# Patient Record
Sex: FEMALE | Race: BLACK OR AFRICAN AMERICAN | NOT HISPANIC OR LATINO | Employment: UNEMPLOYED | URBAN - METROPOLITAN AREA
[De-identification: names, ages, dates, MRNs, and addresses within clinical notes are randomized per-mention and may not be internally consistent; named-entity substitution may affect disease eponyms.]

---

## 2018-03-13 ENCOUNTER — OFFICE VISIT CONVERTED (OUTPATIENT)
Dept: GASTROENTEROLOGY | Facility: CLINIC | Age: 48
End: 2018-03-13
Attending: NURSE PRACTITIONER

## 2018-04-30 ENCOUNTER — CONVERSION ENCOUNTER (OUTPATIENT)
Dept: NEUROLOGY | Facility: CLINIC | Age: 48
End: 2018-04-30

## 2018-04-30 ENCOUNTER — OFFICE VISIT CONVERTED (OUTPATIENT)
Dept: NEUROSURGERY | Facility: CLINIC | Age: 48
End: 2018-04-30
Attending: PHYSICIAN ASSISTANT

## 2019-01-02 ENCOUNTER — OFFICE VISIT CONVERTED (OUTPATIENT)
Dept: CARDIOLOGY | Facility: CLINIC | Age: 49
End: 2019-01-02
Attending: INTERNAL MEDICINE

## 2019-01-02 ENCOUNTER — HOSPITAL ENCOUNTER (OUTPATIENT)
Dept: OTHER | Facility: HOSPITAL | Age: 49
Discharge: HOME | End: 2019-01-02

## 2019-01-02 LAB
ANION GAP SERPL CALC-SCNC: 21 MMOL/L (ref 8–19)
BUN SERPL-MCNC: 16 MG/DL (ref 5–25)
BUN/CREAT SERPL: 21 {RATIO} (ref 6–20)
CALCIUM SERPL-MCNC: 9.6 MG/DL (ref 8.7–10.4)
CHLORIDE SERPL-SCNC: 102 MMOL/L (ref 99–111)
CONV CO2: 24 MMOL/L (ref 22–32)
CREAT UR-MCNC: 0.78 MG/DL (ref 0.5–0.9)
GFR SERPLBLD BASED ON 1.73 SQ M-ARVRAT: >60 ML/MIN/{1.73_M2}
GLUCOSE SERPL-MCNC: 135 MG/DL (ref 65–99)
MAGNESIUM SERPL-MCNC: 1.9 MG/DL (ref 1.6–2.3)
OSMOLALITY SERPL CALC.SUM OF ELEC: 299 MOSM/KG (ref 273–304)
POTASSIUM SERPL-SCNC: 3.8 MMOL/L (ref 3.5–5.3)
SODIUM SERPL-SCNC: 143 MMOL/L (ref 135–147)

## 2019-01-03 LAB
ALBUMIN SERPL-MCNC: 3.3 G/DL (ref 2.9–4.4)
ALBUMIN/GLOB SERPL: 0.7 {RATIO} (ref 0.7–1.7)
ALPHA2 GLOB SERPL ELPH-MCNC: 1.1 G/DL (ref 0.4–1)
BETA GLOBULIN: 1.4 G/DL (ref 0.7–1.3)
CONV ALPHA-1-GLOBULIN: 0.2 G/DL (ref 0–0.4)
CONV IMMUNOGLOBULIN G (IGG): 1735 MG/DL (ref 700–1600)
CONV IMMUNOGLOBULIN M (IGM): 80 MG/DL (ref 26–217)
CONV PE INTERPRETATION: ABNORMAL
CONV PE NOTE: ABNORMAL
CONV TOTAL PROTEIN: 7.9 G/DL (ref 6–8.5)
GAMMA GLOB SERPL ELPH-MCNC: 1.9 G/DL (ref 0.4–1.8)
GLOBULIN UR ELPH-MCNC: 4.6 G/DL (ref 2.2–3.9)
IGA SERPL-MCNC: 427 MG/DL (ref 87–352)
M-SPIKE: ABNORMAL G/DL
PROT PATTERN SERPL IFE-IMP: ABNORMAL

## 2019-01-07 LAB
ALBUMIN 24H MFR UR ELPH: 18.8 %
ALPHA1 GLOB 24H MFR UR ELPH: 3.5 %
ALPHA2 GLOB 24H MFR UR ELPH: 21 %
B-GLOBULIN MFR UR ELPH: 30.2 %
CONV GAMMA GLOBULIN (URINE): 26.5 %
CONV PE 24 HR. INTERPRETATION: NORMAL
CONV PE 24HR. NOTE: NORMAL
M PROTEIN MFR UR ELPH: NORMAL %
PROT 24H UR-MRATE: 88 MG/24 HR (ref 30–150)
PROT UR-MCNC: 10 MG/DL
TOTAL VOLUME: 875 ML

## 2019-01-25 ENCOUNTER — CONVERSION ENCOUNTER (OUTPATIENT)
Dept: CARDIOLOGY | Facility: CLINIC | Age: 49
End: 2019-01-25
Attending: INTERNAL MEDICINE

## 2019-03-28 ENCOUNTER — OFFICE VISIT CONVERTED (OUTPATIENT)
Dept: GASTROENTEROLOGY | Facility: CLINIC | Age: 49
End: 2019-03-28
Attending: NURSE PRACTITIONER

## 2019-04-09 ENCOUNTER — HOSPITAL ENCOUNTER (OUTPATIENT)
Dept: OTHER | Facility: HOSPITAL | Age: 49
Discharge: HOME | End: 2019-04-09

## 2019-04-09 LAB
ALBUMIN SERPL-MCNC: 3.8 G/DL (ref 3.5–5)
ALBUMIN/GLOB SERPL: 0.9 {RATIO} (ref 1.4–2.6)
ALP SERPL-CCNC: 103 U/L (ref 42–98)
ALT SERPL-CCNC: 17 U/L (ref 10–40)
ANION GAP SERPL CALC-SCNC: 16 MMOL/L (ref 8–19)
APPEARANCE UR: ABNORMAL
AST SERPL-CCNC: 26 U/L (ref 15–50)
BASOPHILS # BLD AUTO: 0.03 10*3/UL (ref 0–0.2)
BASOPHILS NFR BLD AUTO: 0.5 % (ref 0–3)
BILIRUB SERPL-MCNC: 0.38 MG/DL (ref 0.2–1.3)
BILIRUB UR QL: NEGATIVE
BUN SERPL-MCNC: 10 MG/DL (ref 5–25)
BUN/CREAT SERPL: 13 {RATIO} (ref 6–20)
CALCIUM SERPL-MCNC: 9.5 MG/DL (ref 8.7–10.4)
CASTS URNS QL MICRO: ABNORMAL /[LPF]
CHLORIDE SERPL-SCNC: 99 MMOL/L (ref 99–111)
CHOLEST SERPL-MCNC: 196 MG/DL (ref 107–200)
CHOLEST/HDLC SERPL: 5.3 {RATIO} (ref 3–6)
COLOR UR: ABNORMAL
CONV ABS IMM GRAN: 0.01 10*3/UL (ref 0–0.2)
CONV BACTERIA: ABNORMAL
CONV CO2: 26 MMOL/L (ref 22–32)
CONV COLLECTION SOURCE (UA): ABNORMAL
CONV CREATININE URINE, RANDOM: 242 MG/DL (ref 10–300)
CONV HYALINE CASTS IN URINE MICRO: ABNORMAL /[LPF]
CONV IMMATURE GRAN: 0.2 % (ref 0–1.8)
CONV MICROALBUM.,U,RANDOM: <12 MG/L (ref 0–20)
CONV TOTAL PROTEIN: 8.2 G/DL (ref 6.3–8.2)
CONV UROBILINOGEN IN URINE BY AUTOMATED TEST STRIP: 1 {EHRLICHU}/DL (ref 0.1–1)
CREAT UR-MCNC: 0.76 MG/DL (ref 0.5–0.9)
DEPRECATED RDW RBC AUTO: 46.5 FL (ref 36.4–46.3)
EOSINOPHIL # BLD AUTO: 0.24 10*3/UL (ref 0–0.7)
EOSINOPHIL # BLD AUTO: 4.3 % (ref 0–7)
ERYTHROCYTE [DISTWIDTH] IN BLOOD BY AUTOMATED COUNT: 14.3 % (ref 11.7–14.4)
EST. AVERAGE GLUCOSE BLD GHB EST-MCNC: 117 MG/DL
GFR SERPLBLD BASED ON 1.73 SQ M-ARVRAT: >60 ML/MIN/{1.73_M2}
GLOBULIN UR ELPH-MCNC: 4.4 G/DL (ref 2–3.5)
GLUCOSE SERPL-MCNC: 172 MG/DL (ref 65–99)
GLUCOSE UR QL: NEGATIVE MG/DL
HBA1C MFR BLD: 12.2 G/DL (ref 12–16)
HBA1C MFR BLD: 5.7 % (ref 3.5–5.7)
HCT VFR BLD AUTO: 38.7 % (ref 37–47)
HDLC SERPL-MCNC: 37 MG/DL (ref 40–60)
HGB UR QL STRIP: NEGATIVE
KETONES UR QL STRIP: NEGATIVE MG/DL
LDLC SERPL CALC-MCNC: 101 MG/DL (ref 70–100)
LEUKOCYTE ESTERASE UR QL STRIP: NEGATIVE
LYMPHOCYTES # BLD AUTO: 1.54 10*3/UL (ref 1–5)
MCH RBC QN AUTO: 27.9 PG (ref 27–31)
MCHC RBC AUTO-ENTMCNC: 31.5 G/DL (ref 33–37)
MCV RBC AUTO: 88.4 FL (ref 81–99)
MICROALBUMIN/CREAT UR: 5 MG/G{CRE} (ref 0–35)
MONOCYTES # BLD AUTO: 0.35 10*3/UL (ref 0.2–1.2)
MONOCYTES NFR BLD AUTO: 6.3 % (ref 3–10)
NEUTROPHILS # BLD AUTO: 3.35 10*3/UL (ref 2–8)
NEUTROPHILS NFR BLD AUTO: 60.8 % (ref 30–85)
NITRITE UR QL STRIP: NEGATIVE
NRBC CBCN: 0 % (ref 0–0.7)
OSMOLALITY SERPL CALC.SUM OF ELEC: 289 MOSM/KG (ref 273–304)
PH UR STRIP.AUTO: 5.5 [PH] (ref 5–8)
PLATELET # BLD AUTO: 242 10*3/UL (ref 130–400)
PMV BLD AUTO: 11.1 FL (ref 9.4–12.3)
POTASSIUM SERPL-SCNC: 3.4 MMOL/L (ref 3.5–5.3)
PROT UR QL: NEGATIVE MG/DL
RBC # BLD AUTO: 4.38 10*6/UL (ref 4.2–5.4)
RBC #/AREA URNS HPF: ABNORMAL /[HPF]
SODIUM SERPL-SCNC: 138 MMOL/L (ref 135–147)
SP GR UR: 1.02 (ref 1–1.03)
SQUAMOUS SPT QL MICRO: ABNORMAL /[HPF]
T4 FREE SERPL-MCNC: 1 NG/DL (ref 0.9–1.8)
TRIGL SERPL-MCNC: 290 MG/DL (ref 40–150)
TSH SERPL-ACNC: 2.06 M[IU]/L (ref 0.27–4.2)
VARIANT LYMPHS NFR BLD MANUAL: 27.9 % (ref 20–45)
VLDLC SERPL-MCNC: 58 MG/DL (ref 5–37)
WBC # BLD AUTO: 5.52 10*3/UL (ref 4.8–10.8)
WBC #/AREA URNS HPF: ABNORMAL /[HPF]

## 2019-04-26 ENCOUNTER — HOSPITAL ENCOUNTER (OUTPATIENT)
Dept: GASTROENTEROLOGY | Facility: HOSPITAL | Age: 49
Setting detail: HOSPITAL OUTPATIENT SURGERY
Discharge: HOME | End: 2019-04-26
Attending: INTERNAL MEDICINE

## 2019-04-26 LAB — GLUCOSE BLD-MCNC: 118 MG/DL (ref 65–99)

## 2019-05-15 ENCOUNTER — HOSPITAL ENCOUNTER (OUTPATIENT)
Dept: GASTROENTEROLOGY | Facility: HOSPITAL | Age: 49
Setting detail: HOSPITAL OUTPATIENT SURGERY
Discharge: HOME | End: 2019-05-15
Attending: INTERNAL MEDICINE

## 2019-06-17 ENCOUNTER — OFFICE VISIT CONVERTED (OUTPATIENT)
Dept: GASTROENTEROLOGY | Facility: CLINIC | Age: 49
End: 2019-06-17
Attending: NURSE PRACTITIONER

## 2019-07-03 ENCOUNTER — CONVERSION ENCOUNTER (OUTPATIENT)
Dept: SURGERY | Facility: CLINIC | Age: 49
End: 2019-07-03

## 2019-07-03 ENCOUNTER — OFFICE VISIT CONVERTED (OUTPATIENT)
Dept: SURGERY | Facility: CLINIC | Age: 49
End: 2019-07-03
Attending: SURGERY

## 2019-08-19 ENCOUNTER — HOSPITAL ENCOUNTER (OUTPATIENT)
Dept: OTHER | Facility: HOSPITAL | Age: 49
Discharge: HOME | End: 2019-08-19

## 2019-08-19 LAB
ALBUMIN SERPL-MCNC: 3.7 G/DL (ref 3.5–5)
ALBUMIN/GLOB SERPL: 0.9 {RATIO} (ref 1.4–2.6)
ALP SERPL-CCNC: 111 U/L (ref 42–98)
ALT SERPL-CCNC: 20 U/L (ref 10–40)
ANION GAP SERPL CALC-SCNC: 23 MMOL/L (ref 8–19)
AST SERPL-CCNC: 49 U/L (ref 15–50)
BASOPHILS # BLD AUTO: 0.02 10*3/UL (ref 0–0.2)
BASOPHILS NFR BLD AUTO: 0.4 % (ref 0–3)
BILIRUB SERPL-MCNC: 0.41 MG/DL (ref 0.2–1.3)
BUN SERPL-MCNC: 9 MG/DL (ref 5–25)
BUN/CREAT SERPL: 14 {RATIO} (ref 6–20)
CALCIUM SERPL-MCNC: 9.7 MG/DL (ref 8.7–10.4)
CHLORIDE SERPL-SCNC: 103 MMOL/L (ref 99–111)
CHOLEST SERPL-MCNC: 181 MG/DL (ref 107–200)
CHOLEST/HDLC SERPL: 5.7 {RATIO} (ref 3–6)
CONV ABS IMM GRAN: 0.01 10*3/UL (ref 0–0.2)
CONV CO2: 22 MMOL/L (ref 22–32)
CONV IMMATURE GRAN: 0.2 % (ref 0–1.8)
CONV TOTAL PROTEIN: 7.9 G/DL (ref 6.3–8.2)
CREAT UR-MCNC: 0.63 MG/DL (ref 0.5–0.9)
DEPRECATED RDW RBC AUTO: 47.1 FL (ref 36.4–46.3)
EOSINOPHIL # BLD AUTO: 0.18 10*3/UL (ref 0–0.7)
EOSINOPHIL # BLD AUTO: 3.4 % (ref 0–7)
ERYTHROCYTE [DISTWIDTH] IN BLOOD BY AUTOMATED COUNT: 14.7 % (ref 11.7–14.4)
EST. AVERAGE GLUCOSE BLD GHB EST-MCNC: 163 MG/DL
GFR SERPLBLD BASED ON 1.73 SQ M-ARVRAT: >60 ML/MIN/{1.73_M2}
GLOBULIN UR ELPH-MCNC: 4.2 G/DL (ref 2–3.5)
GLUCOSE SERPL-MCNC: 192 MG/DL (ref 65–99)
HBA1C MFR BLD: 7.3 % (ref 3.5–5.7)
HCT VFR BLD AUTO: 37.3 % (ref 37–47)
HDLC SERPL-MCNC: 32 MG/DL (ref 40–60)
HGB BLD-MCNC: 12.2 G/DL (ref 12–16)
LDLC SERPL CALC-MCNC: 97 MG/DL (ref 70–100)
LYMPHOCYTES # BLD AUTO: 1.33 10*3/UL (ref 1–5)
LYMPHOCYTES NFR BLD AUTO: 25.2 % (ref 20–45)
MCH RBC QN AUTO: 28.7 PG (ref 27–31)
MCHC RBC AUTO-ENTMCNC: 32.7 G/DL (ref 33–37)
MCV RBC AUTO: 87.8 FL (ref 81–99)
MONOCYTES # BLD AUTO: 0.26 10*3/UL (ref 0.2–1.2)
MONOCYTES NFR BLD AUTO: 4.9 % (ref 3–10)
NEUTROPHILS # BLD AUTO: 3.47 10*3/UL (ref 2–8)
NEUTROPHILS NFR BLD AUTO: 65.9 % (ref 30–85)
NRBC CBCN: 0 % (ref 0–0.7)
OSMOLALITY SERPL CALC.SUM OF ELEC: 302 MOSM/KG (ref 273–304)
PLATELET # BLD AUTO: 226 10*3/UL (ref 130–400)
PMV BLD AUTO: 11.1 FL (ref 9.4–12.3)
POTASSIUM SERPL-SCNC: 3.8 MMOL/L (ref 3.5–5.3)
RBC # BLD AUTO: 4.25 10*6/UL (ref 4.2–5.4)
SODIUM SERPL-SCNC: 144 MMOL/L (ref 135–147)
TRIGL SERPL-MCNC: 260 MG/DL (ref 40–150)
VLDLC SERPL-MCNC: 52 MG/DL (ref 5–37)
WBC # BLD AUTO: 5.27 10*3/UL (ref 4.8–10.8)

## 2019-08-20 LAB
ALBUMIN SERPL-MCNC: 2.9 G/DL (ref 2.9–4.4)
ALBUMIN/GLOB SERPL: 0.6 {RATIO} (ref 0.7–1.7)
ALPHA2 GLOB SERPL ELPH-MCNC: 1 G/DL (ref 0.4–1)
BETA GLOBULIN: 1.5 G/DL (ref 0.7–1.3)
CONV ALPHA-1-GLOBULIN: 0.2 G/DL (ref 0–0.4)
CONV IMMUNOGLOBULIN G (IGG): 1802 MG/DL (ref 700–1600)
CONV IMMUNOGLOBULIN M (IGM): 64 MG/DL (ref 26–217)
CONV PE INTERPRETATION: ABNORMAL
CONV PE NOTE: ABNORMAL
CONV TOTAL PROTEIN: 7.4 G/DL (ref 6–8.5)
GAMMA GLOB SERPL ELPH-MCNC: 1.7 G/DL (ref 0.4–1.8)
GLOBULIN UR ELPH-MCNC: 4.5 G/DL (ref 2.2–3.9)
IGA SERPL-MCNC: 425 MG/DL (ref 87–352)
M-SPIKE: ABNORMAL G/DL
PROT PATTERN SERPL IFE-IMP: ABNORMAL

## 2019-09-20 ENCOUNTER — HOSPITAL ENCOUNTER (OUTPATIENT)
Dept: URGENT CARE | Facility: CLINIC | Age: 49
Discharge: HOME | End: 2019-09-20

## 2019-10-05 ENCOUNTER — HOSPITAL ENCOUNTER (OUTPATIENT)
Dept: URGENT CARE | Facility: CLINIC | Age: 49
Discharge: HOME | End: 2019-10-05
Attending: PHYSICIAN ASSISTANT

## 2019-10-05 LAB — GLUCOSE BLD-MCNC: 465 MG/DL (ref 65–99)

## 2019-12-08 ENCOUNTER — HOSPITAL ENCOUNTER (OUTPATIENT)
Dept: URGENT CARE | Facility: CLINIC | Age: 49
Discharge: HOME | End: 2019-12-08

## 2021-05-15 VITALS — RESPIRATION RATE: 16 BRPM | HEIGHT: 68 IN | BODY MASS INDEX: 33.08 KG/M2 | WEIGHT: 218.25 LBS

## 2021-05-15 VITALS
BODY MASS INDEX: 40.09 KG/M2 | HEIGHT: 63 IN | SYSTOLIC BLOOD PRESSURE: 158 MMHG | WEIGHT: 226.25 LBS | DIASTOLIC BLOOD PRESSURE: 104 MMHG

## 2021-05-15 VITALS
SYSTOLIC BLOOD PRESSURE: 130 MMHG | DIASTOLIC BLOOD PRESSURE: 82 MMHG | BODY MASS INDEX: 38.71 KG/M2 | WEIGHT: 218.5 LBS | HEIGHT: 63 IN

## 2021-05-16 VITALS — HEART RATE: 95 BPM | BODY MASS INDEX: 41.22 KG/M2 | HEIGHT: 62 IN | WEIGHT: 224 LBS

## 2021-05-16 VITALS
SYSTOLIC BLOOD PRESSURE: 114 MMHG | HEART RATE: 76 BPM | HEIGHT: 63 IN | DIASTOLIC BLOOD PRESSURE: 88 MMHG | BODY MASS INDEX: 40.4 KG/M2 | WEIGHT: 228 LBS

## 2021-05-16 VITALS
DIASTOLIC BLOOD PRESSURE: 91 MMHG | SYSTOLIC BLOOD PRESSURE: 134 MMHG | HEIGHT: 62 IN | WEIGHT: 221.12 LBS | BODY MASS INDEX: 40.69 KG/M2

## 2021-06-24 PROBLEM — I10 HYPERTENSION: Status: ACTIVE | Noted: 2021-06-24

## 2021-06-24 PROBLEM — E78.5 HYPERLIPEMIA: Status: ACTIVE | Noted: 2021-06-24

## 2021-06-24 PROBLEM — E78.2 MIXED HYPERLIPIDEMIA: Status: ACTIVE | Noted: 2021-06-24

## 2021-06-24 PROBLEM — I51.7 MILD LEFT VENTRICULAR HYPERTROPHY: Status: ACTIVE | Noted: 2021-06-24

## 2021-06-24 PROBLEM — R07.89 ATYPICAL CHEST PAIN: Status: ACTIVE | Noted: 2021-06-24

## 2021-08-11 ENCOUNTER — TRANSCRIBE ORDERS (OUTPATIENT)
Dept: ADMINISTRATIVE | Facility: HOSPITAL | Age: 51
End: 2021-08-11

## 2021-08-11 DIAGNOSIS — Z12.31 VISIT FOR SCREENING MAMMOGRAM: Primary | ICD-10-CM

## 2021-08-18 ENCOUNTER — APPOINTMENT (OUTPATIENT)
Dept: MAMMOGRAPHY | Facility: HOSPITAL | Age: 51
End: 2021-08-18

## 2021-12-27 ENCOUNTER — LAB (OUTPATIENT)
Dept: LAB | Facility: HOSPITAL | Age: 51
End: 2021-12-27

## 2021-12-27 ENCOUNTER — TRANSCRIBE ORDERS (OUTPATIENT)
Dept: ADMINISTRATIVE | Facility: HOSPITAL | Age: 51
End: 2021-12-27

## 2021-12-27 DIAGNOSIS — I10 ESSENTIAL HYPERTENSION: Primary | ICD-10-CM

## 2021-12-27 DIAGNOSIS — E11.9 TYPE 2 DIABETES MELLITUS WITHOUT COMPLICATION, UNSPECIFIED WHETHER LONG TERM INSULIN USE (HCC): ICD-10-CM

## 2021-12-27 DIAGNOSIS — I10 ESSENTIAL HYPERTENSION: ICD-10-CM

## 2021-12-27 LAB
ALBUMIN SERPL-MCNC: 3.7 G/DL (ref 3.5–5.2)
ALBUMIN/GLOB SERPL: 0.9 G/DL
ALP SERPL-CCNC: 115 U/L (ref 39–117)
ALT SERPL W P-5'-P-CCNC: 18 U/L (ref 1–33)
ANION GAP SERPL CALCULATED.3IONS-SCNC: 9 MMOL/L (ref 5–15)
AST SERPL-CCNC: 32 U/L (ref 1–32)
BASOPHILS # BLD AUTO: 0.02 10*3/MM3 (ref 0–0.2)
BASOPHILS NFR BLD AUTO: 0.3 % (ref 0–1.5)
BILIRUB SERPL-MCNC: 0.5 MG/DL (ref 0–1.2)
BUN SERPL-MCNC: 13 MG/DL (ref 6–20)
BUN/CREAT SERPL: 20 (ref 7–25)
CALCIUM SPEC-SCNC: 8.9 MG/DL (ref 8.6–10.5)
CHLORIDE SERPL-SCNC: 105 MMOL/L (ref 98–107)
CHOLEST SERPL-MCNC: 177 MG/DL (ref 0–200)
CO2 SERPL-SCNC: 28 MMOL/L (ref 22–29)
CREAT SERPL-MCNC: 0.65 MG/DL (ref 0.57–1)
DEPRECATED RDW RBC AUTO: 53.8 FL (ref 37–54)
EOSINOPHIL # BLD AUTO: 0.14 10*3/MM3 (ref 0–0.4)
EOSINOPHIL NFR BLD AUTO: 2.4 % (ref 0.3–6.2)
ERYTHROCYTE [DISTWIDTH] IN BLOOD BY AUTOMATED COUNT: 15.4 % (ref 12.3–15.4)
GFR SERPL CREATININE-BSD FRML MDRD: 116 ML/MIN/1.73
GLOBULIN UR ELPH-MCNC: 3.9 GM/DL
GLUCOSE SERPL-MCNC: 147 MG/DL (ref 65–99)
HBA1C MFR BLD: 6.39 % (ref 4.8–5.6)
HCT VFR BLD AUTO: 31.7 % (ref 34–46.6)
HDLC SERPL-MCNC: 31 MG/DL (ref 40–60)
HGB BLD-MCNC: 11.2 G/DL (ref 12–15.9)
IMM GRANULOCYTES # BLD AUTO: 0.03 10*3/MM3 (ref 0–0.05)
IMM GRANULOCYTES NFR BLD AUTO: 0.5 % (ref 0–0.5)
LDLC SERPL CALC-MCNC: 105 MG/DL (ref 0–100)
LDLC/HDLC SERPL: 3.19 {RATIO}
LYMPHOCYTES # BLD AUTO: 1.33 10*3/MM3 (ref 0.7–3.1)
LYMPHOCYTES NFR BLD AUTO: 22.7 % (ref 19.6–45.3)
MCH RBC QN AUTO: 34.5 PG (ref 26.6–33)
MCHC RBC AUTO-ENTMCNC: 35.3 G/DL (ref 31.5–35.7)
MCV RBC AUTO: 97.5 FL (ref 79–97)
MONOCYTES # BLD AUTO: 0.39 10*3/MM3 (ref 0.1–0.9)
MONOCYTES NFR BLD AUTO: 6.7 % (ref 5–12)
NEUTROPHILS NFR BLD AUTO: 3.94 10*3/MM3 (ref 1.7–7)
NEUTROPHILS NFR BLD AUTO: 67.4 % (ref 42.7–76)
NRBC BLD AUTO-RTO: 0 /100 WBC (ref 0–0.2)
PLATELET # BLD AUTO: 245 10*3/MM3 (ref 140–450)
PMV BLD AUTO: 10.8 FL (ref 6–12)
POTASSIUM SERPL-SCNC: 3.9 MMOL/L (ref 3.5–5.2)
PROT SERPL-MCNC: 7.6 G/DL (ref 6–8.5)
RBC # BLD AUTO: 3.25 10*6/MM3 (ref 3.77–5.28)
SODIUM SERPL-SCNC: 142 MMOL/L (ref 136–145)
TRIGL SERPL-MCNC: 235 MG/DL (ref 0–150)
VLDLC SERPL-MCNC: 41 MG/DL (ref 5–40)
WBC NRBC COR # BLD: 5.85 10*3/MM3 (ref 3.4–10.8)

## 2021-12-27 PROCEDURE — 36415 COLL VENOUS BLD VENIPUNCTURE: CPT

## 2021-12-27 PROCEDURE — 83036 HEMOGLOBIN GLYCOSYLATED A1C: CPT

## 2021-12-27 PROCEDURE — 80053 COMPREHEN METABOLIC PANEL: CPT

## 2021-12-27 PROCEDURE — 85025 COMPLETE CBC W/AUTO DIFF WBC: CPT

## 2021-12-27 PROCEDURE — 80061 LIPID PANEL: CPT

## 2022-02-05 ENCOUNTER — APPOINTMENT (OUTPATIENT)
Dept: GENERAL RADIOLOGY | Facility: HOSPITAL | Age: 52
End: 2022-02-05

## 2022-02-05 ENCOUNTER — HOSPITAL ENCOUNTER (EMERGENCY)
Facility: HOSPITAL | Age: 52
Discharge: HOME | End: 2022-02-05
Attending: EMERGENCY MEDICINE | Admitting: EMERGENCY MEDICINE

## 2022-02-05 VITALS
OXYGEN SATURATION: 100 % | TEMPERATURE: 98 F | DIASTOLIC BLOOD PRESSURE: 89 MMHG | BODY MASS INDEX: 33.15 KG/M2 | WEIGHT: 218.7 LBS | HEART RATE: 93 BPM | SYSTOLIC BLOOD PRESSURE: 121 MMHG | HEIGHT: 68 IN | RESPIRATION RATE: 22 BRPM

## 2022-02-05 DIAGNOSIS — E87.6 HYPOKALEMIA: ICD-10-CM

## 2022-02-05 DIAGNOSIS — K21.9 CHEST PAIN DUE TO GERD: Primary | ICD-10-CM

## 2022-02-05 DIAGNOSIS — R07.9 CHEST PAIN DUE TO GERD: Primary | ICD-10-CM

## 2022-02-05 LAB
ALBUMIN SERPL-MCNC: 3.4 G/DL (ref 3.5–5.2)
ALBUMIN/GLOB SERPL: 0.9 G/DL
ALP SERPL-CCNC: 119 U/L (ref 39–117)
ALT SERPL W P-5'-P-CCNC: 16 U/L (ref 1–33)
ANION GAP SERPL CALCULATED.3IONS-SCNC: 12.1 MMOL/L (ref 5–15)
AST SERPL-CCNC: 36 U/L (ref 1–32)
BASOPHILS # BLD AUTO: 0.03 10*3/MM3 (ref 0–0.2)
BASOPHILS NFR BLD AUTO: 0.7 % (ref 0–1.5)
BILIRUB SERPL-MCNC: 0.3 MG/DL (ref 0–1.2)
BUN SERPL-MCNC: 11 MG/DL (ref 6–20)
BUN/CREAT SERPL: 19 (ref 7–25)
CALCIUM SPEC-SCNC: 8.4 MG/DL (ref 8.6–10.5)
CHLORIDE SERPL-SCNC: 105 MMOL/L (ref 98–107)
CK MB SERPL-CCNC: <1 NG/ML
CK SERPL-CCNC: 42 U/L (ref 20–180)
CO2 SERPL-SCNC: 21.9 MMOL/L (ref 22–29)
CREAT SERPL-MCNC: 0.58 MG/DL (ref 0.57–1)
DEPRECATED RDW RBC AUTO: 56.1 FL (ref 37–54)
EOSINOPHIL # BLD AUTO: 0.14 10*3/MM3 (ref 0–0.4)
EOSINOPHIL NFR BLD AUTO: 3.3 % (ref 0.3–6.2)
ERYTHROCYTE [DISTWIDTH] IN BLOOD BY AUTOMATED COUNT: 15 % (ref 12.3–15.4)
GFR SERPL CREATININE-BSD FRML MDRD: 133 ML/MIN/1.73
GLOBULIN UR ELPH-MCNC: 3.8 GM/DL
GLUCOSE SERPL-MCNC: 154 MG/DL (ref 65–99)
HCT VFR BLD AUTO: 32.3 % (ref 34–46.6)
HGB BLD-MCNC: 11.1 G/DL (ref 12–15.9)
HOLD SPECIMEN: NORMAL
IMM GRANULOCYTES # BLD AUTO: 0.01 10*3/MM3 (ref 0–0.05)
IMM GRANULOCYTES NFR BLD AUTO: 0.2 % (ref 0–0.5)
LIPASE SERPL-CCNC: 23 U/L (ref 13–60)
LYMPHOCYTES # BLD AUTO: 1.27 10*3/MM3 (ref 0.7–3.1)
LYMPHOCYTES NFR BLD AUTO: 29.9 % (ref 19.6–45.3)
MAGNESIUM SERPL-MCNC: 1.7 MG/DL (ref 1.6–2.6)
MCH RBC QN AUTO: 35.1 PG (ref 26.6–33)
MCHC RBC AUTO-ENTMCNC: 34.4 G/DL (ref 31.5–35.7)
MCV RBC AUTO: 102.2 FL (ref 79–97)
MONOCYTES # BLD AUTO: 0.22 10*3/MM3 (ref 0.1–0.9)
MONOCYTES NFR BLD AUTO: 5.2 % (ref 5–12)
NEUTROPHILS NFR BLD AUTO: 2.58 10*3/MM3 (ref 1.7–7)
NEUTROPHILS NFR BLD AUTO: 60.7 % (ref 42.7–76)
NRBC BLD AUTO-RTO: 0 /100 WBC (ref 0–0.2)
NT-PROBNP SERPL-MCNC: 13.5 PG/ML (ref 0–900)
PLATELET # BLD AUTO: 228 10*3/MM3 (ref 140–450)
PMV BLD AUTO: 10.4 FL (ref 6–12)
POTASSIUM SERPL-SCNC: 3.1 MMOL/L (ref 3.5–5.2)
PROT SERPL-MCNC: 7.2 G/DL (ref 6–8.5)
QT INTERVAL: 414 MS
RBC # BLD AUTO: 3.16 10*6/MM3 (ref 3.77–5.28)
SODIUM SERPL-SCNC: 139 MMOL/L (ref 136–145)
TROPONIN I SERPL-MCNC: 0 NG/ML (ref 0–0.6)
TROPONIN I SERPL-MCNC: 0 NG/ML (ref 0–0.6)
WBC NRBC COR # BLD: 4.25 10*3/MM3 (ref 3.4–10.8)
WHOLE BLOOD HOLD SPECIMEN: NORMAL
WHOLE BLOOD HOLD SPECIMEN: NORMAL

## 2022-02-05 PROCEDURE — 99284 EMERGENCY DEPT VISIT MOD MDM: CPT

## 2022-02-05 PROCEDURE — 82553 CREATINE MB FRACTION: CPT

## 2022-02-05 PROCEDURE — 85025 COMPLETE CBC W/AUTO DIFF WBC: CPT

## 2022-02-05 PROCEDURE — 93005 ELECTROCARDIOGRAM TRACING: CPT

## 2022-02-05 PROCEDURE — 71045 X-RAY EXAM CHEST 1 VIEW: CPT

## 2022-02-05 PROCEDURE — 84484 ASSAY OF TROPONIN QUANT: CPT

## 2022-02-05 PROCEDURE — 83735 ASSAY OF MAGNESIUM: CPT

## 2022-02-05 PROCEDURE — 25010000002 MORPHINE PER 10 MG: Performed by: EMERGENCY MEDICINE

## 2022-02-05 PROCEDURE — 96374 THER/PROPH/DIAG INJ IV PUSH: CPT

## 2022-02-05 PROCEDURE — 83690 ASSAY OF LIPASE: CPT

## 2022-02-05 PROCEDURE — 96375 TX/PRO/DX INJ NEW DRUG ADDON: CPT

## 2022-02-05 PROCEDURE — 83880 ASSAY OF NATRIURETIC PEPTIDE: CPT

## 2022-02-05 PROCEDURE — 80053 COMPREHEN METABOLIC PANEL: CPT

## 2022-02-05 PROCEDURE — 93010 ELECTROCARDIOGRAM REPORT: CPT | Performed by: SPECIALIST

## 2022-02-05 PROCEDURE — 25010000002 ONDANSETRON PER 1 MG: Performed by: EMERGENCY MEDICINE

## 2022-02-05 PROCEDURE — 82550 ASSAY OF CK (CPK): CPT

## 2022-02-05 PROCEDURE — 93005 ELECTROCARDIOGRAM TRACING: CPT | Performed by: EMERGENCY MEDICINE

## 2022-02-05 RX ORDER — SODIUM CHLORIDE 0.9 % (FLUSH) 0.9 %
10 SYRINGE (ML) INJECTION AS NEEDED
Status: DISCONTINUED | OUTPATIENT
Start: 2022-02-05 | End: 2022-02-05 | Stop reason: HOSPADM

## 2022-02-05 RX ORDER — POTASSIUM CHLORIDE 750 MG/1
20 CAPSULE, EXTENDED RELEASE ORAL ONCE
Status: COMPLETED | OUTPATIENT
Start: 2022-02-05 | End: 2022-02-05

## 2022-02-05 RX ORDER — POTASSIUM CHLORIDE 750 MG/1
10 TABLET, FILM COATED, EXTENDED RELEASE ORAL 2 TIMES DAILY
Qty: 14 TABLET | Refills: 0 | Status: SHIPPED | OUTPATIENT
Start: 2022-02-05 | End: 2022-07-19

## 2022-02-05 RX ORDER — ONDANSETRON 2 MG/ML
4 INJECTION INTRAMUSCULAR; INTRAVENOUS ONCE
Status: COMPLETED | OUTPATIENT
Start: 2022-02-05 | End: 2022-02-05

## 2022-02-05 RX ORDER — ALUMINA, MAGNESIA, AND SIMETHICONE 2400; 2400; 240 MG/30ML; MG/30ML; MG/30ML
15 SUSPENSION ORAL ONCE
Status: COMPLETED | OUTPATIENT
Start: 2022-02-05 | End: 2022-02-05

## 2022-02-05 RX ORDER — LIDOCAINE HYDROCHLORIDE 20 MG/ML
15 SOLUTION OROPHARYNGEAL ONCE
Status: COMPLETED | OUTPATIENT
Start: 2022-02-05 | End: 2022-02-05

## 2022-02-05 RX ADMIN — ONDANSETRON 4 MG: 2 INJECTION INTRAMUSCULAR; INTRAVENOUS at 10:37

## 2022-02-05 RX ADMIN — MORPHINE SULFATE 4 MG: 4 INJECTION, SOLUTION INTRAMUSCULAR; INTRAVENOUS at 10:39

## 2022-02-05 RX ADMIN — POTASSIUM CHLORIDE 20 MEQ: 10 CAPSULE, COATED, EXTENDED RELEASE ORAL at 08:50

## 2022-02-05 RX ADMIN — ALUMINUM HYDROXIDE, MAGNESIUM HYDROXIDE, AND DIMETHICONE 15 ML: 400; 400; 40 SUSPENSION ORAL at 08:51

## 2022-02-05 RX ADMIN — LIDOCAINE HYDROCHLORIDE 15 ML: 20 SOLUTION ORAL; TOPICAL at 08:53

## 2022-02-05 NOTE — ED PROVIDER NOTES
"Salvador Abarca is a 51 y.o. female that presents to the ED via EMS with complaints of CP that started last night. The pain is still present and has been constant. Nothing improves or worsens symptoms. The CP is located to the center and left-side of the chest and radiates down her arms. The pain is described as \"prickly\". Pt notes that she took Seroquel before the pain started, noting that this is not a new medication.     Pt c/o nausea. She reports lower back pain stating that she does have back issues normally. Pt denies cough and SOB. She denies any illness over the last month until today.     Pt denies hx of cardiac stents and MI. 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 Pt has hx of GERD and takes omeprazole. She adds that she is anemic and receives B12 shots.       History provided by:  Patient      Review of Systems   Constitutional: Negative for chills, diaphoresis and fever.   HENT: Negative for ear discharge and nosebleeds.    Eyes: Negative for photophobia.   Respiratory: Negative for cough and shortness of breath.    Cardiovascular: Positive for chest pain. Negative for leg swelling.   Gastrointestinal: Negative for diarrhea, nausea and vomiting.   Genitourinary: Negative for dysuria.   Musculoskeletal: Positive for back pain (lower back pain). Negative for neck pain.   Skin: Negative for rash.   Neurological: Negative for headaches.   All other systems reviewed and are negative.      Past Medical History:   Diagnosis Date   • Anemia    • Arthritis    • Diabetes (HCC)    • Essential hypertension 6/24/2021   • History of pulmonary embolism    • Lumbago     Low back pain   • Mild left ventricular hypertrophy 6/24/2021   • Mixed hyperlipidemia 6/24/2021   • Obstructive sleep apnea    • Reflux esophagitis    • Seasonal allergies        Allergies   Allergen Reactions   • Tramadol Hcl Anaphylaxis   • Sulfa Antibiotics Unknown - Low Severity "       Past Surgical History:   Procedure Laterality Date   • APPENDECTOMY     •  SECTION      , , ,    • COLONOSCOPY      2019    • ENDOSCOPY  2019   • GASTRIC SLEEVE LAPAROSCOPIC      Lap Band Surgery   • HERNIA REPAIR      , , ,    • HYSTERECTOMY     • OTHER SURGICAL HISTORY      Metal implants       Family History   Problem Relation Age of Onset   • Heart disease Mother    • Diabetes Mother         Unspecified type   • Arthritis Mother    • Heart disease Father    • Diabetes Son         Unspecified type       Social History     Socioeconomic History   • Marital status:    Tobacco Use   • Smoking status: Current Every Day Smoker     Packs/day: 0.50   • Tobacco comment: Smoked 11-20 years   Substance and Sexual Activity   • Alcohol use: Yes     Comment: Occasionally drinks, less than 1 drink per day, has been drinking for less than 1 year   • Drug use: Never         Objective   Physical Exam  Vitals and nursing note reviewed.   Constitutional:       General: She is not in acute distress.     Comments: Appears mildly uncomfortable.    HENT:      Head: Normocephalic and atraumatic.      Nose: Nose normal.   Eyes:      General: No scleral icterus.  Cardiovascular:      Rate and Rhythm: Normal rate and regular rhythm.      Pulses:           Radial pulses are 2+ on the right side and 2+ on the left side.      Heart sounds: Normal heart sounds.   Pulmonary:      Effort: Pulmonary effort is normal. No respiratory distress.      Breath sounds: Normal breath sounds.   Abdominal:      Palpations: Abdomen is soft.      Tenderness: There is no abdominal tenderness.   Musculoskeletal:         General: Normal range of motion.      Cervical back: Neck supple.      Right lower leg: No edema.      Left lower leg: No edema.      Comments: No calf tenderness.    Skin:     General: Skin is warm and dry.   Neurological:      General: No focal deficit present.      Mental  Status: She is alert and oriented to person, place, and time.      Sensory: No sensory deficit.      Motor: No weakness.          Procedures         ED Course  ED Course as of 02/05/22 1630   Sat Feb 05, 2022   0715 EKG: Sinus tachycardia with a rate of 100. Normal P waves. Q waves in leads III and aVF. No ST elevation. Normal T waves and ST. No acute ischemia.Prolonged QTc 533 ms. Unchanged from previous EKG in 05/2021. Interpreted by me.  [KJ]      ED Course User Index  [KJ] Priya Wadr                                            [facility]    For my differential diagnosis of this patient's chest pain I considered acute coronary syndrome, pulmonary embolism, musculoskeletal chest wall pain, acid reflux with esophagitis, thoracic muscle strain, or anxiety induced chest pain.      This patient is a 51-year-old female presenting with some chest pain and dizziness this morning.    She does not have any known history of MI or cardiac stent.    She does have history of reflux and she points to the lower substernal or upper epigastric area with symptoms.    EKG showed no acute ischemia here and both sets of cardiac enzymes were negative.    I gave her some morphine and GI cocktail with some good relief of her symptoms here.    She has good pulses and no widened mediastinum and I have low suspicion for any aortic dissection she is only 51 years old.    I am asking her to follow-up closely with her doctor Monday morning regarding these chest pains that she may warrant further evaluation with stress testing.      Final diagnoses:   Chest pain due to GERD   Hypokalemia       Documentation assistance provided by juan jose Ward.  Information recorded by the adeibsarah was done at my direction and has been verified and validated by me.     Priya Ward  02/05/22 0729       Walter Khan MD  02/05/22 1630

## 2022-02-05 NOTE — DISCHARGE INSTRUCTIONS
Your EKG today looked okay and both sets of heart enzymes came back perfect, and no signs of a heart attack or anything life-threatening were found.    You did have some abnormality on your chest x-ray that looked like leftover Covid pneumonia even though you have not been sick or coughing much.    Your white blood cell count was normal and no fever today and no signs of bacterial infection.    Please follow-up with your doctor and call the office for an appointment regarding these chest pains, as they may want to send you for a stress test of the heart.    Your potassium levels were a bit too low so I called in prescription for some potassium pills.

## 2022-02-07 ENCOUNTER — TRANSCRIBE ORDERS (OUTPATIENT)
Dept: ADMINISTRATIVE | Facility: HOSPITAL | Age: 52
End: 2022-02-07

## 2022-02-07 DIAGNOSIS — J18.9 MULTIFOCAL PNEUMONIA: Primary | ICD-10-CM

## 2022-02-23 ENCOUNTER — APPOINTMENT (OUTPATIENT)
Dept: CT IMAGING | Facility: HOSPITAL | Age: 52
End: 2022-02-23

## 2022-03-15 ENCOUNTER — HOSPITAL ENCOUNTER (OUTPATIENT)
Dept: CT IMAGING | Facility: HOSPITAL | Age: 52
Discharge: HOME | End: 2022-03-15
Admitting: NURSE PRACTITIONER

## 2022-03-15 DIAGNOSIS — J18.9 MULTIFOCAL PNEUMONIA: ICD-10-CM

## 2022-03-15 PROCEDURE — 71260 CT THORAX DX C+: CPT

## 2022-03-15 PROCEDURE — 0 IOPAMIDOL PER 1 ML: Performed by: NURSE PRACTITIONER

## 2022-03-15 RX ADMIN — IOPAMIDOL 100 ML: 755 INJECTION, SOLUTION INTRAVENOUS at 14:36

## 2022-06-27 ENCOUNTER — CLINICAL SUPPORT (OUTPATIENT)
Dept: GASTROENTEROLOGY | Facility: CLINIC | Age: 52
End: 2022-06-27

## 2022-06-27 ENCOUNTER — PREP FOR SURGERY (OUTPATIENT)
Dept: OTHER | Facility: HOSPITAL | Age: 52
End: 2022-06-27

## 2022-06-27 DIAGNOSIS — Z12.11 SCREENING FOR COLON CANCER: Primary | ICD-10-CM

## 2022-06-27 RX ORDER — IBUPROFEN 400 MG/1
TABLET ORAL
COMMUNITY
End: 2022-07-19

## 2022-06-27 RX ORDER — PREDNISONE 20 MG/1
TABLET ORAL
COMMUNITY
End: 2022-07-19

## 2022-06-27 RX ORDER — MAGNESIUM OXIDE 400 MG/1
TABLET ORAL
Status: ON HOLD | COMMUNITY
End: 2022-07-25

## 2022-06-27 RX ORDER — ALPRAZOLAM 1 MG/1
TABLET ORAL
COMMUNITY
End: 2022-07-19

## 2022-06-27 RX ORDER — HYDROXYZINE PAMOATE 25 MG/1
CAPSULE ORAL
COMMUNITY
End: 2022-07-19

## 2022-06-27 RX ORDER — METOPROLOL SUCCINATE 50 MG/1
75 TABLET, EXTENDED RELEASE ORAL DAILY
COMMUNITY
End: 2022-12-18 | Stop reason: SDUPTHER

## 2022-06-27 RX ORDER — POTASSIUM CHLORIDE 750 MG/1
TABLET, EXTENDED RELEASE ORAL
COMMUNITY
End: 2022-07-19

## 2022-06-27 RX ORDER — ASPIRIN 81 MG/1
TABLET ORAL
COMMUNITY

## 2022-06-27 RX ORDER — IPRATROPIUM BROMIDE AND ALBUTEROL SULFATE 2.5; .5 MG/3ML; MG/3ML
SOLUTION RESPIRATORY (INHALATION)
COMMUNITY
End: 2022-07-19

## 2022-06-27 RX ORDER — FENOFIBRATE 160 MG/1
TABLET ORAL
COMMUNITY
End: 2022-07-19

## 2022-06-27 RX ORDER — DULOXETIN HYDROCHLORIDE 60 MG/1
CAPSULE, DELAYED RELEASE ORAL
COMMUNITY

## 2022-06-27 RX ORDER — ATOMOXETINE 40 MG/1
CAPSULE ORAL
COMMUNITY
End: 2022-07-19

## 2022-06-27 RX ORDER — SODIUM, POTASSIUM,MAG SULFATES 17.5-3.13G
1 SOLUTION, RECONSTITUTED, ORAL ORAL EVERY 12 HOURS
Qty: 354 ML | Refills: 0 | Status: ON HOLD | OUTPATIENT
Start: 2022-06-27 | End: 2022-11-04

## 2022-06-27 RX ORDER — LISINOPRIL 40 MG/1
TABLET ORAL
COMMUNITY

## 2022-06-27 NOTE — PROGRESS NOTES
Carol Abarca  REASON FOR CALL Screening for colonoscopy  SENT IN [facility]   Past Medical History:   Diagnosis Date   • Anemia    • Arthritis    • Diabetes (HCC)    • Essential hypertension 2021   • History of pulmonary embolism    • Lumbago     Low back pain   • Mild left ventricular hypertrophy 2021   • Mixed hyperlipidemia 2021   • Obstructive sleep apnea    • Reflux esophagitis    • Seasonal allergies      Allergies   Allergen Reactions   • Tramadol Hcl Anaphylaxis   • Sulfa Antibiotics Unknown - Low Severity   • Tramadol Unknown - High Severity     Past Surgical History:   Procedure Laterality Date   • APPENDECTOMY     •  SECTION      , , ,    • COLONOSCOPY      2019   • ENDOSCOPY  2019   • GASTRIC SLEEVE LAPAROSCOPIC      Lap Band Surgery   • HERNIA REPAIR      , , ,    • HYSTERECTOMY     • OTHER SURGICAL HISTORY      Metal implants     Social History     Socioeconomic History   • Marital status:    Tobacco Use   • Smoking status: Current Every Day Smoker     Packs/day: 0.50   • Tobacco comment: Smoked 11-20 years   Vaping Use   • Vaping Use: Never used   Substance and Sexual Activity   • Alcohol use: Yes     Comment: Occasionally drinks, less than 1 drink per day, has been drinking for less than 1 year   • Drug use: Never   • Sexual activity: Defer     Family History   Problem Relation Age of Onset   • Heart disease Mother    • Diabetes Mother         Unspecified type   • Arthritis Mother    • Heart disease Father    • Diabetes Son         Unspecified type       Current Outpatient Medications:   •  ALPRAZolam (XANAX) 1 MG tablet, Take 1 mg by mouth 2 (Two) Times a Day As Needed., Disp: , Rfl:   •  ALPRAZolam (Xanax) 1 MG tablet, Xanax 1 mg oral tablet take 1 tablet (1 mg) by oral route 3 times per day   Active, Disp: , Rfl:   •  amLODIPine (NORVASC) 5 MG tablet, Take 5 mg by mouth Daily., Disp: , Rfl:   •  aspirin (aspirin) 81 MG  EC tablet, Aspir-81 81 mg oral tablet,delayed release (DR/EC) take 1 tablet (81 mg) by oral route once daily   Active, Disp: , Rfl:   •  aspirin 81 MG EC tablet, Take 81 mg by mouth Daily., Disp: , Rfl:   •  atomoxetine (STRATTERA) 40 MG capsule, atomoxetine 40 mg capsule, Disp: , Rfl:   •  DULoxetine (CYMBALTA) 60 MG capsule, Take 60 mg by mouth 2 (two) times a day., Disp: , Rfl:   •  DULoxetine (Cymbalta) 60 MG capsule, Cymbalta 60 mg oral capsule,delayed release(DR/EC) take 1 capsule (60 mg) by oral route once daily   Active, Disp: , Rfl:   •  fenofibrate 160 MG tablet, fenofibrate 160 mg oral tablet take 1 tablet (160 mg) by oral route once daily   Suspended, Disp: , Rfl:   •  HYDROcodone-acetaminophen (NORCO) 5-325 MG per tablet, Take 1 tablet by mouth Every 6 (Six) Hours As Needed., Disp: , Rfl:   •  hydrOXYzine pamoate (VISTARIL) 25 MG capsule, hydroxyzine pamoate 25 mg capsule, Disp: , Rfl:   •  ibuprofen (ADVIL,MOTRIN) 400 MG tablet, ibuprofen 400 mg tablet, Disp: , Rfl:   •  ipratropium-albuterol (DUO-NEB) 0.5-2.5 mg/3 ml nebulizer, ipratropium 0.5 mg-albuterol 3 mg (2.5 mg base)/3 mL nebulization soln, Disp: , Rfl:   •  lisinopril (PRINIVIL,ZESTRIL) 40 MG tablet, Take 40 mg by mouth Daily., Disp: , Rfl:   •  lisinopril (PRINIVIL,ZESTRIL) 40 MG tablet, lisinopril 40 mg oral tablet take 1 tablet (40 mg) by oral route once daily   Active, Disp: , Rfl:   •  magnesium oxide (MAG-OX) 400 MG tablet, magnesium oxide 400 mg (241.3 mg magnesium) tablet, Disp: , Rfl:   •  metFORMIN (GLUCOPHAGE) 1000 MG tablet, metformin 1,000 mg oral tablet take 1 tablet (1,000 mg) by oral route 2 times per day with morning and evening meals   Active, Disp: , Rfl:   •  metoprolol succinate XL (TOPROL-XL) 25 MG 24 hr tablet, metoprolol succinate 25 mg oral tablet extended release 24 hr take 1 tablet by oral route 3 times a day   Active, Disp: , Rfl:   •  metoprolol tartrate (LOPRESSOR) 50 MG tablet, Take 50 mg by mouth Take As  Directed. Take 1 an a half tablets daily, Disp: , Rfl:   •  Omega-3 Fatty Acids (FISH OIL PO), Take  by mouth., Disp: , Rfl:   •  potassium chloride (K-DUR,KLOR-CON) 10 MEQ CR tablet, potassium chloride ER 10 mEq tablet,extended release(part/cryst), Disp: , Rfl:   •  potassium chloride 10 MEQ CR tablet, Take 1 tablet by mouth 2 (Two) Times a Day., Disp: 14 tablet, Rfl: 0  •  pravastatin (PRAVACHOL) 40 MG tablet, Take 40 mg by mouth Daily., Disp: , Rfl:   •  predniSONE (DELTASONE) 20 MG tablet, prednisone 20 mg tablet, Disp: , Rfl:   •  QUEtiapine (SEROquel) 300 MG tablet, Take 600 mg by mouth Every Night., Disp: , Rfl:   •  sodium-potassium-magnesium sulfates (Suprep Bowel Prep Kit) 17.5-3.13-1.6 GM/177ML solution oral solution, Take 1 bottle by mouth Every 12 (Twelve) Hours., Disp: 354 mL, Rfl: 0  •  zolpidem (AMBIEN) 10 MG tablet, Take 10 mg by mouth At Night As Needed., Disp: , Rfl:

## 2022-06-29 PROBLEM — Z12.11 SCREENING FOR COLON CANCER: Status: ACTIVE | Noted: 2022-06-29

## 2022-07-14 ENCOUNTER — OFFICE VISIT (OUTPATIENT)
Dept: SURGERY | Facility: CLINIC | Age: 52
End: 2022-07-14

## 2022-07-14 ENCOUNTER — PREP FOR SURGERY (OUTPATIENT)
Dept: OTHER | Facility: HOSPITAL | Age: 52
End: 2022-07-14

## 2022-07-14 VITALS — WEIGHT: 226 LBS | HEIGHT: 62 IN | RESPIRATION RATE: 16 BRPM | BODY MASS INDEX: 41.59 KG/M2

## 2022-07-14 DIAGNOSIS — K64.9 HEMORRHOIDS, UNSPECIFIED HEMORRHOID TYPE: Primary | ICD-10-CM

## 2022-07-14 PROCEDURE — 99214 OFFICE O/P EST MOD 30 MIN: CPT | Performed by: SURGERY

## 2022-07-14 RX ORDER — SODIUM CHLORIDE 9 MG/ML
40 INJECTION, SOLUTION INTRAVENOUS AS NEEDED
Status: CANCELLED | OUTPATIENT
Start: 2022-07-14

## 2022-07-14 RX ORDER — SODIUM CHLORIDE, SODIUM LACTATE, POTASSIUM CHLORIDE, CALCIUM CHLORIDE 600; 310; 30; 20 MG/100ML; MG/100ML; MG/100ML; MG/100ML
100 INJECTION, SOLUTION INTRAVENOUS CONTINUOUS
Status: CANCELLED | OUTPATIENT
Start: 2022-07-14

## 2022-07-14 RX ORDER — GABAPENTIN 600 MG/1
600 TABLET ORAL AS NEEDED
COMMUNITY
Start: 2022-06-20

## 2022-07-14 RX ORDER — HYDROCHLOROTHIAZIDE 25 MG/1
25 TABLET ORAL DAILY
COMMUNITY
Start: 2022-04-21 | End: 2022-07-19

## 2022-07-14 RX ORDER — GLIPIZIDE 10 MG/1
10 TABLET, FILM COATED, EXTENDED RELEASE ORAL DAILY
COMMUNITY
Start: 2022-05-20

## 2022-07-14 RX ORDER — SODIUM CHLORIDE 0.9 % (FLUSH) 0.9 %
10 SYRINGE (ML) INJECTION AS NEEDED
Status: CANCELLED | OUTPATIENT
Start: 2022-07-14

## 2022-07-14 RX ORDER — SODIUM CHLORIDE 0.9 % (FLUSH) 0.9 %
10 SYRINGE (ML) INJECTION EVERY 12 HOURS SCHEDULED
Status: CANCELLED | OUTPATIENT
Start: 2022-07-14

## 2022-07-14 RX ORDER — CEFAZOLIN SODIUM IN 0.9 % NACL 3 G/100 ML
3 INTRAVENOUS SOLUTION, PIGGYBACK (ML) INTRAVENOUS ONCE
Status: CANCELLED | OUTPATIENT
Start: 2022-07-14 | End: 2022-07-14

## 2022-07-14 NOTE — PROGRESS NOTES
"Chief Complaint  Hemorrhoids (Internal and external )    Subjective        Carol Abarca presents to [facility] for followup.     History of Present Illness  Carol Abarca is to be seen for hemorrhoids.    Hemorrhoids  The patient reports that she is still having hemorrhoids. She states that they are about the size of a strawberry. She denies any constipation. The patient reports that she had a colonoscopy a few years ago by Dr. James, which was normal other than the hemorrhoids. She states that she was given steroid creams in 2019, which did not provide much relief. The patient reports that she is on 81 mg of aspirin.    Objective   Vital Signs:  Resp 16   Ht 157.5 cm (62\")   Wt 103 kg (226 lb)   BMI 41.34 kg/m²   Estimated body mass index is 41.34 kg/m² as calculated from the following:    Height as of this encounter: 157.5 cm (62\").    Weight as of this encounter: 103 kg (226 lb).          Physical Exam  Constitutional:       General: She is not in acute distress.     Appearance: Normal appearance. She is well-developed and normal weight.      Comments: No acute distress.    Pulmonary:      Effort: Pulmonary effort is normal.      Breath sounds: Normal air entry.   Abdominal:      General: There is no distension.      Tenderness: There is no abdominal tenderness.      Comments: Abdomen is soft.    Genitourinary:     Comments: Her perianal area shows some small external hemorrhoids and skin tags. Unable to identify any internal hemorrhoids.       Result Review :                Assessment and Plan   There are no diagnoses linked to this encounter.    Assessment and Plan   Diagnoses and all orders for this visit:     1. Patient with symptomatic hemorrhoids.  We will plan for hemorrhoidectomy in the near future. Risks, benefits, alternatives, and other procedure were discussed extensively. All questions were answered. Patient voiced understanding and agreed to proceed with the " above plan.       Follow Up   No follow-ups on file.  Patient was given instructions and counseling regarding her condition or for health maintenance advice. Please see specific information pulled into the AVS if appropriate.     Transcribed from ambient dictation for Remi Reeder MD by Tiana Borden.  07/14/22   13:58 EDT    Patient verbalized consent to the visit recording.

## 2022-07-14 NOTE — H&P (VIEW-ONLY)
"Chief Complaint  Hemorrhoids (Internal and external )    Subjective        Carol Abarca presents to [facility] for followup.     History of Present Illness  Carol Abarca is to be seen for hemorrhoids.    Hemorrhoids  The patient reports that she is still having hemorrhoids. She states that they are about the size of a strawberry. She denies any constipation. The patient reports that she had a colonoscopy a few years ago by Dr. James, which was normal other than the hemorrhoids. She states that she was given steroid creams in 2019, which did not provide much relief. The patient reports that she is on 81 mg of aspirin.    Objective   Vital Signs:  Resp 16   Ht 157.5 cm (62\")   Wt 103 kg (226 lb)   BMI 41.34 kg/m²   Estimated body mass index is 41.34 kg/m² as calculated from the following:    Height as of this encounter: 157.5 cm (62\").    Weight as of this encounter: 103 kg (226 lb).          Physical Exam  Constitutional:       General: She is not in acute distress.     Appearance: Normal appearance. She is well-developed and normal weight.      Comments: No acute distress.    Pulmonary:      Effort: Pulmonary effort is normal.      Breath sounds: Normal air entry.   Abdominal:      General: There is no distension.      Tenderness: There is no abdominal tenderness.      Comments: Abdomen is soft.    Genitourinary:     Comments: Her perianal area shows some small external hemorrhoids and skin tags. Unable to identify any internal hemorrhoids.       Result Review :                Assessment and Plan   There are no diagnoses linked to this encounter.    Assessment and Plan   Diagnoses and all orders for this visit:     1. Patient with symptomatic hemorrhoids.  We will plan for hemorrhoidectomy in the near future. Risks, benefits, alternatives, and other procedure were discussed extensively. All questions were answered. Patient voiced understanding and agreed to proceed with the " above plan.       Follow Up   No follow-ups on file.  Patient was given instructions and counseling regarding her condition or for health maintenance advice. Please see specific information pulled into the AVS if appropriate.     Transcribed from ambient dictation for Remi Reeder MD by Tiana Borden.  07/14/22   13:58 EDT    Patient verbalized consent to the visit recording.

## 2022-07-19 NOTE — PRE-PROCEDURE INSTRUCTIONS
Patient instructed to have no food past midnight, clears up to 2 hours prior to arrival time. Patient instructed to wear no lotions, jewelry or piercing's day of surgery. Patient to take metoprolol, cymbalta and xanax

## 2022-07-22 ENCOUNTER — TELEPHONE (OUTPATIENT)
Dept: SURGERY | Facility: CLINIC | Age: 52
End: 2022-07-22

## 2022-07-22 NOTE — TELEPHONE ENCOUNTER
SPOKE TO THE PATIENT AND LET PATIENT KNOW THAT THEY WOULD CALL HER BETWEEN 1-6PM TODAY TO LET HER KNOW WHAT TIME SHE NEEDS TO ARRIVE ON Monday.

## 2022-07-22 NOTE — TELEPHONE ENCOUNTER
Provider: DALE PENDLETON    Caller: TIM COTTON    Phone Number: [phone]    Reason for Call: PT HAS HEMORRHOIDECTOMY Monday AND HAS NOT BEEN TOLD WHEN TO BE THERE OR IF THERE'S ANYTHING SHE NEEDS TO DO BEFOREHAND. PLEASE GIVE HER A CALL BACK.

## 2022-07-25 ENCOUNTER — ANESTHESIA EVENT (OUTPATIENT)
Dept: PERIOP | Facility: HOSPITAL | Age: 52
End: 2022-07-25

## 2022-07-25 ENCOUNTER — HOSPITAL ENCOUNTER (OUTPATIENT)
Facility: HOSPITAL | Age: 52
Setting detail: HOSPITAL OUTPATIENT SURGERY
Discharge: HOME | End: 2022-07-25
Attending: SURGERY | Admitting: SURGERY

## 2022-07-25 ENCOUNTER — ANESTHESIA (OUTPATIENT)
Dept: PERIOP | Facility: HOSPITAL | Age: 52
End: 2022-07-25

## 2022-07-25 VITALS
BODY MASS INDEX: 41.79 KG/M2 | TEMPERATURE: 97 F | WEIGHT: 227.07 LBS | DIASTOLIC BLOOD PRESSURE: 63 MMHG | HEIGHT: 62 IN | SYSTOLIC BLOOD PRESSURE: 104 MMHG | HEART RATE: 98 BPM | RESPIRATION RATE: 20 BRPM | OXYGEN SATURATION: 100 %

## 2022-07-25 DIAGNOSIS — K64.9 HEMORRHOIDS, UNSPECIFIED HEMORRHOID TYPE: ICD-10-CM

## 2022-07-25 LAB
GLUCOSE BLDC GLUCOMTR-MCNC: 128 MG/DL (ref 70–99)
GLUCOSE BLDC GLUCOMTR-MCNC: 134 MG/DL (ref 70–99)

## 2022-07-25 PROCEDURE — 88304 TISSUE EXAM BY PATHOLOGIST: CPT | Performed by: SURGERY

## 2022-07-25 PROCEDURE — 25010000002 DEXAMETHASONE PER 1 MG: Performed by: NURSE ANESTHETIST, CERTIFIED REGISTERED

## 2022-07-25 PROCEDURE — 25010000002 ONDANSETRON PER 1 MG: Performed by: NURSE ANESTHETIST, CERTIFIED REGISTERED

## 2022-07-25 PROCEDURE — 82962 GLUCOSE BLOOD TEST: CPT

## 2022-07-25 PROCEDURE — 0 HYDROMORPHONE 1 MG/ML SOLUTION: Performed by: NURSE ANESTHETIST, CERTIFIED REGISTERED

## 2022-07-25 PROCEDURE — 46260 REMOVE IN/EX HEM GROUPS 2+: CPT | Performed by: SURGERY

## 2022-07-25 PROCEDURE — 0 BUPIVACAINE LIPOSOME 1.3 % SUSPENSION 20 ML VIAL: Performed by: SURGERY

## 2022-07-25 PROCEDURE — 25010000002 FENTANYL CITRATE (PF) 50 MCG/ML SOLUTION: Performed by: NURSE ANESTHETIST, CERTIFIED REGISTERED

## 2022-07-25 PROCEDURE — 25010000002 MIDAZOLAM PER 1 MG: Performed by: STUDENT IN AN ORGANIZED HEALTH CARE EDUCATION/TRAINING PROGRAM

## 2022-07-25 PROCEDURE — C9290 INJ, BUPIVACAINE LIPOSOME: HCPCS | Performed by: SURGERY

## 2022-07-25 PROCEDURE — 25010000002 HYDROMORPHONE 1 MG/ML SOLUTION: Performed by: NURSE ANESTHETIST, CERTIFIED REGISTERED

## 2022-07-25 PROCEDURE — 25010000002 CEFAZOLIN PER 500 MG: Performed by: SURGERY

## 2022-07-25 PROCEDURE — 25010000002 PROPOFOL 10 MG/ML EMULSION: Performed by: NURSE ANESTHETIST, CERTIFIED REGISTERED

## 2022-07-25 DEVICE — HEMOST ABS SURGIFOAM 8X3CM: Type: IMPLANTABLE DEVICE | Site: RECTUM | Status: FUNCTIONAL

## 2022-07-25 RX ORDER — OXYCODONE HYDROCHLORIDE 5 MG/1
5 TABLET ORAL
Status: DISCONTINUED | OUTPATIENT
Start: 2022-07-25 | End: 2022-07-25 | Stop reason: HOSPADM

## 2022-07-25 RX ORDER — ACETAMINOPHEN 500 MG
1000 TABLET ORAL ONCE
Status: COMPLETED | OUTPATIENT
Start: 2022-07-25 | End: 2022-07-25

## 2022-07-25 RX ORDER — FENTANYL CITRATE 50 UG/ML
INJECTION, SOLUTION INTRAMUSCULAR; INTRAVENOUS AS NEEDED
Status: DISCONTINUED | OUTPATIENT
Start: 2022-07-25 | End: 2022-07-25 | Stop reason: SURG

## 2022-07-25 RX ORDER — ONDANSETRON 2 MG/ML
INJECTION INTRAMUSCULAR; INTRAVENOUS AS NEEDED
Status: DISCONTINUED | OUTPATIENT
Start: 2022-07-25 | End: 2022-07-25 | Stop reason: SURG

## 2022-07-25 RX ORDER — MEPERIDINE HYDROCHLORIDE 25 MG/ML
12.5 INJECTION INTRAMUSCULAR; INTRAVENOUS; SUBCUTANEOUS
Status: DISCONTINUED | OUTPATIENT
Start: 2022-07-25 | End: 2022-07-25 | Stop reason: HOSPADM

## 2022-07-25 RX ORDER — LIDOCAINE HYDROCHLORIDE 20 MG/ML
INJECTION, SOLUTION EPIDURAL; INFILTRATION; INTRACAUDAL; PERINEURAL AS NEEDED
Status: DISCONTINUED | OUTPATIENT
Start: 2022-07-25 | End: 2022-07-25 | Stop reason: SURG

## 2022-07-25 RX ORDER — CEFAZOLIN SODIUM IN 0.9 % NACL 3 G/100 ML
3 INTRAVENOUS SOLUTION, PIGGYBACK (ML) INTRAVENOUS ONCE
Status: COMPLETED | OUTPATIENT
Start: 2022-07-25 | End: 2022-07-25

## 2022-07-25 RX ORDER — HYDROCODONE BITARTRATE AND ACETAMINOPHEN 5; 325 MG/1; MG/1
1 TABLET ORAL ONCE AS NEEDED
Status: DISCONTINUED | OUTPATIENT
Start: 2022-07-25 | End: 2022-07-25 | Stop reason: HOSPADM

## 2022-07-25 RX ORDER — SODIUM CHLORIDE 9 MG/ML
40 INJECTION, SOLUTION INTRAVENOUS AS NEEDED
Status: DISCONTINUED | OUTPATIENT
Start: 2022-07-25 | End: 2022-07-25 | Stop reason: HOSPADM

## 2022-07-25 RX ORDER — LIDOCAINE HYDROCHLORIDE 20 MG/ML
JELLY TOPICAL AS NEEDED
Status: DISCONTINUED | OUTPATIENT
Start: 2022-07-25 | End: 2022-07-25 | Stop reason: HOSPADM

## 2022-07-25 RX ORDER — SODIUM CHLORIDE, SODIUM LACTATE, POTASSIUM CHLORIDE, CALCIUM CHLORIDE 600; 310; 30; 20 MG/100ML; MG/100ML; MG/100ML; MG/100ML
9 INJECTION, SOLUTION INTRAVENOUS CONTINUOUS PRN
Status: DISCONTINUED | OUTPATIENT
Start: 2022-07-25 | End: 2022-07-25 | Stop reason: HOSPADM

## 2022-07-25 RX ORDER — SODIUM CHLORIDE 0.9 % (FLUSH) 0.9 %
10 SYRINGE (ML) INJECTION AS NEEDED
Status: DISCONTINUED | OUTPATIENT
Start: 2022-07-25 | End: 2022-07-25 | Stop reason: HOSPADM

## 2022-07-25 RX ORDER — POLYETHYLENE GLYCOL 3350 17 G/17G
17 POWDER, FOR SOLUTION ORAL 2 TIMES DAILY PRN
Qty: 14 PACKET | Refills: 0 | Status: SHIPPED | OUTPATIENT
Start: 2022-07-25 | End: 2022-08-16 | Stop reason: SDUPTHER

## 2022-07-25 RX ORDER — ONDANSETRON 2 MG/ML
4 INJECTION INTRAMUSCULAR; INTRAVENOUS ONCE AS NEEDED
Status: DISCONTINUED | OUTPATIENT
Start: 2022-07-25 | End: 2022-07-25 | Stop reason: HOSPADM

## 2022-07-25 RX ORDER — LIDOCAINE 50 MG/G
1 OINTMENT TOPICAL
Qty: 1 EACH | Refills: 1 | Status: SHIPPED | OUTPATIENT
Start: 2022-07-25 | End: 2022-08-16 | Stop reason: SDUPTHER

## 2022-07-25 RX ORDER — KETAMINE HCL IN NACL, ISO-OSM 100MG/10ML
SYRINGE (ML) INJECTION AS NEEDED
Status: DISCONTINUED | OUTPATIENT
Start: 2022-07-25 | End: 2022-07-25 | Stop reason: SURG

## 2022-07-25 RX ORDER — DEXAMETHASONE SODIUM PHOSPHATE 4 MG/ML
INJECTION, SOLUTION INTRA-ARTICULAR; INTRALESIONAL; INTRAMUSCULAR; INTRAVENOUS; SOFT TISSUE AS NEEDED
Status: DISCONTINUED | OUTPATIENT
Start: 2022-07-25 | End: 2022-07-25 | Stop reason: SURG

## 2022-07-25 RX ORDER — HYDROCODONE BITARTRATE AND ACETAMINOPHEN 5; 325 MG/1; MG/1
1-2 TABLET ORAL EVERY 4 HOURS PRN
Qty: 30 TABLET | Refills: 0 | Status: SHIPPED | OUTPATIENT
Start: 2022-07-25 | End: 2022-12-18 | Stop reason: SDUPTHER

## 2022-07-25 RX ORDER — SODIUM CHLORIDE 0.9 % (FLUSH) 0.9 %
10 SYRINGE (ML) INJECTION EVERY 12 HOURS SCHEDULED
Status: DISCONTINUED | OUTPATIENT
Start: 2022-07-25 | End: 2022-07-25 | Stop reason: HOSPADM

## 2022-07-25 RX ORDER — PROPOFOL 10 MG/ML
VIAL (ML) INTRAVENOUS AS NEEDED
Status: DISCONTINUED | OUTPATIENT
Start: 2022-07-25 | End: 2022-07-25 | Stop reason: SURG

## 2022-07-25 RX ORDER — PROMETHAZINE HYDROCHLORIDE 12.5 MG/1
25 TABLET ORAL ONCE AS NEEDED
Status: DISCONTINUED | OUTPATIENT
Start: 2022-07-25 | End: 2022-07-25 | Stop reason: HOSPADM

## 2022-07-25 RX ORDER — SODIUM CHLORIDE, SODIUM LACTATE, POTASSIUM CHLORIDE, CALCIUM CHLORIDE 600; 310; 30; 20 MG/100ML; MG/100ML; MG/100ML; MG/100ML
100 INJECTION, SOLUTION INTRAVENOUS CONTINUOUS
Status: DISCONTINUED | OUTPATIENT
Start: 2022-07-25 | End: 2022-07-25 | Stop reason: HOSPADM

## 2022-07-25 RX ORDER — PROMETHAZINE HYDROCHLORIDE 25 MG/1
25 SUPPOSITORY RECTAL ONCE AS NEEDED
Status: DISCONTINUED | OUTPATIENT
Start: 2022-07-25 | End: 2022-07-25 | Stop reason: HOSPADM

## 2022-07-25 RX ORDER — MIDAZOLAM HYDROCHLORIDE 1 MG/ML
2 INJECTION INTRAMUSCULAR; INTRAVENOUS ONCE
Status: COMPLETED | OUTPATIENT
Start: 2022-07-25 | End: 2022-07-25

## 2022-07-25 RX ORDER — DOCUSATE SODIUM 100 MG/1
100 CAPSULE, LIQUID FILLED ORAL 2 TIMES DAILY PRN
Qty: 10 CAPSULE | Refills: 1 | Status: SHIPPED | OUTPATIENT
Start: 2022-07-25 | End: 2022-08-16 | Stop reason: SDUPTHER

## 2022-07-25 RX ADMIN — PROPOFOL 200 MG: 10 INJECTION, EMULSION INTRAVENOUS at 10:49

## 2022-07-25 RX ADMIN — HYDROMORPHONE HYDROCHLORIDE 0.25 MG: 1 INJECTION, SOLUTION INTRAMUSCULAR; INTRAVENOUS; SUBCUTANEOUS at 12:47

## 2022-07-25 RX ADMIN — ONDANSETRON 4 MG: 2 INJECTION INTRAMUSCULAR; INTRAVENOUS at 12:04

## 2022-07-25 RX ADMIN — Medication 25 MG: at 11:07

## 2022-07-25 RX ADMIN — MIDAZOLAM HYDROCHLORIDE 2 MG: 1 INJECTION, SOLUTION INTRAMUSCULAR; INTRAVENOUS at 10:33

## 2022-07-25 RX ADMIN — ONDANSETRON 4 MG: 2 INJECTION INTRAMUSCULAR; INTRAVENOUS at 11:06

## 2022-07-25 RX ADMIN — HYDROMORPHONE HYDROCHLORIDE 1 MG: 1 INJECTION, SOLUTION INTRAMUSCULAR; INTRAVENOUS; SUBCUTANEOUS at 11:11

## 2022-07-25 RX ADMIN — LIDOCAINE HYDROCHLORIDE 50 MG: 20 INJECTION, SOLUTION EPIDURAL; INFILTRATION; INTRACAUDAL; PERINEURAL at 10:49

## 2022-07-25 RX ADMIN — SODIUM CHLORIDE, POTASSIUM CHLORIDE, SODIUM LACTATE AND CALCIUM CHLORIDE 9 ML/HR: 600; 310; 30; 20 INJECTION, SOLUTION INTRAVENOUS at 10:33

## 2022-07-25 RX ADMIN — ACETAMINOPHEN 1000 MG: 500 TABLET ORAL at 10:33

## 2022-07-25 RX ADMIN — FENTANYL CITRATE 100 MCG: 50 INJECTION, SOLUTION INTRAMUSCULAR; INTRAVENOUS at 10:49

## 2022-07-25 RX ADMIN — Medication 25 MG: at 10:49

## 2022-07-25 RX ADMIN — Medication 3 G: at 10:45

## 2022-07-25 RX ADMIN — DEXAMETHASONE SODIUM PHOSPHATE 4 MG: 4 INJECTION, SOLUTION INTRA-ARTICULAR; INTRALESIONAL; INTRAMUSCULAR; INTRAVENOUS; SOFT TISSUE at 11:06

## 2022-07-25 NOTE — ANESTHESIA POSTPROCEDURE EVALUATION
Patient: Carol Abarca    Procedure Summary     Date: 07/25/22 Room / Location: [facility] OR 06 / [facility] MAIN OR    Anesthesia Start: 1045 Anesthesia Stop: 1143    Procedure: HEMORRHOIDECTOMY (N/A Anus) Diagnosis:       Hemorrhoids, unspecified hemorrhoid type      (Hemorrhoids, unspecified hemorrhoid type [K64.9])    Surgeons: Remi Reeder MD Provider: Kristin Yepez DO    Anesthesia Type: general ASA Status: 3          Anesthesia Type: general    Vitals  Vitals Value Taken Time   /81 07/25/22 1247   Temp 36.4 °C (97.5 °F) 07/25/22 1145   Pulse 88 07/25/22 1249   Resp 15 07/25/22 1230   SpO2 96 % 07/25/22 1249   Vitals shown include unvalidated device data.        Post Anesthesia Care and Evaluation    Patient location during evaluation: bedside  Patient participation: complete - patient participated  Level of consciousness: awake  Pain management: adequate    Airway patency: patent  Anesthetic complications: No anesthetic complications  PONV Status: none  Cardiovascular status: acceptable and stable  Respiratory status: acceptable  Hydration status: acceptable    Comments: An Anesthesiologist personally participated in the most demanding procedures (including induction and emergence if applicable) in the anesthesia plan, monitored the course of anesthesia administration at frequent intervals and remained physically present and available for immediate diagnosis and treatment of emergencies.

## 2022-07-25 NOTE — ANESTHESIA PREPROCEDURE EVALUATION
Anesthesia Evaluation     Patient summary reviewed and Nursing notes reviewed   no history of anesthetic complications:  NPO Solid Status: > 8 hours  NPO Liquid Status: > 2 hours           Airway   Mallampati: III  TM distance: >3 FB  Possible difficult intubation and Large neck circumference  Dental    (+) lower dentures    Pulmonary - normal exam   (+) pulmonary embolism (2013, 2014), a smoker Current, sleep apnea,   Cardiovascular - normal exam  Exercise tolerance: good (4-7 METS)    ECG reviewed    (+) hypertension, dysrhythmias (prolonged QT) Tachycardia, hyperlipidemia,       Neuro/Psych- negative ROS  GI/Hepatic/Renal/Endo    (+) obesity, morbid obesity, GERD,  diabetes mellitus,     ROS Comment: S/p gastric sleeve    Musculoskeletal     (+) back pain,   Abdominal  - normal exam   Substance History - negative use     OB/GYN negative ob/gyn ROS         Other   arthritis, blood dyscrasia anemia,     ROS/Med Hx Other:                       Anesthesia Plan    ASA 3     general     (Patient understands anesthesia not responsible for dental damage.)  intravenous induction     Anesthetic plan, risks, benefits, and alternatives have been provided, discussed and informed consent has been obtained with: patient.    Plan discussed with CRNA.        CODE STATUS:

## 2022-07-25 NOTE — OP NOTE
Preoperative diagnosis: symptomatic hemorrhoids    Postoperative diagnosis: Same    Procedure: Hemorrhoidectomy    Surgeon: Lilli    Anesthesia: General    Assistant: Coty Gordon    EBL: 11    Specimens: hemorrhoids    Complications: None    Indications: 51-year-old female with worsening and symptomatic hemorrhoids.  Presents today for elective hemorrhoidectomy.    PROCEDURE    Patient was seen in the preoperative holding area.  Risks, benefits, alternatives of the operation were again discussed in detail.  All of the patient and family's questions were answered.  They voiced understanding, and agreed to proceed.    Patient was brought to the operating room.  Monitoring devices and Soto stockings were placed.  Anesthesia was administered.  Patient was prepped and draped in standard surgical fashion.  After prepping and draping a timeout was performed to verify both the correct patient and correct procedure.    We began by injecting local anesthesia using local anesthesia and Exparel around the perianal area.  We injected 60 mL and 6 separate aliquots.  Digital rectal exam was performed.  Did not feel any unusual or unexpected masses.  Speculum was placed.  She had evident small internal and large external hemorrhoids.  We performed a hemorrhoidectomy and 3 columns using the LigaSure device.  We made sure to identify the anal sphincter muscle prior to ligating any hemorrhoids.  Once all hemorrhoids were ligated and bleeding was controlled, we placed a Surgifoam plug in the perianal canal it was coated with lidocaine.  The wound was dressed with 4 x 4's and foam tape.    The patient was then aroused from anesthesia, and taken off the OR table, and taken to PACU in stable condition.  Sponge, needle, and instrument counts were correct x2.  Coty Gordon was present for the entire procedure and helped in all portions of the case.    The operative note was dictated with the help of the dragon dictation system.

## 2022-07-25 NOTE — DISCHARGE INSTRUCTIONS
DISCHARGE INSTRUCTIONS  Hemorrhoidectomy      For your surgery you had:  Local anesthesia  Monitored anesthesia Care  You may experience dizziness, drowsiness, or light-headedness for several hours following surgery/procedure.  Do not stay alone today or tonight.  Limit your activity for 24 hours.  Resume your diet slowly.  Follow whatever special dietary instructions you may have been given by your doctor.  You should not drive or operate machinery, drink alcohol, or sign legally binding documents for 24 hours or while you are taking pain medication.  NOTIFY YOUR DOCTOR IF YOU EXPERIENCE ANY OF THE FOLLOWING:  Temperature greater than 101 degrees Fahrenheit  Shaking Chills  Redness or excessive drainage from incision  Nausea, vomiting and/or pain that is not controlled by prescribed medications  Increase in bleeding or bleeding that is excessive  Unable to urinate in 6 hours after surgery  If unable to reach your doctor, please go to the closest Emergency Room  It is important to avoid constipation at this time so the physician will prescribe stool softeners. Eating a high-fiber diet and drinking plenty of liquids also helps. A small to moderate amount of bleeding, usually when having a bowel movement may occur for a week or two following the surgery. This is normal and should stop when the anus and rectum heal.  Heavy lifting should be avoided for 2 to 3 weeks.  An ice pack can help reduce swelling. Soaking in a sitz bath (a shallow bath of warm water) several times a day helps ease the discomfort. Using a donut ring (cushion with a hole in the middle) can make sitting upright more comfortable.    SPECIAL INSTRUCTIONS:               Last dose of pain medication was given at:  Tylenol (1000mg) last at 10:30am. Do not exceed 4000mg of tylenol in a 24 hour period.

## 2022-07-26 ENCOUNTER — TELEPHONE (OUTPATIENT)
Dept: SURGERY | Facility: CLINIC | Age: 52
End: 2022-07-26

## 2022-07-26 NOTE — TELEPHONE ENCOUNTER
Pt was calling to see if she could get anything different called in because the lidocaine was $40 at the pharmacy. She also said she had some questions she wanted to speak with Dr. Reeder or his staff about. [facility] [phone]

## 2022-07-26 NOTE — TELEPHONE ENCOUNTER
Spoke with the patient and let her know that she may try to get it otc, it may be cheaper for her.

## 2022-07-26 NOTE — TELEPHONE ENCOUNTER
Pt returned call. Can you call her back please? She was in the bathroom when you called the first time.

## 2022-07-27 ENCOUNTER — TELEPHONE (OUTPATIENT)
Dept: SURGERY | Facility: CLINIC | Age: 52
End: 2022-07-27

## 2022-07-27 LAB
CYTO UR: NORMAL
LAB AP CASE REPORT: NORMAL
LAB AP CLINICAL INFORMATION: NORMAL
PATH REPORT.FINAL DX SPEC: NORMAL
PATH REPORT.GROSS SPEC: NORMAL

## 2022-07-27 NOTE — TELEPHONE ENCOUNTER
Patient had hemorrhoidectomy on Monday and she called today concerned with bleeding and passing blood clots. She wants to know if passing blood clots is normal?   # [phone]

## 2022-07-27 NOTE — TELEPHONE ENCOUNTER
PER April YES THIS IS NORMAL. PER PATIENT SHE HAS BEEN TAKING MIRALX AND HAS NOT HAD A BEEN A BM YET. I TOLD HER TO TRY 1/2 BOTTLE OF MAG CITRATE TODAY AND THE OTHER 1/2 TOMORROW IF STILL NO BM TO CALL THE OFFICE AND LET US KNOW.

## 2022-08-16 ENCOUNTER — OFFICE VISIT (OUTPATIENT)
Dept: SURGERY | Facility: CLINIC | Age: 52
End: 2022-08-16

## 2022-08-16 VITALS — WEIGHT: 224.2 LBS | BODY MASS INDEX: 41.26 KG/M2 | HEIGHT: 62 IN

## 2022-08-16 DIAGNOSIS — K64.9 HEMORRHOIDS, UNSPECIFIED HEMORRHOID TYPE: ICD-10-CM

## 2022-08-16 PROCEDURE — 99024 POSTOP FOLLOW-UP VISIT: CPT | Performed by: SURGERY

## 2022-08-16 RX ORDER — HYDROCODONE BITARTRATE AND ACETAMINOPHEN 5; 325 MG/1; MG/1
1-2 TABLET ORAL EVERY 6 HOURS PRN
Qty: 20 TABLET | Refills: 0 | Status: SHIPPED | OUTPATIENT
Start: 2022-08-16 | End: 2022-11-03

## 2022-08-16 RX ORDER — LIDOCAINE 50 MG/G
1 OINTMENT TOPICAL
Qty: 1 EACH | Refills: 1 | Status: SHIPPED | OUTPATIENT
Start: 2022-08-16

## 2022-08-16 RX ORDER — BLOOD SUGAR DIAGNOSTIC
STRIP MISCELLANEOUS
COMMUNITY
Start: 2022-07-14

## 2022-08-16 RX ORDER — POLYETHYLENE GLYCOL 3350 17 G/17G
17 POWDER, FOR SOLUTION ORAL 2 TIMES DAILY PRN
Qty: 14 PACKET | Refills: 0 | Status: ON HOLD | OUTPATIENT
Start: 2022-08-16 | End: 2022-11-04

## 2022-08-16 RX ORDER — POLYETHYLENE GLYCOL 3350 17 G/17G
POWDER, FOR SOLUTION ORAL
Status: ON HOLD | COMMUNITY
Start: 2022-07-25 | End: 2022-11-04

## 2022-08-16 RX ORDER — DOCUSATE SODIUM 100 MG/1
100 CAPSULE, LIQUID FILLED ORAL 2 TIMES DAILY PRN
Qty: 10 CAPSULE | Refills: 1 | Status: ON HOLD | OUTPATIENT
Start: 2022-08-16 | End: 2022-11-04

## 2022-08-16 NOTE — PROGRESS NOTES
"Chief Complaint: Post-op Follow-up (Hemorrhoidectomy)    Subjective         History of Present Illness  Carol Abarca is a 51 y.o. female presents to [facility] GENERAL SURGERY. The patient is to be seen for follow-up status post hemorrhoidectomy.    Status post hemorrhoidectomy  The patient complains of significant pain. She reports painful bowel movements. She states she sleeps on her stomach. The patient reports a \"poking\" sensation in her rectum when passing a bowel movement. She reports pain while ambulating. The patient has been applying a folded tissue to her rectal area to catch the drainage.     Objective     Past Medical History:   Diagnosis Date   • Anemia    • Arthritis    • Diabetes (HCC)    • Essential hypertension 2021   • History of pulmonary embolism    • Lumbago     Low back pain   • Mild left ventricular hypertrophy 2021   • Mixed hyperlipidemia 2021   • Obstructive sleep apnea    • Reflux esophagitis    • Seasonal allergies        Past Surgical History:   Procedure Laterality Date   • APPENDECTOMY     •  SECTION      , , ,    • COLONOSCOPY      2019   • ENDOSCOPY     • GASTRIC SLEEVE LAPAROSCOPIC      Lap Band Surgery   • HEMORRHOIDECTOMY N/A 2022    Procedure: HEMORRHOIDECTOMY;  Surgeon: Remi Reeder MD;  Location: [facility];  Service: General;  Laterality: N/A;   • HERNIA REPAIR      , , ,    • HYSTERECTOMY     • OTHER SURGICAL HISTORY      Metal implants         Current Outpatient Medications:   •  Accu-Chek Frances Plus test strip, USE 1 STRIP EVERY 6 HOURS, Disp: , Rfl:   •  ALPRAZolam (XANAX) 1 MG tablet, Take 1 mg by mouth 2 (Two) Times a Day As Needed., Disp: , Rfl:   •  aspirin 81 MG EC tablet, Aspir-81 81 mg oral tablet,delayed release (DR/EC) take 1 tablet (81 mg) by oral route once daily   Active, Disp: , Rfl:   •  docusate sodium (Colace) 100 MG capsule, Take 1 capsule by mouth " 2 (Two) Times a Day As Needed for Constipation., Disp: 10 capsule, Rfl: 1  •  DULoxetine (CYMBALTA) 60 MG capsule, Cymbalta 60 mg oral capsule,delayed release(DR/EC) take 1 capsule (60 mg) by oral route once daily   Active, Disp: , Rfl:   •  gabapentin (NEURONTIN) 600 MG tablet, Take 600 mg by mouth As Needed., Disp: , Rfl:   •  glipizide (GLUCOTROL XL) 10 MG 24 hr tablet, Take 10 mg by mouth Daily., Disp: , Rfl:   •  HYDROcodone-acetaminophen (NORCO) 5-325 MG per tablet, Take 1 tablet by mouth Every 6 (Six) Hours As Needed., Disp: , Rfl:   •  HYDROcodone-acetaminophen (NORCO) 5-325 MG per tablet, Take 1-2 tablets by mouth Every 4 (Four) Hours As Needed (Pain)., Disp: 30 tablet, Rfl: 0  •  lidocaine (XYLOCAINE) 5 % ointment, Apply 1 application topically to the appropriate area as directed Every 2 (Two) Hours As Needed for Mild Pain ., Disp: 1 each, Rfl: 1  •  lisinopril (PRINIVIL,ZESTRIL) 40 MG tablet, lisinopril 40 mg oral tablet take 1 tablet (40 mg) by oral route once daily   Active, Disp: , Rfl:   •  metFORMIN (GLUCOPHAGE) 1000 MG tablet, metformin 1,000 mg oral tablet take 1 tablet (1,000 mg) by oral route 2 times per day with morning and evening meals   Active, Disp: , Rfl:   •  metoprolol succinate XL (TOPROL-XL) 50 MG 24 hr tablet, Take 75 mg by mouth Daily., Disp: , Rfl:   •  polyethylene glycol (MIRALAX) 17 g packet, Take 17 g by mouth 2 (Two) Times a Day As Needed (constipation)., Disp: 14 packet, Rfl: 0  •  pravastatin (PRAVACHOL) 40 MG tablet, Take 40 mg by mouth Daily., Disp: , Rfl:   •  QUEtiapine (SEROquel) 300 MG tablet, Take 600 mg by mouth Every Night., Disp: , Rfl:   •  witch hazel-glycerin (TUCKS) pad, Insert  into the rectum As Needed for Irritation., Disp: 1 each, Rfl: 12  •  zolpidem (AMBIEN) 10 MG tablet, Take 10 mg by mouth At Night As Needed., Disp: , Rfl:   •  polyethylene glycol (MIRALAX) 17 GM/SCOOP powder, TAKE 1 CAPFUL BY MOUTH TWICE DAILY AS NEEDED, Disp: , Rfl:   •   sodium-potassium-magnesium sulfates (Suprep Bowel Prep Kit) 17.5-3.13-1.6 GM/177ML solution oral solution, Take 1 bottle by mouth Every 12 (Twelve) Hours., Disp: 354 mL, Rfl: 0    Allergies   Allergen Reactions   • Tramadol Hcl Anaphylaxis   • Sulfa Antibiotics Unknown - Low Severity   • Tramadol Unknown - High Severity        Family History   Problem Relation Age of Onset   • Heart disease Mother    • Diabetes Mother         Unspecified type   • Arthritis Mother    • Heart disease Father    • Diabetes Son         Unspecified type   • Malig Hyperthermia Neg Hx        Social History     Socioeconomic History   • Marital status: Single   Tobacco Use   • Smoking status: Current Every Day Smoker     Packs/day: 0.50     Types: Cigarettes   • Smokeless tobacco: Never Used   • Tobacco comment: Smoked 11-20 years. last 7/24/22 1700   Vaping Use   • Vaping Use: Never used   Substance and Sexual Activity   • Alcohol use: Yes     Comment: Occasionally drinks, less than 1 drink per day, has been drinking for less than 1 year   • Drug use: Never   • Sexual activity: Defer        Physical Exam  Vitals and nursing note reviewed. Exam conducted with a chaperone present.   Constitutional:       General: She is not in acute distress.     Appearance: Normal appearance. She is well-developed and normal weight.   Cardiovascular:      Rate and Rhythm: Normal rate and regular rhythm.   Pulmonary:      Effort: Pulmonary effort is normal.      Breath sounds: Normal air entry. No wheezing.   Abdominal:      General: Bowel sounds are normal. There is no distension.      Palpations: Abdomen is soft.      Tenderness: There is no abdominal tenderness.      Hernia: No hernia is present.   Skin:     General: Skin is warm and dry.      Findings: No erythema.      Comments: Surgical Incision Healing Well   Neurological:      Mental Status: She is alert and oriented to person, place, and time.      Motor: Motor function is intact.   Psychiatric:          Mood and Affect: Mood normal.        Post Surgical Incision  Surgical wound: Her perianal area has good beefy red granulation tissue. No signs of infection. No purulent drainage or bleeding.    Result Review :               Assessment and Plan    There are no diagnoses linked to this encounter.   Patient status post hemorrhoidectomy.  We will call in refills on her medications for pain and constipation. I will follow up with her again in about 4 weeks for reassessment. Discussed with the patient. All questions were answered. She voiced understanding and agreed to proceed with above plan.      Follow Up   No follow-ups on file.  Patient was given instructions and counseling regarding her condition or for health maintenance advice. Please see specific information pulled into the AVS if appropriate.       Transcribed from ambient dictation for Remi Reeder MD by Daya Marcial.  08/16/22   13:56 EDT    Patient verbalized consent to the visit recording.  I have personally performed the services described in this document as transcribed by the above individual, and it is both accurate and complete.  Remi Reeder MD  8/17/2022  16:58 EDT

## 2022-08-19 ENCOUNTER — TELEPHONE (OUTPATIENT)
Dept: SURGERY | Facility: CLINIC | Age: 52
End: 2022-08-19

## 2022-08-19 NOTE — TELEPHONE ENCOUNTER
LEFT MESSAGE FOR PATIENT TO CALL THE OFFICE. CALLING TO SEE IF PATIENT PICKED UP HER LIDOCAINE OINTMENT.

## 2022-10-21 ENCOUNTER — TELEPHONE (OUTPATIENT)
Dept: GASTROENTEROLOGY | Facility: CLINIC | Age: 52
End: 2022-10-21

## 2022-10-24 ENCOUNTER — TELEPHONE (OUTPATIENT)
Dept: GASTROENTEROLOGY | Facility: CLINIC | Age: 52
End: 2022-10-24

## 2022-10-24 NOTE — TELEPHONE ENCOUNTER
Pt called and is requesting a call back due to having questions about her upcoming scope in November

## 2022-10-27 NOTE — TELEPHONE ENCOUNTER
Pt called and left a vm requesting a return call.   Pt states that she had an egd previously with her colonoscopy and had stomach polyps in 2019. Pt is requesting an egd added to scheduled colonoscopy on 11/04. Please advise.

## 2022-11-04 ENCOUNTER — ANESTHESIA EVENT (OUTPATIENT)
Dept: GASTROENTEROLOGY | Facility: HOSPITAL | Age: 52
End: 2022-11-04

## 2022-11-04 ENCOUNTER — HOSPITAL ENCOUNTER (OUTPATIENT)
Facility: HOSPITAL | Age: 52
Setting detail: HOSPITAL OUTPATIENT SURGERY
Discharge: HOME | End: 2022-11-04
Attending: INTERNAL MEDICINE | Admitting: INTERNAL MEDICINE

## 2022-11-04 ENCOUNTER — ANESTHESIA (OUTPATIENT)
Dept: GASTROENTEROLOGY | Facility: HOSPITAL | Age: 52
End: 2022-11-04

## 2022-11-04 VITALS
DIASTOLIC BLOOD PRESSURE: 93 MMHG | OXYGEN SATURATION: 97 % | RESPIRATION RATE: 15 BRPM | HEIGHT: 63 IN | WEIGHT: 212.52 LBS | SYSTOLIC BLOOD PRESSURE: 109 MMHG | BODY MASS INDEX: 37.66 KG/M2 | TEMPERATURE: 98.8 F | HEART RATE: 94 BPM

## 2022-11-04 DIAGNOSIS — Z12.11 SCREENING FOR COLON CANCER: ICD-10-CM

## 2022-11-04 LAB — GLUCOSE BLDC GLUCOMTR-MCNC: 118 MG/DL (ref 70–99)

## 2022-11-04 PROCEDURE — 82962 GLUCOSE BLOOD TEST: CPT

## 2022-11-04 PROCEDURE — 45385 COLONOSCOPY W/LESION REMOVAL: CPT | Performed by: INTERNAL MEDICINE

## 2022-11-04 PROCEDURE — 25010000002 PROPOFOL 10 MG/ML EMULSION: Performed by: NURSE ANESTHETIST, CERTIFIED REGISTERED

## 2022-11-04 PROCEDURE — 88305 TISSUE EXAM BY PATHOLOGIST: CPT | Performed by: INTERNAL MEDICINE

## 2022-11-04 RX ORDER — SODIUM CHLORIDE, SODIUM LACTATE, POTASSIUM CHLORIDE, CALCIUM CHLORIDE 600; 310; 30; 20 MG/100ML; MG/100ML; MG/100ML; MG/100ML
30 INJECTION, SOLUTION INTRAVENOUS CONTINUOUS
Status: DISCONTINUED | OUTPATIENT
Start: 2022-11-04 | End: 2022-11-04 | Stop reason: HOSPADM

## 2022-11-04 RX ORDER — LIDOCAINE HYDROCHLORIDE 20 MG/ML
INJECTION, SOLUTION EPIDURAL; INFILTRATION; INTRACAUDAL; PERINEURAL AS NEEDED
Status: DISCONTINUED | OUTPATIENT
Start: 2022-11-04 | End: 2022-11-04 | Stop reason: SURG

## 2022-11-04 RX ORDER — PROPOFOL 10 MG/ML
VIAL (ML) INTRAVENOUS AS NEEDED
Status: DISCONTINUED | OUTPATIENT
Start: 2022-11-04 | End: 2022-11-04 | Stop reason: SURG

## 2022-11-04 RX ADMIN — PROPOFOL 250 MCG/KG/MIN: 10 INJECTION, EMULSION INTRAVENOUS at 12:49

## 2022-11-04 RX ADMIN — LIDOCAINE HYDROCHLORIDE 100 MG: 20 INJECTION, SOLUTION EPIDURAL; INFILTRATION; INTRACAUDAL; PERINEURAL at 12:49

## 2022-11-04 RX ADMIN — PROPOFOL 150 MG: 10 INJECTION, EMULSION INTRAVENOUS at 12:49

## 2022-11-04 RX ADMIN — SODIUM CHLORIDE, POTASSIUM CHLORIDE, SODIUM LACTATE AND CALCIUM CHLORIDE 30 ML/HR: 600; 310; 30; 20 INJECTION, SOLUTION INTRAVENOUS at 12:37

## 2022-11-04 NOTE — ANESTHESIA PREPROCEDURE EVALUATION
Anesthesia Evaluation     Patient summary reviewed and Nursing notes reviewed   no history of anesthetic complications:  NPO Solid Status: > 8 hours  NPO Liquid Status: > 2 hours           Airway   Mallampati: II  TM distance: >3 FB  Neck ROM: full  No difficulty expected  Dental - normal exam     Pulmonary - normal exam    breath sounds clear to auscultation  (+) a smoker Current Abstained day of surgery, sleep apnea on CPAP,   Cardiovascular - normal exam  Exercise tolerance: good (4-7 METS)    Patient on routine beta blocker and Beta blocker not taken-may be given intraoperatively  Rhythm: regular  Rate: normal    (+) hypertension well controlled 2 medications or greater, hyperlipidemia,       Neuro/Psych- negative ROS  GI/Hepatic/Renal/Endo    (+) obesity,   diabetes mellitus type 2 using insulin,     Musculoskeletal     Abdominal    Substance History - negative use     OB/GYN negative ob/gyn ROS         Other   arthritis,      ROS/Med Hx Other: PAT Nursing Notes unavailable.                   Anesthesia Plan    ASA 3     general     (Patient understands anesthesia not responsible for dental damage.)  intravenous induction     Anesthetic plan, risks, benefits, and alternatives have been provided, discussed and informed consent has been obtained with: patient.  Pre-procedure education provided  Use of blood products discussed with patient .   Plan discussed with CRNA.        CODE STATUS:

## 2022-11-04 NOTE — ANESTHESIA POSTPROCEDURE EVALUATION
Patient: Carol Abarca    Procedure Summary     Date: 11/04/22 Room / Location: [facility] ENDOSCOPY 1 / [facility] ENDOSCOPY    Anesthesia Start: 1247 Anesthesia Stop: 1311    Procedure: COLONOSCOPY Diagnosis:       Screening for colon cancer      (Screening for colon cancer [Z12.11])    Surgeons: Radha James MD Provider: Reyes, Mirabelle, DO    Anesthesia Type: general ASA Status: 3          Anesthesia Type: general    Vitals  Vitals Value Taken Time   /89 11/04/22 1317   Temp 36.6 °C (97.9 °F) 11/04/22 1312   Pulse 102 11/04/22 1317   Resp 26 11/04/22 1317   SpO2 96 % 11/04/22 1317           Post Anesthesia Care and Evaluation    Patient location during evaluation: bedside  Patient participation: complete - patient participated  Level of consciousness: awake  Pain management: adequate    Airway patency: patent  Anesthetic complications: No anesthetic complications  PONV Status: none  Cardiovascular status: acceptable and stable  Respiratory status: acceptable  Hydration status: acceptable    Comments: An Anesthesiologist personally participated in the most demanding procedures (including induction and emergence if applicable) in the anesthesia plan, monitored the course of anesthesia administration at frequent intervals and remained physically present and available for immediate diagnosis and treatment of emergencies.

## 2022-11-04 NOTE — H&P
Pre Procedure History & Physical    Chief Complaint:   Screening colonoscopy    Subjective     HPI:   53 yo F here for screening colonoscopy.    Past Medical History:   Past Medical History:   Diagnosis Date   • Anemia    • Arthritis    • Diabetes (HCC)    • Essential hypertension 2021   • History of pulmonary embolism    • Lumbago     Low back pain   • Mild left ventricular hypertrophy 2021   • Mixed hyperlipidemia 2021   • Obstructive sleep apnea    • Reflux esophagitis    • Seasonal allergies        Past Surgical History:  Past Surgical History:   Procedure Laterality Date   • APPENDECTOMY     •  SECTION      , , ,    • COLONOSCOPY      2019   • ENDOSCOPY     • HEMORRHOIDECTOMY N/A 2022    Procedure: HEMORRHOIDECTOMY;  Surgeon: Remi Reeder MD;  Location: [facility] MAIN OR;  Service: General;  Laterality: N/A;   • HERNIA REPAIR      , , ,    • HYSTERECTOMY     • LAPAROSCOPIC GASTRIC BANDING     • OTHER SURGICAL HISTORY      Metal implants       Family History:  Family History   Problem Relation Age of Onset   • Heart disease Mother    • Diabetes Mother         Unspecified type   • Arthritis Mother    • Heart disease Father    • Diabetes Son         Unspecified type   • Malig Hyperthermia Neg Hx        Social History:   reports that she has been smoking cigarettes. She has been smoking an average of .5 packs per day. She has never used smokeless tobacco. She reports current alcohol use. She reports that she does not use drugs.    Medications:   Medications Prior to Admission   Medication Sig Dispense Refill Last Dose   • ALPRAZolam (XANAX) 1 MG tablet Take 1 mg by mouth 2 (Two) Times a Day As Needed.      • aspirin 81 MG EC tablet Aspir-81 81 mg oral tablet,delayed release (DR/EC) take 1 tablet (81 mg) by oral route once daily   Active      • docusate sodium (Colace) 100 MG capsule Take 1 capsule by mouth 2 (Two) Times a Day As  Spoke to Gloria Amin from testing, she stated she will contact pt to schedule echo test Needed for Constipation. 10 capsule 1    • DULoxetine (CYMBALTA) 60 MG capsule Cymbalta 60 mg oral capsule,delayed release(DR/EC) take 1 capsule (60 mg) by oral route once daily   Active      • gabapentin (NEURONTIN) 600 MG tablet Take 600 mg by mouth As Needed.      • glipizide (GLUCOTROL XL) 10 MG 24 hr tablet Take 10 mg by mouth Daily.      • HYDROcodone-acetaminophen (NORCO) 5-325 MG per tablet Take 1-2 tablets by mouth Every 4 (Four) Hours As Needed (Pain). 30 tablet 0    • lidocaine (XYLOCAINE) 5 % ointment Apply 1 application topically to the appropriate area as directed Every 2 (Two) Hours As Needed for Mild Pain . 1 each 1    • lisinopril (PRINIVIL,ZESTRIL) 40 MG tablet lisinopril 40 mg oral tablet take 1 tablet (40 mg) by oral route once daily   Active      • metFORMIN (GLUCOPHAGE) 1000 MG tablet metformin 1,000 mg oral tablet take 1 tablet (1,000 mg) by oral route 2 times per day with morning and evening meals   Active      • metoprolol succinate XL (TOPROL-XL) 50 MG 24 hr tablet Take 75 mg by mouth Daily.      • pravastatin (PRAVACHOL) 40 MG tablet Take 40 mg by mouth Daily.      • QUEtiapine (SEROquel) 300 MG tablet Take 600 mg by mouth Every Night.      • witch hazel-glycerin (TUCKS) pad Insert  into the rectum As Needed for Irritation. 1 each 12    • zolpidem (AMBIEN) 10 MG tablet Take 10 mg by mouth At Night As Needed.      • Accu-Chek Frances Plus test strip USE 1 STRIP EVERY 6 HOURS      • polyethylene glycol (MIRALAX) 17 g packet Take 17 g by mouth 2 (Two) Times a Day As Needed (constipation). 14 packet 0    • polyethylene glycol (MIRALAX) 17 GM/SCOOP powder TAKE 1 CAPFUL BY MOUTH TWICE DAILY AS NEEDED      • sodium-potassium-magnesium sulfates (Suprep Bowel Prep Kit) 17.5-3.13-1.6 GM/177ML solution oral solution Take 1 bottle by mouth Every 12 (Twelve) Hours. 354 mL 0        Allergies:  Tramadol hcl, Sulfa antibiotics, and Tramadol    ROS:    Pertinent items are noted in HPI     Objective  "    Height 160 cm (63\"), weight 96.4 kg (212 lb 8.4 oz), not currently breastfeeding.    Physical Exam   Constitutional: Pt is oriented to person, place, and time and well-developed, well-nourished, and in no distress.   Mouth/Throat: Oropharynx is clear and moist.   Neck: Normal range of motion.   Cardiovascular: Normal rate, regular rhythm and normal heart sounds.    Pulmonary/Chest: Effort normal and breath sounds normal.   Abdominal: Soft. Nontender  Skin: Skin is warm and dry.   Psychiatric: Mood, memory, affect and judgment normal.     Assessment & Plan     Diagnosis:  Screening colonoscopy    Anticipated Surgical Procedure:  Colonoscopy    The risks, benefits, and alternatives of this procedure have been discussed with the patient or the responsible party- the patient understands and agrees to proceed.          "

## 2022-11-08 ENCOUNTER — TELEPHONE (OUTPATIENT)
Dept: GASTROENTEROLOGY | Facility: CLINIC | Age: 52
End: 2022-11-08

## 2022-11-08 NOTE — TELEPHONE ENCOUNTER
----- Message from REGAN Ribeiro sent at 11/8/2022 12:53 PM EST -----  Please place patient in colon recall for 5 years.  Mail letter to patient PCP.  Follow-up only as needed.

## 2023-05-31 ENCOUNTER — HOSPITAL ENCOUNTER (INPATIENT)
Facility: HOSPITAL | Age: 53
LOS: 14 days | Discharge: HOME | End: 2023-06-15
Attending: EMERGENCY MEDICINE | Admitting: INTERNAL MEDICINE
Payer: MEDICARE

## 2023-05-31 ENCOUNTER — APPOINTMENT (OUTPATIENT)
Dept: CT IMAGING | Facility: HOSPITAL | Age: 53
End: 2023-05-31
Payer: MEDICARE

## 2023-05-31 ENCOUNTER — APPOINTMENT (OUTPATIENT)
Dept: GENERAL RADIOLOGY | Facility: HOSPITAL | Age: 53
End: 2023-05-31
Payer: MEDICARE

## 2023-05-31 DIAGNOSIS — R26.2 DIFFICULTY IN WALKING: ICD-10-CM

## 2023-05-31 DIAGNOSIS — D61.818 PANCYTOPENIA: ICD-10-CM

## 2023-05-31 DIAGNOSIS — R26.2 DIFFICULTY WALKING: ICD-10-CM

## 2023-05-31 DIAGNOSIS — Z78.9 DECREASED ACTIVITIES OF DAILY LIVING (ADL): ICD-10-CM

## 2023-05-31 DIAGNOSIS — R55 SYNCOPE AND COLLAPSE: Primary | ICD-10-CM

## 2023-05-31 DIAGNOSIS — D64.9 SEVERE ANEMIA: ICD-10-CM

## 2023-05-31 LAB
ALBUMIN SERPL-MCNC: 3.2 G/DL (ref 3.5–5.2)
ALBUMIN/GLOB SERPL: 0.8 G/DL
ALP SERPL-CCNC: 113 U/L (ref 39–117)
ALT SERPL W P-5'-P-CCNC: 10 U/L (ref 1–33)
ANION GAP SERPL CALCULATED.3IONS-SCNC: 7.9 MMOL/L (ref 5–15)
ANISOCYTOSIS BLD QL: ABNORMAL
AST SERPL-CCNC: 25 U/L (ref 1–32)
BILIRUB SERPL-MCNC: 0.5 MG/DL (ref 0–1.2)
BUN SERPL-MCNC: 9 MG/DL (ref 6–20)
BUN/CREAT SERPL: 14.8 (ref 7–25)
CALCIUM SPEC-SCNC: 8.7 MG/DL (ref 8.6–10.5)
CHLORIDE SERPL-SCNC: 101 MMOL/L (ref 98–107)
CO2 SERPL-SCNC: 27.1 MMOL/L (ref 22–29)
CREAT SERPL-MCNC: 0.61 MG/DL (ref 0.57–1)
DEPRECATED RDW RBC AUTO: 60.9 FL (ref 37–54)
EGFRCR SERPLBLD CKD-EPI 2021: 107.7 ML/MIN/1.73
EOSINOPHIL # BLD MANUAL: 0.03 10*3/MM3 (ref 0–0.4)
EOSINOPHIL NFR BLD MANUAL: 1 % (ref 0.3–6.2)
ERYTHROCYTE [DISTWIDTH] IN BLOOD BY AUTOMATED COUNT: 15.6 % (ref 12.3–15.4)
FERRITIN SERPL-MCNC: 176.5 NG/ML (ref 13–150)
FOLATE SERPL-MCNC: <2 NG/ML (ref 4.78–24.2)
GEN 5 2HR TROPONIN T REFLEX: 8 NG/L
GLOBULIN UR ELPH-MCNC: 3.8 GM/DL
GLUCOSE SERPL-MCNC: 165 MG/DL (ref 65–99)
HCT VFR BLD AUTO: 24.7 % (ref 34–46.6)
HCYS SERPL-MCNC: 42.8 UMOL/L (ref 0–15)
HGB BLD-MCNC: 8.1 G/DL (ref 12–15.9)
HOLD SPECIMEN: NORMAL
HOLD SPECIMEN: NORMAL
HYPOCHROMIA BLD QL: ABNORMAL
IRON 24H UR-MRATE: 76 MCG/DL (ref 37–145)
IRON SATN MFR SERPL: 26 % (ref 20–50)
LIPASE SERPL-CCNC: 17 U/L (ref 13–60)
LYMPHOCYTES # BLD MANUAL: 1.15 10*3/MM3 (ref 0.7–3.1)
LYMPHOCYTES NFR BLD MANUAL: 4 % (ref 5–12)
MACROCYTES BLD QL SMEAR: ABNORMAL
MAGNESIUM SERPL-MCNC: 1.9 MG/DL (ref 1.6–2.6)
MCH RBC QN AUTO: 35.8 PG (ref 26.6–33)
MCHC RBC AUTO-ENTMCNC: 32.8 G/DL (ref 31.5–35.7)
MCV RBC AUTO: 109.3 FL (ref 79–97)
MONOCYTES # BLD: 0.12 10*3/MM3 (ref 0.1–0.9)
NEUTROPHILS # BLD AUTO: 1.8 10*3/MM3 (ref 1.7–7)
NEUTROPHILS NFR BLD MANUAL: 52 % (ref 42.7–76)
NEUTS BAND NFR BLD MANUAL: 6 % (ref 0–5)
NT-PROBNP SERPL-MCNC: 93.2 PG/ML (ref 0–900)
PLATELET # BLD AUTO: 98 10*3/MM3 (ref 140–450)
PMV BLD AUTO: 9.9 FL (ref 6–12)
POTASSIUM SERPL-SCNC: 3.9 MMOL/L (ref 3.5–5.2)
PROT SERPL-MCNC: 7 G/DL (ref 6–8.5)
QT INTERVAL: 340 MS
QT INTERVAL: 421 MS
RBC # BLD AUTO: 2.26 10*6/MM3 (ref 3.77–5.28)
SCAN SLIDE: NORMAL
SMALL PLATELETS BLD QL SMEAR: ABNORMAL
SODIUM SERPL-SCNC: 136 MMOL/L (ref 136–145)
TIBC SERPL-MCNC: 288 MCG/DL (ref 298–536)
TRANSFERRIN SERPL-MCNC: 193 MG/DL (ref 200–360)
TROPONIN T DELTA: 0 NG/L
TROPONIN T SERPL HS-MCNC: 8 NG/L
TROPONIN T SERPL HS-MCNC: 8 NG/L
VARIANT LYMPHS NFR BLD MANUAL: 37 % (ref 19.6–45.3)
VIT B12 BLD-MCNC: <150 PG/ML (ref 211–946)
WBC MORPH BLD: NORMAL
WBC NRBC COR # BLD: 3.11 10*3/MM3 (ref 3.4–10.8)
WHOLE BLOOD HOLD COAG: NORMAL
WHOLE BLOOD HOLD SPECIMEN: NORMAL

## 2023-05-31 PROCEDURE — 83735 ASSAY OF MAGNESIUM: CPT

## 2023-05-31 PROCEDURE — 83690 ASSAY OF LIPASE: CPT

## 2023-05-31 PROCEDURE — 83090 ASSAY OF HOMOCYSTEINE: CPT | Performed by: INTERNAL MEDICINE

## 2023-05-31 PROCEDURE — 85025 COMPLETE CBC W/AUTO DIFF WBC: CPT

## 2023-05-31 PROCEDURE — 94799 UNLISTED PULMONARY SVC/PX: CPT

## 2023-05-31 PROCEDURE — 70450 CT HEAD/BRAIN W/O DYE: CPT

## 2023-05-31 PROCEDURE — 93010 ELECTROCARDIOGRAM REPORT: CPT | Performed by: SPECIALIST

## 2023-05-31 PROCEDURE — 82607 VITAMIN B-12: CPT | Performed by: INTERNAL MEDICINE

## 2023-05-31 PROCEDURE — 94640 AIRWAY INHALATION TREATMENT: CPT

## 2023-05-31 PROCEDURE — 83921 ORGANIC ACID SINGLE QUANT: CPT | Performed by: INTERNAL MEDICINE

## 2023-05-31 PROCEDURE — 93005 ELECTROCARDIOGRAM TRACING: CPT | Performed by: EMERGENCY MEDICINE

## 2023-05-31 PROCEDURE — 25510000001 IOPAMIDOL PER 1 ML: Performed by: EMERGENCY MEDICINE

## 2023-05-31 PROCEDURE — 82746 ASSAY OF FOLIC ACID SERUM: CPT | Performed by: INTERNAL MEDICINE

## 2023-05-31 PROCEDURE — 71045 X-RAY EXAM CHEST 1 VIEW: CPT

## 2023-05-31 PROCEDURE — 80053 COMPREHEN METABOLIC PANEL: CPT

## 2023-05-31 PROCEDURE — G0378 HOSPITAL OBSERVATION PER HR: HCPCS

## 2023-05-31 PROCEDURE — 93005 ELECTROCARDIOGRAM TRACING: CPT

## 2023-05-31 PROCEDURE — 83880 ASSAY OF NATRIURETIC PEPTIDE: CPT

## 2023-05-31 PROCEDURE — 85007 BL SMEAR W/DIFF WBC COUNT: CPT

## 2023-05-31 PROCEDURE — 84484 ASSAY OF TROPONIN QUANT: CPT

## 2023-05-31 PROCEDURE — 83540 ASSAY OF IRON: CPT | Performed by: INTERNAL MEDICINE

## 2023-05-31 PROCEDURE — 82728 ASSAY OF FERRITIN: CPT | Performed by: INTERNAL MEDICINE

## 2023-05-31 PROCEDURE — 84466 ASSAY OF TRANSFERRIN: CPT | Performed by: INTERNAL MEDICINE

## 2023-05-31 PROCEDURE — 99285 EMERGENCY DEPT VISIT HI MDM: CPT

## 2023-05-31 PROCEDURE — 36415 COLL VENOUS BLD VENIPUNCTURE: CPT

## 2023-05-31 PROCEDURE — 72125 CT NECK SPINE W/O DYE: CPT

## 2023-05-31 PROCEDURE — 71260 CT THORAX DX C+: CPT

## 2023-05-31 RX ORDER — FOLIC ACID 1 MG/1
5 TABLET ORAL DAILY
Status: DISCONTINUED | OUTPATIENT
Start: 2023-05-31 | End: 2023-06-15 | Stop reason: HOSPADM

## 2023-05-31 RX ORDER — GABAPENTIN 300 MG/1
600 CAPSULE ORAL EVERY 12 HOURS SCHEDULED
Status: DISCONTINUED | OUTPATIENT
Start: 2023-05-31 | End: 2023-06-01

## 2023-05-31 RX ORDER — IPRATROPIUM BROMIDE AND ALBUTEROL SULFATE 2.5; .5 MG/3ML; MG/3ML
3 SOLUTION RESPIRATORY (INHALATION)
Status: DISCONTINUED | OUTPATIENT
Start: 2023-05-31 | End: 2023-06-01

## 2023-05-31 RX ORDER — QUETIAPINE 200 MG/1
600 TABLET, FILM COATED, EXTENDED RELEASE ORAL NIGHTLY
Status: DISCONTINUED | OUTPATIENT
Start: 2023-05-31 | End: 2023-06-01

## 2023-05-31 RX ORDER — PRAVASTATIN SODIUM 40 MG
40 TABLET ORAL DAILY
Status: DISCONTINUED | OUTPATIENT
Start: 2023-05-31 | End: 2023-06-09

## 2023-05-31 RX ORDER — METOPROLOL SUCCINATE 50 MG/1
50 TABLET, EXTENDED RELEASE ORAL 2 TIMES DAILY
Status: ON HOLD | COMMUNITY
End: 2023-06-15 | Stop reason: SDUPTHER

## 2023-05-31 RX ORDER — HYDROCODONE BITARTRATE AND ACETAMINOPHEN 10; 325 MG/1; MG/1
1 TABLET ORAL EVERY 6 HOURS PRN
Status: DISCONTINUED | OUTPATIENT
Start: 2023-05-31 | End: 2023-06-01

## 2023-05-31 RX ORDER — MONTELUKAST SODIUM 10 MG/1
10 TABLET ORAL NIGHTLY
Status: DISCONTINUED | OUTPATIENT
Start: 2023-05-31 | End: 2023-06-15 | Stop reason: HOSPADM

## 2023-05-31 RX ORDER — ALPRAZOLAM 1 MG/1
1 TABLET ORAL 2 TIMES DAILY PRN
Status: DISCONTINUED | OUTPATIENT
Start: 2023-05-31 | End: 2023-06-01

## 2023-05-31 RX ORDER — ZOLPIDEM TARTRATE 5 MG/1
10 TABLET ORAL NIGHTLY
Status: DISCONTINUED | OUTPATIENT
Start: 2023-05-31 | End: 2023-06-01

## 2023-05-31 RX ORDER — ASPIRIN 81 MG/1
324 TABLET, CHEWABLE ORAL ONCE
Status: DISCONTINUED | OUTPATIENT
Start: 2023-05-31 | End: 2023-05-31

## 2023-05-31 RX ORDER — SODIUM CHLORIDE 0.9 % (FLUSH) 0.9 %
10 SYRINGE (ML) INJECTION AS NEEDED
Status: DISCONTINUED | OUTPATIENT
Start: 2023-05-31 | End: 2023-05-31

## 2023-05-31 RX ORDER — CETIRIZINE HYDROCHLORIDE 10 MG/1
10 TABLET ORAL DAILY
Status: DISCONTINUED | OUTPATIENT
Start: 2023-05-31 | End: 2023-06-15 | Stop reason: HOSPADM

## 2023-05-31 RX ORDER — QUETIAPINE 200 MG/1
600 TABLET, FILM COATED, EXTENDED RELEASE ORAL EVERY EVENING
Status: DISCONTINUED | OUTPATIENT
Start: 2023-05-31 | End: 2023-05-31

## 2023-05-31 RX ORDER — DULOXETIN HYDROCHLORIDE 30 MG/1
60 CAPSULE, DELAYED RELEASE ORAL 2 TIMES DAILY
Status: DISCONTINUED | OUTPATIENT
Start: 2023-05-31 | End: 2023-06-15 | Stop reason: HOSPADM

## 2023-05-31 RX ORDER — METOPROLOL SUCCINATE 25 MG/1
25 TABLET, EXTENDED RELEASE ORAL 2 TIMES DAILY
Status: DISCONTINUED | OUTPATIENT
Start: 2023-05-31 | End: 2023-06-02 | Stop reason: SDUPTHER

## 2023-05-31 RX ADMIN — IOPAMIDOL 100 ML: 755 INJECTION, SOLUTION INTRAVENOUS at 06:30

## 2023-05-31 RX ADMIN — IPRATROPIUM BROMIDE AND ALBUTEROL SULFATE 3 ML: .5; 2.5 SOLUTION RESPIRATORY (INHALATION) at 19:12

## 2023-05-31 RX ADMIN — QUETIAPINE FUMARATE 600 MG: 200 TABLET, EXTENDED RELEASE ORAL at 21:07

## 2023-05-31 RX ADMIN — PRAVASTATIN SODIUM 40 MG: 40 TABLET ORAL at 17:07

## 2023-05-31 RX ADMIN — Medication 5 MG: at 21:09

## 2023-05-31 RX ADMIN — METOPROLOL SUCCINATE 25 MG: 25 TABLET, EXTENDED RELEASE ORAL at 21:08

## 2023-05-31 RX ADMIN — FOLIC ACID 5 MG: 5 INJECTION, SOLUTION INTRAMUSCULAR; INTRAVENOUS; SUBCUTANEOUS at 21:06

## 2023-05-31 RX ADMIN — GABAPENTIN 600 MG: 300 CAPSULE ORAL at 21:06

## 2023-05-31 RX ADMIN — CETIRIZINE HYDROCHLORIDE 10 MG: 10 TABLET, FILM COATED ORAL at 16:50

## 2023-05-31 RX ADMIN — ALPRAZOLAM 1 MG: 1 TABLET ORAL at 21:07

## 2023-05-31 RX ADMIN — HYDROCODONE BITARTRATE AND ACETAMINOPHEN 1 TABLET: 10; 325 TABLET ORAL at 16:50

## 2023-05-31 RX ADMIN — MONTELUKAST 10 MG: 10 TABLET, FILM COATED ORAL at 21:08

## 2023-05-31 RX ADMIN — ZOLPIDEM TARTRATE 10 MG: 5 TABLET ORAL at 21:07

## 2023-05-31 RX ADMIN — IOPAMIDOL 100 ML: 755 INJECTION, SOLUTION INTRAVENOUS at 06:32

## 2023-05-31 NOTE — H&P
"   [facility]   HISTORY AND PHYSICAL  [facility]   HISTORY AND PHYSICAL    Patient Name: Carol Abarca  : 1970  MRN: [id number]  Primary Care Physician: Sonia Mosquera APRN  Date of admission: 2023    Subjective   Subjective     Chief Complaint:   Loss of consciousness  History of Present Illness   The patient is a 52-year-old woman, she was brought to the emergency department by EMS after she passed out in her home.  History was obtained from the patient who was sitting in the bed and was awake and alert and did not appear to be in any discomfort.  The patient stated that on 2023 she fell out of the bed and landed on the floor, she woke up as soon as she touched the floor and felt a little dazed.  At 3:00 this morning she fell down while walking out of her bedroom, she does not recall the preceding circumstances of the fall but she believes that she passed out and landed on the floor, her sons called EMS and she was brought to the emergency department.  The patient did not feel dizziness, palpitations, chest pain or shortness of breath before the fall  Patient evaluated emergency department oxygen saturation was noted to be 82% and it yesica immediately to 96% upon application of supplemental oxygen  The patient stated that she has been feeling \"\" lazy\" for the past few months, she feels tired and sluggish and is not motivated to perform her usual activities.  She did not complain of nausea, vomiting, diarrhea, constipation or bleeding per rectum  She reported no change in her appetite  Patient complains of chronic pain which involves many joints in the lower back.  She also complains of numbness and pain in the feet and the hands which is attributed to diabetic neuropathy  She states that she has not been compliant with vitamin B12 injections which she has been taking at home and are prescribed for vitamin B12 deficiency as she finds the co-pay of $23 per 3 months unaffordable.  She is also " not compliant with CPAP which was prescribed for sleep apnea    Review of Systems   Constitutional: Positive for fatigue. Negative for activity change.   HENT: Negative.    Eyes: Negative.    Respiratory: Negative.    Cardiovascular: Negative.    Gastrointestinal: Negative.    Endocrine: Negative.    Genitourinary: Negative.    Musculoskeletal: Positive for arthralgias and back pain. Negative for gait problem and joint swelling.   Neurological: Positive for numbness.   Hematological: Negative.    Psychiatric/Behavioral: Negative.         Personal History     Past Medical History:   Diagnosis Date   • Anemia    • Arthritis    • Diabetes    • Essential hypertension 2021   • History of pulmonary embolism    • Lumbago     Low back pain   • Mild left ventricular hypertrophy 2021   • Mixed hyperlipidemia 2021   • Obstructive sleep apnea    • Reflux esophagitis    • Seasonal allergies        Past Surgical History:   Procedure Laterality Date   • APPENDECTOMY     •  SECTION      , , ,    • COLONOSCOPY      2019   • COLONOSCOPY N/A 2022    Procedure: COLONOSCOPY;  Surgeon: Radha James MD;  Location: [facility] ENDOSCOPY;  Service: Gastroenterology;  Laterality: N/A;  COLON POLYP    • ENDOSCOPY     • HEMORRHOIDECTOMY N/A 2022    Procedure: HEMORRHOIDECTOMY;  Surgeon: Remi Reeder MD;  Location: [facility] MAIN OR;  Service: General;  Laterality: N/A;   • HERNIA REPAIR      , , ,    • HYSTERECTOMY     • LAPAROSCOPIC GASTRIC BANDING     • OTHER SURGICAL HISTORY      Metal implants       Family History: family history includes Arthritis in her mother; Diabetes in her mother and son; Heart disease in her father and mother. Otherwise pertinent FHx was reviewed and not pertinent to current issue.    Social History:  reports that she has been smoking cigarettes. She has been smoking an average of .5 packs per day. She has never used  smokeless tobacco. She reports current alcohol use. She reports that she does not use drugs.    Home Medications:  ALPRAZolam, DULoxetine, HYDROcodone-acetaminophen, Insulin Glargine (1 Unit Dial), QUEtiapine XR, albuterol sulfate HFA, aspirin, cetirizine, gabapentin, glipizide, lisinopril, metoprolol succinate XL, montelukast, potassium chloride, pravastatin, and zolpidem      Allergies:  Allergies   Allergen Reactions   • Tramadol Anaphylaxis   • Tramadol Hcl Anaphylaxis   • Moxifloxacin Hives   • Sulfa Antibiotics Hives       Objective    Objective     Vitals:  Temp:  [98.2 °F (36.8 °C)-98.6 °F (37 °C)] 98.6 °F (37 °C)  Heart Rate:  [] 99  Resp:  [16-18] 18  BP: ()/() 130/89  Flow (L/min):  [2-4] 2    Physical Exam  Vitals reviewed.   HENT:      Head: Normocephalic.      Nose: Nose normal.   Eyes:      Pupils: Pupils are equal, round, and reactive to light.   Cardiovascular:      Rate and Rhythm: Normal rate.      Pulses: Normal pulses.   Pulmonary:      Effort: Pulmonary effort is normal.   Abdominal:      General: Abdomen is flat.   Musculoskeletal:         General: Normal range of motion.      Cervical back: Normal range of motion.   Skin:     General: Skin is warm.   Neurological:      General: No focal deficit present.      Mental Status: She is alert.   Psychiatric:         Mood and Affect: Mood normal.         Result Review    Result Review:  I have personally reviewed the results from the time of this admission to 05/31/23 3:25 PM EDT and agree with these findings:  [x]  Laboratory  []  Microbiology  [x]  Radiology  []  EKG/Telemetry   []  Cardiology/Vascular   []  Pathology  []  Old records  []  Other:      Current Facility-Administered Medications:   •  ipratropium-albuterol (DUO-NEB) nebulizer solution 3 mL, 3 mL, Nebulization, 4x Daily - RT, Guanako Lemons MD       Assessment & Plan   Assessment / Plan   Macrocytic anemia, likely secondary to vitamin B12 and folate deficiency.  Type  2 diabetes mellitus with neuropathy  Chronic pain  Hypertension  Sleep apnea, noncompliant with treatment    Active Hospital Problems:  Active Hospital Problems    Diagnosis    • **Syncope and collapse        Plan:   Admit to monitored bed  Continue Toprol-XL 25 mg daily  Continue Norco  Neurochecks  Obtain vitamin B12 and folate level  2D echocardiogram  Initiate supplemental oxygen to maintain oxygen saturation above 92%  Consult cardiology             Electronically signed by Guanako Lemons MD, 05/31/23, 3:25 PM EDT.

## 2023-05-31 NOTE — ED NOTES
"Patient arrived via [facility] for lightheadedness and dizziness x2 days.  Per EMS, patient had a syncopal episode 40 minutes before their arrival.  Patient admits a near syncopal episode yesterday.     \"When I passed out, I landed face first on the floor.\"     Patient denies vomiting after passing out.     Patient O2 arrival to ED was 82%.  After 4L NC patient O2 was 98%.    Patient complains of chest pain that started 5 minutes ago after arrival.   "

## 2023-06-01 ENCOUNTER — APPOINTMENT (OUTPATIENT)
Dept: CARDIOLOGY | Facility: HOSPITAL | Age: 53
End: 2023-06-01
Payer: MEDICARE

## 2023-06-01 LAB
ANION GAP SERPL CALCULATED.3IONS-SCNC: 11.2 MMOL/L (ref 5–15)
ARTERIAL PATENCY WRIST A: POSITIVE
ASCENDING AORTA: 4.2 CM
BASE EXCESS BLDA CALC-SCNC: -5.1 MMOL/L (ref -2–2)
BDY SITE: ABNORMAL
BH CV ECHO MEAS - AO MAX PG: 11 MMHG
BH CV ECHO MEAS - AO MEAN PG: 5.9 MMHG
BH CV ECHO MEAS - AO ROOT DIAM: 3.4 CM
BH CV ECHO MEAS - AO V2 MAX: 168 CM/SEC
BH CV ECHO MEAS - AO V2 VTI: 32.5 CM
BH CV ECHO MEAS - AVA(I,D): 1.54 CM2
BH CV ECHO MEAS - EDV(CUBED): 100.9 ML
BH CV ECHO MEAS - EDV(MOD-SP2): 93.3 ML
BH CV ECHO MEAS - EDV(MOD-SP4): 84.5 ML
BH CV ECHO MEAS - EF(MOD-BP): 72.3 %
BH CV ECHO MEAS - EF(MOD-SP2): 77 %
BH CV ECHO MEAS - EF(MOD-SP4): 68.6 %
BH CV ECHO MEAS - ESV(CUBED): 9.3 ML
BH CV ECHO MEAS - ESV(MOD-SP2): 21.5 ML
BH CV ECHO MEAS - ESV(MOD-SP4): 26.5 ML
BH CV ECHO MEAS - FS: 54.8 %
BH CV ECHO MEAS - IVS/LVPW: 0.91 CM
BH CV ECHO MEAS - IVSD: 1.31 CM
BH CV ECHO MEAS - LA DIMENSION: 3.7 CM
BH CV ECHO MEAS - LAT PEAK E' VEL: 7.5 CM/SEC
BH CV ECHO MEAS - LV DIASTOLIC VOL/BSA (35-75): 42.9 CM2
BH CV ECHO MEAS - LV MASS(C)D: 256.4 GRAMS
BH CV ECHO MEAS - LV MAX PG: 2.8 MMHG
BH CV ECHO MEAS - LV MEAN PG: 1.45 MMHG
BH CV ECHO MEAS - LV SYSTOLIC VOL/BSA (12-30): 13.4 CM2
BH CV ECHO MEAS - LV V1 MAX: 83 CM/SEC
BH CV ECHO MEAS - LV V1 VTI: 15.4 CM
BH CV ECHO MEAS - LVIDD: 4.7 CM
BH CV ECHO MEAS - LVIDS: 2.11 CM
BH CV ECHO MEAS - LVOT AREA: 3.2 CM2
BH CV ECHO MEAS - LVOT DIAM: 2.03 CM
BH CV ECHO MEAS - LVPWD: 1.45 CM
BH CV ECHO MEAS - MED PEAK E' VEL: 4.9 CM/SEC
BH CV ECHO MEAS - MV A MAX VEL: 86.7 CM/SEC
BH CV ECHO MEAS - MV DEC SLOPE: 570.8 CM/SEC2
BH CV ECHO MEAS - MV DEC TIME: 0.17 MSEC
BH CV ECHO MEAS - MV E MAX VEL: 107 CM/SEC
BH CV ECHO MEAS - MV E/A: 1.23
BH CV ECHO MEAS - MV MEAN PG: 2.17 MMHG
BH CV ECHO MEAS - MV P1/2T: 52.5 MSEC
BH CV ECHO MEAS - MV V2 VTI: 26.1 CM
BH CV ECHO MEAS - MVA(P1/2T): 4.2 CM2
BH CV ECHO MEAS - MVA(VTI): 1.92 CM2
BH CV ECHO MEAS - RAP SYSTOLE: 3 MMHG
BH CV ECHO MEAS - RVDD: 2.47 CM
BH CV ECHO MEAS - RVSP: 40.9 MMHG
BH CV ECHO MEAS - SI(MOD-SP2): 36.4 ML/M2
BH CV ECHO MEAS - SI(MOD-SP4): 29.4 ML/M2
BH CV ECHO MEAS - SV(LVOT): 50.1 ML
BH CV ECHO MEAS - SV(MOD-SP2): 71.8 ML
BH CV ECHO MEAS - SV(MOD-SP4): 58 ML
BH CV ECHO MEAS - TR MAX PG: 37.9 MMHG
BH CV ECHO MEAS - TR MAX VEL: 307.7 CM/SEC
BH CV ECHO MEASUREMENTS AVERAGE E/E' RATIO: 17.26
BUN SERPL-MCNC: 13 MG/DL (ref 6–20)
BUN/CREAT SERPL: 14.6 (ref 7–25)
CA-I BLDA-SCNC: 1.09 MMOL/L (ref 1.13–1.32)
CALCIUM SPEC-SCNC: 8.1 MG/DL (ref 8.6–10.5)
CHLORIDE BLDA-SCNC: 102 MMOL/L (ref 98–106)
CHLORIDE SERPL-SCNC: 99 MMOL/L (ref 98–107)
CO2 SERPL-SCNC: 23.8 MMOL/L (ref 22–29)
COHGB MFR BLD: 0.9 % (ref 0–1.5)
CREAT SERPL-MCNC: 0.89 MG/DL (ref 0.57–1)
EGFRCR SERPLBLD CKD-EPI 2021: 78.1 ML/MIN/1.73
FHHB: 5.3 % (ref 0–5)
GAS FLOW AIRWAY: 7 LPM
GLUCOSE BLDA-MCNC: 356 MG/DL (ref 65–99)
GLUCOSE BLDC GLUCOMTR-MCNC: 128 MG/DL (ref 70–99)
GLUCOSE BLDC GLUCOMTR-MCNC: 371 MG/DL (ref 70–99)
GLUCOSE SERPL-MCNC: 351 MG/DL (ref 65–99)
HCO3 BLDA-SCNC: 22.3 MMOL/L (ref 22–26)
HGB BLDA-MCNC: 10 G/DL (ref 11.7–14.6)
HOLD SPECIMEN: NORMAL
INHALED O2 CONCENTRATION: ABNORMAL %
LACTATE BLDA-SCNC: 3.63 MMOL/L (ref 0.5–2)
LEFT ATRIUM VOLUME INDEX: 41.3 ML/M2
MAXIMAL PREDICTED HEART RATE: 168 BPM
METHGB BLD QL: 0.1 % (ref 0–1.5)
MODALITY: ABNORMAL
NOTE: ABNORMAL
OXYHGB MFR BLDV: 93.7 % (ref 94–99)
PCO2 BLDA: 52.8 MM HG (ref 35–45)
PH BLDA: 7.24 PH UNITS (ref 7.35–7.45)
PO2 BLD: ABNORMAL MM[HG]
PO2 BLDA: 96.6 MM HG (ref 80–100)
POTASSIUM BLDA-SCNC: 3.98 MMOL/L (ref 3.5–5)
POTASSIUM SERPL-SCNC: 4 MMOL/L (ref 3.5–5.2)
SAO2 % BLDCOA: 94.6 % (ref 95–99)
SODIUM BLDA-SCNC: 134.1 MMOL/L (ref 136–146)
SODIUM SERPL-SCNC: 134 MMOL/L (ref 136–145)
STRESS TARGET HR: 143 BPM
WHOLE BLOOD HOLD SPECIMEN: NORMAL

## 2023-06-01 PROCEDURE — 80048 BASIC METABOLIC PNL TOTAL CA: CPT | Performed by: INTERNAL MEDICINE

## 2023-06-01 PROCEDURE — 94799 UNLISTED PULMONARY SVC/PX: CPT

## 2023-06-01 PROCEDURE — 82805 BLOOD GASES W/O2 SATURATION: CPT | Performed by: INTERNAL MEDICINE

## 2023-06-01 PROCEDURE — 82375 ASSAY CARBOXYHB QUANT: CPT | Performed by: INTERNAL MEDICINE

## 2023-06-01 PROCEDURE — 25010000002 CYANOCOBALAMIN PER 1000 MCG: Performed by: INTERNAL MEDICINE

## 2023-06-01 PROCEDURE — 99221 1ST HOSP IP/OBS SF/LOW 40: CPT | Performed by: SPECIALIST

## 2023-06-01 PROCEDURE — 25010000002 SULFUR HEXAFLUORIDE MICROSPH 60.7-25 MG RECONSTITUTED SUSPENSION: Performed by: INTERNAL MEDICINE

## 2023-06-01 PROCEDURE — 94664 DEMO&/EVAL PT USE INHALER: CPT

## 2023-06-01 PROCEDURE — 93306 TTE W/DOPPLER COMPLETE: CPT

## 2023-06-01 PROCEDURE — 93306 TTE W/DOPPLER COMPLETE: CPT | Performed by: INTERNAL MEDICINE

## 2023-06-01 PROCEDURE — 25010000002 NALOXONE PER 1 MG: Performed by: INTERNAL MEDICINE

## 2023-06-01 PROCEDURE — G0378 HOSPITAL OBSERVATION PER HR: HCPCS

## 2023-06-01 PROCEDURE — 83050 HGB METHEMOGLOBIN QUAN: CPT | Performed by: INTERNAL MEDICINE

## 2023-06-01 PROCEDURE — 36600 WITHDRAWAL OF ARTERIAL BLOOD: CPT | Performed by: INTERNAL MEDICINE

## 2023-06-01 PROCEDURE — 25010000002 NALOXONE PER 1 MG

## 2023-06-01 PROCEDURE — 25010000002 HYDROMORPHONE 1 MG/ML SOLUTION: Performed by: INTERNAL MEDICINE

## 2023-06-01 PROCEDURE — 82948 REAGENT STRIP/BLOOD GLUCOSE: CPT

## 2023-06-01 RX ORDER — GABAPENTIN 100 MG/1
200 CAPSULE ORAL EVERY 12 HOURS SCHEDULED
Status: DISCONTINUED | OUTPATIENT
Start: 2023-06-01 | End: 2023-06-15 | Stop reason: HOSPADM

## 2023-06-01 RX ORDER — NALOXONE HCL 0.4 MG/ML
0.4 VIAL (ML) INJECTION ONCE
Status: COMPLETED | OUTPATIENT
Start: 2023-06-01 | End: 2023-06-01

## 2023-06-01 RX ORDER — QUETIAPINE FUMARATE 50 MG/1
150 TABLET, EXTENDED RELEASE ORAL NIGHTLY
Status: DISCONTINUED | OUTPATIENT
Start: 2023-06-01 | End: 2023-06-02

## 2023-06-01 RX ORDER — NALOXONE HYDROCHLORIDE 0.4 MG/ML
INJECTION, SOLUTION INTRAMUSCULAR; INTRAVENOUS; SUBCUTANEOUS
Status: COMPLETED
Start: 2023-06-01 | End: 2023-06-01

## 2023-06-01 RX ORDER — SODIUM CHLORIDE 9 MG/ML
75 INJECTION, SOLUTION INTRAVENOUS CONTINUOUS
Status: DISCONTINUED | OUTPATIENT
Start: 2023-06-01 | End: 2023-06-02

## 2023-06-01 RX ORDER — CYANOCOBALAMIN 1000 UG/ML
1000 INJECTION, SOLUTION INTRAMUSCULAR; SUBCUTANEOUS DAILY
Status: DISPENSED | OUTPATIENT
Start: 2023-06-01 | End: 2023-06-09

## 2023-06-01 RX ADMIN — NALOXONE HYDROCHLORIDE 0.4 MG: 0.4 INJECTION, SOLUTION INTRAMUSCULAR; INTRAVENOUS; SUBCUTANEOUS at 06:57

## 2023-06-01 RX ADMIN — HYDROCODONE BITARTRATE AND ACETAMINOPHEN 1 TABLET: 10; 325 TABLET ORAL at 14:43

## 2023-06-01 RX ADMIN — PRAVASTATIN SODIUM 40 MG: 40 TABLET ORAL at 09:09

## 2023-06-01 RX ADMIN — CYANOCOBALAMIN 1000 MCG: 1000 INJECTION, SOLUTION INTRAMUSCULAR at 18:22

## 2023-06-01 RX ADMIN — SULFUR HEXAFLUORIDE 2 ML: KIT at 10:25

## 2023-06-01 RX ADMIN — DULOXETINE HYDROCHLORIDE 60 MG: 30 CAPSULE, DELAYED RELEASE ORAL at 20:50

## 2023-06-01 RX ADMIN — SODIUM CHLORIDE 75 ML/HR: 9 INJECTION, SOLUTION INTRAVENOUS at 05:52

## 2023-06-01 RX ADMIN — HYDROMORPHONE HYDROCHLORIDE 0.25 MG: 1 INJECTION, SOLUTION INTRAMUSCULAR; INTRAVENOUS; SUBCUTANEOUS at 18:22

## 2023-06-01 RX ADMIN — METOPROLOL SUCCINATE 25 MG: 25 TABLET, EXTENDED RELEASE ORAL at 09:10

## 2023-06-01 RX ADMIN — IPRATROPIUM BROMIDE AND ALBUTEROL SULFATE 3 ML: .5; 2.5 SOLUTION RESPIRATORY (INHALATION) at 07:24

## 2023-06-01 RX ADMIN — SODIUM CHLORIDE 75 ML/HR: 9 INJECTION, SOLUTION INTRAVENOUS at 18:22

## 2023-06-01 RX ADMIN — CETIRIZINE HYDROCHLORIDE 10 MG: 10 TABLET, FILM COATED ORAL at 09:09

## 2023-06-01 RX ADMIN — GABAPENTIN 600 MG: 300 CAPSULE ORAL at 09:09

## 2023-06-01 RX ADMIN — NALOXONE HYDROCHLORIDE 0.4 MG: 0.4 INJECTION, SOLUTION INTRAMUSCULAR; INTRAVENOUS; SUBCUTANEOUS at 05:40

## 2023-06-01 RX ADMIN — GABAPENTIN 200 MG: 100 CAPSULE ORAL at 20:50

## 2023-06-01 RX ADMIN — QUETIAPINE FUMARATE 150 MG: 50 TABLET, EXTENDED RELEASE ORAL at 20:50

## 2023-06-01 RX ADMIN — METOPROLOL SUCCINATE 25 MG: 25 TABLET, EXTENDED RELEASE ORAL at 20:50

## 2023-06-01 RX ADMIN — MONTELUKAST 10 MG: 10 TABLET, FILM COATED ORAL at 20:50

## 2023-06-01 RX ADMIN — FOLIC ACID 5 MG: 5 INJECTION, SOLUTION INTRAMUSCULAR; INTRAVENOUS; SUBCUTANEOUS at 09:10

## 2023-06-01 NOTE — NURSING NOTE
PCA called out for this nurse during VS; patient was found to be non responsive to sternal rubs and BP was found to be 85/43 bilateral extremities are flaccid as well; Pupils found to be pinpoint; RRT called; patient received Narcan via IV and began to arouse and PERRLA noted in pupils; patient orientated to self, and location; MD notified with orders placed accordingly; Family notified and voicemail left; will continue to monitor

## 2023-06-01 NOTE — PROGRESS NOTES
[facility]   Progress Note    Patient Name: Carol Abarca  : 1970  MRN: [id number]  Primary Care Physician: Sonia Mosquera APRN  Date of admission: 2023    Subjective   Subjective     Chief Complaint:   Follow-up on syncope    History of Present Illness  Complains of generalized pain  RRT was called this morning  Patient could not be awakened from sleep easily.  When she was finally awakened she was noted to be confused and drowsy, pupils were pinpoint.  Blood pressure was low, ABGs showed hypercapnia and hypoxia  Supplemental oxygen was initiated and IV fluids were given  Opioids and benzodiazepines were discontinued  The patient is now fully awake and alert and does not fully remember the events of early morning  Vitamin B12 and folate levels are less than the reportable range meaning that they are almost nonexistent.  Patient acknowledges that she may not have taken B12 injections since the onset of the pandemic        Review of Systems   Constitutional: Positive for activity change.   Cardiovascular: Negative.    Gastrointestinal: Negative.    Musculoskeletal: Positive for arthralgias, back pain and myalgias.       Objective   Objective     Vitals:  Temp:  [98 °F (36.7 °C)-98.6 °F (37 °C)] 98.6 °F (37 °C)  Heart Rate:  [] 90  Resp:  [14-18] 16  BP: ()/() 113/82  Flow (L/min):  [3-8] 3    Physical Exam  Cardiovascular:      Rate and Rhythm: Normal rate.      Pulses: Normal pulses.   Skin:     General: Skin is warm.   Neurological:      General: No focal deficit present.         Result Review    Result Review:  I have personally reviewed the results from the time of this admission to 23 4:46 PM EDT and agree with these findings:  []  Laboratory  []  Microbiology  []  Radiology  []  EKG/Telemetry   []  Cardiology/Vascular   []  Pathology  []  Old records  []  Other:    Assessment & Plan   Assessment / Plan   Excessive sedation secondary to opioids and  benzodiazepines  Macrocytic anemia secondary to severe vitamin B12 and folate deficiency  Obstructive sleep apnea  Hypoxia and hypercapnia secondary to obstructive sleep apnea and decreased respiratory drive  Syncope  Hypertension  Diabetes  Neuropathy secondary to diabetes and vitamin B12 deficiency    Active Hospital Problems:  Active Hospital Problems    Diagnosis    • **Syncope and collapse        Plan:   Start folic acid 5 mg IV daily  Start folic acid 5 mg p.o. daily  Start vitamin B12 1000 mcg IM daily for 7 days and then every week for 8 weeks  Stop Norco and Xanax  Decrease Seroquel from 600 mg to 200 mg  Decrease Neurontin  Initiate continuous pulse oximetry  Start Dilaudid 0.25 mg IV every 4 hours  Start CPAP pressure setting of 12 cm  Continue supplemental oxygen   Check antiparietal cell antibody                  Electronically signed by Guanako Lemons MD, 06/01/23, 4:46 PM EDT.

## 2023-06-01 NOTE — CONSULTS
[facility]   Cardiology Consult Note    Patient Name: Carol Abarca  : 1970  MRN: [id number]  Primary Care Physician:  Sonia Mosquera APRN  Referring Physician: No Known Provider  Date of admission: 2023    Subjective   Subjective     Reason for Consult/ Chief Complaint: Syncope    HPI:  Carol Abarca is a 52 y.o. female with history of syncopal episode while she was sitting at home.  Now alert no distress.  Syncope was not preceded by any palpitations chest pain or dizziness.  Has history of obstructive sleep apnea.    Review of Systems:      Constitutional no fever,  no weight loss   Skin no rash   Otolaryngeal no difficulty swallowing   Cardiovascular See HPI   Pulmonary no cough, no sputum production   Gastrointestinal no constipation, no diarrhea   Genitourinary no dysuria, no hematuria   Hematologic no easy bruisability, no abnormal bleeding   Musculoskeletal no muscle pain   Neurologic no dizziness, no falls     Personal History       Past Medical/Surgical History:   Past Medical History:   Diagnosis Date   • Anemia    • Arthritis    • Diabetes    • Essential hypertension 2021   • History of pulmonary embolism    • Lumbago     Low back pain   • Mild left ventricular hypertrophy 2021   • Mixed hyperlipidemia 2021   • Obstructive sleep apnea    • Reflux esophagitis    • Seasonal allergies      Past Surgical History:   Procedure Laterality Date   • APPENDECTOMY     •  SECTION      , , ,    • COLONOSCOPY      2019   • COLONOSCOPY N/A 2022    Procedure: COLONOSCOPY;  Surgeon: Radha James MD;  Location: [facility] ENDOSCOPY;  Service: Gastroenterology;  Laterality: N/A;  COLON POLYP    • ENDOSCOPY     • HEMORRHOIDECTOMY N/A 2022    Procedure: HEMORRHOIDECTOMY;  Surgeon: Remi Reeder MD;  Location: [facility] MAIN OR;  Service: General;  Laterality: N/A;   • HERNIA REPAIR      , 2006, 2007, 2008   • HYSTERECTOMY  2004    • LAPAROSCOPIC GASTRIC BANDING     • OTHER SURGICAL HISTORY      Metal implants         Family History: No Family History of CAD.    Social History:  reports that she has been smoking cigarettes. She has been smoking an average of .5 packs per day. She has never used smokeless tobacco. She reports current alcohol use. She reports that she does not use drugs.    Medications:  Medications Prior to Admission   Medication Sig Dispense Refill Last Dose   • albuterol sulfate  (90 Base) MCG/ACT inhaler Inhale 2 puffs Every 4 (Four) Hours As Needed for Wheezing. 8 g 0 5/30/2023   • ALPRAZolam (XANAX) 1 MG tablet Take 1 tablet by mouth 2 (Two) Times a Day As Needed.   5/30/2023   • aspirin 81 MG EC tablet Take 1 tablet by mouth Daily.   5/30/2023   • cetirizine (zyrTEC) 10 MG tablet Daily.   5/30/2023   • DULoxetine (CYMBALTA) 60 MG capsule Take 1 capsule by mouth 2 (Two) Times a Day.   5/30/2023   • gabapentin (NEURONTIN) 600 MG tablet Take 1 tablet by mouth 2 (Two) Times a Day As Needed.   5/30/2023   • glipizide (GLUCOTROL XL) 10 MG 24 hr tablet Take 1 tablet by mouth Daily.   5/30/2023   • HYDROcodone-acetaminophen (NORCO)  MG per tablet Take 1 tablet by mouth Every 6 (Six) Hours As Needed for Moderate Pain.   5/30/2023   • lisinopril (PRINIVIL,ZESTRIL) 40 MG tablet Take 1 tablet by mouth Daily.   5/30/2023   • metoprolol succinate XL (TOPROL-XL) 25 MG 24 hr tablet Take 1 tablet by mouth 2 (Two) Times a Day.   5/30/2023   • metoprolol succinate XL (TOPROL-XL) 50 MG 24 hr tablet Take 1 tablet by mouth 2 (Two) Times a Day.   5/30/2023   • montelukast (SINGULAIR) 10 MG tablet Take 1 tablet by mouth Every Night.   5/30/2023   • pravastatin (PRAVACHOL) 40 MG tablet Take 1 tablet by mouth Daily.   5/30/2023   • QUEtiapine XR (SEROquel XR) 300 MG 24 hr tablet Take 2 tablets by mouth Every Evening.   5/30/2023   • Toujeo SoloStar 300 UNIT/ML solution pen-injector injection Inject 300 Units under the skin into the  appropriate area as directed Every Night.   5/30/2023   • zolpidem (AMBIEN) 10 MG tablet Take 1 tablet by mouth At Night As Needed.   5/30/2023   • potassium chloride (K-DUR,KLOR-CON) 10 MEQ CR tablet Take 1 tablet by mouth 2 (Two) Times a Day.   More than a month     Current medications:  cetirizine, 10 mg, Oral, Daily  DULoxetine, 60 mg, Oral, BID  folic acid, 5 mg, Oral, Daily  folic acid (FOLVITE) IVPB, 5 mg, Intravenous, Daily  gabapentin, 600 mg, Oral, Q12H  ipratropium-albuterol, 3 mL, Nebulization, 4x Daily - RT  metoprolol succinate XL, 25 mg, Oral, BID  montelukast, 10 mg, Oral, Nightly  pravastatin, 40 mg, Oral, Daily  QUEtiapine XR, 600 mg, Oral, Nightly  zolpidem, 10 mg, Oral, Nightly      Current IV drips:  sodium chloride, 75 mL/hr, Last Rate: 75 mL/hr (06/01/23 0552)        Allergies:  Allergies   Allergen Reactions   • Tramadol Anaphylaxis   • Tramadol Hcl Anaphylaxis   • Moxifloxacin Hives   • Sulfa Antibiotics Hives       Objective    Objective     Vitals:   Temp:  [98 °F (36.7 °C)-98.6 °F (37 °C)] 98.6 °F (37 °C)  Heart Rate:  [] 87  Resp:  [14-18] 14  BP: ()/() 128/82  Flow (L/min):  [2-8] 3      Physical Exam:    Constitutional: Awake, alert, No acute distress   Eyes: PERRLA, sclerae anicteric, no conjunctival injection  HENT: NCAT, mucous membranes moist  Neck: Supple, no thyromegaly, no lymphadenopathy, trachea midline  Respiratory: Clear to auscultation bilaterally, nonlabored respirations   Cardiovascular: RRR, no murmurs, rubs, or gallops, palpable pedal pulses bilaterally  Gastrointestinal: Positive bowel sounds, soft, nontender, nondistended  Musculoskeletal: No bilateral ankle edema, no clubbing or cyanosis to extremities  Psychiatric: Appropriate affect, cooperative  Neurologic: Oriented x 3, strength symmetric in all extremities, Cranial Nerves grossly intact to confrontation, speech clear  Skin: No rashes.    Result Review    Result Review:  I have personally  reviewed the results from the time of this admission to 6/1/2023 08:58 EDT and agree with these findings:  [x]  Laboratory  [x]  EKG/Telemetry   [x]  Cardiology/Vascular   []  Pathology  [x]  Old records  [x]  Medications  Basic Metabolic Panel    Sodium Sodium   Date Value Ref Range Status   06/01/2023 134 (L) 136 - 145 mmol/L Final   05/31/2023 136 136 - 145 mmol/L Final     Sodium, Arterial   Date Value Ref Range Status   06/01/2023 134.1 (L) 136 - 146 mmol/L Final      Potassium Potassium   Date Value Ref Range Status   06/01/2023 4.0 3.5 - 5.2 mmol/L Final   05/31/2023 3.9 3.5 - 5.2 mmol/L Final      Chloride Chloride   Date Value Ref Range Status   06/01/2023 99 98 - 107 mmol/L Final   05/31/2023 101 98 - 107 mmol/L Final      Bicarbonate No results found for: PLASMABICARB   BUN BUN   Date Value Ref Range Status   06/01/2023 13 6 - 20 mg/dL Final   05/31/2023 9 6 - 20 mg/dL Final      Creatinine Creatinine   Date Value Ref Range Status   06/01/2023 0.89 0.57 - 1.00 mg/dL Final   05/31/2023 0.61 0.57 - 1.00 mg/dL Final      Calcium Calcium   Date Value Ref Range Status   06/01/2023 8.1 (L) 8.6 - 10.5 mg/dL Final   05/31/2023 8.7 8.6 - 10.5 mg/dL Final      Glucose            Lab Results   Component Value Date    PROBNP 93.2 05/31/2023          EKG shows sinus rhythm with no acute changes.  Telemetry reviewed shows sinus rhythm with no arrhythmias    Assessment / Plan     Impression:   1.  Syncope unspecified.  2.  Obstructive sleep apnea    Plan:   1.  Monitoring showed no significant arrhythmias.  2.  Echocardiogram.  3.  48-hour Holter monitoring as an outpatient.    Electronically signed by Aiden Cedillo MD, 06/01/23, 8:58 AM EDT.

## 2023-06-01 NOTE — PLAN OF CARE
Goal Outcome Evaluation:                      Patient is alert and oriented x4.  Patient was confused and lethargic at the beginning of the shift, but an RRT had been called before shift change and Narcan had been administered.  Patient is now fully alert and oriented.  Vital signs stable.  Patient complained of pain x1 and was medicated per MAR.  Continuing plan of care.

## 2023-06-01 NOTE — SIGNIFICANT NOTE
RRT called for AMS, patient was unresponsive upon assessment, pupils bilaterally were found to be pinpiont and nonreactive.   0.4 of Narcan given per RRT protocol, after trap squeeze patient was able to give name and location. Pupils much improved to 4 mm and reactive. Following commands appropriately. MD notified of events, IVF given as ordered.  Cintia Reddy RN

## 2023-06-01 NOTE — PLAN OF CARE
Goal Outcome Evaluation:  Plan of Care Reviewed With: patient        Progress: no change  Outcome Evaluation: patient alert and orientated overnight; VSS; no s/s of distress; oxygen remained in place; patient educated on medications overnight; will continue to monitor;

## 2023-06-02 LAB
ABO GROUP BLD: NORMAL
ABO GROUP BLD: NORMAL
ALBUMIN SERPL-MCNC: 3.2 G/DL (ref 3.5–5.2)
ALBUMIN/GLOB SERPL: 0.9 G/DL
ALP SERPL-CCNC: 97 U/L (ref 39–117)
ALT SERPL W P-5'-P-CCNC: 8 U/L (ref 1–33)
ANION GAP SERPL CALCULATED.3IONS-SCNC: 8.8 MMOL/L (ref 5–15)
ANISOCYTOSIS BLD QL: NORMAL
ARTERIAL PATENCY WRIST A: POSITIVE
AST SERPL-CCNC: 18 U/L (ref 1–32)
BASE EXCESS BLDA CALC-SCNC: 0.5 MMOL/L (ref -2–2)
BASOPHILS # BLD AUTO: 0.01 10*3/MM3 (ref 0–0.2)
BASOPHILS NFR BLD AUTO: 0.2 % (ref 0–1.5)
BDY SITE: ABNORMAL
BILIRUB SERPL-MCNC: 0.8 MG/DL (ref 0–1.2)
BLD GP AB SCN SERPL QL: NEGATIVE
BUN SERPL-MCNC: 13 MG/DL (ref 6–20)
BUN/CREAT SERPL: 23.2 (ref 7–25)
CA-I BLDA-SCNC: 1.13 MMOL/L (ref 1.13–1.32)
CALCIUM SPEC-SCNC: 8.3 MG/DL (ref 8.6–10.5)
CHLORIDE BLDA-SCNC: 104 MMOL/L (ref 98–106)
CHLORIDE SERPL-SCNC: 102 MMOL/L (ref 98–107)
CO2 SERPL-SCNC: 24.2 MMOL/L (ref 22–29)
COHGB MFR BLD: 0.6 % (ref 0–1.5)
CREAT SERPL-MCNC: 0.56 MG/DL (ref 0.57–1)
DACRYOCYTES BLD QL SMEAR: NORMAL
DEPRECATED RDW RBC AUTO: 60.6 FL (ref 37–54)
EGFRCR SERPLBLD CKD-EPI 2021: 110 ML/MIN/1.73
EOSINOPHIL # BLD AUTO: 0.02 10*3/MM3 (ref 0–0.4)
EOSINOPHIL NFR BLD AUTO: 0.3 % (ref 0.3–6.2)
ERYTHROCYTE [DISTWIDTH] IN BLOOD BY AUTOMATED COUNT: 15.3 % (ref 12.3–15.4)
FHHB: 4.5 % (ref 0–5)
FOLATE SERPL-MCNC: 2.5 NG/ML
GAS FLOW AIRWAY: 3 LPM
GLOBULIN UR ELPH-MCNC: 3.7 GM/DL
GLUCOSE BLDA-MCNC: 124 MG/DL (ref 65–99)
GLUCOSE SERPL-MCNC: 173 MG/DL (ref 65–99)
HBA1C MFR BLD: 5.4 % (ref 4.8–5.6)
HCO3 BLDA-SCNC: 26 MMOL/L (ref 22–26)
HCT VFR BLD AUTO: 21.5 % (ref 34–46.6)
HGB BLD-MCNC: 6.9 G/DL (ref 12–15.9)
HGB BLDA-MCNC: 8.8 G/DL (ref 11.7–14.6)
HYPOCHROMIA BLD QL: NORMAL
IMM GRANULOCYTES # BLD AUTO: 0.02 10*3/MM3 (ref 0–0.05)
IMM GRANULOCYTES NFR BLD AUTO: 0.3 % (ref 0–0.5)
INHALED O2 CONCENTRATION: 32 %
LACTATE BLDA-SCNC: 0.86 MMOL/L (ref 0.5–2)
LYMPHOCYTES # BLD AUTO: 0.96 10*3/MM3 (ref 0.7–3.1)
LYMPHOCYTES NFR BLD AUTO: 16.4 % (ref 19.6–45.3)
MACROCYTES BLD QL SMEAR: NORMAL
MCH RBC QN AUTO: 35.2 PG (ref 26.6–33)
MCHC RBC AUTO-ENTMCNC: 32.1 G/DL (ref 31.5–35.7)
MCV RBC AUTO: 109.7 FL (ref 79–97)
METHGB BLD QL: 0.2 % (ref 0–1.5)
MODALITY: ABNORMAL
MONOCYTES # BLD AUTO: 0.24 10*3/MM3 (ref 0.1–0.9)
MONOCYTES NFR BLD AUTO: 4.1 % (ref 5–12)
NEUTROPHILS NFR BLD AUTO: 4.6 10*3/MM3 (ref 1.7–7)
NEUTROPHILS NFR BLD AUTO: 78.7 % (ref 42.7–76)
NOTE: ABNORMAL
NRBC BLD AUTO-RTO: 0 /100 WBC (ref 0–0.2)
OVALOCYTES BLD QL SMEAR: NORMAL
OXYHGB MFR BLDV: 94.7 % (ref 94–99)
PCO2 BLDA: 46 MM HG (ref 35–45)
PH BLDA: 7.37 PH UNITS (ref 7.35–7.45)
PLAT MORPH BLD: NORMAL
PLATELET # BLD AUTO: 87 10*3/MM3 (ref 140–450)
PMV BLD AUTO: 12.7 FL (ref 6–12)
PO2 BLD: 297 MM[HG] (ref 0–500)
PO2 BLDA: 95 MM HG (ref 80–100)
POTASSIUM BLDA-SCNC: 4.29 MMOL/L (ref 3.5–5)
POTASSIUM SERPL-SCNC: 4.1 MMOL/L (ref 3.5–5.2)
PROT SERPL-MCNC: 6.9 G/DL (ref 6–8.5)
RBC # BLD AUTO: 1.96 10*6/MM3 (ref 3.77–5.28)
RH BLD: POSITIVE
RH BLD: POSITIVE
SAO2 % BLDCOA: 95.5 % (ref 95–99)
SODIUM BLDA-SCNC: 135.2 MMOL/L (ref 136–146)
SODIUM SERPL-SCNC: 135 MMOL/L (ref 136–145)
T&S EXPIRATION DATE: NORMAL
VIT B12 SERPL-MCNC: 132 PG/ML (ref 232–1245)
WBC MORPH BLD: NORMAL
WBC NRBC COR # BLD: 5.85 10*3/MM3 (ref 3.4–10.8)

## 2023-06-02 PROCEDURE — 94799 UNLISTED PULMONARY SVC/PX: CPT

## 2023-06-02 PROCEDURE — 36430 TRANSFUSION BLD/BLD COMPNT: CPT

## 2023-06-02 PROCEDURE — 86901 BLOOD TYPING SEROLOGIC RH(D): CPT | Performed by: INTERNAL MEDICINE

## 2023-06-02 PROCEDURE — 86900 BLOOD TYPING SEROLOGIC ABO: CPT

## 2023-06-02 PROCEDURE — P9016 RBC LEUKOCYTES REDUCED: HCPCS

## 2023-06-02 PROCEDURE — 94761 N-INVAS EAR/PLS OXIMETRY MLT: CPT

## 2023-06-02 PROCEDURE — 83516 IMMUNOASSAY NONANTIBODY: CPT | Performed by: INTERNAL MEDICINE

## 2023-06-02 PROCEDURE — 83050 HGB METHEMOGLOBIN QUAN: CPT | Performed by: INTERNAL MEDICINE

## 2023-06-02 PROCEDURE — 83036 HEMOGLOBIN GLYCOSYLATED A1C: CPT | Performed by: INTERNAL MEDICINE

## 2023-06-02 PROCEDURE — 86923 COMPATIBILITY TEST ELECTRIC: CPT

## 2023-06-02 PROCEDURE — 82375 ASSAY CARBOXYHB QUANT: CPT | Performed by: INTERNAL MEDICINE

## 2023-06-02 PROCEDURE — 25010000002 CYANOCOBALAMIN PER 1000 MCG: Performed by: INTERNAL MEDICINE

## 2023-06-02 PROCEDURE — 25010000002 HYDROMORPHONE 1 MG/ML SOLUTION: Performed by: INTERNAL MEDICINE

## 2023-06-02 PROCEDURE — 86850 RBC ANTIBODY SCREEN: CPT | Performed by: INTERNAL MEDICINE

## 2023-06-02 PROCEDURE — 99231 SBSQ HOSP IP/OBS SF/LOW 25: CPT | Performed by: SPECIALIST

## 2023-06-02 PROCEDURE — 25010000002 HYDRALAZINE PER 20 MG: Performed by: INTERNAL MEDICINE

## 2023-06-02 PROCEDURE — 82805 BLOOD GASES W/O2 SATURATION: CPT | Performed by: INTERNAL MEDICINE

## 2023-06-02 PROCEDURE — 94660 CPAP INITIATION&MGMT: CPT

## 2023-06-02 PROCEDURE — 80053 COMPREHEN METABOLIC PANEL: CPT | Performed by: INTERNAL MEDICINE

## 2023-06-02 PROCEDURE — 86901 BLOOD TYPING SEROLOGIC RH(D): CPT

## 2023-06-02 PROCEDURE — 85025 COMPLETE CBC W/AUTO DIFF WBC: CPT | Performed by: INTERNAL MEDICINE

## 2023-06-02 PROCEDURE — 97161 PT EVAL LOW COMPLEX 20 MIN: CPT

## 2023-06-02 PROCEDURE — 86900 BLOOD TYPING SEROLOGIC ABO: CPT | Performed by: INTERNAL MEDICINE

## 2023-06-02 PROCEDURE — 36600 WITHDRAWAL OF ARTERIAL BLOOD: CPT | Performed by: INTERNAL MEDICINE

## 2023-06-02 PROCEDURE — 85007 BL SMEAR W/DIFF WBC COUNT: CPT | Performed by: INTERNAL MEDICINE

## 2023-06-02 RX ORDER — HYDRALAZINE HYDROCHLORIDE 20 MG/ML
20 INJECTION INTRAMUSCULAR; INTRAVENOUS EVERY 4 HOURS PRN
Status: DISCONTINUED | OUTPATIENT
Start: 2023-06-02 | End: 2023-06-10

## 2023-06-02 RX ORDER — ZOLPIDEM TARTRATE 5 MG/1
5 TABLET ORAL NIGHTLY PRN
Status: DISPENSED | OUTPATIENT
Start: 2023-06-02 | End: 2023-06-09

## 2023-06-02 RX ORDER — QUETIAPINE 200 MG/1
600 TABLET, FILM COATED, EXTENDED RELEASE ORAL EVERY EVENING
Status: DISCONTINUED | OUTPATIENT
Start: 2023-06-02 | End: 2023-06-09

## 2023-06-02 RX ORDER — METOPROLOL SUCCINATE 50 MG/1
50 TABLET, EXTENDED RELEASE ORAL 2 TIMES DAILY
Status: DISCONTINUED | OUTPATIENT
Start: 2023-06-02 | End: 2023-06-15 | Stop reason: HOSPADM

## 2023-06-02 RX ORDER — LISINOPRIL 20 MG/1
40 TABLET ORAL DAILY
Status: DISCONTINUED | OUTPATIENT
Start: 2023-06-02 | End: 2023-06-15 | Stop reason: HOSPADM

## 2023-06-02 RX ORDER — HYDRALAZINE HYDROCHLORIDE 20 MG/ML
40 INJECTION INTRAMUSCULAR; INTRAVENOUS ONCE
Status: COMPLETED | OUTPATIENT
Start: 2023-06-02 | End: 2023-06-02

## 2023-06-02 RX ADMIN — PRAVASTATIN SODIUM 40 MG: 40 TABLET ORAL at 08:23

## 2023-06-02 RX ADMIN — MONTELUKAST 10 MG: 10 TABLET, FILM COATED ORAL at 21:40

## 2023-06-02 RX ADMIN — CETIRIZINE HYDROCHLORIDE 10 MG: 10 TABLET, FILM COATED ORAL at 08:23

## 2023-06-02 RX ADMIN — HYDROMORPHONE HYDROCHLORIDE 0.25 MG: 1 INJECTION, SOLUTION INTRAMUSCULAR; INTRAVENOUS; SUBCUTANEOUS at 08:24

## 2023-06-02 RX ADMIN — HYDROMORPHONE HYDROCHLORIDE 0.25 MG: 1 INJECTION, SOLUTION INTRAMUSCULAR; INTRAVENOUS; SUBCUTANEOUS at 14:22

## 2023-06-02 RX ADMIN — GABAPENTIN 200 MG: 100 CAPSULE ORAL at 21:40

## 2023-06-02 RX ADMIN — LISINOPRIL 40 MG: 20 TABLET ORAL at 08:22

## 2023-06-02 RX ADMIN — METOPROLOL SUCCINATE 50 MG: 50 TABLET, EXTENDED RELEASE ORAL at 21:40

## 2023-06-02 RX ADMIN — ZOLPIDEM TARTRATE 5 MG: 5 TABLET ORAL at 21:41

## 2023-06-02 RX ADMIN — HYDROMORPHONE HYDROCHLORIDE 0.25 MG: 1 INJECTION, SOLUTION INTRAMUSCULAR; INTRAVENOUS; SUBCUTANEOUS at 21:40

## 2023-06-02 RX ADMIN — CYANOCOBALAMIN 1000 MCG: 1000 INJECTION, SOLUTION INTRAMUSCULAR at 08:23

## 2023-06-02 RX ADMIN — Medication 5 MG: at 08:22

## 2023-06-02 RX ADMIN — GABAPENTIN 200 MG: 100 CAPSULE ORAL at 08:22

## 2023-06-02 RX ADMIN — HYDROMORPHONE HYDROCHLORIDE 0.25 MG: 1 INJECTION, SOLUTION INTRAMUSCULAR; INTRAVENOUS; SUBCUTANEOUS at 00:42

## 2023-06-02 RX ADMIN — QUETIAPINE FUMARATE 600 MG: 50 TABLET, EXTENDED RELEASE ORAL at 21:40

## 2023-06-02 RX ADMIN — HYDRALAZINE HYDROCHLORIDE 40 MG: 20 INJECTION, SOLUTION INTRAMUSCULAR; INTRAVENOUS at 21:39

## 2023-06-02 RX ADMIN — DULOXETINE HYDROCHLORIDE 60 MG: 30 CAPSULE, DELAYED RELEASE ORAL at 21:41

## 2023-06-02 RX ADMIN — FOLIC ACID 5 MG: 5 INJECTION, SOLUTION INTRAMUSCULAR; INTRAVENOUS; SUBCUTANEOUS at 08:23

## 2023-06-02 RX ADMIN — METOPROLOL SUCCINATE 25 MG: 25 TABLET, EXTENDED RELEASE ORAL at 08:23

## 2023-06-02 NOTE — PLAN OF CARE
Goal Outcome Evaluation:  Plan of Care Reviewed With: patient           Outcome Evaluation: Patient is stand by assist with all transfers and ambulation. She does not present with an deficits that require inpatient PT services at this time. Recommend home following discharge.      PT Evaluation Complexity  History, PT Evaluation Complexity: no personal factors and/or comorbidities  Examination of Body Systems (PT Eval Complexity): 1-2 elements  Clinical Presentation (PT Evaluation Complexity): stable  Clinical Decision Making (PT Evaluation Complexity): low complexity  Overall Complexity (PT Evaluation Complexity): low complexity

## 2023-06-02 NOTE — PLAN OF CARE
Goal Outcome Evaluation:  Plan of Care Reviewed With: patient        Progress: no change  Outcome Evaluation: Patient has our V60 BIPAP at bedside for PRN/nightly use due to Sleep Apnea. Patient states that she does wear a unit at home as well. Provider order reads [id number]. Charting has said [id number]. I ensured machine matched provider order at [id number]. Patient stated that she is used to wearing mask and has no issues with it. She is wearing a full face mask. Currently, patient is on a 3lpm NC when not on CPAP. She does not wear Oxygen at home.

## 2023-06-02 NOTE — PLAN OF CARE
Problem: Adult Inpatient Plan of Care  Goal: Plan of Care Review  Outcome: Ongoing, Progressing  Goal: Patient-Specific Goal (Individualized)  Outcome: Ongoing, Progressing  Goal: Absence of Hospital-Acquired Illness or Injury  Outcome: Ongoing, Progressing  Intervention: Identify and Manage Fall Risk  Recent Flowsheet Documentation  Taken 6/2/2023 0300 by Layla Rhodes RN  Safety Promotion/Fall Prevention: safety round/check completed  Taken 6/2/2023 0200 by Layla Rhodes RN  Safety Promotion/Fall Prevention: safety round/check completed  Taken 6/2/2023 0100 by Layla Rhodes RN  Safety Promotion/Fall Prevention: safety round/check completed  Taken 6/2/2023 0000 by Layla Rhodes RN  Safety Promotion/Fall Prevention: safety round/check completed  Taken 6/1/2023 2300 by Layla Rhodes RN  Safety Promotion/Fall Prevention: safety round/check completed  Taken 6/1/2023 2200 by Layla Rhodes RN  Safety Promotion/Fall Prevention: safety round/check completed  Taken 6/1/2023 2100 by Layla Rhodes RN  Safety Promotion/Fall Prevention: safety round/check completed  Taken 6/1/2023 2000 by Layla Rhoeds RN  Safety Promotion/Fall Prevention: safety round/check completed  Intervention: Prevent Skin Injury  Recent Flowsheet Documentation  Taken 6/1/2023 2000 by Layla Rhodes RN  Body Position: position changed independently  Skin Protection: adhesive use limited  Intervention: Prevent and Manage VTE (Venous Thromboembolism) Risk  Recent Flowsheet Documentation  Taken 6/1/2023 2000 by Layla Rhodes RN  Activity Management: activity encouraged  Range of Motion: active ROM (range of motion) encouraged  Intervention: Prevent Infection  Recent Flowsheet Documentation  Taken 6/1/2023 2000 by Layla Rhodes RN  Infection Prevention:   visitors restricted/screened   single patient room provided   hand hygiene promoted  Goal: Optimal Comfort and Wellbeing  Outcome: Ongoing, Progressing  Intervention: Monitor Pain and Promote  Comfort  Recent Flowsheet Documentation  Taken 6/1/2023 2000 by Layla Rhodes, RN  Pain Management Interventions:   see MAR   position adjusted   pillow support provided  Intervention: Provide Person-Centered Care  Recent Flowsheet Documentation  Taken 6/1/2023 2000 by Layla Rhodes RN  Trust Relationship/Rapport:   care explained   choices provided   emotional support provided   empathic listening provided   questions answered   questions encouraged   reassurance provided   thoughts/feelings acknowledged  Goal: Readiness for Transition of Care  Outcome: Ongoing, Progressing     Problem: Syncope  Goal: Absence of Syncopal Symptoms  Outcome: Ongoing, Progressing  Intervention: Manage Effect of Syncopal Symptoms  Recent Flowsheet Documentation  Taken 6/1/2023 2000 by Layla Rhodes, RN  Syncope Management: position changed slowly  Supportive Measures:   positive reinforcement provided   active listening utilized   Goal Outcome Evaluation:      No changes at this time

## 2023-06-02 NOTE — PLAN OF CARE
Goal Outcome Evaluation:                      Patient is alert and oriented x4.  Blood pressure elevated this afternoon; MD aware.  Patient complained of pain x2, and was medicated per MAR.  Patient received one unit of blood this shift and tolerated well.  Continuing plan of care.

## 2023-06-02 NOTE — PLAN OF CARE
Goal Outcome Evaluation:         Patient wore CPAP 12, 30% from 0100 until morning for sleep. Patient has continuous pulse ox at bedside.

## 2023-06-02 NOTE — THERAPY EVALUATION
Acute Care - Physical Therapy Initial Evaluation  [facility]     Patient Name: Carol Abarca  : 1970  MRN: [id number]  Today's Date: 2023      Visit Dx: Admit date: 2023     Referring Physician: Guanako Lemons MD     Surgery Date:* No surgery found *            ICD-10-CM ICD-9-CM   1. Syncope and collapse  R55 780.2   2. Pancytopenia  D61.818 284.19   3. Difficulty walking  R26.2 719.7     Patient Active Problem List   Diagnosis   • Mixed hyperlipidemia   • Essential hypertension   • Mild left ventricular hypertrophy   • Atypical chest pain   • Obstructive sleep apnea   • History of pulmonary embolism   • Screening for colon cancer   • Hemorrhoids   • Syncope and collapse     Past Medical History:   Diagnosis Date   • Anemia    • Arthritis    • Diabetes    • Essential hypertension 2021   • History of pulmonary embolism    • Lumbago     Low back pain   • Mild left ventricular hypertrophy 2021   • Mixed hyperlipidemia 2021   • Obstructive sleep apnea    • Reflux esophagitis    • Seasonal allergies      Past Surgical History:   Procedure Laterality Date   • APPENDECTOMY     •  SECTION      , , ,    • COLONOSCOPY      2019   • COLONOSCOPY N/A 2022    Procedure: COLONOSCOPY;  Surgeon: Radha James MD;  Location: [facility] ENDOSCOPY;  Service: Gastroenterology;  Laterality: N/A;  COLON POLYP    • ENDOSCOPY     • HEMORRHOIDECTOMY N/A 2022    Procedure: HEMORRHOIDECTOMY;  Surgeon: Remi Reeder MD;  Location: [facility] MAIN OR;  Service: General;  Laterality: N/A;   • HERNIA REPAIR      , , ,    • HYSTERECTOMY     • LAPAROSCOPIC GASTRIC BANDING     • OTHER SURGICAL HISTORY      Metal implants     PT Assessment (last 12 hours)     PT Evaluation and Treatment     Row Name 23 1400          Physical Therapy Time and Intention    Subjective Information no complaints  -DP     Document Type evaluation  -DP      Mode of Treatment individual therapy;physical therapy  -DP     Patient Effort good  -DP     Row Name 06/02/23 1400          General Information    Patient Profile Reviewed yes  -DP     Patient Observations alert;cooperative;agree to therapy  -DP     Prior Level of Function independent:;all household mobility;ADL's;gait;transfer;dressing;bathing;home management;driving  -DP     Equipment Currently Used at Home none  -DP     Existing Precautions/Restrictions fall  -DP     Barriers to Rehab none identified  -DP     Row Name 06/02/23 1400          Living Environment    Current Living Arrangements home  -DP     Home Accessibility stairs to enter home  -DP     People in Home child(cory), adult  -DP     Primary Care Provided by self  -DP     Row Name 06/02/23 1400          Home Main Entrance    Number of Stairs, Main Entrance one  -DP     Row Name 06/02/23 1400          Home Use of Assistive/Adaptive Equipment    Equipment Currently Used at Home none  -DP     Row Name 06/02/23 1400          Range of Motion (ROM)    Range of Motion bilateral lower extremities;ROM is WFL  -DP     Row Name 06/02/23 1400          Strength (Manual Muscle Testing)    Strength (Manual Muscle Testing) bilateral lower extremities  BLE: 4-/5  -DP     Row Name 06/02/23 1400          Bed Mobility    Bed Mobility supine-sit;sit-supine  -DP     Supine-Sit [place] (Bed Mobility) standby assist  -DP     Sit-Supine [place] (Bed Mobility) standby assist  -DP     Assistive Device (Bed Mobility) head of bed elevated  -DP     Row Name 06/02/23 1400          Transfers    Transfers sit-stand transfer;stand-sit transfer  -DP     Row Name 06/02/23 1400          Sit-Stand Transfer    Sit-Stand [place] (Transfers) standby assist  -DP     Assistive Device (Sit-Stand Transfers) other (see comments)  none  -DP     Row Name 06/02/23 1400          Stand-Sit Transfer    Stand-Sit [place] (Transfers) standby assist  -DP     Assistive Device (Stand-Sit  Transfers) other (see comments)  none  -DP     Row Name 06/02/23 1400          Gait/Stairs (Locomotion)    Gait/Stairs Locomotion gait/ambulation independence  -DP     [place] Level (Gait) standby assist  -DP     Assistive Device (Gait) other (see comments)  none  -DP     Distance in Feet (Gait) 50 feet  -DP     Deviations/Abnormal Patterns (Gait) gait speed decreased  -DP     Comment, (Gait/Stairs) ambulated on room air  -DP     Row Name 06/02/23 1400          Balance    Balance Assessment standing dynamic balance  -DP     Dynamic Standing Balance standby assist  -DP     Position/Device Used, Standing Balance unsupported  -DP     Row Name 06/02/23 1400          Plan of Care Review    Plan of Care Reviewed With patient  -DP     Outcome Evaluation Patient is stand by assist with all transfers and ambulation. She does not present with an deficits that require inpatient PT services at this time. Recommend home following discharge.  -DP     Row Name 06/02/23 1400          Vital Signs    Post SpO2 (%) 75  -DP     O2 Delivery Post Treatment supplemental O2  3L. elevated to 91 <5 seconds  -DP     Row Name 06/02/23 1400          Therapy Assessment/Plan (PT)    Criteria for Skilled Interventions Met (PT) no;no problems identified which require skilled intervention  -DP     Therapy Frequency (PT) evaluation only  -DP     Row Name 06/02/23 1400          PT Evaluation Complexity    History, PT Evaluation Complexity no personal factors and/or comorbidities  -DP     Examination of Body Systems (PT Eval Complexity) 1-2 elements  -DP     Clinical Presentation (PT Evaluation Complexity) stable  -DP     Clinical Decision Making (PT Evaluation Complexity) low complexity  -DP     Overall Complexity (PT Evaluation Complexity) low complexity  -DP           User Key  (r) = Recorded By, (t) = Taken By, (c) = Cosigned By    Initials Name Provider Type    Arnulfo Forde, PT Physical Therapist                  PT Recommendation and  Plan  Anticipated Discharge Disposition (PT): home  Therapy Frequency (PT): evaluation only  Plan of Care Reviewed With: patient  Outcome Evaluation: Patient is stand by assist with all transfers and ambulation. She does not present with an deficits that require inpatient PT services at this time. Recommend home following discharge.   Outcome Measures     Row Name 06/02/23 1400             How much help from another person do you currently need...    Turning from your back to your side while in flat bed without using bedrails? 4  -DP      Moving from lying on back to sitting on the side of a flat bed without bedrails? 4  -DP      Moving to and from a bed to a chair (including a wheelchair)? 4  -DP      Standing up from a chair using your arms (e.g., wheelchair, bedside chair)? 4  -DP      Climbing 3-5 steps with a railing? 4  -DP      To walk in hospital room? 4  -DP      AM-PAC 6 Clicks Score (PT) 24  -DP         Functional Assessment    Outcome Measure Options AM-PAC 6 Clicks Basic Mobility (PT)  -DP            User Key  (r) = Recorded By, (t) = Taken By, (c) = Cosigned By    Initials Name Provider Type    Arnulfo Forde, PT Physical Therapist                 Time Calculation:    PT Charges     Row Name 06/02/23 1419             Time Calculation    PT Received On 06/02/23  -DP         Untimed Charges    PT Eval/Re-eval Minutes 50  -DP         Total Minutes    Untimed Charges Total Minutes 50  -DP       Total Minutes 50  -DP            User Key  (r) = Recorded By, (t) = Taken By, (c) = Cosigned By    Initials Name Provider Type    Arnulfo Forde, PT Physical Therapist              Therapy Charges for Today     Code Description Service Date Service Provider Modifiers Qty    [id number]  PT EVAL LOW COMPLEXITY 4 6/2/2023 Arnulfo Geiger, PT GP 1          PT G-Codes  Outcome Measure Options: AM-PAC 6 Clicks Basic Mobility (PT)  AM-PAC 6 Clicks Score (PT): 24    Arnulfo Geiger PT  6/2/2023

## 2023-06-02 NOTE — PROGRESS NOTES
"  [facility]   Cardiology Progress Note      Patient Name: Carol Abarca  : 1970  MRN: [id number]  Primary Care Physician:  Sonia Mosquera APRN  Referring Physician: No Known Provider  Date of admission: 2023    Subjective   Subjective     Chief Complaint: Syncopal episode    HPI:  Carol Abarca is a 52 y.o. female admitted with an unspecified syncopal episode.  Now alert oriented no further episode    REVIEW OF SYSTEMS    Constitutional:    No fever, no weight loss  Skin:     No rash  Otolaryngeal:    No difficulty swallowing  Cardiovascular:  No chest pain or shortness of breath  Pulmonary:    No cough, no sputum production    Objective    Objective     Vitals:   Vitals:    23 0101 23 0357 23 0714 23 0724   BP:  142/92     BP Location:  Left arm     Patient Position:  Lying     Pulse:  86 86    Resp:  16 16 16   Temp:  98.2 °F (36.8 °C)     TempSrc:  Oral  Oral   SpO2:  96% 96%    Weight: 97.8 kg (215 lb 9.8 oz)      Height: 157.5 cm (62\")               Physical Exam:   Constitutional: Awake, alert, No acute distress    Eyes: PERRLA, sclerae anicteric, no conjunctival injection   HENT: NCAT, mucous membranes moist   Neck: Supple, no thyromegaly, no lymphadenopathy, trachea midline   Respiratory: Clear to auscultation bilaterally, nonlabored respirations    Cardiovascular: RRR, no murmurs, rubs, or gallops, palpable pedal pulses bilaterally   Gastrointestinal: Positive bowel sounds, soft, nontender, nondistended   Musculoskeletal: No bilateral ankle edema, no clubbing or cyanosis to extremities   Psychiatric: Appropriate affect, cooperative   Neurologic: Oriented x 3, strength symmetric in all extremities, Cranial Nerves grossly intact to confrontation, speech clear   Skin: No rashes.      Current medications:  cetirizine, 10 mg, Oral, Daily  cyanocobalamin, 1,000 mcg, Intramuscular, Daily  DULoxetine, 60 mg, Oral, BID  folic acid, 5 mg, Oral, Daily  folic acid (FOLVITE) " IVPB, 5 mg, Intravenous, Daily  gabapentin, 200 mg, Oral, Q12H  lisinopril, 40 mg, Oral, Daily  metoprolol succinate XL, 25 mg, Oral, BID  montelukast, 10 mg, Oral, Nightly  pravastatin, 40 mg, Oral, Daily  QUEtiapine XR, 150 mg, Oral, Nightly      Current IV drips:       Result Review    Result Review:  I have personally reviewed the results from the time of this admission to 6/2/2023 08:27 EDT and agree with these findings:  []  Laboratory  []  EKG/Telemetry   []  Cardiology/Vascular   []  Radiology         CBC        5/31/2023    05:09 6/2/2023    04:22   CBC   WBC 3.11   5.85     RBC 2.26   1.96     Hemoglobin 8.1   6.9     Hematocrit 24.7   21.5     .3   109.7     MCH 35.8   35.2     MCHC 32.8   32.1     RDW 15.6   15.3     Platelets 98   87       CMP        5/31/2023    05:09 6/1/2023    05:45 6/1/2023    05:46 6/2/2023    04:22   CMP   Glucose 165   351   356   173     BUN 9   13    13     Creatinine 0.61   0.89    0.56     EGFR 107.7   78.1    110.0     Sodium 136   134   134.1   135     Potassium 3.9   4.0    4.1     Chloride 101   99    102     Calcium 8.7   8.1    8.3     Total Protein 7.0     6.9     Albumin 3.2     3.2     Globulin 3.8     3.7     Total Bilirubin 0.5     0.8     Alkaline Phosphatase 113     97     AST (SGOT) 25     18     ALT (SGPT) 10     8     Albumin/Globulin Ratio 0.8     0.9     BUN/Creatinine Ratio 14.8   14.6    23.2     Anion Gap 7.9   11.2    8.8       Results for orders placed during the hospital encounter of 05/31/23    Adult Transthoracic Echo Complete W/ Cont if Necessary Per Protocol    Interpretation Summary  •  Left ventricular ejection fraction appears to be 61 - 65%.  •  The left ventricular cavity is borderline dilated.  •  Left ventricular wall thickness is consistent with mild concentric hypertrophy.  •  Left ventricular diastolic function is consistent with (grade Ia w/high LAP) impaired relaxation.  •  Left atrial volume is mildly increased.  •  Estimated  right ventricular systolic pressure from tricuspid regurgitation is mildly elevated (35-45 mmHg).        Lab Results   Component Value Date    PROBNP 93.2 05/31/2023         Telemetry reviewed shows sinus rhythm    Assessment / Plan     ASSESSMENT:    Syncope and collapse  Essential hypertension  Anemia      PLAN:  1.  Telemetry shows no significant bradycardia arrhythmias.  2.  Echocardiogram within normal limits.  PRBC transfusion for anemia and work-up..  3.  Continue current home medications.  4.  We will sign off.    Electronically signed by Aiden Cedillo MD, 06/02/23, 8:27 AM EDT.

## 2023-06-02 NOTE — PROGRESS NOTES
[facility]   Progress Note    Patient Name: Carol Abarca  : 1970  MRN: [id number]  Primary Care Physician: Sonia Mosquera APRN  Date of admission: 2023    Subjective   Subjective     Chief Complaint:   Follow-up on multiple conditions    History of Present Illness  Complains of generalized pain  Continues to feel weak  Hemoglobin decreased to 6.9 g  Receiving IV folic acid, oral folate and intramuscular vitamin B12  Blood pressure is increased  Did not sleep well last night and attributes that to the absence of taking her usual dose of Seroquel 600 mg at night  Tolerated CPAP      Review of Systems   Constitutional: Positive for activity change and fatigue. Negative for fever.   Musculoskeletal: Positive for arthralgias.   Hematological: Negative.        Objective   Objective     Vitals:  Temp:  [97.7 °F (36.5 °C)-99 °F (37.2 °C)] 98.6 °F (37 °C)  Heart Rate:  [79-94] 84  Resp:  [16-19] 19  BP: (139-190)/() 190/102  Flow (L/min):  [3] 3    Physical Exam  Cardiovascular:      Rate and Rhythm: Normal rate.      Pulses: Normal pulses.   Pulmonary:      Effort: Pulmonary effort is normal.   Abdominal:      Palpations: Abdomen is soft.   Skin:     Capillary Refill: Capillary refill takes 2 to 3 seconds.   Neurological:      General: No focal deficit present.      Mental Status: She is alert.         Result Review    Result Review:  I have personally reviewed the results from the time of this admission to 23 6:14 PM EDT and agree with these findings:  [x]  Laboratory  []  Microbiology  []  Radiology  []  EKG/Telemetry   []  Cardiology/Vascular   []  Pathology  []  Old records  []  Other:    Assessment & Plan   Assessment / Plan   Uncontrolled blood pressure  Macrocytic anemia secondary to vitamin B12 and folate deficiency  Type 2 diabetes mellitus, extremely well controlled with hemoglobin A1c of 5.6  Obstructive sleep apnea  Hypoxia  Hypertension with hypertensive heart disease    Active  Hospital Problems:  Active Hospital Problems    Diagnosis    • **Syncope and collapse        Plan:  Transfuse 1 unit of packed red blood cells  Continue folate and B12 supplementation  Give hydralazine 40 mg IV push now  Start hydralazine 20 mg IV every 4 hours as needed for systolic blood pressure greater than 160 mmHg  Resume usual dose of Toprol  Resume home dose of Seroquel  mg at bedtime  Hold hypoglycemic agents  Continue supplemental oxygen  Counseled to quit smoking  Increase activity  Possible discharge to home on 6/4/23                Electronically signed by Guanako Lemons MD, 06/02/23, 6:14 PM EDT.

## 2023-06-03 LAB
BH BB BLOOD EXPIRATION DATE: NORMAL
BH BB BLOOD TYPE BARCODE: NORMAL
BH BB DISPENSE STATUS: NORMAL
BH BB PRODUCT CODE: NORMAL
BH BB UNIT NUMBER: NORMAL
CROSSMATCH INTERPRETATION: NORMAL
UNIT  ABO: NORMAL
UNIT  RH: NORMAL

## 2023-06-03 PROCEDURE — 94799 UNLISTED PULMONARY SVC/PX: CPT

## 2023-06-03 PROCEDURE — 25010000002 CYANOCOBALAMIN PER 1000 MCG: Performed by: INTERNAL MEDICINE

## 2023-06-03 PROCEDURE — 94660 CPAP INITIATION&MGMT: CPT

## 2023-06-03 PROCEDURE — 25010000002 HYDROMORPHONE 1 MG/ML SOLUTION: Performed by: INTERNAL MEDICINE

## 2023-06-03 PROCEDURE — 94761 N-INVAS EAR/PLS OXIMETRY MLT: CPT

## 2023-06-03 RX ADMIN — GABAPENTIN 200 MG: 100 CAPSULE ORAL at 09:22

## 2023-06-03 RX ADMIN — METOPROLOL SUCCINATE 50 MG: 50 TABLET, EXTENDED RELEASE ORAL at 09:22

## 2023-06-03 RX ADMIN — GABAPENTIN 200 MG: 100 CAPSULE ORAL at 21:00

## 2023-06-03 RX ADMIN — CYANOCOBALAMIN 1000 MCG: 1000 INJECTION, SOLUTION INTRAMUSCULAR at 09:22

## 2023-06-03 RX ADMIN — HYDROMORPHONE HYDROCHLORIDE 0.25 MG: 1 INJECTION, SOLUTION INTRAMUSCULAR; INTRAVENOUS; SUBCUTANEOUS at 16:01

## 2023-06-03 RX ADMIN — PRAVASTATIN SODIUM 40 MG: 40 TABLET ORAL at 09:22

## 2023-06-03 RX ADMIN — HYDROMORPHONE HYDROCHLORIDE 0.25 MG: 1 INJECTION, SOLUTION INTRAMUSCULAR; INTRAVENOUS; SUBCUTANEOUS at 06:44

## 2023-06-03 RX ADMIN — Medication 5 MG: at 09:22

## 2023-06-03 RX ADMIN — FOLIC ACID 5 MG: 5 INJECTION, SOLUTION INTRAMUSCULAR; INTRAVENOUS; SUBCUTANEOUS at 11:38

## 2023-06-03 RX ADMIN — LISINOPRIL 40 MG: 20 TABLET ORAL at 09:22

## 2023-06-03 RX ADMIN — CETIRIZINE HYDROCHLORIDE 10 MG: 10 TABLET, FILM COATED ORAL at 09:22

## 2023-06-03 RX ADMIN — QUETIAPINE FUMARATE 600 MG: 50 TABLET, EXTENDED RELEASE ORAL at 21:02

## 2023-06-03 RX ADMIN — DULOXETINE HYDROCHLORIDE 60 MG: 30 CAPSULE, DELAYED RELEASE ORAL at 09:22

## 2023-06-03 RX ADMIN — HYDROMORPHONE HYDROCHLORIDE 0.25 MG: 1 INJECTION, SOLUTION INTRAMUSCULAR; INTRAVENOUS; SUBCUTANEOUS at 11:03

## 2023-06-03 RX ADMIN — METOPROLOL SUCCINATE 50 MG: 50 TABLET, EXTENDED RELEASE ORAL at 21:01

## 2023-06-03 NOTE — PLAN OF CARE
Goal Outcome Evaluation:  Plan of Care Reviewed With: patient        Progress: no change  Outcome Evaluation: Patient is off Cpap this morning on 4L nasal cannula.

## 2023-06-03 NOTE — PROGRESS NOTES
[facility]   Progress Note    Patient Name: Carol Abarca  : 1970  MRN: [id number]  Primary Care Physician: Sonia Mosqurea APRN  Date of admission: 2023    Subjective   Subjective     Chief Complaint:   Follow-up on syncope    History of Present Illness  No further episodes of syncope since admission to the hospital  Feels slightly better today  Blood pressure is better controlled  Slept well last night after dose of Seroquel was increased to 600 mg      Review of Systems   Constitutional:  Positive for activity change.   Respiratory: Negative.     Psychiatric/Behavioral: Negative.       Objective   Objective     Vitals:  Temp:  [97.8 °F (36.6 °C)-98.6 °F (37 °C)] 98.1 °F (36.7 °C)  Heart Rate:  [79-96] 82  Resp:  [18-19] 18  BP: (100-190)/() 121/78  Flow (L/min):  [3-3.5] 3.5    Physical Exam  Constitutional:       Appearance: Normal appearance.   Cardiovascular:      Pulses: Normal pulses.   Pulmonary:      Effort: Pulmonary effort is normal.   Musculoskeletal:         General: Normal range of motion.   Neurological:      General: No focal deficit present.      Mental Status: She is alert.       Result Review    Result Review:  I have personally reviewed the results from the time of this admission to 23 2:00 PM EDT and agree with these findings:  []  Laboratory  []  Microbiology  []  Radiology  []  EKG/Telemetry   []  Cardiology/Vascular   []  Pathology  []  Old records  []  Other:    Assessment & Plan   Assessment / Plan       Active Hospital Problems:  Active Hospital Problems    Diagnosis     **Syncope and collapse        Plan:   Continue current management  Walk oximetry in a.m. to determine need for supplemental oxygen at home  Possible discharge to home in the morning                    Electronically signed by Guanako Lemons MD, 23, 2:00 PM EDT.

## 2023-06-03 NOTE — PLAN OF CARE
Problem: Adult Inpatient Plan of Care  Goal: Plan of Care Review  Outcome: Ongoing, Progressing  Goal: Patient-Specific Goal (Individualized)  Outcome: Ongoing, Progressing  Goal: Absence of Hospital-Acquired Illness or Injury  Outcome: Ongoing, Progressing  Intervention: Identify and Manage Fall Risk  Recent Flowsheet Documentation  Taken 6/3/2023 0500 by Layla Rhodes RN  Safety Promotion/Fall Prevention: safety round/check completed  Taken 6/3/2023 0410 by Layla Rhodes RN  Safety Promotion/Fall Prevention: safety round/check completed  Taken 6/3/2023 0400 by Layla Rhodes RN  Safety Promotion/Fall Prevention: safety round/check completed  Taken 6/3/2023 0300 by Layla Rhodes RN  Safety Promotion/Fall Prevention: safety round/check completed  Taken 6/3/2023 0200 by Layla Rhodes RN  Safety Promotion/Fall Prevention: safety round/check completed  Taken 6/3/2023 0100 by Layla Rhodes RN  Safety Promotion/Fall Prevention: safety round/check completed  Taken 6/3/2023 0000 by Layla Rhodes RN  Safety Promotion/Fall Prevention: safety round/check completed  Taken 6/2/2023 2300 by Layla Rhodes RN  Safety Promotion/Fall Prevention: safety round/check completed  Taken 6/2/2023 2200 by Layla Rhodes RN  Safety Promotion/Fall Prevention: safety round/check completed  Taken 6/2/2023 2100 by Layla Rhodes RN  Safety Promotion/Fall Prevention: safety round/check completed  Taken 6/2/2023 2000 by Layla Rhodes RN  Safety Promotion/Fall Prevention: safety round/check completed  Intervention: Prevent Skin Injury  Recent Flowsheet Documentation  Taken 6/2/2023 2000 by Layla Rhodes RN  Body Position: position changed independently  Skin Protection: adhesive use limited  Intervention: Prevent and Manage VTE (Venous Thromboembolism) Risk  Recent Flowsheet Documentation  Taken 6/2/2023 2000 by Layla Rhodes RN  Activity Management: activity encouraged  Range of Motion: active ROM (range of motion) encouraged  Intervention: Prevent  Infection  Recent Flowsheet Documentation  Taken 6/2/2023 2000 by Layla Rhodes, RN  Infection Prevention: rest/sleep promoted  Goal: Optimal Comfort and Wellbeing  Outcome: Ongoing, Progressing  Intervention: Monitor Pain and Promote Comfort  Recent Flowsheet Documentation  Taken 6/2/2023 2000 by Layla Rhodes RN  Pain Management Interventions:   see MAR   quiet environment facilitated   position adjusted   pillow support provided  Intervention: Provide Person-Centered Care  Recent Flowsheet Documentation  Taken 6/2/2023 2000 by Layla Rhodes RN  Trust Relationship/Rapport:   care explained   choices provided   emotional support provided   empathic listening provided   questions answered   questions encouraged   reassurance provided   thoughts/feelings acknowledged  Goal: Readiness for Transition of Care  Outcome: Ongoing, Progressing     Problem: Syncope  Goal: Absence of Syncopal Symptoms  Outcome: Ongoing, Progressing  Intervention: Manage Effect of Syncopal Symptoms  Recent Flowsheet Documentation  Taken 6/2/2023 2000 by Layla Rhodes RN  Syncope Management: position changed slowly  Supportive Measures: active listening utilized     Problem: Noninvasive Ventilation Acute  Goal: Effective Unassisted Ventilation and Oxygenation  Outcome: Ongoing, Progressing  Intervention: Monitor and Manage Noninvasive Ventilation  Recent Flowsheet Documentation  Taken 6/2/2023 2000 by Layla Rhodes RN  Airway/Ventilation Management: airway patency maintained   Goal Outcome Evaluation:   No changes at this time

## 2023-06-04 LAB
ALBUMIN SERPL-MCNC: 3 G/DL (ref 3.5–5.2)
ALBUMIN/GLOB SERPL: 0.8 G/DL
ALP SERPL-CCNC: 105 U/L (ref 39–117)
ALT SERPL W P-5'-P-CCNC: 10 U/L (ref 1–33)
ANION GAP SERPL CALCULATED.3IONS-SCNC: 5.7 MMOL/L (ref 5–15)
AST SERPL-CCNC: 18 U/L (ref 1–32)
BASOPHILS # BLD AUTO: 0.01 10*3/MM3 (ref 0–0.2)
BASOPHILS NFR BLD AUTO: 0.2 % (ref 0–1.5)
BILIRUB SERPL-MCNC: 0.6 MG/DL (ref 0–1.2)
BUN SERPL-MCNC: 9 MG/DL (ref 6–20)
BUN/CREAT SERPL: 19.1 (ref 7–25)
CALCIUM SPEC-SCNC: 8.6 MG/DL (ref 8.6–10.5)
CHLORIDE SERPL-SCNC: 101 MMOL/L (ref 98–107)
CO2 SERPL-SCNC: 28.3 MMOL/L (ref 22–29)
CREAT SERPL-MCNC: 0.47 MG/DL (ref 0.57–1)
DEPRECATED RDW RBC AUTO: 71.2 FL (ref 37–54)
EGFRCR SERPLBLD CKD-EPI 2021: 114.7 ML/MIN/1.73
EOSINOPHIL # BLD AUTO: 0.04 10*3/MM3 (ref 0–0.4)
EOSINOPHIL NFR BLD AUTO: 0.9 % (ref 0.3–6.2)
ERYTHROCYTE [DISTWIDTH] IN BLOOD BY AUTOMATED COUNT: 18.4 % (ref 12.3–15.4)
FOLATE SERPL-MCNC: >20 NG/ML (ref 4.78–24.2)
GLOBULIN UR ELPH-MCNC: 3.9 GM/DL
GLUCOSE SERPL-MCNC: 97 MG/DL (ref 65–99)
HCT VFR BLD AUTO: 21.6 % (ref 34–46.6)
HGB BLD-MCNC: 7 G/DL (ref 12–15.9)
IMM GRANULOCYTES # BLD AUTO: 0.04 10*3/MM3 (ref 0–0.05)
IMM GRANULOCYTES NFR BLD AUTO: 0.9 % (ref 0–0.5)
LYMPHOCYTES # BLD AUTO: 0.97 10*3/MM3 (ref 0.7–3.1)
LYMPHOCYTES NFR BLD AUTO: 22.8 % (ref 19.6–45.3)
MCH RBC QN AUTO: 34.7 PG (ref 26.6–33)
MCHC RBC AUTO-ENTMCNC: 32.4 G/DL (ref 31.5–35.7)
MCV RBC AUTO: 106.9 FL (ref 79–97)
METHYLMALONATE SERPL-SCNC: 119 NMOL/L (ref 0–378)
MONOCYTES # BLD AUTO: 0.39 10*3/MM3 (ref 0.1–0.9)
MONOCYTES NFR BLD AUTO: 9.2 % (ref 5–12)
NEUTROPHILS NFR BLD AUTO: 2.81 10*3/MM3 (ref 1.7–7)
NEUTROPHILS NFR BLD AUTO: 66 % (ref 42.7–76)
NRBC BLD AUTO-RTO: 0.7 /100 WBC (ref 0–0.2)
PLATELET # BLD AUTO: 75 10*3/MM3 (ref 140–450)
PMV BLD AUTO: 9.9 FL (ref 6–12)
POTASSIUM SERPL-SCNC: 4.2 MMOL/L (ref 3.5–5.2)
PROT SERPL-MCNC: 6.9 G/DL (ref 6–8.5)
RBC # BLD AUTO: 2.02 10*6/MM3 (ref 3.77–5.28)
SODIUM SERPL-SCNC: 135 MMOL/L (ref 136–145)
WBC NRBC COR # BLD: 4.26 10*3/MM3 (ref 3.4–10.8)

## 2023-06-04 PROCEDURE — 86900 BLOOD TYPING SEROLOGIC ABO: CPT

## 2023-06-04 PROCEDURE — 36430 TRANSFUSION BLD/BLD COMPNT: CPT

## 2023-06-04 PROCEDURE — 80053 COMPREHEN METABOLIC PANEL: CPT | Performed by: INTERNAL MEDICINE

## 2023-06-04 PROCEDURE — 94799 UNLISTED PULMONARY SVC/PX: CPT

## 2023-06-04 PROCEDURE — P9016 RBC LEUKOCYTES REDUCED: HCPCS

## 2023-06-04 PROCEDURE — 25010000002 HYDROMORPHONE 1 MG/ML SOLUTION: Performed by: INTERNAL MEDICINE

## 2023-06-04 PROCEDURE — 94660 CPAP INITIATION&MGMT: CPT

## 2023-06-04 PROCEDURE — 94761 N-INVAS EAR/PLS OXIMETRY MLT: CPT

## 2023-06-04 PROCEDURE — 82728 ASSAY OF FERRITIN: CPT | Performed by: INTERNAL MEDICINE

## 2023-06-04 PROCEDURE — 85025 COMPLETE CBC W/AUTO DIFF WBC: CPT | Performed by: INTERNAL MEDICINE

## 2023-06-04 PROCEDURE — 25010000002 CYANOCOBALAMIN PER 1000 MCG: Performed by: INTERNAL MEDICINE

## 2023-06-04 PROCEDURE — 25010000002 HYDRALAZINE PER 20 MG: Performed by: INTERNAL MEDICINE

## 2023-06-04 PROCEDURE — 82746 ASSAY OF FOLIC ACID SERUM: CPT | Performed by: INTERNAL MEDICINE

## 2023-06-04 PROCEDURE — 86923 COMPATIBILITY TEST ELECTRIC: CPT

## 2023-06-04 RX ADMIN — METOPROLOL SUCCINATE 50 MG: 50 TABLET, EXTENDED RELEASE ORAL at 09:12

## 2023-06-04 RX ADMIN — ZOLPIDEM TARTRATE 5 MG: 5 TABLET ORAL at 21:34

## 2023-06-04 RX ADMIN — HYDROMORPHONE HYDROCHLORIDE 0.25 MG: 1 INJECTION, SOLUTION INTRAMUSCULAR; INTRAVENOUS; SUBCUTANEOUS at 02:16

## 2023-06-04 RX ADMIN — Medication 5 MG: at 09:12

## 2023-06-04 RX ADMIN — GABAPENTIN 200 MG: 100 CAPSULE ORAL at 20:22

## 2023-06-04 RX ADMIN — DULOXETINE HYDROCHLORIDE 60 MG: 30 CAPSULE, DELAYED RELEASE ORAL at 20:22

## 2023-06-04 RX ADMIN — CYANOCOBALAMIN 1000 MCG: 1000 INJECTION, SOLUTION INTRAMUSCULAR at 09:12

## 2023-06-04 RX ADMIN — HYDRALAZINE HYDROCHLORIDE 20 MG: 20 INJECTION, SOLUTION INTRAMUSCULAR; INTRAVENOUS at 16:24

## 2023-06-04 RX ADMIN — DULOXETINE HYDROCHLORIDE 60 MG: 30 CAPSULE, DELAYED RELEASE ORAL at 09:12

## 2023-06-04 RX ADMIN — CETIRIZINE HYDROCHLORIDE 10 MG: 10 TABLET, FILM COATED ORAL at 09:13

## 2023-06-04 RX ADMIN — GABAPENTIN 200 MG: 100 CAPSULE ORAL at 09:12

## 2023-06-04 RX ADMIN — FOLIC ACID 5 MG: 5 INJECTION, SOLUTION INTRAMUSCULAR; INTRAVENOUS; SUBCUTANEOUS at 09:13

## 2023-06-04 RX ADMIN — PRAVASTATIN SODIUM 40 MG: 40 TABLET ORAL at 09:13

## 2023-06-04 RX ADMIN — METOPROLOL SUCCINATE 50 MG: 50 TABLET, EXTENDED RELEASE ORAL at 20:22

## 2023-06-04 RX ADMIN — HYDRALAZINE HYDROCHLORIDE 20 MG: 20 INJECTION, SOLUTION INTRAMUSCULAR; INTRAVENOUS at 21:34

## 2023-06-04 RX ADMIN — HYDROMORPHONE HYDROCHLORIDE 0.25 MG: 1 INJECTION, SOLUTION INTRAMUSCULAR; INTRAVENOUS; SUBCUTANEOUS at 09:11

## 2023-06-04 RX ADMIN — MONTELUKAST 10 MG: 10 TABLET, FILM COATED ORAL at 20:22

## 2023-06-04 RX ADMIN — HYDROMORPHONE HYDROCHLORIDE 0.25 MG: 1 INJECTION, SOLUTION INTRAMUSCULAR; INTRAVENOUS; SUBCUTANEOUS at 22:23

## 2023-06-04 RX ADMIN — LISINOPRIL 40 MG: 20 TABLET ORAL at 09:12

## 2023-06-04 RX ADMIN — HYDROMORPHONE HYDROCHLORIDE 0.25 MG: 1 INJECTION, SOLUTION INTRAMUSCULAR; INTRAVENOUS; SUBCUTANEOUS at 14:36

## 2023-06-04 NOTE — PLAN OF CARE
Goal Outcome Evaluation:  Plan of Care Reviewed With: patient        Progress: no change  Outcome Evaluation: Patient wore cpap a few hours last night. Currently on 3.5L nasal cannula.

## 2023-06-04 NOTE — PROGRESS NOTES
[facility]   Progress Note    Patient Name: Carol Abarca  : 1970  MRN: [id number]  Primary Care Physician: Sonia Mosquera APRN  Date of admission: 2023    Subjective   Subjective     Chief Complaint:   Follow-up on multiple conditions    History of Present Illness  Complains of pain in the back and other joints.  Still feels fatigued      Review of Systems   Constitutional:  Positive for activity change. Negative for appetite change.   Musculoskeletal:  Positive for arthralgias and back pain.   Neurological:  Positive for weakness.     Objective   Objective     Vitals:  Temp:  [98.2 °F (36.8 °C)-98.8 °F (37.1 °C)] 98.8 °F (37.1 °C)  Heart Rate:  [80-95] 94  Resp:  [20-22] 22  BP: (121-156)/(57-98) 132/80  Flow (L/min):  [3.5] 3.5    Physical Exam  Constitutional:       Appearance: Normal appearance.   Cardiovascular:      Rate and Rhythm: Normal rate.   Pulmonary:      Effort: Pulmonary effort is normal.   Neurological:      General: No focal deficit present.      Mental Status: She is alert and oriented to person, place, and time.       Result Review    Result Review:  I have personally reviewed the results from the time of this admission to 23 2:33 PM EDT and agree with these findings:  [x]  Laboratory  []  Microbiology  []  Radiology  []  EKG/Telemetry   []  Cardiology/Vascular   []  Pathology  []  Old records  []  Other:    Assessment & Plan   Assessment / Plan   Macrocytic anemia secondary to B12 and folate deficiency, severe    Active Hospital Problems:  Active Hospital Problems    Diagnosis     **Syncope and collapse        Plan:  Transfuse 1 unit of packed red blood cells  Continue injection B12  Continue folic acid intravenously and orally  Continue supplemental oxygen  Increase activity                      Electronically signed by Guanako Lemons MD, 23, 2:33 PM EDT.

## 2023-06-04 NOTE — PLAN OF CARE
Goal Outcome Evaluation:      Patient A&O X4. Received PRN hydralazine for /94. Medicated for pain X2 this shift.

## 2023-06-05 PROBLEM — D64.9 SEVERE ANEMIA: Status: ACTIVE | Noted: 2023-01-01

## 2023-06-05 PROBLEM — D64.9 SEVERE ANEMIA: Status: ACTIVE | Noted: 2023-06-05

## 2023-06-05 LAB
BASOPHILS # BLD AUTO: 0.01 10*3/MM3 (ref 0–0.2)
BASOPHILS NFR BLD AUTO: 0.1 % (ref 0–1.5)
DEPRECATED RDW RBC AUTO: 79.7 FL (ref 37–54)
EOSINOPHIL # BLD AUTO: 0.01 10*3/MM3 (ref 0–0.4)
EOSINOPHIL NFR BLD AUTO: 0.1 % (ref 0.3–6.2)
ERYTHROCYTE [DISTWIDTH] IN BLOOD BY AUTOMATED COUNT: 21.6 % (ref 12.3–15.4)
FERRITIN SERPL-MCNC: 200 NG/ML (ref 13–150)
HCT VFR BLD AUTO: 27.1 % (ref 34–46.6)
HGB BLD-MCNC: 8.8 G/DL (ref 12–15.9)
IMM GRANULOCYTES # BLD AUTO: 0.1 10*3/MM3 (ref 0–0.05)
IMM GRANULOCYTES NFR BLD AUTO: 1.2 % (ref 0–0.5)
LYMPHOCYTES # BLD AUTO: 0.86 10*3/MM3 (ref 0.7–3.1)
LYMPHOCYTES NFR BLD AUTO: 10.2 % (ref 19.6–45.3)
MCH RBC QN AUTO: 33.7 PG (ref 26.6–33)
MCHC RBC AUTO-ENTMCNC: 32.5 G/DL (ref 31.5–35.7)
MCV RBC AUTO: 103.8 FL (ref 79–97)
MONOCYTES # BLD AUTO: 0.94 10*3/MM3 (ref 0.1–0.9)
MONOCYTES NFR BLD AUTO: 11.1 % (ref 5–12)
NEUTROPHILS NFR BLD AUTO: 6.54 10*3/MM3 (ref 1.7–7)
NEUTROPHILS NFR BLD AUTO: 77.3 % (ref 42.7–76)
NRBC BLD AUTO-RTO: 1.8 /100 WBC (ref 0–0.2)
PCA AB SER-ACNC: 23.1 UNITS (ref 0–20)
PLATELET # BLD AUTO: 162 10*3/MM3 (ref 140–450)
PMV BLD AUTO: 10.9 FL (ref 6–12)
RBC # BLD AUTO: 2.61 10*6/MM3 (ref 3.77–5.28)
WBC NRBC COR # BLD: 8.46 10*3/MM3 (ref 3.4–10.8)

## 2023-06-05 PROCEDURE — 25010000002 HYDRALAZINE PER 20 MG: Performed by: INTERNAL MEDICINE

## 2023-06-05 PROCEDURE — 94799 UNLISTED PULMONARY SVC/PX: CPT

## 2023-06-05 PROCEDURE — 94761 N-INVAS EAR/PLS OXIMETRY MLT: CPT

## 2023-06-05 PROCEDURE — 25010000002 CYANOCOBALAMIN PER 1000 MCG: Performed by: INTERNAL MEDICINE

## 2023-06-05 PROCEDURE — 85025 COMPLETE CBC W/AUTO DIFF WBC: CPT | Performed by: INTERNAL MEDICINE

## 2023-06-05 PROCEDURE — 25010000002 HYDROMORPHONE 1 MG/ML SOLUTION: Performed by: INTERNAL MEDICINE

## 2023-06-05 RX ADMIN — CETIRIZINE HYDROCHLORIDE 10 MG: 10 TABLET, FILM COATED ORAL at 11:46

## 2023-06-05 RX ADMIN — HYDROMORPHONE HYDROCHLORIDE 0.25 MG: 1 INJECTION, SOLUTION INTRAMUSCULAR; INTRAVENOUS; SUBCUTANEOUS at 13:54

## 2023-06-05 RX ADMIN — GABAPENTIN 200 MG: 100 CAPSULE ORAL at 11:46

## 2023-06-05 RX ADMIN — HYDROMORPHONE HYDROCHLORIDE 0.25 MG: 1 INJECTION, SOLUTION INTRAMUSCULAR; INTRAVENOUS; SUBCUTANEOUS at 04:13

## 2023-06-05 RX ADMIN — GABAPENTIN 200 MG: 100 CAPSULE ORAL at 20:15

## 2023-06-05 RX ADMIN — QUETIAPINE FUMARATE 600 MG: 50 TABLET, EXTENDED RELEASE ORAL at 17:42

## 2023-06-05 RX ADMIN — DULOXETINE HYDROCHLORIDE 60 MG: 30 CAPSULE, DELAYED RELEASE ORAL at 20:20

## 2023-06-05 RX ADMIN — PRAVASTATIN SODIUM 40 MG: 40 TABLET ORAL at 11:46

## 2023-06-05 RX ADMIN — CYANOCOBALAMIN 1000 MCG: 1000 INJECTION, SOLUTION INTRAMUSCULAR at 11:47

## 2023-06-05 RX ADMIN — FOLIC ACID 5 MG: 5 INJECTION, SOLUTION INTRAMUSCULAR; INTRAVENOUS; SUBCUTANEOUS at 13:54

## 2023-06-05 RX ADMIN — Medication 5 MG: at 11:46

## 2023-06-05 RX ADMIN — METOPROLOL SUCCINATE 50 MG: 50 TABLET, EXTENDED RELEASE ORAL at 20:15

## 2023-06-05 RX ADMIN — METOPROLOL SUCCINATE 50 MG: 50 TABLET, EXTENDED RELEASE ORAL at 11:46

## 2023-06-05 RX ADMIN — DULOXETINE HYDROCHLORIDE 60 MG: 30 CAPSULE, DELAYED RELEASE ORAL at 11:46

## 2023-06-05 RX ADMIN — MONTELUKAST 10 MG: 10 TABLET, FILM COATED ORAL at 20:15

## 2023-06-05 RX ADMIN — HYDRALAZINE HYDROCHLORIDE 20 MG: 20 INJECTION, SOLUTION INTRAMUSCULAR; INTRAVENOUS at 17:54

## 2023-06-05 RX ADMIN — LISINOPRIL 40 MG: 20 TABLET ORAL at 11:46

## 2023-06-05 RX ADMIN — HYDROMORPHONE HYDROCHLORIDE 0.25 MG: 1 INJECTION, SOLUTION INTRAMUSCULAR; INTRAVENOUS; SUBCUTANEOUS at 20:14

## 2023-06-05 NOTE — PLAN OF CARE
Goal Outcome Evaluation:      Patient refused CPAP during the night. RT lowered patient NC to 2.5. patient tolerated well and kept O2 sats above 88 while sleeping. Continuing plan of care.

## 2023-06-05 NOTE — PLAN OF CARE
Goal Outcome Evaluation:   Patient has wore oxygen off and on throughout shift. When patient is wearing O2 she is one 2.5 liters nasal canula. O2 sats have stayed above 90% this shift on room air. Patient blood pressure was 172/102 at 1748, patient medicated per MAR. Continue plan of care.

## 2023-06-05 NOTE — PLAN OF CARE
Problem: Noninvasive Ventilation Acute  Goal: Effective Unassisted Ventilation and Oxygenation  Intervention: Monitor and Manage Noninvasive Ventilation  Flowsheets (Taken 6/5/2023 0830)  Airway/Ventilation Management: (pt refused machine last night)   calming measures promoted   pulmonary hygiene promoted   Goal Outcome Evaluation:  Plan of Care Reviewed With: patient        Progress: improving  Outcome Evaluation: pt is no on room air and is tolerating well at 98% saturations.

## 2023-06-05 NOTE — PLAN OF CARE
Goal Outcome Evaluation:   Patient did not want to wear CPAP tonight. Was able to wean from 3.5L to 2.5L nasal cannula.

## 2023-06-05 NOTE — PROGRESS NOTES
[facility]     Progress Note    Patient Name: Carol Abarca  : 1970  MRN: [id number]  Primary Care Physician:  Sonia Mosquera APRN  Date of admission: 2023      Subjective   Brief summary.  Chart reviewed patient admitted with syncope and weakness and severe anemia      HPI:  Patient feels tired and fatigued, not sure about blood in the stools or black-colored stools.  Patient very poor historian.  Patient's last hemoglobin in office was in the normal range approximately 2 months ago.  Denies any chest pain complains of fatigue and weakness and shortness of breath, chronic abdominal discomfort.    Review of Systems     Fatigue and weakness difficulty walking.  No chest pain.  Chest tightness chronic   Denies shortness of breath at rest.  But shortness of breath with exertion.  No blood in the stools or black-colored stools.  Chronic abdominal pain and reflux symptoms.  Chronic nausea.  Back pain and joint pain      Objective     Vitals:   Temp:  [97.5 °F (36.4 °C)-98.4 °F (36.9 °C)] 97.9 °F (36.6 °C)  Heart Rate:  [] 81  Resp:  [16-19] 19  BP: ()/() 111/71  Flow (L/min):  [2.5-205] 205    Physical Exam :     Middle-age female obese not in acute distress tired looking.  Pale mucosa.  Neck supple.  Heart regular, grade 2 systolic murmur.  Lungs diminished breath sounds.  Abdomen soft and obese.  Mild diffuse tenderness chronic.  No masses felt.  Extremities trace of edema.  Neuro awake alert and oriented generalized weakness        Result Review:  I have personally reviewed the results from the time of this admission to 2023 16:59 EDT and agree with these findings:  [x]  Laboratory  []  Microbiology  []  Radiology  []  EKG/Telemetry   []  Cardiology/Vascular   []  Pathology  []  Old records  []  Other:    Reviewed labs      Assessment / Plan       Active Hospital Problems:  Active Hospital Problems    Diagnosis     **Severe anemia     Syncope and collapse        Plan:   Patient  with severe anemia, diagnosed with B12 deficiency, on B12 supplements.  Patient's anemia is severe enough warrants GI work-up.  Normal hemoglobin couple of months ago.  Doubt patient has a drop of significant hemoglobin just because of B12 deficiency as patient has known history of B12 deficiency for years.  At this point we will consult GI, Dr. Patrick notified.  EGD in a.m..  Monitor orthostats.  Check echo.  Nicotine patch for tobacco abuse.  Increase activity with assistance, PT OT consult.         DVT prophylaxis:  No DVT prophylaxis order currently exists.    CODE STATUS:        .Total  time spent in patient care, approximately ..37...minutes.  I personally saw and examined the patient. I have reviewed all diagnostic interpretations including labs and x-rays, also I have reviewed treatment plans as written. I was present for care of my patient, time includes patient management by me, time spent at the patients bedside/exam,  time to review lab and imaging results, discussing patient care with nursing staff, consultants and documentation in the medical record.  Electronically signed by Dung Hall MD, 06/05/23, 4:56 PM EDT.

## 2023-06-06 ENCOUNTER — PREP FOR SURGERY (OUTPATIENT)
Dept: GASTROENTEROLOGY | Facility: CLINIC | Age: 53
End: 2023-06-06
Payer: MEDICARE

## 2023-06-06 ENCOUNTER — APPOINTMENT (OUTPATIENT)
Dept: CT IMAGING | Facility: HOSPITAL | Age: 53
End: 2023-06-06
Payer: MEDICARE

## 2023-06-06 DIAGNOSIS — D64.9 SEVERE ANEMIA: Primary | ICD-10-CM

## 2023-06-06 LAB
ALBUMIN SERPL-MCNC: 3.4 G/DL (ref 3.5–5.2)
ALBUMIN/GLOB SERPL: 0.8 G/DL
ALP SERPL-CCNC: 144 U/L (ref 39–117)
ALT SERPL W P-5'-P-CCNC: 18 U/L (ref 1–33)
ANION GAP SERPL CALCULATED.3IONS-SCNC: 12.7 MMOL/L (ref 5–15)
ARTERIAL PATENCY WRIST A: POSITIVE
AST SERPL-CCNC: 28 U/L (ref 1–32)
BASE EXCESS BLDA CALC-SCNC: -1.3 MMOL/L (ref -2–2)
BASOPHILS # BLD AUTO: 0.01 10*3/MM3 (ref 0–0.2)
BASOPHILS NFR BLD AUTO: 0.2 % (ref 0–1.5)
BDY SITE: ABNORMAL
BH BB BLOOD EXPIRATION DATE: NORMAL
BH BB BLOOD TYPE BARCODE: 5100
BH BB DISPENSE STATUS: NORMAL
BH BB PRODUCT CODE: NORMAL
BH BB UNIT NUMBER: NORMAL
BILIRUB SERPL-MCNC: 0.8 MG/DL (ref 0–1.2)
BUN SERPL-MCNC: 27 MG/DL (ref 6–20)
BUN/CREAT SERPL: 33.3 (ref 7–25)
CA-I BLDA-SCNC: 1.14 MMOL/L (ref 1.13–1.32)
CALCIUM SPEC-SCNC: 8.7 MG/DL (ref 8.6–10.5)
CHLORIDE BLDA-SCNC: 100 MMOL/L (ref 98–106)
CHLORIDE SERPL-SCNC: 98 MMOL/L (ref 98–107)
CO2 SERPL-SCNC: 24.3 MMOL/L (ref 22–29)
COHGB MFR BLD: 0.4 % (ref 0–1.5)
CREAT SERPL-MCNC: 0.81 MG/DL (ref 0.57–1)
CROSSMATCH INTERPRETATION: NORMAL
D-LACTATE SERPL-SCNC: 0.9 MMOL/L (ref 0.5–2)
DEPRECATED RDW RBC AUTO: 76.3 FL (ref 37–54)
EGFRCR SERPLBLD CKD-EPI 2021: 87.5 ML/MIN/1.73
EOSINOPHIL # BLD AUTO: 0 10*3/MM3 (ref 0–0.4)
EOSINOPHIL NFR BLD AUTO: 0 % (ref 0.3–6.2)
ERYTHROCYTE [DISTWIDTH] IN BLOOD BY AUTOMATED COUNT: 20.5 % (ref 12.3–15.4)
FHHB: 24.1 % (ref 0–5)
GEN 5 2HR TROPONIN T REFLEX: 51 NG/L
GLOBULIN UR ELPH-MCNC: 4.2 GM/DL
GLUCOSE BLDA-MCNC: 143 MG/DL (ref 65–99)
GLUCOSE BLDC GLUCOMTR-MCNC: 160 MG/DL (ref 70–99)
GLUCOSE SERPL-MCNC: 141 MG/DL (ref 65–99)
HCO3 BLDA-SCNC: 24.2 MMOL/L (ref 22–26)
HCT VFR BLD AUTO: 27.9 % (ref 34–46.6)
HGB BLD-MCNC: 9 G/DL (ref 12–15.9)
HGB BLDA-MCNC: 11.5 G/DL (ref 11.7–14.6)
IMM GRANULOCYTES # BLD AUTO: 0.06 10*3/MM3 (ref 0–0.05)
IMM GRANULOCYTES NFR BLD AUTO: 1 % (ref 0–0.5)
INHALED O2 CONCENTRATION: 21 %
LACTATE BLDA-SCNC: 0.89 MMOL/L (ref 0.5–2)
LIPASE SERPL-CCNC: 10 U/L (ref 13–60)
LYMPHOCYTES # BLD AUTO: 0.66 10*3/MM3 (ref 0.7–3.1)
LYMPHOCYTES NFR BLD AUTO: 11 % (ref 19.6–45.3)
MCH RBC QN AUTO: 33 PG (ref 26.6–33)
MCHC RBC AUTO-ENTMCNC: 32.3 G/DL (ref 31.5–35.7)
MCV RBC AUTO: 102.2 FL (ref 79–97)
METHGB BLD QL: 0.1 % (ref 0–1.5)
MODALITY: ABNORMAL
MONOCYTES # BLD AUTO: 0.7 10*3/MM3 (ref 0.1–0.9)
MONOCYTES NFR BLD AUTO: 11.6 % (ref 5–12)
NEUTROPHILS NFR BLD AUTO: 4.58 10*3/MM3 (ref 1.7–7)
NEUTROPHILS NFR BLD AUTO: 76.2 % (ref 42.7–76)
NOTE: ABNORMAL
NRBC BLD AUTO-RTO: 1.2 /100 WBC (ref 0–0.2)
OXYHGB MFR BLDV: 75.4 % (ref 94–99)
PCO2 BLDA: 43.7 MM HG (ref 35–45)
PH BLDA: 7.36 PH UNITS (ref 7.35–7.45)
PLATELET # BLD AUTO: 195 10*3/MM3 (ref 140–450)
PMV BLD AUTO: 11.6 FL (ref 6–12)
PO2 BLD: 230 MM[HG] (ref 0–500)
PO2 BLDA: 48.3 MM HG (ref 80–100)
POTASSIUM BLDA-SCNC: 4.36 MMOL/L (ref 3.5–5)
POTASSIUM SERPL-SCNC: 4.5 MMOL/L (ref 3.5–5.2)
PROCALCITONIN SERPL-MCNC: 0.17 NG/ML (ref 0–0.25)
PROT SERPL-MCNC: 7.6 G/DL (ref 6–8.5)
RBC # BLD AUTO: 2.73 10*6/MM3 (ref 3.77–5.28)
SAO2 % BLDCOA: 75.8 % (ref 95–99)
SODIUM BLDA-SCNC: 134 MMOL/L (ref 136–146)
SODIUM SERPL-SCNC: 135 MMOL/L (ref 136–145)
SPECIMEN STATUS: NORMAL
TROPONIN T DELTA: -9 NG/L
TROPONIN T SERPL HS-MCNC: 60 NG/L
TROPONIN T SERPL HS-MCNC: 77 NG/L
UNIT  ABO: NORMAL
UNIT  RH: NORMAL
WBC NRBC COR # BLD: 6.01 10*3/MM3 (ref 3.4–10.8)
WHOLE BLOOD HOLD SPECIMEN: NORMAL

## 2023-06-06 PROCEDURE — 82375 ASSAY CARBOXYHB QUANT: CPT | Performed by: INTERNAL MEDICINE

## 2023-06-06 PROCEDURE — 94799 UNLISTED PULMONARY SVC/PX: CPT

## 2023-06-06 PROCEDURE — 84145 PROCALCITONIN (PCT): CPT | Performed by: INTERNAL MEDICINE

## 2023-06-06 PROCEDURE — 83690 ASSAY OF LIPASE: CPT | Performed by: INTERNAL MEDICINE

## 2023-06-06 PROCEDURE — 25010000002 HYDROMORPHONE 1 MG/ML SOLUTION: Performed by: INTERNAL MEDICINE

## 2023-06-06 PROCEDURE — 82948 REAGENT STRIP/BLOOD GLUCOSE: CPT

## 2023-06-06 PROCEDURE — 25010000002 HYDRALAZINE PER 20 MG: Performed by: INTERNAL MEDICINE

## 2023-06-06 PROCEDURE — 85025 COMPLETE CBC W/AUTO DIFF WBC: CPT | Performed by: INTERNAL MEDICINE

## 2023-06-06 PROCEDURE — 84484 ASSAY OF TROPONIN QUANT: CPT | Performed by: INTERNAL MEDICINE

## 2023-06-06 PROCEDURE — 93010 ELECTROCARDIOGRAM REPORT: CPT | Performed by: INTERNAL MEDICINE

## 2023-06-06 PROCEDURE — 70450 CT HEAD/BRAIN W/O DYE: CPT

## 2023-06-06 PROCEDURE — 36600 WITHDRAWAL OF ARTERIAL BLOOD: CPT | Performed by: INTERNAL MEDICINE

## 2023-06-06 PROCEDURE — 97165 OT EVAL LOW COMPLEX 30 MIN: CPT

## 2023-06-06 PROCEDURE — 93005 ELECTROCARDIOGRAM TRACING: CPT | Performed by: INTERNAL MEDICINE

## 2023-06-06 PROCEDURE — 25010000002 ONDANSETRON PER 1 MG: Performed by: INTERNAL MEDICINE

## 2023-06-06 PROCEDURE — 82805 BLOOD GASES W/O2 SATURATION: CPT | Performed by: INTERNAL MEDICINE

## 2023-06-06 PROCEDURE — 83605 ASSAY OF LACTIC ACID: CPT | Performed by: INTERNAL MEDICINE

## 2023-06-06 PROCEDURE — 80053 COMPREHEN METABOLIC PANEL: CPT | Performed by: INTERNAL MEDICINE

## 2023-06-06 PROCEDURE — 83050 HGB METHEMOGLOBIN QUAN: CPT | Performed by: INTERNAL MEDICINE

## 2023-06-06 RX ORDER — DEXTROSE, SODIUM CHLORIDE, AND POTASSIUM CHLORIDE 5; .9; .15 G/100ML; G/100ML; G/100ML
50 INJECTION INTRAVENOUS CONTINUOUS
Status: DISCONTINUED | OUTPATIENT
Start: 2023-06-06 | End: 2023-06-09

## 2023-06-06 RX ORDER — ONDANSETRON 2 MG/ML
4 INJECTION INTRAMUSCULAR; INTRAVENOUS EVERY 6 HOURS PRN
Status: DISCONTINUED | OUTPATIENT
Start: 2023-06-06 | End: 2023-06-15 | Stop reason: HOSPADM

## 2023-06-06 RX ORDER — FAMOTIDINE 10 MG/ML
20 INJECTION, SOLUTION INTRAVENOUS EVERY 12 HOURS SCHEDULED
Status: DISCONTINUED | OUTPATIENT
Start: 2023-06-06 | End: 2023-06-09

## 2023-06-06 RX ORDER — AMLODIPINE BESYLATE 5 MG/1
5 TABLET ORAL
Status: DISCONTINUED | OUTPATIENT
Start: 2023-06-06 | End: 2023-06-15 | Stop reason: HOSPADM

## 2023-06-06 RX ORDER — IPRATROPIUM BROMIDE AND ALBUTEROL SULFATE 2.5; .5 MG/3ML; MG/3ML
3 SOLUTION RESPIRATORY (INHALATION)
Status: DISCONTINUED | OUTPATIENT
Start: 2023-06-06 | End: 2023-06-10

## 2023-06-06 RX ORDER — CLONIDINE 0.1 MG/24H
1 PATCH, EXTENDED RELEASE TRANSDERMAL WEEKLY
Status: DISCONTINUED | OUTPATIENT
Start: 2023-06-06 | End: 2023-06-09

## 2023-06-06 RX ADMIN — QUETIAPINE FUMARATE 600 MG: 50 TABLET, EXTENDED RELEASE ORAL at 18:15

## 2023-06-06 RX ADMIN — DULOXETINE HYDROCHLORIDE 60 MG: 30 CAPSULE, DELAYED RELEASE ORAL at 20:18

## 2023-06-06 RX ADMIN — IPRATROPIUM BROMIDE AND ALBUTEROL SULFATE 3 ML: .5; 3 SOLUTION RESPIRATORY (INHALATION) at 16:22

## 2023-06-06 RX ADMIN — HYDRALAZINE HYDROCHLORIDE 20 MG: 20 INJECTION, SOLUTION INTRAMUSCULAR; INTRAVENOUS at 08:03

## 2023-06-06 RX ADMIN — METOPROLOL SUCCINATE 50 MG: 50 TABLET, EXTENDED RELEASE ORAL at 08:02

## 2023-06-06 RX ADMIN — METOPROLOL SUCCINATE 50 MG: 50 TABLET, EXTENDED RELEASE ORAL at 20:18

## 2023-06-06 RX ADMIN — LISINOPRIL 40 MG: 20 TABLET ORAL at 08:03

## 2023-06-06 RX ADMIN — HYDROMORPHONE HYDROCHLORIDE 0.25 MG: 1 INJECTION, SOLUTION INTRAMUSCULAR; INTRAVENOUS; SUBCUTANEOUS at 23:58

## 2023-06-06 RX ADMIN — IPRATROPIUM BROMIDE AND ALBUTEROL SULFATE 3 ML: .5; 3 SOLUTION RESPIRATORY (INHALATION) at 20:10

## 2023-06-06 RX ADMIN — HYDRALAZINE HYDROCHLORIDE 20 MG: 20 INJECTION, SOLUTION INTRAMUSCULAR; INTRAVENOUS at 16:35

## 2023-06-06 RX ADMIN — NITROGLYCERIN 1 INCH: 20 OINTMENT TOPICAL at 12:30

## 2023-06-06 RX ADMIN — ONDANSETRON 4 MG: 2 INJECTION INTRAMUSCULAR; INTRAVENOUS at 15:39

## 2023-06-06 RX ADMIN — CLONIDINE 1 PATCH: 0.1 PATCH TRANSDERMAL at 11:55

## 2023-06-06 RX ADMIN — HYDRALAZINE HYDROCHLORIDE 20 MG: 20 INJECTION, SOLUTION INTRAMUSCULAR; INTRAVENOUS at 12:55

## 2023-06-06 RX ADMIN — POTASSIUM CHLORIDE, DEXTROSE MONOHYDRATE AND SODIUM CHLORIDE 75 ML/HR: 150; 5; 900 INJECTION, SOLUTION INTRAVENOUS at 12:52

## 2023-06-06 RX ADMIN — AMLODIPINE BESYLATE 5 MG: 5 TABLET ORAL at 15:38

## 2023-06-06 RX ADMIN — MONTELUKAST 10 MG: 10 TABLET, FILM COATED ORAL at 20:18

## 2023-06-06 RX ADMIN — HYDROMORPHONE HYDROCHLORIDE 0.25 MG: 1 INJECTION, SOLUTION INTRAMUSCULAR; INTRAVENOUS; SUBCUTANEOUS at 14:36

## 2023-06-06 RX ADMIN — GABAPENTIN 200 MG: 100 CAPSULE ORAL at 20:18

## 2023-06-06 RX ADMIN — NITROGLYCERIN 1 INCH: 20 OINTMENT TOPICAL at 18:15

## 2023-06-06 RX ADMIN — FAMOTIDINE 20 MG: 10 INJECTION INTRAVENOUS at 20:35

## 2023-06-06 NOTE — CODE DOCUMENTATION
RRT called for patient found down in hallway. Reported that it was unwitnessed. On my arrival patient had been placed back in bed. Informed that patient had been lifted using something similar to an air mattress. Per nurse patient was hypertensive before the event as well. See vitals on flowsheet for RRT vitals. Patient taken to CT for head. Stat troponin being done at this time per Dr. Hall orders.

## 2023-06-06 NOTE — CONSULTS
"  [facility]   Consult Note      Patient Name: Carol Abarca  : 1970  MRN: [id number]  Primary Care Physician:  Sonia Mosquera APRN  Date of admission: 2023    Subjective   Subjective     This 53 years old woman was seen upon the request of  Dean Hall MD for evaluation.  Blackout spell    Chief Complaint:   Blackout spell    HPI:  She dated the onset of her illness sometime on May 31, 2023 while on the way to the restroom, she felt that tired and \"dizzy like\".  The next thing she remember was waking up on the floor.  She did not bite her lips or her tongue.  Neither did she wet herself.  She could not tell whether there were any shaking or jerking movements.  She has not had any of these spells in the past.    She mentioned that her son started to pass out and started having seizures.  Nobody else passes out or have seizures in her family.    Review of Systems  There is no headache nausea or vomiting.  No chest pains or abdominal pains.      Past Medical History:   Diagnosis Date    Anemia     Arthritis     Diabetes     Essential hypertension 2021    History of pulmonary embolism     Lumbago     Low back pain    Mild left ventricular hypertrophy 2021    Mixed hyperlipidemia 2021    Obstructive sleep apnea     Reflux esophagitis     Seasonal allergies        Past Surgical History:   Procedure Laterality Date    APPENDECTOMY  2010     SECTION      , , ,     COLONOSCOPY       2018    COLONOSCOPY N/A 2022    Procedure: COLONOSCOPY;  Surgeon: Radha James MD;  Location: [facility] ENDOSCOPY;  Service: Gastroenterology;  Laterality: N/A;  COLON POLYP     ENDOSCOPY  2019    HEMORRHOIDECTOMY N/A 2022    Procedure: HEMORRHOIDECTOMY;  Surgeon: Remi Reeder MD;  Location: [facility] MAIN OR;  Service: General;  Laterality: N/A;    HERNIA REPAIR      2005, 2006, 2007, 2008    HYSTERECTOMY  2004    LAPAROSCOPIC GASTRIC BANDING      OTHER SURGICAL " HISTORY      Metal implants       Family History: family history includes Arthritis in her mother; Diabetes in her mother and son; Heart disease in her father and mother. Otherwise pertinent FHx was reviewed and not pertinent to current issue.    Social History:  reports that she has been smoking cigarettes. She has been smoking an average of .5 packs per day. She has never used smokeless tobacco. She reports current alcohol use. She reports that she does not use drugs.    Psychosocial History: No known psychiatric disturbance.    Home Medications:  ALPRAZolam, DULoxetine, HYDROcodone-acetaminophen, Insulin Glargine (1 Unit Dial), QUEtiapine XR, albuterol sulfate HFA, aspirin, cetirizine, gabapentin, glipizide, lisinopril, metoprolol succinate XL, montelukast, potassium chloride, pravastatin, and zolpidem      Allergies:  Allergies   Allergen Reactions    Tramadol Anaphylaxis    Tramadol Hcl Anaphylaxis    Moxifloxacin Hives    Sulfa Antibiotics Hives       Vitals:   Temp:  [97.8 °F (36.6 °C)-98.7 °F (37.1 °C)] 98.2 °F (36.8 °C)  Heart Rate:  [77-89] 81  Resp:  [14-24] 20  BP: (130-251)/() 201/118  Flow (L/min):  [2-4] 2  Physical Exam   She was awake and alert was not informed distress was fully developed and fairly nourished.  Abdomen is flabby and protuberant.    Her heart was regular heart rate was 80/min.  There were no murmurs.  The lungs were clear.    The carotid pulses were 1+ and equal.  There were no bruits on either side.    Neurological Examination:  Her responses were coherent and relevant.  She was aware of what was going on around her.  She had an insight to her condition.  She was able to understand and follow verbal commands.    I did not look into the fundus on either side because of the COVID-19 pandemic social distancing.    The pupils were 3 to 4 mm. They were round and equal reactive to light directly and consensually.  There was no extraocular muscle weakness.    No facial asymmetry.   No facial weakness.  Was able to hear normal conversational speech.    The strength of the sternomastoid and trapezius muscles was normal and symmetrical.  The uvula and the tongue were in the midline.    She is weak in both upper and lower extremities.  Strength were 4+5 on both sides.    Felt pinprick equal in both sides of the forehead, face, lower jaw, both upper and lower extremities, and both sides of her trunk.    The deep tendon reflexes were absent on both sides.    Did finger-to-nose test fairly well equal on both sides.      Result Review    Result Review:  I have personally reviewed the results from the time of this admission to 6/6/2023 19:57 EDT and agree with these findings:  []  Laboratory  []  Microbiology  [x]  Radiology  []  EKG/Telemetry   []  Cardiology/Vascular   []  Pathology  []  Old records  []  Other:  Most notable findings include:   June 6, 2023  I reviewed the digital images of the CAT scan of her brain that was done on June 6, 2023.  Study showed no acute changes.  It is essentially unchanged as of the study done on May 31, 2023.  The ethmoid and maxillary sinuses are essentially unchanged.    We have the report of the CAT scan of her chest that was done on May 31, 2023.  There were evidence of pulmonary arterial hypertension, mild to moderate cardiomegaly small amount of pericardial effusion.  Changes consistent with median sternotomy procedure.      Impression:    Syncope  Seizure disorder  Postural Hypotension        Plan:   We will obtain an EEG and tilt table test.  We will stop.  MRI of the brain.  Depending on the results of this, will advise accordingly.  Thank you very much for letting me see this patient.  I will follow the patient with you.              Please note that portions of this note were completed with a voice recognition program.  Part of this note is an electric or electronic transcription/translation of spoken language to printed text using the dragon dictating  system.    Electronically signed by Jerome Rajan Jr., MD, 06/06/23, 7:57 PM EDT.

## 2023-06-06 NOTE — NURSING NOTE
Received phone call from Lab calling for critical troponin of 60. Notified MD JOE gave order for nitro ointment 1 inch q6 hours.

## 2023-06-06 NOTE — PLAN OF CARE
Goal Outcome Evaluation:  Plan of Care Reviewed With: patient        Progress: improving  Outcome Evaluation: PT resting. No complaints at this time.

## 2023-06-06 NOTE — PLAN OF CARE
Goal Outcome Evaluation:   Patient is not wanting to wear the unit and has been doing well on the 3L NC when it is on

## 2023-06-06 NOTE — NURSING NOTE
While in patient's room noticed that patient removed heart monitor, while reapplying monitor on patient, patient would remove electrodes after nurse placed. Patient non compliant with wearing continuous pulse ox and oxygen. PCA preformed orthostatic vitals on patient, patient's blood pressure 213/124. Patient medicated per MAR. MD on floor and notified of blood pressure. MD stated to only give metoprolol and lisinopril and hydralazine for blood pressure and to hold other medications due to patient going for egd this am.

## 2023-06-06 NOTE — PLAN OF CARE
Goal Outcome Evaluation:  Plan of Care Reviewed With: patient        Progress: no change  Outcome Evaluation: Pateint did not wear her CPAP last night only her nasal cannula.  Patient doing well just on oxygen.  Will try to wear CPAP tonight.

## 2023-06-06 NOTE — PLAN OF CARE
Goal Outcome Evaluation:  Plan of Care Reviewed With: patient        Progress: no change (first session for evaluation)  Outcome Evaluation: Skilled OT is not indicated at this time as patient has had no signficant decline in functional status. Will discharge OT services with patient in agreement.

## 2023-06-06 NOTE — NURSING NOTE
Patient continues to pull oxygen off when falling asleep and dropping her oxygen down to 55% once replaced oxygen shoots up to the 80s and then slowly rises to 90s. Patient was medicated once for pain during shift and reported being in pain a second time but patient fell asleep after verbalizing pain 8/10. No other complaints verbalized.

## 2023-06-06 NOTE — NURSING NOTE
Patient found at 1005 on the floor in the hallway unresponsive. RRT called. Placed non rebreather mask oxygen. Vitals obtain. Used hoverjack to lift patient out of floor and onto bed. RRT nurse arrived to floor. Called MD, gave order for stat troponin and CT of head. Patient transferred for CT of head by primary nurse and RRT nurse. Patient brought back to floor and lab collected troponin. RRT nurse notified Dr. Hall. MD gave orders to transfer to PCU and ordered clonidine patch. Patient will be in room 213 when room is cleaned.

## 2023-06-06 NOTE — PROGRESS NOTES
[facility]     Progress Note    Patient Name: Carol Abarca  : 1970  MRN: [id number]  Primary Care Physician:  Sonia Mosquera APRN  Date of admission: 2023      Subjective   Brief summary.  Admitted with syncope.        HPI:  Patient diagnosed with severe anemia, B12 extremely low.  On supplements now.  Waiting for endoscopy today.  No new complaints extremely weak.  Fatigue.  No dizziness blood pressure running high    Review of Systems     No chest pain or shortness of breath, complains of fatigue, no blood in the stools.      Objective     Vitals:   Temp:  [97.5 °F (36.4 °C)-99.1 °F (37.3 °C)] 97.8 °F (36.6 °C)  Heart Rate:  [78-89] 80  Resp:  [18-19] 18  BP: ()/() 213/124  Flow (L/min):  [2-205] 2    Physical Exam :     Middle-aged female obese not in acute distress dry oral mucosa.  Pallor and mucosa pale  Heart regular.  Lungs diminished breath sounds bilaterally clear.  Abdomen obese and soft nontender.  Extremities no edema      Result Review:  I have personally reviewed the results from the time of this admission to 2023 08:36 EDT and agree with these findings:  [x]  Laboratory  []  Microbiology  []  Radiology  []  EKG/Telemetry   []  Cardiology/Vascular   []  Pathology  []  Old records  []  Other:           Assessment / Plan       Active Hospital Problems:  Active Hospital Problems    Diagnosis     **Severe anemia     Syncope and collapse     Essential hypertension        Plan:   For EGD today  Blood pressure fluctuating  IV hydralazine in place  Patient gets extremely anxious anxious blood pressure shoots up.  Monitor labs.         DVT prophylaxis:  No DVT prophylaxis order currently exists.    CODE STATUS:              Electronically signed by Dung Hall MD, 23, 8:35 AM EDT.    Addendum..    Patient became lightheaded and dizzy almost passed out in the hallway while walking.  EGD canceled patient blood pressure high troponin high transferred to PCU.  Seen by  cardiologist, believes troponin nonsignificant related to anemia.  Patient also has issues with sleep apnea and hypoxia and COPD.  We will check baseline ABG  Aggressive nebs.  Blood pressure control.  Monitor labs.  Neuro work-up consult Dr. Rajan.  Further management will based on clinical course, lab results and consultant input.        Electronically signed by Dung Hall MD, 06/06/23, 3:23 PM EDT.

## 2023-06-06 NOTE — THERAPY EVALUATION
Patient Name: Carol Abarca  : 1970    MRN: [id number]                              Today's Date: 2023       Admit Date: 2023    Visit Dx:     ICD-10-CM ICD-9-CM   1. Syncope and collapse  R55 780.2   2. Pancytopenia  D61.818 284.19   3. Difficulty walking  R26.2 719.7   4. Decreased activities of daily living (ADL)  Z78.9 V49.89     Patient Active Problem List   Diagnosis    Mixed hyperlipidemia    Essential hypertension    Mild left ventricular hypertrophy    Atypical chest pain    Obstructive sleep apnea    History of pulmonary embolism    Screening for colon cancer    Hemorrhoids    Syncope and collapse    Severe anemia     Past Medical History:   Diagnosis Date    Anemia     Arthritis     Diabetes     Essential hypertension 2021    History of pulmonary embolism     Lumbago     Low back pain    Mild left ventricular hypertrophy 2021    Mixed hyperlipidemia 2021    Obstructive sleep apnea     Reflux esophagitis     Seasonal allergies      Past Surgical History:   Procedure Laterality Date    APPENDECTOMY  2010     SECTION      , , ,     COLONOSCOPY      2019 2018    COLONOSCOPY N/A 2022    Procedure: COLONOSCOPY;  Surgeon: Radha James MD;  Location: [facility] ENDOSCOPY;  Service: Gastroenterology;  Laterality: N/A;  COLON POLYP     ENDOSCOPY  2019    HEMORRHOIDECTOMY N/A 2022    Procedure: HEMORRHOIDECTOMY;  Surgeon: Remi Reeder MD;  Location: [facility] MAIN OR;  Service: General;  Laterality: N/A;    HERNIA REPAIR      2005, 2006, 2007, 2008    HYSTERECTOMY  2004    LAPAROSCOPIC GASTRIC BANDING      OTHER SURGICAL HISTORY      Metal implants      General Information       Row Name 23 0924          OT Time and Intention    Document Type evaluation  -AV     Mode of Treatment individual therapy;occupational therapy  -AV       Row Name 23          General Information    Patient Profile Reviewed yes  -AV     Prior  Level of Function independent:;ADL's;home management;all household mobility;transfer  stands to shower (tub without DME). stands at sink to groom standard commode. ambulates without assistive device. no home O2.  -AV     Existing Precautions/Restrictions no known precautions/restrictions  -AV     Barriers to Rehab none identified  -AV       Row Name 06/06/23 0924          Occupational Profile    Reason for Services/Referral (Occupational Profile) Patient is a 52 year old female admitted to [facility] on 5/31/23. She is currently on 3W/ 2L O2. OT consulted to evaluate for ADL needs. No previous OT services for current condition.  -AV       Row Name 06/06/23 0924          Living Environment    People in Home child(cory), adult  -AV       Row Name 06/06/23 0924          Home Main Entrance    Number of Stairs, Main Entrance one  -AV       Row Name 06/06/23 0924          Cognition    Orientation Status (Cognition) --  alert, pleasant and cooperative. able to follow commands.  -AV               User Key  (r) = Recorded By, (t) = Taken By, (c) = Cosigned By      Initials Name Provider Type    Brayan Leo OT Occupational Therapist                     Mobility/ADL's       Row Name 06/06/23 0926          Transfers    Comment, (Transfers) SBA due to hospital setting  -AV       Row Name 06/06/23 0926          Activities of Daily Living    BADL Assessment/Intervention --  Independent ADLs with setup. SBA for standing portions due to hospital setting/ consideration of lines.  -AV               User Key  (r) = Recorded By, (t) = Taken By, (c) = Cosigned By      Initials Name Provider Type    Brayan Leo OT Occupational Therapist                   Obj/Interventions       Row Name 06/06/23 0927          Sensory Assessment (Somatosensory)    Sensory Assessment (Somatosensory) UE sensation intact  -AV       Row Name 06/06/23 0927          Vision Assessment/Intervention    Visual Impairment/Limitations  WFL;corrective lenses for reading  -AV       Row Name 06/06/23 0927          Range of Motion Comprehensive    General Range of Motion bilateral upper extremity ROM WFL  -AV     Comment, General Range of Motion AROM including opposition  -AV       Row Name 06/06/23 0927          Strength Comprehensive (MMT)    Comment, General Manual Muscle Testing (MMT) Assessment 4/5 bilateral upper extremities  -AV       Row Name 06/06/23 0927          Motor Skills    Motor Skills coordination;functional endurance  -AV     Coordination WFL  right dominant  -AV     Functional Endurance fair  -AV       Row Name 06/06/23 0927          Balance    Comment, Balance SBA  -AV               User Key  (r) = Recorded By, (t) = Taken By, (c) = Cosigned By      Initials Name Provider Type    Brayan Leo, JEFFREY Occupational Therapist                   Goals/Plan    No documentation.                  Clinical Impression       Row Name 06/06/23 0928          Pain Assessment    Additional Documentation Pain Scale: FACES Pre/Post-Treatment (Group)  -AV       Row Name 06/06/23 0928          Pain Scale: FACES Pre/Post-Treatment    Pain: FACES Scale, Pretreatment 0-->no hurt  -AV     Posttreatment Pain Rating 0-->no hurt  -AV       Row Name 06/06/23 0928          Plan of Care Review    Plan of Care Reviewed With patient  -AV     Progress no change  first session for evaluation  -AV     Outcome Evaluation Skilled OT is not indicated at this time as patient has had no signficant decline in functional status. Will discharge OT services with patient in agreement.  -AV       Row Name 06/06/23 0928          Therapy Assessment/Plan (OT)    Patient/Family Therapy Goal Statement (OT) NA  -AV     Rehab Potential (OT) --  NA  -AV     Criteria for Skilled Therapeutic Interventions Met (OT) does not meet criteria for skilled intervention  -AV     Therapy Frequency (OT) evaluation only  -AV       Row Name 06/06/23 0928          Therapy Plan Review/Discharge Plan  (OT)    Anticipated Discharge Disposition (OT) home  -AV       Row Name 06/06/23 0928          Vital Signs    O2 Delivery Pre Treatment room air  nasal cannula below chin upon arrival.  -AV     O2 Delivery Intra Treatment nasal cannula  2  -AV     O2 Delivery Post Treatment nasal cannula  2  -AV               User Key  (r) = Recorded By, (t) = Taken By, (c) = Cosigned By      Initials Name Provider Type    Brayan Leo OT Occupational Therapist                   Outcome Measures       Row Name 06/06/23 0931          How much help from another is currently needed...    Putting on and taking off regular lower body clothing? 4  -AV     Bathing (including washing, rinsing, and drying) 4  -AV     Toileting (which includes using toilet bed pan or urinal) 4  -AV     Putting on and taking off regular upper body clothing 4  -AV     Taking care of personal grooming (such as brushing teeth) 4  -AV     Eating meals 4  -AV     AM-PAC 6 Clicks Score (OT) 24  -AV       Row Name 06/06/23 0931          Functional Assessment    Outcome Measure Options AM-PAC 6 Clicks Daily Activity (OT);Optimal Instrument  -AV       Row Name 06/06/23 0931          Optimal Instrument    Optimal Instrument Optimal - 3  -AV     Bending/Stooping 1  -AV     Standing 1  -AV     Reaching 1  -AV     From the list, choose the 3 activities you would most like to be able to do without any difficulty Bending/stooping;Standing;Reaching  -AV     Total Score Optimal - 3 3  -AV               User Key  (r) = Recorded By, (t) = Taken By, (c) = Cosigned By      Initials Name Provider Type    Brayan Leo OT Occupational Therapist                    Occupational Therapy Education       Title: PT OT SLP Therapies (Done)       Topic: Occupational Therapy (Done)       Point: ADL training (Done)       Description:   Instruct learner(s) on proper safety adaptation and remediation techniques during self care or transfers.   Instruct in proper use of assistive  devices.                  Learning Progress Summary             Patient Acceptance, E, VU by BRENDA at 6/6/2023 0931    Comment: no need for skilled OT services identified at this time                                         User Key       Initials Effective Dates Name Provider Type Discipline     06/16/21 -  Brayan Henderson OT Occupational Therapist OT                  OT Recommendation and Plan  Therapy Frequency (OT): evaluation only  Plan of Care Review  Plan of Care Reviewed With: patient  Progress: no change (first session for evaluation)  Outcome Evaluation: Skilled OT is not indicated at this time as patient has had no signficant decline in functional status. Will discharge OT services with patient in agreement.     Time Calculation:    Time Calculation- OT       Row Name 06/06/23 0932             Time Calculation- OT    OT Received On 06/06/23  -AV         Untimed Charges    OT Eval/Re-eval Minutes 35  -AV         Total Minutes    Untimed Charges Total Minutes 35  -AV       Total Minutes 35  -AV                User Key  (r) = Recorded By, (t) = Taken By, (c) = Cosigned By      Initials Name Provider Type    AV Brayan Henderson OT Occupational Therapist                  Therapy Charges for Today       Code Description Service Date Service Provider Modifiers Qty    89979707399 HC OT EVAL LOW COMPLEXITY 3 6/6/2023 Brayan Henderson OT GO 1                 Brayan Henderson OT  6/6/2023

## 2023-06-06 NOTE — CODE DOCUMENTATION
Discussed patient case with Dr. Hall. Patient to be transferred to PCU. Order for Clonidine patch. Transfer will take place to 213 when bed is clean. Ending RRT

## 2023-06-07 ENCOUNTER — APPOINTMENT (OUTPATIENT)
Dept: CT IMAGING | Facility: HOSPITAL | Age: 53
End: 2023-06-07
Payer: MEDICARE

## 2023-06-07 ENCOUNTER — APPOINTMENT (OUTPATIENT)
Dept: MRI IMAGING | Facility: HOSPITAL | Age: 53
End: 2023-06-07
Payer: MEDICARE

## 2023-06-07 ENCOUNTER — APPOINTMENT (OUTPATIENT)
Dept: CARDIOLOGY | Facility: HOSPITAL | Age: 53
End: 2023-06-07
Payer: MEDICARE

## 2023-06-07 ENCOUNTER — APPOINTMENT (OUTPATIENT)
Dept: NEUROLOGY | Facility: HOSPITAL | Age: 53
End: 2023-06-07
Payer: MEDICARE

## 2023-06-07 ENCOUNTER — PREP FOR SURGERY (OUTPATIENT)
Dept: GASTROENTEROLOGY | Facility: CLINIC | Age: 53
End: 2023-06-07
Payer: MEDICARE

## 2023-06-07 DIAGNOSIS — D64.9 ANEMIA: Primary | ICD-10-CM

## 2023-06-07 DIAGNOSIS — D64.9 SEVERE ANEMIA: ICD-10-CM

## 2023-06-07 PROBLEM — E53.8 B12 DEFICIENCY: Status: ACTIVE | Noted: 2023-06-07

## 2023-06-07 PROBLEM — R10.9 ABDOMINAL PAIN: Status: ACTIVE | Noted: 2023-01-01

## 2023-06-07 PROBLEM — R10.9 ABDOMINAL PAIN: Status: ACTIVE | Noted: 2023-06-07

## 2023-06-07 PROBLEM — E53.8 B12 DEFICIENCY: Status: ACTIVE | Noted: 2023-01-01

## 2023-06-07 LAB
ALBUMIN SERPL-MCNC: 3.2 G/DL (ref 3.5–5.2)
ALBUMIN/GLOB SERPL: 0.9 G/DL
ALP SERPL-CCNC: 117 U/L (ref 39–117)
ALT SERPL W P-5'-P-CCNC: 15 U/L (ref 1–33)
ANION GAP SERPL CALCULATED.3IONS-SCNC: 11.1 MMOL/L (ref 5–15)
AST SERPL-CCNC: 19 U/L (ref 1–32)
BASOPHILS # BLD AUTO: 0 10*3/MM3 (ref 0–0.2)
BASOPHILS NFR BLD AUTO: 0 % (ref 0–1.5)
BILIRUB SERPL-MCNC: 0.7 MG/DL (ref 0–1.2)
BUN SERPL-MCNC: 26 MG/DL (ref 6–20)
BUN/CREAT SERPL: 40.6 (ref 7–25)
CALCIUM SPEC-SCNC: 8.6 MG/DL (ref 8.6–10.5)
CHLORIDE SERPL-SCNC: 98 MMOL/L (ref 98–107)
CO2 SERPL-SCNC: 26.9 MMOL/L (ref 22–29)
CREAT SERPL-MCNC: 0.64 MG/DL (ref 0.57–1)
DEPRECATED RDW RBC AUTO: 75.8 FL (ref 37–54)
EGFRCR SERPLBLD CKD-EPI 2021: 106.5 ML/MIN/1.73
EOSINOPHIL # BLD AUTO: 0 10*3/MM3 (ref 0–0.4)
EOSINOPHIL NFR BLD AUTO: 0 % (ref 0.3–6.2)
ERYTHROCYTE [DISTWIDTH] IN BLOOD BY AUTOMATED COUNT: 20.4 % (ref 12.3–15.4)
GLOBULIN UR ELPH-MCNC: 3.7 GM/DL
GLUCOSE SERPL-MCNC: 105 MG/DL (ref 65–99)
HCT VFR BLD AUTO: 25.7 % (ref 34–46.6)
HGB BLD-MCNC: 8.2 G/DL (ref 12–15.9)
IMM GRANULOCYTES # BLD AUTO: 0.03 10*3/MM3 (ref 0–0.05)
IMM GRANULOCYTES NFR BLD AUTO: 0.6 % (ref 0–0.5)
LYMPHOCYTES # BLD AUTO: 0.62 10*3/MM3 (ref 0.7–3.1)
LYMPHOCYTES NFR BLD AUTO: 12.9 % (ref 19.6–45.3)
MCH RBC QN AUTO: 33.1 PG (ref 26.6–33)
MCHC RBC AUTO-ENTMCNC: 31.9 G/DL (ref 31.5–35.7)
MCV RBC AUTO: 103.6 FL (ref 79–97)
MONOCYTES # BLD AUTO: 0.51 10*3/MM3 (ref 0.1–0.9)
MONOCYTES NFR BLD AUTO: 10.6 % (ref 5–12)
NEUTROPHILS NFR BLD AUTO: 3.63 10*3/MM3 (ref 1.7–7)
NEUTROPHILS NFR BLD AUTO: 75.9 % (ref 42.7–76)
NRBC BLD AUTO-RTO: 0.8 /100 WBC (ref 0–0.2)
PLATELET # BLD AUTO: 206 10*3/MM3 (ref 140–450)
PMV BLD AUTO: 11.2 FL (ref 6–12)
POTASSIUM SERPL-SCNC: 4.5 MMOL/L (ref 3.5–5.2)
PROT SERPL-MCNC: 6.9 G/DL (ref 6–8.5)
RBC # BLD AUTO: 2.48 10*6/MM3 (ref 3.77–5.28)
RETICS # AUTO: 0.11 10*6/MM3 (ref 0.02–0.13)
RETICS/RBC NFR AUTO: 4.29 % (ref 0.7–1.9)
SODIUM SERPL-SCNC: 136 MMOL/L (ref 136–145)
WBC NRBC COR # BLD: 4.79 10*3/MM3 (ref 3.4–10.8)

## 2023-06-07 PROCEDURE — 95816 EEG AWAKE AND DROWSY: CPT

## 2023-06-07 PROCEDURE — 74177 CT ABD & PELVIS W/CONTRAST: CPT

## 2023-06-07 PROCEDURE — 94664 DEMO&/EVAL PT USE INHALER: CPT

## 2023-06-07 PROCEDURE — 93660 TILT TABLE EVALUATION: CPT

## 2023-06-07 PROCEDURE — 97161 PT EVAL LOW COMPLEX 20 MIN: CPT

## 2023-06-07 PROCEDURE — 85025 COMPLETE CBC W/AUTO DIFF WBC: CPT | Performed by: INTERNAL MEDICINE

## 2023-06-07 PROCEDURE — 93660 TILT TABLE EVALUATION: CPT | Performed by: SPECIALIST

## 2023-06-07 PROCEDURE — 70544 MR ANGIOGRAPHY HEAD W/O DYE: CPT

## 2023-06-07 PROCEDURE — 0 IOHEXOL 12 MG/ML SOLUTION: Performed by: INTERNAL MEDICINE

## 2023-06-07 PROCEDURE — 70547 MR ANGIOGRAPHY NECK W/O DYE: CPT

## 2023-06-07 PROCEDURE — 94799 UNLISTED PULMONARY SVC/PX: CPT

## 2023-06-07 PROCEDURE — 25010000002 HYDROMORPHONE 1 MG/ML SOLUTION: Performed by: INTERNAL MEDICINE

## 2023-06-07 PROCEDURE — 99222 1ST HOSP IP/OBS MODERATE 55: CPT | Performed by: INTERNAL MEDICINE

## 2023-06-07 PROCEDURE — 25010000002 LEVETIRACETAM IN NACL 0.82% 500 MG/100ML SOLUTION: Performed by: PSYCHIATRY & NEUROLOGY

## 2023-06-07 PROCEDURE — 99231 SBSQ HOSP IP/OBS SF/LOW 25: CPT | Performed by: SPECIALIST

## 2023-06-07 PROCEDURE — 70551 MRI BRAIN STEM W/O DYE: CPT

## 2023-06-07 PROCEDURE — 80053 COMPREHEN METABOLIC PANEL: CPT | Performed by: INTERNAL MEDICINE

## 2023-06-07 PROCEDURE — 85045 AUTOMATED RETICULOCYTE COUNT: CPT | Performed by: INTERNAL MEDICINE

## 2023-06-07 PROCEDURE — 25510000001 IOPAMIDOL PER 1 ML: Performed by: INTERNAL MEDICINE

## 2023-06-07 RX ORDER — LEVETIRACETAM 5 MG/ML
500 INJECTION INTRAVASCULAR EVERY 12 HOURS SCHEDULED
Status: DISCONTINUED | OUTPATIENT
Start: 2023-06-07 | End: 2023-06-10

## 2023-06-07 RX ADMIN — IOPAMIDOL 91 ML: 755 INJECTION, SOLUTION INTRAVENOUS at 20:47

## 2023-06-07 RX ADMIN — METOPROLOL SUCCINATE 50 MG: 50 TABLET, EXTENDED RELEASE ORAL at 20:54

## 2023-06-07 RX ADMIN — IPRATROPIUM BROMIDE AND ALBUTEROL SULFATE 3 ML: .5; 3 SOLUTION RESPIRATORY (INHALATION) at 19:06

## 2023-06-07 RX ADMIN — HYDROMORPHONE HYDROCHLORIDE 0.25 MG: 1 INJECTION, SOLUTION INTRAMUSCULAR; INTRAVENOUS; SUBCUTANEOUS at 08:37

## 2023-06-07 RX ADMIN — IPRATROPIUM BROMIDE AND ALBUTEROL SULFATE 3 ML: .5; 3 SOLUTION RESPIRATORY (INHALATION) at 11:26

## 2023-06-07 RX ADMIN — LEVETIRACETAM 500 MG: 5 INJECTION, SOLUTION INTRAVENOUS at 20:55

## 2023-06-07 RX ADMIN — IPRATROPIUM BROMIDE AND ALBUTEROL SULFATE 3 ML: .5; 3 SOLUTION RESPIRATORY (INHALATION) at 04:04

## 2023-06-07 RX ADMIN — IOHEXOL 500 ML: 12 SOLUTION ORAL at 16:04

## 2023-06-07 RX ADMIN — POTASSIUM CHLORIDE, DEXTROSE MONOHYDRATE AND SODIUM CHLORIDE 50 ML/HR: 150; 5; 900 INJECTION, SOLUTION INTRAVENOUS at 05:32

## 2023-06-07 RX ADMIN — QUETIAPINE FUMARATE 600 MG: 50 TABLET, EXTENDED RELEASE ORAL at 20:54

## 2023-06-07 RX ADMIN — FAMOTIDINE 20 MG: 10 INJECTION INTRAVENOUS at 20:54

## 2023-06-07 RX ADMIN — NITROGLYCERIN 1 INCH: 20 OINTMENT TOPICAL at 05:30

## 2023-06-07 RX ADMIN — DULOXETINE HYDROCHLORIDE 60 MG: 30 CAPSULE, DELAYED RELEASE ORAL at 20:54

## 2023-06-07 RX ADMIN — IPRATROPIUM BROMIDE AND ALBUTEROL SULFATE 3 ML: .5; 3 SOLUTION RESPIRATORY (INHALATION) at 15:57

## 2023-06-07 RX ADMIN — NITROGLYCERIN 1 INCH: 20 OINTMENT TOPICAL at 17:20

## 2023-06-07 RX ADMIN — IPRATROPIUM BROMIDE AND ALBUTEROL SULFATE 3 ML: .5; 3 SOLUTION RESPIRATORY (INHALATION) at 06:33

## 2023-06-07 RX ADMIN — MONTELUKAST 10 MG: 10 TABLET, FILM COATED ORAL at 20:58

## 2023-06-07 RX ADMIN — HYDROMORPHONE HYDROCHLORIDE 0.25 MG: 1 INJECTION, SOLUTION INTRAMUSCULAR; INTRAVENOUS; SUBCUTANEOUS at 23:20

## 2023-06-07 RX ADMIN — GABAPENTIN 200 MG: 100 CAPSULE ORAL at 20:54

## 2023-06-07 RX ADMIN — HYDROMORPHONE HYDROCHLORIDE 0.25 MG: 1 INJECTION, SOLUTION INTRAMUSCULAR; INTRAVENOUS; SUBCUTANEOUS at 17:38

## 2023-06-07 NOTE — PLAN OF CARE
Goal Outcome Evaluation:      Patient had not been wearing [facility] cpap. Her family brought her home unit in and she has worn it all night. Family had placed unit on her with the 2l n/c still in place. I put an o2 adapter in line with her cpap and bled in o2.

## 2023-06-07 NOTE — PLAN OF CARE
Goal Outcome Evaluation:  Plan of Care Reviewed With: patient           Outcome Evaluation: The patient presented to physical therapy with a decrease in lower extremity strength and balance. Skilled physical therapy is indicated at this time to address these deficits. Recommend the patient return to home with outpatient physical therapy.

## 2023-06-07 NOTE — CONSULTS
[facility]  Initial Inpatient Consult Note    Referring Provider: Dr. Hall    Reason for Consultation: Anemia    Subjective     History of present illness:    52 y.o. female with a history of anemia hypertension pulmonary embolus COPD who was admitted on May 30 after an episode of syncope at home.  I was asked to see the patient because of her chronic anemia.  We will going to pursue upper endoscopy however yesterday morning she had an RRT and had to be transferred to floors and was subsequently canceled the procedure for the day.  She is awake and oriented but somewhat of a poor historian.  She denies any signs of overt GI bleeding.  She had a screening colonoscopy in 2022 by Dr. James showing a small polyp removed from the ascending colon.  She had a colonoscopy in  for rectal bleeding showed hemorrhoids.  She had an upper scope in 2019 showing evidence of prior gastric lap band, gastritis and esophagitis.    Past Medical History:  Past Medical History:   Diagnosis Date    Anemia     Arthritis     Diabetes     Essential hypertension 2021    History of pulmonary embolism     Lumbago     Low back pain    Mild left ventricular hypertrophy 2021    Mixed hyperlipidemia 2021    Obstructive sleep apnea     Reflux esophagitis     Seasonal allergies      Past Surgical History:  Past Surgical History:   Procedure Laterality Date    APPENDECTOMY  2010     SECTION      , , ,     COLONOSCOPY       2018    COLONOSCOPY N/A 2022    Procedure: COLONOSCOPY;  Surgeon: Radha James MD;  Location: [facility] ENDOSCOPY;  Service: Gastroenterology;  Laterality: N/A;  COLON POLYP     ENDOSCOPY  2019    HEMORRHOIDECTOMY N/A 2022    Procedure: HEMORRHOIDECTOMY;  Surgeon: Remi Reeder MD;  Location: [facility] MAIN OR;  Service: General;  Laterality: N/A;    HERNIA REPAIR      , 2006, 2007, 2008    HYSTERECTOMY  2004     LAPAROSCOPIC GASTRIC BANDING      OTHER SURGICAL HISTORY      Metal implants      Social History:   Social History     Tobacco Use    Smoking status: Every Day     Packs/day: 0.50     Types: Cigarettes    Smokeless tobacco: Never    Tobacco comments:     Smoked 11-20 years. last 7/24/22 1700   Substance Use Topics    Alcohol use: Yes     Comment: Occasionally drinks, less than 1 drink per day, has been drinking for less than 1 year      Family History:  Family History   Problem Relation Age of Onset    Heart disease Mother     Diabetes Mother         Unspecified type    Arthritis Mother     Heart disease Father     Diabetes Son         Unspecified type    Malig Hyperthermia Neg Hx        Home Meds:  Medications Prior to Admission   Medication Sig Dispense Refill Last Dose    albuterol sulfate  (90 Base) MCG/ACT inhaler Inhale 2 puffs Every 4 (Four) Hours As Needed for Wheezing. 8 g 0 5/30/2023    ALPRAZolam (XANAX) 1 MG tablet Take 1 tablet by mouth 2 (Two) Times a Day As Needed.   5/30/2023    aspirin 81 MG EC tablet Take 1 tablet by mouth Daily.   5/30/2023    cetirizine (zyrTEC) 10 MG tablet Daily.   5/30/2023    DULoxetine (CYMBALTA) 60 MG capsule Take 1 capsule by mouth 2 (Two) Times a Day.   5/30/2023    gabapentin (NEURONTIN) 600 MG tablet Take 1 tablet by mouth 2 (Two) Times a Day As Needed.   5/30/2023    glipizide (GLUCOTROL XL) 10 MG 24 hr tablet Take 1 tablet by mouth Daily.   5/30/2023    HYDROcodone-acetaminophen (NORCO)  MG per tablet Take 1 tablet by mouth Every 6 (Six) Hours As Needed for Moderate Pain.   5/30/2023    lisinopril (PRINIVIL,ZESTRIL) 40 MG tablet Take 1 tablet by mouth Daily.   5/30/2023    metoprolol succinate XL (TOPROL-XL) 25 MG 24 hr tablet Take 1 tablet by mouth 2 (Two) Times a Day.   5/30/2023    metoprolol succinate XL (TOPROL-XL) 50 MG 24 hr tablet Take 1 tablet by mouth 2 (Two) Times a Day.   5/30/2023    montelukast (SINGULAIR) 10 MG tablet Take 1 tablet by mouth  Every Night.   5/30/2023    pravastatin (PRAVACHOL) 40 MG tablet Take 1 tablet by mouth Daily.   5/30/2023    QUEtiapine XR (SEROquel XR) 300 MG 24 hr tablet Take 2 tablets by mouth Every Evening.   5/30/2023    Tore SoloStar 300 UNIT/ML solution pen-injector injection Inject 300 Units under the skin into the appropriate area as directed Every Night.   5/30/2023    zolpidem (AMBIEN) 10 MG tablet Take 1 tablet by mouth At Night As Needed.   5/30/2023    potassium chloride (K-DUR,KLOR-CON) 10 MEQ CR tablet Take 1 tablet by mouth 2 (Two) Times a Day.   More than a month     Current Meds:   !Patient Home Medications Stored in Pharmacy, , Does not apply, BID  amLODIPine, 5 mg, Oral, Q24H  cetirizine, 10 mg, Oral, Daily  cloNIDine, 1 patch, Transdermal, Weekly  cyanocobalamin, 1,000 mcg, Intramuscular, Daily  DULoxetine, 60 mg, Oral, BID  famotidine, 20 mg, Intravenous, Q12H  folic acid, 5 mg, Oral, Daily  folic acid (FOLVITE) IVPB, 5 mg, Intravenous, Daily  gabapentin, 200 mg, Oral, Q12H  ipratropium-albuterol, 3 mL, Nebulization, Q4H - RT  lisinopril, 40 mg, Oral, Daily  metoprolol succinate XL, 50 mg, Oral, BID  montelukast, 10 mg, Oral, Nightly  nitroglycerin, 1 inch, Topical, Q6H  pravastatin, 40 mg, Oral, Daily  QUEtiapine XR, 600 mg, Oral, Q PM      Allergies:  Allergies   Allergen Reactions    Tramadol Anaphylaxis    Tramadol Hcl Anaphylaxis    Moxifloxacin Hives    Sulfa Antibiotics Hives     Review of Systems  Pertinent items are noted in HPI, all other systems reviewed and negative         Vital Signs  Temp:  [98 °F (36.7 °C)-98.6 °F (37 °C)] 98.6 °F (37 °C)  Heart Rate:  [72-90] 88  Resp:  [14-24] 18  BP: (129-251)/() 129/85  Physical Exam:  General Appearance:    Alert, cooperative, in no acute distress   Head:    Normocephalic, without obvious abnormality, atraumatic   Eyes:          conjunctivae and sclerae normal, no   icterus   Throat:   no thrush, oral mucosa moist   Neck:   Supple, no adenopathy    Lungs:     Clear to auscultation bilaterally    Heart:    Regular rhythm and normal rate    Chest Wall:    No abnormalities observed   Abdomen:     Soft, nondistended, nontender; normal bowel sounds   Extremities:   no edema, no redness   Skin:   No bruising or rash   Psychiatric:  normal mood and insight     Results Review:  [x]  Laboratory   [x]  Radiology  []  Pathology      I reviewed the patient's new clinical results.    Results from last 7 days   Lab Units 06/07/23  0503 06/06/23  0514 06/05/23  0758   WBC 10*3/mm3 4.79 6.01 8.46   HEMOGLOBIN g/dL 8.2* 9.0* 8.8*   HEMATOCRIT % 25.7* 27.9* 27.1*   PLATELETS 10*3/mm3 206 195 162     Results from last 7 days   Lab Units 06/07/23  0503 06/06/23  1629 06/06/23  0514 06/04/23  0536   SODIUM mmol/L 136  --  135* 135*   SODIUM, ARTERIAL mmol/L  --  134.0*  --   --    POTASSIUM mmol/L 4.5  --  4.5 4.2   CHLORIDE mmol/L 98  --  98 101   CO2 mmol/L 26.9  --  24.3 28.3   BUN mg/dL 26*  --  27* 9   CREATININE mg/dL 0.64  --  0.81 0.47*   CALCIUM mg/dL 8.6  --  8.7 8.6   BILIRUBIN mg/dL 0.7  --  0.8 0.6   ALK PHOS U/L 117  --  144* 105   ALT (SGPT) U/L 15  --  18 10   AST (SGOT) U/L 19  --  28 18   GLUCOSE mg/dL 105*  --  141* 97   GLUCOSE, ARTERIAL mg/dL  --  143*  --   --          Lab Results   Lab Value Date/Time    LIPASE 10 (L) 06/06/2023 1653    LIPASE 17 05/31/2023 0509    LIPASE 23 02/05/2022 0617    LIPASE 14 05/19/2021 1300    LIPASE 37 10/05/2019 2320       Radiology:  CT Head Without Contrast   Final Result    No acute intracranial abnormality.  Stable bilateral orbital proptosis.  Chronic sinus    changes.                AYE PIÑA MD          Electronically Signed and Approved By: AYE PIÑA MD on 6/06/2023 at 11:21                           CT Chest With Contrast Diagnostic   Final Result       1. The study is limited.     2. No definite proximal (or central) pulmonary embolism is identified.     3. Evidence for pulmonary arterial hypertension is  noted.     4. There is mild-to-moderate cardiomegaly.     5. The maximum diameter of the ascending aorta is about 4 cm, which is at the upper limits of    normal.  Consider close interval clinical and imaging follow-up of this finding to ensure a benign    progression and to exclude an expanding or developing fusiform aneurysm of the thoracic aorta.     6. A small amount of pericardial effusion is seen.     7. The patient has undergone median sternotomy.     8. There is suspected hepatosplenomegaly.     9. Subsegmental atelectasis involves the lungs.  Acute infiltrate is thought to be less likely.     10. Grossly, no suspicious pulmonary nodules are appreciated.     11. There may be mild levoscoliosis of the upper thoracic spine.     12. There is a gastric band in place.     13. There is debris in the upper thoracic esophageal lumen, which may be related to esophageal    dysmotility, gastroesophageal reflux disease (GERD), and/or the gastric band, possibly being too    constrictive.   14. Please see above comments for further detail.               Please note that portions of this note were completed with a voice recognition program.       PARVEZ PIERCE JR, MD          Electronically Signed and Approved By: PAVREZ PIERCE JR, MD on 5/31/2023 at 7:01                               CT Cervical Spine Without Contrast   Final Result       No acute cervical spine fracture is seen.  No acute subluxation abnormality.             Please note that portions of this note were completed with a voice recognition program.       PARVEZ PIERCE JR, MD          Electronically Signed and Approved By: PARVEZ PIERCE JR, MD on 5/31/2023 at 6:31                          CT Head Without Contrast   Final Result    No acute brain abnormality is seen. Specifically, no acute intracranial hemorrhage,    no acute infarct, no intracranial mass lesion, and no acute intracranial mass effect are    appreciated.  Please see above comments for  further detail.         Please note that portions of this note were completed with a voice recognition program.       PARVEZ PIERCE JR, MD          Electronically Signed and Approved By: PARVEZ PIERCE JR, MD on 5/31/2023 at 6:41                           XR Chest 1 View   Final Result   Addendum (preliminary) 1 of 1   ADDENDUM:        Upon further review, the prior median sternotomy is believed to have been    for resection of a thymic    origin tumor and not coronary artery bypass graft surgery.  Please    correlate clinically.             Please note that portions of this note were completed with a voice    recognition program.       PARVEZ PIERCE JR, MD          Electronically Signed and Approved By: PARVEZ PIERCE JR, MD on 5/31/2023    at 7:40                               Final     No acute infiltrate is appreciated.               Please note that portions of this note were completed with a voice recognition program.       PARVEZ PIERCE JR, MD          Electronically Signed and Approved By: PARVEZ PIERCE JR, MD on 5/31/2023 at 6:10                               CT Abdomen Pelvis With Contrast    (Results Pending)        Assessment & Plan       Severe anemia    Essential hypertension    Syncope and collapse       Plan:  I will plan to pursue upper endoscopy once the patient is more clinically stable.  Her RRT yesterday morning prompted a delay in her EGD scheduled for yesterday.  Her hemoglobin is again down slightly but there was no reported evidence of active GI bleeding.  I will tentatively plan for repeat upper endoscopy tomorrow if her clinical status improves or she will tolerate light sedation for the procedure.      I discussed the patients findings and my recommendations with patient.    Coy Patrick MD

## 2023-06-07 NOTE — PLAN OF CARE
Goal Outcome Evaluation:            Patient had EEG, tilt table and drinking contrast to have a CT.  MRI consent form is filled out and in chart.  VSS.  Patient NPO at midnight.  No acute changes this shift.  NO signs of distress noted at this time.

## 2023-06-07 NOTE — PROGRESS NOTES
[facility]     Progress Note    Patient Name: Carol Abarca  : 1970  MRN: [id number]  Primary Care Physician:  Sonia Mosquera APRN  Date of admission: 2023      Subjective   Brief summary.  Patient admitted with syncope and anemia.  Further transferred to PCU for recurrent syncope      HPI:  Patient awake alert today.  Daughter at bedside.  Had episodes of abdominal pain.  No nausea vomiting.  No chest pain or shortness of breath.  Patient CT scan of the abdomen still pending.  EGD planned for today but not done yet.  Possibly not on the schedule.    Review of Systems     No chest pain or shortness of breath, blood pressure fluctuating.  Abdominal pain persist.  No blood in the stools.      Objective     Vitals:   Temp:  [98 °F (36.7 °C)-98.6 °F (37 °C)] 98.4 °F (36.9 °C)  Heart Rate:  [72-90] 82  Resp:  [17-20] 17  BP: (121-156)/(80-95) 128/86  Flow (L/min):  [2] 2    Physical Exam :     Middle-age obese female not in acute distress, tired looking, oral mucosa dry.  Neck supple.  Heart regular.  Lungs diminished breath sounds.  Abdomen is obese and soft epigastric tenderness no guarding no rebound.  Extremities trace of edema.  Neurologically awake alert and oriented.      Result Review:  I have personally reviewed the results from the time of this admission to 2023 15:11 EDT and agree with these findings:  [x]  Laboratory  []  Microbiology  []  Radiology  []  EKG/Telemetry   []  Cardiology/Vascular   []  Pathology  []  Old records  []  Other:    CT pending    Lipase normal, troponin slightly high  Assessment / Plan       Active Hospital Problems:  Active Hospital Problems    Diagnosis     **Severe anemia     Abdominal pain     B12 deficiency     Syncope and collapse     Essential hypertension        Plan:   Hemoglobin stable  Lipase normal  Continue Pepcid  Abdominal pain persist  For GI evaluation, consulted gastro  CT pending, will be done post EGD.  Not sure whether EGD is already scheduled  patient is n.p.o. but Endo staff not aware.  Advised the nurse to call and make sure patient not on schedule.  If not on schedule restart feeds and proceed with CT abdomen.  Blood pressure slightly better.  Cardiologist on board.  Elevated troponins not significant per cardiologist Dr. Dr. Broderick.  Continue supportive care.  Discussed with RN.  Patient's daughter updated         DVT prophylaxis:  No DVT prophylaxis order currently exists.    CODE STATUS:   Level Of Support Discussed With: Patient  Code Status (Patient has no pulse and is not breathing): CPR (Attempt to Resuscitate)  Medical Interventions (Patient has pulse or is breathing): Full Support  Release to patient: Routine Release      .Total  time spent in patient care, approximately 36 minutes.  I personally saw and examined the patient. I have reviewed all diagnostic interpretations including labs and x-rays, also I have reviewed treatment plans as written. I was present for care of my patient, time includes patient management by me, time spent at the patients bedside/exam,  time to review lab and imaging results, discussing patient care with nursing staff, consultants and documentation in the medical record.  .    Electronically signed by Dung Hall MD, 06/07/23, 3:09 PM EDT.

## 2023-06-07 NOTE — SIGNIFICANT NOTE
06/07/23 1145   Coping/Psychosocial   Observed Emotional State calm;cooperative   Verbalized Emotional State hopefulness;relief   Trust Relationship/Rapport empathic listening provided   Involvement in Care interacting with patient   Additional Documentation Spiritual Care (Group)   Spiritual Care   Use of Spiritual Resources spirituality for coping, indicated strong use of   Spiritual Care Source  initiative   Spiritual Care Follow-Up follow-up, none required as presently assessed   Response to Spiritual Care receptive of support;engaged in conversation   Spiritual Care Interventions supportive conversation provided   Spiritual Care Visit Type initial   Receptivity to Spiritual Care visit welcomed

## 2023-06-07 NOTE — PLAN OF CARE
Goal Outcome Evaluation:           Progress: no change. Pt has been lethargic most of the shift. C/o chest pain, troponin levels had increased slightly with no change in EKG. Blood pressure improved. Wore home cpap unit majority of the night. Daughter at bedside. Brenda Walker RN

## 2023-06-07 NOTE — PROGRESS NOTES
[facility]   Cardiology Progress Note      Patient Name: Carol Abarca  : 1970  MRN: [id number]  Primary Care Physician:  Sonia Mosquera APRN  Referring Physician: No Known Provider  Date of admission: 2023    Subjective   Subjective     Chief Complaint: Syncope    HPI:  Carol Abarca is a 52 y.o. female with history of syncope and severe anemia.  Another episode of unresponsiveness yesterday.  No chest pain or shortness of breath      Objective    Objective     Vitals:       Current medications:  !Patient Home Medications Stored in Pharmacy, , Does not apply, BID  amLODIPine, 5 mg, Oral, Q24H  cetirizine, 10 mg, Oral, Daily  cloNIDine, 1 patch, Transdermal, Weekly  cyanocobalamin, 1,000 mcg, Intramuscular, Daily  DULoxetine, 60 mg, Oral, BID  famotidine, 20 mg, Intravenous, Q12H  folic acid, 5 mg, Oral, Daily  folic acid (FOLVITE) IVPB, 5 mg, Intravenous, Daily  gabapentin, 200 mg, Oral, Q12H  ipratropium-albuterol, 3 mL, Nebulization, Q4H - RT  lisinopril, 40 mg, Oral, Daily  metoprolol succinate XL, 50 mg, Oral, BID  montelukast, 10 mg, Oral, Nightly  nitroglycerin, 1 inch, Topical, Q6H  pravastatin, 40 mg, Oral, Daily  QUEtiapine XR, 600 mg, Oral, Q PM      Current IV drips:  dextrose 5 % and sodium chloride 0.9 % with KCl 20 mEq, 50 mL/hr, Last Rate: 50 mL/hr (23 0532)        Result Review    Result Review:  I have personally reviewed the results from the time of this admission to 2023 10:45 EDT and agree with these findings:  []  Laboratory  []  EKG/Telemetry   []  Cardiology/Vascular   []  Radiology         CBC          2023    07:58 2023    05:14 2023    05:03   CBC   WBC 8.46  6.01  4.79    RBC 2.61  2.73  2.48    Hemoglobin 8.8  9.0  8.2    Hematocrit 27.1  27.9  25.7    .8  102.2  103.6    MCH 33.7  33.0  33.1    MCHC 32.5  32.3  31.9    RDW 21.6  20.5  20.4    Platelets 162  195  206      CMP          2023    05:36 2023    05:14 2023    16:29  6/7/2023    05:03   CMP   Glucose 97  141  143  105    BUN 9  27   26    Creatinine 0.47  0.81   0.64    EGFR 114.7  87.5   106.5    Sodium 135  135  134.0  136    Potassium 4.2  4.5   4.5    Chloride 101  98   98    Calcium 8.6  8.7   8.6    Total Protein 6.9  7.6   6.9    Albumin 3.0  3.4   3.2    Globulin 3.9  4.2   3.7    Total Bilirubin 0.6  0.8   0.7    Alkaline Phosphatase 105  144   117    AST (SGOT) 18  28   19    ALT (SGPT) 10  18   15    Albumin/Globulin Ratio 0.8  0.8   0.9    BUN/Creatinine Ratio 19.1  33.3   40.6    Anion Gap 5.7  12.7   11.1      Results for orders placed during the hospital encounter of 05/31/23    Adult Transthoracic Echo Complete W/ Cont if Necessary Per Protocol    Interpretation Summary    Left ventricular ejection fraction appears to be 61 - 65%.    The left ventricular cavity is borderline dilated.    Left ventricular wall thickness is consistent with mild concentric hypertrophy.    Left ventricular diastolic function is consistent with (grade Ia w/high LAP) impaired relaxation.    Left atrial volume is mildly increased.    Estimated right ventricular systolic pressure from tricuspid regurgitation is mildly elevated (35-45 mmHg).        Lab Results   Component Value Date    PROBNP 93.2 05/31/2023         Telemetry reviewed shows sinus rhythm with no arrhythmias    Assessment / Plan     ASSESSMENT:    Severe anemia    Essential hypertension    Syncope and collapse  Slightly increased troponins secondary to above.  Obstructive sleep apnea      PLAN:  1.  Continue amlodipine and lisinopril for blood pressure control.  Continue clonidine.  Adjust dose depending upon blood pressure.  2.  Echocardiogram within normal limits.  3.  Neurological work-up for syncope including tilt table in progress.  4.  Lexiscan sestamibi stress test when she is improved.  5.  Continue CPAP  Electronically signed by Aiden Cedillo MD, 06/07/23, 10:45 AM EDT.

## 2023-06-07 NOTE — H&P (VIEW-ONLY)
[facility]  Initial Inpatient Consult Note    Referring Provider: Dr. Hall    Reason for Consultation: Anemia    Subjective     History of present illness:    52 y.o. female with a history of anemia hypertension pulmonary embolus COPD who was admitted on May 30 after an episode of syncope at home.  I was asked to see the patient because of her chronic anemia.  We will going to pursue upper endoscopy however yesterday morning she had an RRT and had to be transferred to floors and was subsequently canceled the procedure for the day.  She is awake and oriented but somewhat of a poor historian.  She denies any signs of overt GI bleeding.  She had a screening colonoscopy in 2022 by Dr. James showing a small polyp removed from the ascending colon.  She had a colonoscopy in  for rectal bleeding showed hemorrhoids.  She had an upper scope in 2019 showing evidence of prior gastric lap band, gastritis and esophagitis.    Past Medical History:  Past Medical History:   Diagnosis Date    Anemia     Arthritis     Diabetes     Essential hypertension 2021    History of pulmonary embolism     Lumbago     Low back pain    Mild left ventricular hypertrophy 2021    Mixed hyperlipidemia 2021    Obstructive sleep apnea     Reflux esophagitis     Seasonal allergies      Past Surgical History:  Past Surgical History:   Procedure Laterality Date    APPENDECTOMY  2010     SECTION      , , ,     COLONOSCOPY       2018    COLONOSCOPY N/A 2022    Procedure: COLONOSCOPY;  Surgeon: Radha James MD;  Location: [facility] ENDOSCOPY;  Service: Gastroenterology;  Laterality: N/A;  COLON POLYP     ENDOSCOPY  2019    HEMORRHOIDECTOMY N/A 2022    Procedure: HEMORRHOIDECTOMY;  Surgeon: Remi Reeder MD;  Location: [facility] MAIN OR;  Service: General;  Laterality: N/A;    HERNIA REPAIR      , 2006, 2007, 2008    HYSTERECTOMY  2004     LAPAROSCOPIC GASTRIC BANDING      OTHER SURGICAL HISTORY      Metal implants      Social History:   Social History     Tobacco Use    Smoking status: Every Day     Packs/day: 0.50     Types: Cigarettes    Smokeless tobacco: Never    Tobacco comments:     Smoked 11-20 years. last 7/24/22 1700   Substance Use Topics    Alcohol use: Yes     Comment: Occasionally drinks, less than 1 drink per day, has been drinking for less than 1 year      Family History:  Family History   Problem Relation Age of Onset    Heart disease Mother     Diabetes Mother         Unspecified type    Arthritis Mother     Heart disease Father     Diabetes Son         Unspecified type    Malig Hyperthermia Neg Hx        Home Meds:  Medications Prior to Admission   Medication Sig Dispense Refill Last Dose    albuterol sulfate  (90 Base) MCG/ACT inhaler Inhale 2 puffs Every 4 (Four) Hours As Needed for Wheezing. 8 g 0 5/30/2023    ALPRAZolam (XANAX) 1 MG tablet Take 1 tablet by mouth 2 (Two) Times a Day As Needed.   5/30/2023    aspirin 81 MG EC tablet Take 1 tablet by mouth Daily.   5/30/2023    cetirizine (zyrTEC) 10 MG tablet Daily.   5/30/2023    DULoxetine (CYMBALTA) 60 MG capsule Take 1 capsule by mouth 2 (Two) Times a Day.   5/30/2023    gabapentin (NEURONTIN) 600 MG tablet Take 1 tablet by mouth 2 (Two) Times a Day As Needed.   5/30/2023    glipizide (GLUCOTROL XL) 10 MG 24 hr tablet Take 1 tablet by mouth Daily.   5/30/2023    HYDROcodone-acetaminophen (NORCO)  MG per tablet Take 1 tablet by mouth Every 6 (Six) Hours As Needed for Moderate Pain.   5/30/2023    lisinopril (PRINIVIL,ZESTRIL) 40 MG tablet Take 1 tablet by mouth Daily.   5/30/2023    metoprolol succinate XL (TOPROL-XL) 25 MG 24 hr tablet Take 1 tablet by mouth 2 (Two) Times a Day.   5/30/2023    metoprolol succinate XL (TOPROL-XL) 50 MG 24 hr tablet Take 1 tablet by mouth 2 (Two) Times a Day.   5/30/2023    montelukast (SINGULAIR) 10 MG tablet Take 1 tablet by mouth  Every Night.   5/30/2023    pravastatin (PRAVACHOL) 40 MG tablet Take 1 tablet by mouth Daily.   5/30/2023    QUEtiapine XR (SEROquel XR) 300 MG 24 hr tablet Take 2 tablets by mouth Every Evening.   5/30/2023    Tore SoloStar 300 UNIT/ML solution pen-injector injection Inject 300 Units under the skin into the appropriate area as directed Every Night.   5/30/2023    zolpidem (AMBIEN) 10 MG tablet Take 1 tablet by mouth At Night As Needed.   5/30/2023    potassium chloride (K-DUR,KLOR-CON) 10 MEQ CR tablet Take 1 tablet by mouth 2 (Two) Times a Day.   More than a month     Current Meds:   !Patient Home Medications Stored in Pharmacy, , Does not apply, BID  amLODIPine, 5 mg, Oral, Q24H  cetirizine, 10 mg, Oral, Daily  cloNIDine, 1 patch, Transdermal, Weekly  cyanocobalamin, 1,000 mcg, Intramuscular, Daily  DULoxetine, 60 mg, Oral, BID  famotidine, 20 mg, Intravenous, Q12H  folic acid, 5 mg, Oral, Daily  folic acid (FOLVITE) IVPB, 5 mg, Intravenous, Daily  gabapentin, 200 mg, Oral, Q12H  ipratropium-albuterol, 3 mL, Nebulization, Q4H - RT  lisinopril, 40 mg, Oral, Daily  metoprolol succinate XL, 50 mg, Oral, BID  montelukast, 10 mg, Oral, Nightly  nitroglycerin, 1 inch, Topical, Q6H  pravastatin, 40 mg, Oral, Daily  QUEtiapine XR, 600 mg, Oral, Q PM      Allergies:  Allergies   Allergen Reactions    Tramadol Anaphylaxis    Tramadol Hcl Anaphylaxis    Moxifloxacin Hives    Sulfa Antibiotics Hives     Review of Systems  Pertinent items are noted in HPI, all other systems reviewed and negative         Vital Signs  Temp:  [98 °F (36.7 °C)-98.6 °F (37 °C)] 98.6 °F (37 °C)  Heart Rate:  [72-90] 88  Resp:  [14-24] 18  BP: (129-251)/() 129/85  Physical Exam:  General Appearance:    Alert, cooperative, in no acute distress   Head:    Normocephalic, without obvious abnormality, atraumatic   Eyes:          conjunctivae and sclerae normal, no   icterus   Throat:   no thrush, oral mucosa moist   Neck:   Supple, no adenopathy    Lungs:     Clear to auscultation bilaterally    Heart:    Regular rhythm and normal rate    Chest Wall:    No abnormalities observed   Abdomen:     Soft, nondistended, nontender; normal bowel sounds   Extremities:   no edema, no redness   Skin:   No bruising or rash   Psychiatric:  normal mood and insight     Results Review:  [x]  Laboratory   [x]  Radiology  []  Pathology      I reviewed the patient's new clinical results.    Results from last 7 days   Lab Units 06/07/23  0503 06/06/23  0514 06/05/23  0758   WBC 10*3/mm3 4.79 6.01 8.46   HEMOGLOBIN g/dL 8.2* 9.0* 8.8*   HEMATOCRIT % 25.7* 27.9* 27.1*   PLATELETS 10*3/mm3 206 195 162     Results from last 7 days   Lab Units 06/07/23  0503 06/06/23  1629 06/06/23  0514 06/04/23  0536   SODIUM mmol/L 136  --  135* 135*   SODIUM, ARTERIAL mmol/L  --  134.0*  --   --    POTASSIUM mmol/L 4.5  --  4.5 4.2   CHLORIDE mmol/L 98  --  98 101   CO2 mmol/L 26.9  --  24.3 28.3   BUN mg/dL 26*  --  27* 9   CREATININE mg/dL 0.64  --  0.81 0.47*   CALCIUM mg/dL 8.6  --  8.7 8.6   BILIRUBIN mg/dL 0.7  --  0.8 0.6   ALK PHOS U/L 117  --  144* 105   ALT (SGPT) U/L 15  --  18 10   AST (SGOT) U/L 19  --  28 18   GLUCOSE mg/dL 105*  --  141* 97   GLUCOSE, ARTERIAL mg/dL  --  143*  --   --          Lab Results   Lab Value Date/Time    LIPASE 10 (L) 06/06/2023 1653    LIPASE 17 05/31/2023 0509    LIPASE 23 02/05/2022 0617    LIPASE 14 05/19/2021 1300    LIPASE 37 10/05/2019 2320       Radiology:  CT Head Without Contrast   Final Result    No acute intracranial abnormality.  Stable bilateral orbital proptosis.  Chronic sinus    changes.                AYE PIÑA MD          Electronically Signed and Approved By: AYE PIÑA MD on 6/06/2023 at 11:21                           CT Chest With Contrast Diagnostic   Final Result       1. The study is limited.     2. No definite proximal (or central) pulmonary embolism is identified.     3. Evidence for pulmonary arterial hypertension is  noted.     4. There is mild-to-moderate cardiomegaly.     5. The maximum diameter of the ascending aorta is about 4 cm, which is at the upper limits of    normal.  Consider close interval clinical and imaging follow-up of this finding to ensure a benign    progression and to exclude an expanding or developing fusiform aneurysm of the thoracic aorta.     6. A small amount of pericardial effusion is seen.     7. The patient has undergone median sternotomy.     8. There is suspected hepatosplenomegaly.     9. Subsegmental atelectasis involves the lungs.  Acute infiltrate is thought to be less likely.     10. Grossly, no suspicious pulmonary nodules are appreciated.     11. There may be mild levoscoliosis of the upper thoracic spine.     12. There is a gastric band in place.     13. There is debris in the upper thoracic esophageal lumen, which may be related to esophageal    dysmotility, gastroesophageal reflux disease (GERD), and/or the gastric band, possibly being too    constrictive.   14. Please see above comments for further detail.               Please note that portions of this note were completed with a voice recognition program.       PARVEZ PIERCE JR, MD          Electronically Signed and Approved By: PARVEZ PIERCE JR, MD on 5/31/2023 at 7:01                               CT Cervical Spine Without Contrast   Final Result       No acute cervical spine fracture is seen.  No acute subluxation abnormality.             Please note that portions of this note were completed with a voice recognition program.       PARVEZ PIERCE JR, MD          Electronically Signed and Approved By: PARVEZ PIERCE JR, MD on 5/31/2023 at 6:31                          CT Head Without Contrast   Final Result    No acute brain abnormality is seen. Specifically, no acute intracranial hemorrhage,    no acute infarct, no intracranial mass lesion, and no acute intracranial mass effect are    appreciated.  Please see above comments for  further detail.         Please note that portions of this note were completed with a voice recognition program.       PARVEZ PIERCE JR, MD          Electronically Signed and Approved By: PARVEZ PIERCE JR, MD on 5/31/2023 at 6:41                           XR Chest 1 View   Final Result   Addendum (preliminary) 1 of 1   ADDENDUM:        Upon further review, the prior median sternotomy is believed to have been    for resection of a thymic    origin tumor and not coronary artery bypass graft surgery.  Please    correlate clinically.             Please note that portions of this note were completed with a voice    recognition program.       PARVEZ PIERCE JR, MD          Electronically Signed and Approved By: PARVEZ PIERCE JR, MD on 5/31/2023    at 7:40                               Final     No acute infiltrate is appreciated.               Please note that portions of this note were completed with a voice recognition program.       PARVEZ PIERCE JR, MD          Electronically Signed and Approved By: PARVEZ PIERCE JR, MD on 5/31/2023 at 6:10                               CT Abdomen Pelvis With Contrast    (Results Pending)        Assessment & Plan       Severe anemia    Essential hypertension    Syncope and collapse       Plan:  I will plan to pursue upper endoscopy once the patient is more clinically stable.  Her RRT yesterday morning prompted a delay in her EGD scheduled for yesterday.  Her hemoglobin is again down slightly but there was no reported evidence of active GI bleeding.  I will tentatively plan for repeat upper endoscopy tomorrow if her clinical status improves or she will tolerate light sedation for the procedure.      I discussed the patients findings and my recommendations with patient.    Coy Patrick MD

## 2023-06-07 NOTE — THERAPY EVALUATION
Acute Care - Physical Therapy Initial Evaluation   [place]     Patient Name: Carol Abarca  : 1970  MRN: [id number]  Today's Date: 2023   Admit date: 2023     Referring Physician: Dung Hall MD     Surgery Date:2023   Procedure(s) (LRB):  ESOPHAGOGASTRODUODENOSCOPY (N/A)        Visit Dx:     ICD-10-CM ICD-9-CM   1. Syncope and collapse  R55 780.2   2. Pancytopenia  D61.818 284.19   3. Difficulty walking  R26.2 719.7   4. Decreased activities of daily living (ADL)  Z78.9 V49.89   5. Difficulty in walking  R26.2 719.7     Patient Active Problem List   Diagnosis    Mixed hyperlipidemia    Essential hypertension    Mild left ventricular hypertrophy    Atypical chest pain    Obstructive sleep apnea    History of pulmonary embolism    Screening for colon cancer    Hemorrhoids    Syncope and collapse    Severe anemia     Past Medical History:   Diagnosis Date    Anemia     Arthritis     Diabetes     Essential hypertension 2021    History of pulmonary embolism     Lumbago     Low back pain    Mild left ventricular hypertrophy 2021    Mixed hyperlipidemia 2021    Obstructive sleep apnea     Reflux esophagitis     Seasonal allergies      Past Surgical History:   Procedure Laterality Date    APPENDECTOMY  2010     SECTION      , , ,     COLONOSCOPY      2019 2018    COLONOSCOPY N/A 2022    Procedure: COLONOSCOPY;  Surgeon: Radha James MD;  Location: [facility] ENDOSCOPY;  Service: Gastroenterology;  Laterality: N/A;  COLON POLYP     ENDOSCOPY  2019    HEMORRHOIDECTOMY N/A 2022    Procedure: HEMORRHOIDECTOMY;  Surgeon: Remi Reeder MD;  Location: [facility] MAIN OR;  Service: General;  Laterality: N/A;    HERNIA REPAIR      2005, 2006, 2007, 2008    HYSTERECTOMY  2004    LAPAROSCOPIC GASTRIC BANDING      OTHER SURGICAL HISTORY      Metal implants     PT Assessment (last 12 hours)       PT Evaluation and Treatment       Row Name 23 0908           Physical Therapy Time and Intention    Subjective Information complains of;weakness;fatigue;dizziness  -DESEAN     Document Type evaluation  -DESEAN     Mode of Treatment individual therapy;physical therapy  -DESEAN     Patient Effort good  -DESEAN     Symptoms Noted During/After Treatment dizziness;fatigue  -DESEAN       Row Name 06/07/23 0908          General Information    Patient Profile Reviewed yes  -DESEAN     Patient Observations alert;cooperative;agree to therapy  -DESEAN     Prior Level of Function independent:;all household mobility;community mobility;gait;transfer;bed mobility;ADL's;home management  -DESEAN     Equipment Currently Used at Home none  -DESEAN     Existing Precautions/Restrictions no known precautions/restrictions  -DESEAN     Barriers to Rehab none identified  -DESEAN       Row Name 06/07/23 0908          Living Environment    Current Living Arrangements home  -DESEAN     People in Home child(cory), adult  -DESEAN     Primary Care Provided by self  -DESEAN       Row Name 06/07/23 0908          Home Use of Assistive/Adaptive Equipment    Equipment Currently Used at Home none  -DESEAN       Row Name 06/07/23 0908          Range of Motion (ROM)    Range of Motion bilateral lower extremities;ROM is WFL  -DESEAN       Row Name 06/07/23 0908          Strength (Manual Muscle Testing)    Strength (Manual Muscle Testing) bilateral lower extremities  RLE: 4/5, LLE: 5/5 except knee flex 3+/5  -DESEAN       Row Name 06/07/23 0908          Bed Mobility    Bed Mobility supine-sit;sit-supine  -DESEAN     Supine-Sit [place] (Bed Mobility) standby assist  -DESEAN     Sit-Supine [place] (Bed Mobility) standby assist  -DESEAN     Assistive Device (Bed Mobility) bed rails  -DESEAN       Row Name 06/07/23 0908          Transfers    Transfers sit-stand transfer;stand-sit transfer  -DESEAN       Row Name 06/07/23 0908          Sit-Stand Transfer    Sit-Stand [place] (Transfers) standby assist  -DESEAN     Assistive Device (Sit-Stand Transfers) walker, front-wheeled  -DESEANFRANSICO Sorensen  Name 06/07/23 0908          Stand-Sit Transfer    Stand-Sit [place] (Transfers) standby assist  -DESEAN     Assistive Device (Stand-Sit Transfers) walker, front-wheeled  -DESEAN       Row Name 06/07/23 0908          Gait/Stairs (Locomotion)    Gait/Stairs Locomotion gait/ambulation assistive device  -DESEAN     [place] Level (Gait) contact guard  -DESEAN     Assistive Device (Gait) walker, front-wheeled  -DESEAN     Distance in Feet (Gait) 10  -DESEAN     Pattern (Gait) step-through  -DESEAN     Deviations/Abnormal Patterns (Gait) gait speed decreased  -DESEAN     Comment, (Gait/Stairs) Patient experienced 1x LOB with dizziness during right turn which was self corrected.  -DESEAN       Row Name 06/07/23 0908          Safety Issues, Functional Mobility    Impairments Affecting Function (Mobility) balance;endurance/activity tolerance  -DESEAN       Row Name 06/07/23 0908          Balance    Balance Assessment standing dynamic balance  -DESEAN     Dynamic Standing Balance contact guard  -DESEAN     Position/Device Used, Standing Balance supported;walker, front-wheeled  -DESEAN       Row Name 06/07/23 0908          Plan of Care Review    Plan of Care Reviewed With patient  -DESEAN     Outcome Evaluation The patient presented to physical therapy with a decrease in lower extremity strength and balance. Skilled physical therapy is indicated at this time to address these deficits. Recommend the patient return to home with outpatient physical therapy.  -DESEAN       Row Name 06/07/23 0908          Positioning and Restraints    Pre-Treatment Position in bed  -DESEAN     Post Treatment Position bed  -DESEAN       Row Name 06/07/23 0908          Therapy Assessment/Plan (PT)    Rehab Potential (PT) good, to achieve stated therapy goals  -DESEAN     Criteria for Skilled Interventions Met (PT) skilled treatment is necessary  -DESEAN     Therapy Frequency (PT) daily  -DESEAN     Predicted Duration of Therapy Intervention (PT) 10  -DESENA     Problem List (PT) balance;mobility;strength  -DESEAN     Activity  Limitations Related to Problem List (PT) unable to ambulate safely;unable to transfer safely  -DESEAN       Row Name 06/07/23 0908          Therapy Plan Review/Discharge Plan (PT)    Therapy Plan Review (PT) evaluation/treatment results reviewed;participants included;patient  -DESEAN       Row Name 06/07/23 0908          Physical Therapy Goals    Transfer Goal Selection (PT) transfer, PT goal 1  -DESEAN     Gait Training Goal Selection (PT) gait training, PT goal 1  -DESEAN       Row Name 06/07/23 0908          Transfer Goal 1 (PT)    Activity/Assistive Device (Transfer Goal 1, PT) sit-to-stand/stand-to-sit  -DESEAN     [place] Level/Cues Needed (Transfer Goal 1, PT) independent  -DESEAN     Time Frame (Transfer Goal 1, PT) long term goal (LTG);10 days  -DESEAN       Row Name 06/07/23 0908          Gait Training Goal 1 (PT)    Activity/Assistive Device (Gait Training Goal 1, PT) gait (walking locomotion)  -DESEAN     [place] Level (Gait Training Goal 1, PT) independent  -DESEAN     Distance (Gait Training Goal 1, PT) 100  -DESEAN     Time Frame (Gait Training Goal 1, PT) long term goal (LTG);10 days  -DESEAN               User Key  (r) = Recorded By, (t) = Taken By, (c) = Cosigned By      Initials Name Provider Type    Aravind Mathis PT Physical Therapist                    Physical Therapy Education       Title: PT OT SLP Therapies (Done)       Topic: Physical Therapy (Done)       Point: Mobility training (Done)       Learning Progress Summary             Patient Acceptance, E,TB, VU by DESEAN at 6/7/2023 0918                                         User Key       Initials Effective Dates Name Provider Type Discipline    DESEAN 06/03/21 -  rAavind Meade PT Physical Therapist PT                  PT Recommendation and Plan  Anticipated Discharge Disposition (PT): home with outpatient therapy services  Planned Therapy Interventions (PT): balance training, home exercise program, stair training, strengthening, transfer training, gait training  Therapy  Frequency (PT): daily  Plan of Care Reviewed With: patient  Outcome Evaluation: The patient presented to physical therapy with a decrease in lower extremity strength and balance. Skilled physical therapy is indicated at this time to address these deficits. Recommend the patient return to home with outpatient physical therapy.   Outcome Measures       Row Name 06/07/23 0900             How much help from another person do you currently need...    Turning from your back to your side while in flat bed without using bedrails? 4  -DESEAN      Moving from lying on back to sitting on the side of a flat bed without bedrails? 4  -DESEAN      Moving to and from a bed to a chair (including a wheelchair)? 3  -DESEAN      Standing up from a chair using your arms (e.g., wheelchair, bedside chair)? 3  -DESEAN      Climbing 3-5 steps with a railing? 3  -DESEAN      To walk in hospital room? 3  -DESEAN      AM-PAC 6 Clicks Score (PT) 20  -DESEAN                User Key  (r) = Recorded By, (t) = Taken By, (c) = Cosigned By      Initials Name Provider Type    Aravind Mathis PT Physical Therapist                     Time Calculation:    PT Charges       Row Name 06/07/23 0936 06/07/23 0908          Time Calculation    PT Received On -- 06/07/23  -DESEAN     PT Goal Re-Cert Due Date --  -DESEAN 06/16/23  -DESEAN        Untimed Charges    PT Eval/Re-eval Minutes -- 20  -DESEAN        Total Minutes    Untimed Charges Total Minutes -- 20  -DESEAN      Total Minutes -- 20  -DESEAN               User Key  (r) = Recorded By, (t) = Taken By, (c) = Cosigned By      Initials Name Provider Type    Aravind Mathis PT Physical Therapist                  Therapy Charges for Today       Code Description Service Date Service Provider Modifiers Qty    [id number] HC PT EVAL LOW COMPLEXITY 2 6/7/2023 Aravind Meade PT GP 1            PT G-Codes  Outcome Measure Options: AM-PAC 6 Clicks Daily Activity (OT), Optimal Instrument  AM-PAC 6 Clicks Score (PT): 20  AM-PAC 6 Clicks Score (OT):  24    Aravind Meade, PT  6/7/2023

## 2023-06-08 ENCOUNTER — ANESTHESIA (OUTPATIENT)
Dept: GASTROENTEROLOGY | Facility: HOSPITAL | Age: 53
End: 2023-06-08
Payer: MEDICARE

## 2023-06-08 ENCOUNTER — ANESTHESIA EVENT (OUTPATIENT)
Dept: GASTROENTEROLOGY | Facility: HOSPITAL | Age: 53
End: 2023-06-08
Payer: MEDICARE

## 2023-06-08 LAB — QT INTERVAL: 476 MS

## 2023-06-08 PROCEDURE — 25010000002 LEVETIRACETAM IN NACL 0.82% 500 MG/100ML SOLUTION: Performed by: PSYCHIATRY & NEUROLOGY

## 2023-06-08 PROCEDURE — 88305 TISSUE EXAM BY PATHOLOGIST: CPT | Performed by: INTERNAL MEDICINE

## 2023-06-08 PROCEDURE — 94799 UNLISTED PULMONARY SVC/PX: CPT

## 2023-06-08 PROCEDURE — 25010000002 ENOXAPARIN PER 10 MG: Performed by: INTERNAL MEDICINE

## 2023-06-08 PROCEDURE — 43239 EGD BIOPSY SINGLE/MULTIPLE: CPT | Performed by: INTERNAL MEDICINE

## 2023-06-08 PROCEDURE — 25010000002 PROPOFOL 10 MG/ML EMULSION: Performed by: NURSE ANESTHETIST, CERTIFIED REGISTERED

## 2023-06-08 PROCEDURE — 25010000002 HYDROMORPHONE 1 MG/ML SOLUTION: Performed by: INTERNAL MEDICINE

## 2023-06-08 PROCEDURE — 94761 N-INVAS EAR/PLS OXIMETRY MLT: CPT

## 2023-06-08 PROCEDURE — 94664 DEMO&/EVAL PT USE INHALER: CPT

## 2023-06-08 PROCEDURE — 99231 SBSQ HOSP IP/OBS SF/LOW 25: CPT | Performed by: SPECIALIST

## 2023-06-08 RX ORDER — ENOXAPARIN SODIUM 100 MG/ML
40 INJECTION SUBCUTANEOUS EVERY 24 HOURS
Status: DISCONTINUED | OUTPATIENT
Start: 2023-06-08 | End: 2023-06-15 | Stop reason: HOSPADM

## 2023-06-08 RX ORDER — SODIUM CHLORIDE, SODIUM LACTATE, POTASSIUM CHLORIDE, CALCIUM CHLORIDE 600; 310; 30; 20 MG/100ML; MG/100ML; MG/100ML; MG/100ML
30 INJECTION, SOLUTION INTRAVENOUS CONTINUOUS
Status: DISCONTINUED | OUTPATIENT
Start: 2023-06-08 | End: 2023-06-15 | Stop reason: HOSPADM

## 2023-06-08 RX ORDER — HYDROCODONE BITARTRATE AND ACETAMINOPHEN 10; 325 MG/1; MG/1
1 TABLET ORAL EVERY 6 HOURS PRN
Status: DISCONTINUED | OUTPATIENT
Start: 2023-06-08 | End: 2023-06-15 | Stop reason: HOSPADM

## 2023-06-08 RX ORDER — PROPOFOL 10 MG/ML
VIAL (ML) INTRAVENOUS AS NEEDED
Status: DISCONTINUED | OUTPATIENT
Start: 2023-06-08 | End: 2023-06-08 | Stop reason: SURG

## 2023-06-08 RX ORDER — LIDOCAINE HYDROCHLORIDE 20 MG/ML
INJECTION, SOLUTION EPIDURAL; INFILTRATION; INTRACAUDAL; PERINEURAL AS NEEDED
Status: DISCONTINUED | OUTPATIENT
Start: 2023-06-08 | End: 2023-06-08 | Stop reason: SURG

## 2023-06-08 RX ADMIN — LEVETIRACETAM 500 MG: 5 INJECTION, SOLUTION INTRAVENOUS at 08:39

## 2023-06-08 RX ADMIN — HYDROCODONE BITARTRATE AND ACETAMINOPHEN 1 TABLET: 10; 325 TABLET ORAL at 20:41

## 2023-06-08 RX ADMIN — QUETIAPINE FUMARATE 600 MG: 50 TABLET, EXTENDED RELEASE ORAL at 20:41

## 2023-06-08 RX ADMIN — HYDROMORPHONE HYDROCHLORIDE 0.25 MG: 1 INJECTION, SOLUTION INTRAMUSCULAR; INTRAVENOUS; SUBCUTANEOUS at 13:19

## 2023-06-08 RX ADMIN — IPRATROPIUM BROMIDE AND ALBUTEROL SULFATE 3 ML: .5; 3 SOLUTION RESPIRATORY (INHALATION) at 18:17

## 2023-06-08 RX ADMIN — PROPOFOL 175 MCG/KG/MIN: 10 INJECTION, EMULSION INTRAVENOUS at 09:52

## 2023-06-08 RX ADMIN — CETIRIZINE HYDROCHLORIDE 10 MG: 10 TABLET, FILM COATED ORAL at 08:39

## 2023-06-08 RX ADMIN — FAMOTIDINE 20 MG: 10 INJECTION INTRAVENOUS at 08:39

## 2023-06-08 RX ADMIN — IPRATROPIUM BROMIDE AND ALBUTEROL SULFATE 3 ML: .5; 3 SOLUTION RESPIRATORY (INHALATION) at 23:38

## 2023-06-08 RX ADMIN — DULOXETINE HYDROCHLORIDE 60 MG: 30 CAPSULE, DELAYED RELEASE ORAL at 08:39

## 2023-06-08 RX ADMIN — NITROGLYCERIN 1 INCH: 20 OINTMENT TOPICAL at 05:18

## 2023-06-08 RX ADMIN — FAMOTIDINE 20 MG: 10 INJECTION INTRAVENOUS at 20:40

## 2023-06-08 RX ADMIN — IPRATROPIUM BROMIDE AND ALBUTEROL SULFATE 3 ML: .5; 3 SOLUTION RESPIRATORY (INHALATION) at 12:27

## 2023-06-08 RX ADMIN — POTASSIUM CHLORIDE, DEXTROSE MONOHYDRATE AND SODIUM CHLORIDE 50 ML/HR: 150; 5; 900 INJECTION, SOLUTION INTRAVENOUS at 04:04

## 2023-06-08 RX ADMIN — METOPROLOL SUCCINATE 50 MG: 50 TABLET, EXTENDED RELEASE ORAL at 20:41

## 2023-06-08 RX ADMIN — IPRATROPIUM BROMIDE AND ALBUTEROL SULFATE 3 ML: .5; 3 SOLUTION RESPIRATORY (INHALATION) at 03:32

## 2023-06-08 RX ADMIN — METOPROLOL SUCCINATE 50 MG: 50 TABLET, EXTENDED RELEASE ORAL at 08:39

## 2023-06-08 RX ADMIN — NITROGLYCERIN 1 INCH: 20 OINTMENT TOPICAL at 13:19

## 2023-06-08 RX ADMIN — ENOXAPARIN SODIUM 40 MG: 100 INJECTION SUBCUTANEOUS at 20:40

## 2023-06-08 RX ADMIN — AMLODIPINE BESYLATE 5 MG: 5 TABLET ORAL at 08:39

## 2023-06-08 RX ADMIN — MONTELUKAST 10 MG: 10 TABLET, FILM COATED ORAL at 21:17

## 2023-06-08 RX ADMIN — LIDOCAINE HYDROCHLORIDE 50 MG: 20 INJECTION, SOLUTION EPIDURAL; INFILTRATION; INTRACAUDAL; PERINEURAL at 09:52

## 2023-06-08 RX ADMIN — IPRATROPIUM BROMIDE AND ALBUTEROL SULFATE 3 ML: .5; 3 SOLUTION RESPIRATORY (INHALATION) at 07:17

## 2023-06-08 RX ADMIN — LEVETIRACETAM 500 MG: 5 INJECTION, SOLUTION INTRAVENOUS at 20:40

## 2023-06-08 RX ADMIN — PROPOFOL 100 MG: 10 INJECTION, EMULSION INTRAVENOUS at 09:52

## 2023-06-08 RX ADMIN — GABAPENTIN 200 MG: 100 CAPSULE ORAL at 08:39

## 2023-06-08 RX ADMIN — GABAPENTIN 200 MG: 100 CAPSULE ORAL at 20:41

## 2023-06-08 RX ADMIN — SODIUM CHLORIDE, POTASSIUM CHLORIDE, SODIUM LACTATE AND CALCIUM CHLORIDE: 600; 310; 30; 20 INJECTION, SOLUTION INTRAVENOUS at 09:50

## 2023-06-08 RX ADMIN — PRAVASTATIN SODIUM 40 MG: 40 TABLET ORAL at 08:39

## 2023-06-08 RX ADMIN — IPRATROPIUM BROMIDE AND ALBUTEROL SULFATE 3 ML: .5; 3 SOLUTION RESPIRATORY (INHALATION) at 15:09

## 2023-06-08 RX ADMIN — NITROGLYCERIN 1 INCH: 20 OINTMENT TOPICAL at 17:54

## 2023-06-08 RX ADMIN — IPRATROPIUM BROMIDE AND ALBUTEROL SULFATE 3 ML: .5; 3 SOLUTION RESPIRATORY (INHALATION) at 00:10

## 2023-06-08 RX ADMIN — DULOXETINE HYDROCHLORIDE 60 MG: 30 CAPSULE, DELAYED RELEASE ORAL at 20:41

## 2023-06-08 NOTE — PLAN OF CARE
Goal Outcome Evaluation:           Progress: improving. Pt more alert and interactive than previous. Went for MRI and CT scans. Medicated for pain x1. Brenda Walker RN

## 2023-06-08 NOTE — ANESTHESIA POSTPROCEDURE EVALUATION
Patient: Carol Abarca    Procedure Summary       Date: 06/08/23 Room / Location: [facility] ENDOSCOPY 2 / [facility] ENDOSCOPY    Anesthesia Start: 0950 Anesthesia Stop: 1023    Procedure: ESOPHAGOGASTRODUODENOSCOPY WITH BIPOSIES Diagnosis:       Severe anemia      (Severe anemia [D64.9])    Surgeons: Coy Patrick MD Provider: Reyes, Mirabelle, DO    Anesthesia Type: general ASA Status: 3            Anesthesia Type: general    Vitals  Vitals Value Taken Time   /105 06/08/23 1051   Temp 37.2 °C (98.9 °F) 06/08/23 1045   Pulse 73 06/08/23 1054   Resp 18 06/08/23 1045   SpO2 97 % 06/08/23 1054   Vitals shown include unvalidated device data.        Post Anesthesia Care and Evaluation    Patient location during evaluation: bedside  Patient participation: complete - patient participated  Level of consciousness: awake  Pain management: adequate    Airway patency: patent  PONV Status: none  Cardiovascular status: acceptable and stable  Respiratory status: acceptable  Hydration status: acceptable    Comments: An Anesthesiologist personally participated in the most demanding procedures (including induction and emergence if applicable) in the anesthesia plan, monitored the course of anesthesia administration at frequent intervals and remained physically present and available for immediate diagnosis and treatment of emergencies.

## 2023-06-08 NOTE — ANESTHESIA PREPROCEDURE EVALUATION
Anesthesia Evaluation     Patient summary reviewed and Nursing notes reviewed   no history of anesthetic complications:   NPO Solid Status: > 8 hours  NPO Liquid Status: > 2 hours           Airway   Mallampati: II  TM distance: >3 FB  Neck ROM: full  No difficulty expected  Comment: Prominent upper front teeth  Dental      Pulmonary - normal exam    breath sounds clear to auscultation  (+) a smoker Current Abstained day of surgery,sleep apnea on CPAP  Cardiovascular - normal exam  Exercise tolerance: good (4-7 METS)    Patient on routine beta blocker and Beta blocker not taken-may be given intraoperatively  Rhythm: regular  Rate: normal    (+) hypertension well controlled 2 medications or greaterangina, hyperlipidemia      Neuro/Psych  (+) syncope  GI/Hepatic/Renal/Endo    (+) obesity, morbid obesity, GI bleeding active bleeding, diabetes mellitus type 2, thyroid problem hypothyroidism    ROS Comment: Hx thymoma 20 years ago s/p resection and stable    Musculoskeletal     Abdominal    Substance History - negative use     OB/GYN negative ob/gyn ROS         Other   arthritis, blood dyscrasia anemia,     ROS/Med Hx Other: PAT Nursing Notes unavailable.                   Anesthesia Plan    ASA 3     general     (Patient understands anesthesia not responsible for dental damage.)  intravenous induction     Anesthetic plan, risks, benefits, and alternatives have been provided, discussed and informed consent has been obtained with: patient.  Pre-procedure education provided  Use of blood products discussed with patient  Consented to blood products.    Plan discussed with CRNA.      CODE STATUS:    Level Of Support Discussed With: Patient  Code Status (Patient has no pulse and is not breathing): CPR (Attempt to Resuscitate)  Medical Interventions (Patient has pulse or is breathing): Full Support  Release to patient: Routine Release

## 2023-06-08 NOTE — PROGRESS NOTES
[facility]   Cardiology Progress Note      Patient Name: Carol Abarca  : 1970  MRN: [id number]  Primary Care Physician:  Sonia Mosquera APRN  Referring Physician: No Known Provider  Date of admission: 2023    Subjective   Subjective     Chief Complaint: Anemia/syncope    HPI:  Carol Abarca is a 52 y.o. female admitted with severe anemia and nonspecific syncope.    Objective    Objective     Vitals:   Vitals:    23 0332 23 0719 23 0735 23 0855   BP:   (!) 145/103 (!) 161/104   BP Location:   Left arm Left arm   Patient Position:   Lying Lying   Pulse:  82 79 82   Resp: 18 18 18 20   Temp:   98.8 °F (37.1 °C) 96.9 °F (36.1 °C)   TempSrc:   Oral Temporal   SpO2: 93% 97% 94% 98%   Weight:       Height:                    Current medications:  !Patient Home Medications Stored in Pharmacy, , Does not apply, BID  amLODIPine, 5 mg, Oral, Q24H  cetirizine, 10 mg, Oral, Daily  cloNIDine, 1 patch, Transdermal, Weekly  cyanocobalamin, 1,000 mcg, Intramuscular, Daily  DULoxetine, 60 mg, Oral, BID  famotidine, 20 mg, Intravenous, Q12H  folic acid, 5 mg, Oral, Daily  folic acid (FOLVITE) IVPB, 5 mg, Intravenous, Daily  gabapentin, 200 mg, Oral, Q12H  ipratropium-albuterol, 3 mL, Nebulization, Q4H - RT  levETIRAcetam, 500 mg, Intravenous, Q12H  lisinopril, 40 mg, Oral, Daily  metoprolol succinate XL, 50 mg, Oral, BID  montelukast, 10 mg, Oral, Nightly  nitroglycerin, 1 inch, Topical, Q6H  pravastatin, 40 mg, Oral, Daily  QUEtiapine XR, 600 mg, Oral, Q PM      Current IV drips:  dextrose 5 % and sodium chloride 0.9 % with KCl 20 mEq, 50 mL/hr, Last Rate: 50 mL/hr (23 0404)        Result Review    Result Review:  I have personally reviewed the results from the time of this admission to 2023 09:04 EDT and agree with these findings:  []  Laboratory  []  EKG/Telemetry   []  Cardiology/Vascular   []  Radiology         CBC          2023    07:58 2023    05:14 2023    05:03    CBC   WBC 8.46  6.01  4.79    RBC 2.61  2.73  2.48    Hemoglobin 8.8  9.0  8.2    Hematocrit 27.1  27.9  25.7    .8  102.2  103.6    MCH 33.7  33.0  33.1    MCHC 32.5  32.3  31.9    RDW 21.6  20.5  20.4    Platelets 162  195  206      CMP          6/4/2023    05:36 6/6/2023    05:14 6/6/2023    16:29 6/7/2023    05:03   CMP   Glucose 97  141  143  105    BUN 9  27   26    Creatinine 0.47  0.81   0.64    EGFR 114.7  87.5   106.5    Sodium 135  135  134.0  136    Potassium 4.2  4.5   4.5    Chloride 101  98   98    Calcium 8.6  8.7   8.6    Total Protein 6.9  7.6   6.9    Albumin 3.0  3.4   3.2    Globulin 3.9  4.2   3.7    Total Bilirubin 0.6  0.8   0.7    Alkaline Phosphatase 105  144   117    AST (SGOT) 18  28   19    ALT (SGPT) 10  18   15    Albumin/Globulin Ratio 0.8  0.8   0.9    BUN/Creatinine Ratio 19.1  33.3   40.6    Anion Gap 5.7  12.7   11.1      Results for orders placed during the hospital encounter of 05/31/23    Adult Transthoracic Echo Complete W/ Cont if Necessary Per Protocol    Interpretation Summary    Left ventricular ejection fraction appears to be 61 - 65%.    The left ventricular cavity is borderline dilated.    Left ventricular wall thickness is consistent with mild concentric hypertrophy.    Left ventricular diastolic function is consistent with (grade Ia w/high LAP) impaired relaxation.    Left atrial volume is mildly increased.    Estimated right ventricular systolic pressure from tricuspid regurgitation is mildly elevated (35-45 mmHg).        Lab Results   Component Value Date    PROBNP 93.2 05/31/2023         Telemetry reviewed shows sinus rhythm    Assessment / Plan     ASSESSMENT:    Severe anemia    Essential hypertension    Syncope and collapse    Abdominal pain    B12 deficiency        PLAN:  1.  Syncope work-up including tilt table negative so far.  2.  Continue current medications for hypertension adjust dosage.  3.  GI work-up for anemia in progress  4.  We will sign  off and see as needed.    Electronically signed by Aiden Cedillo MD, 06/08/23, 9:04 AM EDT.

## 2023-06-08 NOTE — PLAN OF CARE
Goal Outcome Evaluation:         Patient went down for EGD today.  VSS.  No acute changes this shift.  NO signs of distress noted at this time.

## 2023-06-08 NOTE — PROGRESS NOTES
[facility]     Progress Note    Patient Name: Carol Abarca  : 1970  MRN: [id number]  Primary Care Physician:  Sonia Mosquera APRN  Date of admission: 2023      Subjective   Brief summary.  Patient admitted with syncope and anemia.  Further transferred to PCU for recurrent syncope      HPI:  Patient awake alert today.   Lethargic and weak  Seen and examined earlier before endoscopy.  Endoscopy results reviewed.  Continues to complain of abdominal pain    Review of Systems     No shortness of breath  No chest pain  Abdominal discomfort      Objective     Vitals:   Temp:  [96.9 °F (36.1 °C)-99.1 °F (37.3 °C)] 99 °F (37.2 °C)  Heart Rate:  [73-89] 79  Resp:  [16-21] 16  BP: (100-164)/() 120/76  Flow (L/min):  [2-4] 3    Physical Exam :     Middle-age obese female not in acute distress, tired looking, oral mucosa dry.  Neck supple.  Heart regular.  Lungs diminished breath sounds.  Abdomen is obese and soft epigastric tenderness no guarding no rebound.  Extremities trace of edema.  Neurologically awake alert and oriented.      Result Review:  I have personally reviewed the results from the time of this admission to 2023 19:13 EDT and agree with these findings:  [x]  Laboratory  []  Microbiology  []  Radiology  []  EKG/Telemetry   []  Cardiology/Vascular   []  Pathology  []  Old records  []  Other:    CT pending      Assessment / Plan       Active Hospital Problems:  Active Hospital Problems    Diagnosis     **Severe anemia     Abdominal pain     B12 deficiency     Syncope and collapse     Essential hypertension        Plan:   EGD reviewed.  CT without any acute findings.  Patient on chronic pain syndrome.  Pain out of proportion.  At this point will continue home pain meds.  Dilaudid discontinued.  Increase activity.  Monitor labs  Time spent 28 minutes       DVT prophylaxis:  No DVT prophylaxis order currently exists.    CODE STATUS:   Level Of Support Discussed With: Patient  Code Status  (Patient has no pulse and is not breathing): CPR (Attempt to Resuscitate)  Medical Interventions (Patient has pulse or is breathing): Full Support  Release to patient: Routine Release    .      Electronically signed by Dung Hall MD, 06/08/23, 7:14 PM EDT.

## 2023-06-08 NOTE — CONSULTS
"Nutrition Services    Patient Name: Carol Abarca  YOB: 1970  MRN: [id number]  Admission date: 5/31/2023      CLINICAL NUTRITION ASSESSMENT      Reason for Assessment  LOS     H&P:    Past Medical History:   Diagnosis Date    Anemia     Arthritis     Diabetes     Essential hypertension 06/24/2021    History of pulmonary embolism     Lumbago     Low back pain    Mild left ventricular hypertrophy 06/24/2021    Mixed hyperlipidemia 06/24/2021    Obstructive sleep apnea     Reflux esophagitis     Seasonal allergies         Current Problems:   Active Hospital Problems    Diagnosis     **Severe anemia     Abdominal pain     B12 deficiency     Syncope and collapse     Essential hypertension         Nutrition/Diet History         Narrative     Pt followed by RD for LOS. Pt is at low risk per nutrition risk screening. No acute nutrition concerns or interventions at this time. RD will continue to follow and monitor per protocol.     Anthropometrics        Current Height, Weight Height: 157.5 cm (62\")  Weight: 97.8 kg (215 lb 9.8 oz)   Current BMI Body mass index is 39.44 kg/m².       Weight Hx  Wt Readings from Last 30 Encounters:   06/02/23 0101 97.8 kg (215 lb 9.8 oz)   12/18/22 1541 97.8 kg (215 lb 11.2 oz)   11/04/22 1123 96.4 kg (212 lb 8.4 oz)   11/03/22 0901 102 kg (225 lb)   08/16/22 1252 102 kg (224 lb 3.2 oz)   07/25/22 1006 103 kg (227 lb 1.2 oz)   07/14/22 1125 103 kg (226 lb)   02/05/22 0612 99.2 kg (218 lb 11.1 oz)   07/03/19 0000 99 kg (218 lb 4 oz)   06/17/19 0000 99.1 kg (218 lb 8 oz)   03/28/19 0000 103 kg (226 lb 4 oz)   01/02/19 0000 103 kg (228 lb)   04/30/18 0000 102 kg (224 lb)   03/13/18 0000 100 kg (221 lb 2 oz)            Wt Change Observation stable     Estimated/Assessed Needs       Energy Requirements 30 kcal/kg adj BW (62 kg)   EST Needs (kcal/day) 1860 kcal        Protein Requirements 1.0 g/kg adj BW   EST Daily Needs (g/day) 62 g       Fluid Requirements 30 ml/kg adj BW    " Estimated Needs (mL/day) 1860 ml      Labs/Medications         Pertinent Labs Reviewed.   Results from last 7 days   Lab Units 06/07/23  0503 06/06/23  1629 06/06/23  0514 06/04/23  0536   SODIUM mmol/L 136  --  135* 135*   SODIUM, ARTERIAL mmol/L  --  134.0*  --   --    POTASSIUM mmol/L 4.5  --  4.5 4.2   CHLORIDE mmol/L 98  --  98 101   CO2 mmol/L 26.9  --  24.3 28.3   BUN mg/dL 26*  --  27* 9   CREATININE mg/dL 0.64  --  0.81 0.47*   CALCIUM mg/dL 8.6  --  8.7 8.6   BILIRUBIN mg/dL 0.7  --  0.8 0.6   ALK PHOS U/L 117  --  144* 105   ALT (SGPT) U/L 15  --  18 10   AST (SGOT) U/L 19  --  28 18   GLUCOSE mg/dL 105*  --  141* 97   GLUCOSE, ARTERIAL mg/dL  --  143*  --   --      Results from last 7 days   Lab Units 06/07/23  0503   HEMOGLOBIN g/dL 8.2*   HEMATOCRIT % 25.7*     Coronavirus (COVID-19)   Date Value Ref Range Status   03/06/2021 NOT DETECTED NA Final     Comment:     The SARS-CoV-2 assay is a real-time, RT-PCR test intended  for the qualitative detection of nucleic acid from the  SARS-CoV-2 in respiratory specimens from individuals,  testing performed at [facility].       Lab Results   Component Value Date    HGBA1C 5.40 06/02/2023         Pertinent Medications Reviewed.     Current Nutrition Orders & Evaluation of Intake       Oral Nutrition     Current PO Diet Diet: Regular/House Diet; Texture: Regular Texture (IDDSI 7); Fluid Consistency: Thin (IDDSI 0)   Supplement No active supplement orders       Malnutrition Severity Assessment                Nutrition Diagnosis         Nutrition Dx Problem 1 No nutrition diagnosis at this time.       Nutrition Intervention         No intervention indicated.      Medical Nutrition Therapy/Nutrition Education          Learner     Readiness N/A  N/A     Method     Response N/A  N/A     Monitor/Evaluation        Monitor Per protocol.       Nutrition Discharge Plan         No nutrition needs identified at this time.       Electronically signed by:  Radha  hCilo, ELEAZAR  06/08/23 13:35 EDT

## 2023-06-09 PROBLEM — I95.9 HYPOTENSION: Status: ACTIVE | Noted: 2023-01-01

## 2023-06-09 PROBLEM — I95.9 HYPOTENSION: Status: ACTIVE | Noted: 2023-06-09

## 2023-06-09 LAB
ALBUMIN SERPL-MCNC: 3.1 G/DL (ref 3.5–5.2)
ALBUMIN/GLOB SERPL: 0.8 G/DL
ALP SERPL-CCNC: 95 U/L (ref 39–117)
ALT SERPL W P-5'-P-CCNC: 11 U/L (ref 1–33)
ANION GAP SERPL CALCULATED.3IONS-SCNC: 6.9 MMOL/L (ref 5–15)
AST SERPL-CCNC: 17 U/L (ref 1–32)
BASOPHILS # BLD AUTO: 0.01 10*3/MM3 (ref 0–0.2)
BASOPHILS NFR BLD AUTO: 0.2 % (ref 0–1.5)
BILIRUB SERPL-MCNC: 0.7 MG/DL (ref 0–1.2)
BUN SERPL-MCNC: 12 MG/DL (ref 6–20)
BUN/CREAT SERPL: 21.4 (ref 7–25)
CALCIUM SPEC-SCNC: 8.5 MG/DL (ref 8.6–10.5)
CHLORIDE SERPL-SCNC: 104 MMOL/L (ref 98–107)
CO2 SERPL-SCNC: 28.1 MMOL/L (ref 22–29)
CREAT SERPL-MCNC: 0.56 MG/DL (ref 0.57–1)
CYTO UR: NORMAL
DEPRECATED RDW RBC AUTO: 77.2 FL (ref 37–54)
EGFRCR SERPLBLD CKD-EPI 2021: 110 ML/MIN/1.73
EOSINOPHIL # BLD AUTO: 0.01 10*3/MM3 (ref 0–0.4)
EOSINOPHIL NFR BLD AUTO: 0.2 % (ref 0.3–6.2)
ERYTHROCYTE [DISTWIDTH] IN BLOOD BY AUTOMATED COUNT: 19.9 % (ref 12.3–15.4)
GLOBULIN UR ELPH-MCNC: 3.7 GM/DL
GLUCOSE BLDC GLUCOMTR-MCNC: 166 MG/DL (ref 70–99)
GLUCOSE SERPL-MCNC: 98 MG/DL (ref 65–99)
HCT VFR BLD AUTO: 25.4 % (ref 34–46.6)
HGB BLD-MCNC: 8 G/DL (ref 12–15.9)
IMM GRANULOCYTES # BLD AUTO: 0.02 10*3/MM3 (ref 0–0.05)
IMM GRANULOCYTES NFR BLD AUTO: 0.4 % (ref 0–0.5)
LAB AP CASE REPORT: NORMAL
LAB AP CLINICAL INFORMATION: NORMAL
LYMPHOCYTES # BLD AUTO: 0.89 10*3/MM3 (ref 0.7–3.1)
LYMPHOCYTES NFR BLD AUTO: 19.6 % (ref 19.6–45.3)
MCH RBC QN AUTO: 32.9 PG (ref 26.6–33)
MCHC RBC AUTO-ENTMCNC: 31.5 G/DL (ref 31.5–35.7)
MCV RBC AUTO: 104.5 FL (ref 79–97)
MONOCYTES # BLD AUTO: 0.47 10*3/MM3 (ref 0.1–0.9)
MONOCYTES NFR BLD AUTO: 10.3 % (ref 5–12)
NEUTROPHILS NFR BLD AUTO: 3.15 10*3/MM3 (ref 1.7–7)
NEUTROPHILS NFR BLD AUTO: 69.3 % (ref 42.7–76)
NRBC BLD AUTO-RTO: 0 /100 WBC (ref 0–0.2)
PATH REPORT.FINAL DX SPEC: NORMAL
PATH REPORT.GROSS SPEC: NORMAL
PLATELET # BLD AUTO: 207 10*3/MM3 (ref 140–450)
PMV BLD AUTO: 9.9 FL (ref 6–12)
POTASSIUM SERPL-SCNC: 4.5 MMOL/L (ref 3.5–5.2)
PROT SERPL-MCNC: 6.8 G/DL (ref 6–8.5)
RBC # BLD AUTO: 2.43 10*6/MM3 (ref 3.77–5.28)
SODIUM SERPL-SCNC: 139 MMOL/L (ref 136–145)
T4 FREE SERPL-MCNC: 0.96 NG/DL (ref 0.93–1.7)
TSH SERPL DL<=0.05 MIU/L-ACNC: 2.05 UIU/ML (ref 0.27–4.2)
WBC NRBC COR # BLD: 4.55 10*3/MM3 (ref 3.4–10.8)

## 2023-06-09 PROCEDURE — 25010000002 ENOXAPARIN PER 10 MG: Performed by: INTERNAL MEDICINE

## 2023-06-09 PROCEDURE — 84443 ASSAY THYROID STIM HORMONE: CPT | Performed by: INTERNAL MEDICINE

## 2023-06-09 PROCEDURE — 80053 COMPREHEN METABOLIC PANEL: CPT | Performed by: INTERNAL MEDICINE

## 2023-06-09 PROCEDURE — 94664 DEMO&/EVAL PT USE INHALER: CPT

## 2023-06-09 PROCEDURE — 97116 GAIT TRAINING THERAPY: CPT

## 2023-06-09 PROCEDURE — 84439 ASSAY OF FREE THYROXINE: CPT | Performed by: INTERNAL MEDICINE

## 2023-06-09 PROCEDURE — 25010000002 LEVETIRACETAM IN NACL 0.82% 500 MG/100ML SOLUTION: Performed by: PSYCHIATRY & NEUROLOGY

## 2023-06-09 PROCEDURE — 94799 UNLISTED PULMONARY SVC/PX: CPT

## 2023-06-09 PROCEDURE — 25010000002 ONDANSETRON PER 1 MG: Performed by: INTERNAL MEDICINE

## 2023-06-09 PROCEDURE — 97110 THERAPEUTIC EXERCISES: CPT

## 2023-06-09 PROCEDURE — 85025 COMPLETE CBC W/AUTO DIFF WBC: CPT | Performed by: INTERNAL MEDICINE

## 2023-06-09 PROCEDURE — 82948 REAGENT STRIP/BLOOD GLUCOSE: CPT

## 2023-06-09 RX ORDER — PRAVASTATIN SODIUM 40 MG
40 TABLET ORAL NIGHTLY
Status: DISCONTINUED | OUTPATIENT
Start: 2023-06-10 | End: 2023-06-15 | Stop reason: HOSPADM

## 2023-06-09 RX ORDER — POLYETHYLENE GLYCOL 3350 17 G/17G
17 POWDER, FOR SOLUTION ORAL DAILY PRN
Status: DISCONTINUED | OUTPATIENT
Start: 2023-06-09 | End: 2023-06-15 | Stop reason: HOSPADM

## 2023-06-09 RX ORDER — FAMOTIDINE 10 MG/ML
20 INJECTION, SOLUTION INTRAVENOUS 2 TIMES DAILY
Status: DISCONTINUED | OUTPATIENT
Start: 2023-06-09 | End: 2023-06-09

## 2023-06-09 RX ORDER — BISACODYL 10 MG
10 SUPPOSITORY, RECTAL RECTAL DAILY PRN
Status: DISCONTINUED | OUTPATIENT
Start: 2023-06-09 | End: 2023-06-15 | Stop reason: HOSPADM

## 2023-06-09 RX ORDER — FAMOTIDINE 10 MG/ML
20 INJECTION, SOLUTION INTRAVENOUS EVERY 12 HOURS
Status: DISCONTINUED | OUTPATIENT
Start: 2023-06-10 | End: 2023-06-15 | Stop reason: HOSPADM

## 2023-06-09 RX ORDER — AMOXICILLIN 250 MG
2 CAPSULE ORAL 2 TIMES DAILY
Status: DISCONTINUED | OUTPATIENT
Start: 2023-06-09 | End: 2023-06-15 | Stop reason: HOSPADM

## 2023-06-09 RX ORDER — FAMOTIDINE 20 MG/1
20 TABLET, FILM COATED ORAL
Status: DISCONTINUED | OUTPATIENT
Start: 2023-06-09 | End: 2023-06-09

## 2023-06-09 RX ORDER — BISACODYL 5 MG/1
5 TABLET, DELAYED RELEASE ORAL DAILY PRN
Status: DISCONTINUED | OUTPATIENT
Start: 2023-06-09 | End: 2023-06-15 | Stop reason: HOSPADM

## 2023-06-09 RX ADMIN — NITROGLYCERIN 1 INCH: 20 OINTMENT TOPICAL at 11:56

## 2023-06-09 RX ADMIN — IPRATROPIUM BROMIDE AND ALBUTEROL SULFATE 3 ML: .5; 3 SOLUTION RESPIRATORY (INHALATION) at 11:41

## 2023-06-09 RX ADMIN — CETIRIZINE HYDROCHLORIDE 10 MG: 10 TABLET, FILM COATED ORAL at 09:30

## 2023-06-09 RX ADMIN — ENOXAPARIN SODIUM 40 MG: 100 INJECTION SUBCUTANEOUS at 20:23

## 2023-06-09 RX ADMIN — ONDANSETRON 4 MG: 2 INJECTION INTRAMUSCULAR; INTRAVENOUS at 20:22

## 2023-06-09 RX ADMIN — LEVETIRACETAM 500 MG: 5 INJECTION, SOLUTION INTRAVENOUS at 21:50

## 2023-06-09 RX ADMIN — LEVETIRACETAM 500 MG: 5 INJECTION, SOLUTION INTRAVENOUS at 11:02

## 2023-06-09 RX ADMIN — GABAPENTIN 200 MG: 100 CAPSULE ORAL at 09:29

## 2023-06-09 RX ADMIN — Medication 5 MG: at 09:30

## 2023-06-09 RX ADMIN — GABAPENTIN 200 MG: 100 CAPSULE ORAL at 21:50

## 2023-06-09 RX ADMIN — IPRATROPIUM BROMIDE AND ALBUTEROL SULFATE 3 ML: .5; 3 SOLUTION RESPIRATORY (INHALATION) at 16:18

## 2023-06-09 RX ADMIN — FOLIC ACID 5 MG: 5 INJECTION, SOLUTION INTRAMUSCULAR; INTRAVENOUS; SUBCUTANEOUS at 11:55

## 2023-06-09 RX ADMIN — LISINOPRIL 40 MG: 20 TABLET ORAL at 09:29

## 2023-06-09 RX ADMIN — MONTELUKAST 10 MG: 10 TABLET, FILM COATED ORAL at 21:51

## 2023-06-09 RX ADMIN — METOPROLOL SUCCINATE 50 MG: 50 TABLET, EXTENDED RELEASE ORAL at 09:30

## 2023-06-09 RX ADMIN — IPRATROPIUM BROMIDE AND ALBUTEROL SULFATE 3 ML: .5; 3 SOLUTION RESPIRATORY (INHALATION) at 18:36

## 2023-06-09 RX ADMIN — IPRATROPIUM BROMIDE AND ALBUTEROL SULFATE 3 ML: .5; 3 SOLUTION RESPIRATORY (INHALATION) at 06:32

## 2023-06-09 RX ADMIN — AMLODIPINE BESYLATE 5 MG: 5 TABLET ORAL at 09:29

## 2023-06-09 RX ADMIN — IPRATROPIUM BROMIDE AND ALBUTEROL SULFATE 3 ML: .5; 3 SOLUTION RESPIRATORY (INHALATION) at 23:54

## 2023-06-09 RX ADMIN — DULOXETINE HYDROCHLORIDE 60 MG: 30 CAPSULE, DELAYED RELEASE ORAL at 21:50

## 2023-06-09 RX ADMIN — METOPROLOL SUCCINATE 50 MG: 50 TABLET, EXTENDED RELEASE ORAL at 21:51

## 2023-06-09 RX ADMIN — SODIUM CHLORIDE 500 ML: 9 INJECTION, SOLUTION INTRAVENOUS at 15:45

## 2023-06-09 RX ADMIN — PRAVASTATIN SODIUM 40 MG: 40 TABLET ORAL at 09:30

## 2023-06-09 RX ADMIN — DULOXETINE HYDROCHLORIDE 60 MG: 30 CAPSULE, DELAYED RELEASE ORAL at 09:30

## 2023-06-09 RX ADMIN — NITROGLYCERIN 1 INCH: 20 OINTMENT TOPICAL at 06:08

## 2023-06-09 RX ADMIN — FAMOTIDINE 20 MG: 10 INJECTION INTRAVENOUS at 09:31

## 2023-06-09 RX ADMIN — FAMOTIDINE 20 MG: 10 INJECTION, SOLUTION INTRAVENOUS at 22:38

## 2023-06-09 NOTE — THERAPY TREATMENT NOTE
Acute Care - Physical Therapy Treatment Note   Edi     Patient Name: Carol Abarca  : 1970  MRN: [id number]  Today's Date: 2023      Visit Dx:     ICD-10-CM ICD-9-CM   1. Syncope and collapse  R55 780.2   2. Pancytopenia  D61.818 284.19   3. Difficulty walking  R26.2 719.7   4. Decreased activities of daily living (ADL)  Z78.9 V49.89   5. Difficulty in walking  R26.2 719.7   6. Severe anemia  D64.9 285.9     Patient Active Problem List   Diagnosis   • Mixed hyperlipidemia   • Essential hypertension   • Mild left ventricular hypertrophy   • Atypical chest pain   • Obstructive sleep apnea   • History of pulmonary embolism   • Screening for colon cancer   • Hemorrhoids   • Syncope and collapse   • Severe anemia   • Abdominal pain   • B12 deficiency     Past Medical History:   Diagnosis Date   • Anemia    • Arthritis    • Diabetes    • Essential hypertension 2021   • History of pulmonary embolism    • Lumbago     Low back pain   • Mild left ventricular hypertrophy 2021   • Mixed hyperlipidemia 2021   • Obstructive sleep apnea    • Reflux esophagitis    • Seasonal allergies      Past Surgical History:   Procedure Laterality Date   • APPENDECTOMY     •  SECTION      , , ,    • COLONOSCOPY      2019   • COLONOSCOPY N/A 2022    Procedure: COLONOSCOPY;  Surgeon: Radha aJmes MD;  Location: [facility] ENDOSCOPY;  Service: Gastroenterology;  Laterality: N/A;  COLON POLYP    • ENDOSCOPY     • ENDOSCOPY N/A 2023    Procedure: ESOPHAGOGASTRODUODENOSCOPY WITH BIPOSIES;  Surgeon: Coy Patrick MD;  Location: [facility] ENDOSCOPY;  Service: Gastroenterology;  Laterality: N/A;  PRIOR LAPBAND, GASTRITIS   • HEMORRHOIDECTOMY N/A 2022    Procedure: HEMORRHOIDECTOMY;  Surgeon: Remi Reeder MD;  Location: [facility] MAIN OR;  Service: General;  Laterality: N/A;   • HERNIA REPAIR      , , ,    • HYSTERECTOMY     •  LAPAROSCOPIC GASTRIC BANDING     • OTHER SURGICAL HISTORY      Metal implants     PT Assessment (last 12 hours)       PT Evaluation and Treatment       Row Name 06/09/23 0900          Physical Therapy Time and Intention    Subjective Information complains of;weakness  -     Document Type therapy note (daily note)  -     Mode of Treatment physical therapy;individual therapy  -     Patient Effort adequate  -       Row Name 06/09/23 0900          Pain Scale: FACES Pre/Post-Treatment    Pain: FACES Scale, Pretreatment 0-->no hurt  -     Posttreatment Pain Rating 0-->no hurt  -       Row Name 06/09/23 0900          Bed Mobility    Bed Mobility supine-sit;scooting/bridging  -     Scooting/Bridging Clar[place] (Bed Mobility) standby assist  -     Supine-Sit [place] (Bed Mobility) standby assist  -     Assistive Device (Bed Mobility) bed rails  -     Comment, (Bed Mobility) Pt maintains sitting with only supervision.  -       Row Name 06/09/23 0900          Transfers    Transfers sit-stand transfer;stand-sit transfer  -       Row Name 06/09/23 0900          Sit-Stand Transfer    Sit-Stand [place] (Transfers) standby assist  -     Assistive Device (Sit-Stand Transfers) --  Pt transfers without AD.  -       Row Name 06/09/23 0900          Stand-Sit Transfer    Stand-Sit [place] (Transfers) standby assist  -       Row Name 06/09/23 0900          Gait/Stairs (Locomotion)    Gait/Stairs Locomotion gait/ambulation independence;gait/ambulation assistive device;distance ambulated;gait pattern;gait deviations  -     Cl[place]ion Level (Gait) contact guard  -     Assistive Device (Gait) --  Pt pushing IV pole with her RUE.  -     Distance in Feet (Gait) 25  -     Pattern (Gait) 3-point;step-through  -     Deviations/Abnormal Patterns (Gait) base of support, narrow;stride length decreased;gait speed decreased  -     Gait Assessment/Intervention Pt amb with CGA while pushing IV  pole with her RUE.  Pt with narrow base of support and decreased gait speed and stride length.  -       Row Name 06/09/23 0900          Balance    Dynamic Standing Balance contact guard  -     Position/Device Used, Standing Balance --  IV pole  -       Row Name 06/09/23 0900          Motor Skills    Therapeutic Exercise hip;knee;ankle  -       Row Name 06/09/23 0900          Hip (Therapeutic Exercise)    Hip (Therapeutic Exercise) strengthening exercise  -     Hip Strengthening (Therapeutic Exercise) bilateral;sitting;marching while seated;10 repetitions;2 sets  -       Row Name 06/09/23 0900          Knee (Therapeutic Exercise)    Knee (Therapeutic Exercise) strengthening exercise  -     Knee Strengthening (Therapeutic Exercise) bilateral;LAQ (long arc quad);sitting;10 repetitions;2 sets  -       Row Name 06/09/23 0900          Ankle (Therapeutic Exercise)    Ankle (Therapeutic Exercise) AROM (active range of motion)  -     Ankle AROM (Therapeutic Exercise) bilateral;dorsiflexion;plantarflexion;sitting;10 repetitions;2 sets  -       Row Name 06/09/23 0900          Vital Signs    O2 Delivery Intra Treatment --  Pt on 2L with nasal cannula.  -       Row Name 06/09/23 0900          Progress Summary (PT)    Progress Toward Functional Goals (PT) progress toward functional goals is fair  -RH               User Key  (r) = Recorded By, (t) = Taken By, (c) = Cosigned By      Initials Name Provider Type     Osmany Avila, PTA Physical Therapist Assistant                    Physical Therapy Education       Title: PT OT SLP Therapies (Done)       Topic: Physical Therapy (Done)       Point: Mobility training (Done)       Learning Progress Summary             Patient Acceptance, E,TB, VU by DESEAN at 6/7/2023 0918                                         User Key       Initials Effective Dates Name Provider Type Discipline    DESEAN 06/03/21 -  Aravind Meade, PT Physical Therapist PT                  PT  Recommendation and Plan     Progress Summary (PT)  Progress Toward Functional Goals (PT): progress toward functional goals is fair   Outcome Measures       Row Name 06/09/23 0900 06/07/23 0900          How much help from another person do you currently need...    Turning from your back to your side while in flat bed without using bedrails? 4  -RH 4  -DESEAN     Moving from lying on back to sitting on the side of a flat bed without bedrails? 4  -RH 4  -DESEAN     Moving to and from a bed to a chair (including a wheelchair)? 3  -RH 3  -DESEAN     Standing up from a chair using your arms (e.g., wheelchair, bedside chair)? 3  -RH 3  -DESEAN     Climbing 3-5 steps with a railing? 3  -RH 3  -DESEAN     To walk in hospital room? 3  -RH 3  -DESEAN     AM-PAC 6 Clicks Score (PT) 20  -RH 20  -DESEAN               User Key  (r) = Recorded By, (t) = Taken By, (c) = Cosigned By      Initials Name Provider Type    Osmany Benson PTA Physical Therapist Assistant    Aravind Mathis, PT Physical Therapist                     Time Calculation:         PT G-Codes  Outcome Measure Options: AM-PAC 6 Clicks Daily Activity (OT), Optimal Instrument  AM-PAC 6 Clicks Score (PT): 20  AM-PAC 6 Clicks Score (OT): 24    Osmany Avila PTA  6/9/2023

## 2023-06-09 NOTE — PLAN OF CARE
Goal Outcome Evaluation:           Progress: no change. No acute events overnight. Medicated for back/abdominal pain x1. Brenda Walker RN

## 2023-06-09 NOTE — SIGNIFICANT NOTE
06/09/23 1130   Coping/Psychosocial   Observed Emotional State calm;cooperative;happy   Verbalized Emotional State relief;hopefulness   Trust Relationship/Rapport empathic listening provided   Involvement in Care interacting with patient   Additional Documentation Spiritual Care (Group)   Spiritual Care   Use of Spiritual Resources non-Synagogue use of spiritual care   Spiritual Care Source  initiative   Spiritual Care Follow-Up follow-up, none required as presently assessed   Response to Spiritual Care receptive of support;thanks expressed   Spiritual Care Interventions supportive conversation provided   Spiritual Care Visit Type initial   Receptivity to Spiritual Care visit welcomed

## 2023-06-09 NOTE — PLAN OF CARE
Goal Outcome Evaluation:  Plan of Care Reviewed With: patient        Progress: no change    Hypotension with lethargy and desaturation during shift, nitropaste D/C and catapress patch removed.Gave NS bolus 500ml per MD. BP improved afterwards. Was able to titrate O2 back to 2l/nc.

## 2023-06-09 NOTE — PROGRESS NOTES
[facility]     Progress Note    Patient Name: Carol Abarca  : 1970  MRN: [id number]  Primary Care Physician:  Sonia Mosquera APRN  Date of admission: 2023      Subjective   Brief summary.  Patient admitted with syncope and anemia.  Further transferred to PCU for recurrent syncope      HPI:  Patient seen earlier this morning    Patient awake alert today.  Sitting in chair  Still feels very weak  No chest pain, no abdominal pain today  Later on RN reported this afternoon patient dropped her blood pressure  IV fluid boluses given,  Patient feeling better      Review of Systems     No shortness of breath  Abdominal discomfort improved  Weakness and dizziness  No chest pain      Objective     Vitals:   Temp:  [97.9 °F (36.6 °C)-98.9 °F (37.2 °C)] 98.4 °F (36.9 °C)  Heart Rate:  [67-80] 72  Resp:  [14-20] 20  BP: ()/() 125/86  Flow (L/min):  [2-3] 2    Physical Exam :     Middle-age obese female not in acute distress, tired looking, oral mucosa dry.  Neck supple.  Heart regular.  Lungs diminished breath sounds.  Abdomen is obese and soft epigastric no tenderness, no guarding no rebound.  Extremities trace of edema.  Neurologically awake alert and oriented.      Result Review:  I have personally reviewed the results from the time of this admission to 2023 17:51 EDT and agree with these findings:  [x]  Laboratory  []  Microbiology  []  Radiology  []  EKG/Telemetry   []  Cardiology/Vascular   []  Pathology  []  Old records  []  Other:    CT abdomen pelvis reviewed      Assessment / Plan       Active Hospital Problems:  Active Hospital Problems    Diagnosis    • **Severe anemia    • Hypotension    • Abdominal pain    • B12 deficiency    • Syncope and collapse    • Essential hypertension        Plan:   Patient with fluctuating blood pressure blood pressure was high in fact this morning and later on it dropped down further.  Became hypotensive.  Requiring fluid resuscitation.  Discontinue  clonidine, discontinue nitroglycerin.  Hemoglobin stable but on the low range  Continue to monitor  With fluids and dilution hemoglobin expected to drop further  CT without any retroperitoneal bleed.  Discussed with patient to increase activity.  Options of inpatient rehab discussed.  Patient more inclined to go home.  We will continue to monitor in PCU.  Monitor labs, repeat in a.m.       DVT prophylaxis:  Medical DVT prophylaxis orders are present.    CODE STATUS:   Level Of Support Discussed With: Patient  Code Status (Patient has no pulse and is not breathing): CPR (Attempt to Resuscitate)  Medical Interventions (Patient has pulse or is breathing): Full Support  Release to patient: Routine Release    .  .Total  time spent in patient care, approximately 40 minutes.  I personally saw and examined the patient. I have reviewed all diagnostic interpretations including labs and x-rays, also I have reviewed treatment plans as written. I was present for care of my patient, time includes patient management by me, time spent at the patients bedside/exam,  time to review lab and imaging results, discussing patient care with nursing staff, consultants and documentation in the medical record.  Also additional time spent with discussing patient's condition as well as discharge planning.    Electronically signed by Dung Hall MD, 06/09/23, 5:14 PM EDT.

## 2023-06-10 LAB
ALBUMIN SERPL-MCNC: 3.4 G/DL (ref 3.5–5.2)
ALBUMIN/GLOB SERPL: 0.9 G/DL
ALP SERPL-CCNC: 105 U/L (ref 39–117)
ALT SERPL W P-5'-P-CCNC: 7 U/L (ref 1–33)
ANION GAP SERPL CALCULATED.3IONS-SCNC: 8.3 MMOL/L (ref 5–15)
ANISOCYTOSIS BLD QL: NORMAL
AST SERPL-CCNC: 22 U/L (ref 1–32)
BASO STIPL COARSE BLD QL SMEAR: NORMAL
BASOPHILS # BLD AUTO: 0.01 10*3/MM3 (ref 0–0.2)
BASOPHILS NFR BLD AUTO: 0.1 % (ref 0–1.5)
BILIRUB SERPL-MCNC: 0.9 MG/DL (ref 0–1.2)
BUN SERPL-MCNC: 15 MG/DL (ref 6–20)
BUN/CREAT SERPL: 14.3 (ref 7–25)
CALCIUM SPEC-SCNC: 8.3 MG/DL (ref 8.6–10.5)
CHLORIDE SERPL-SCNC: 101 MMOL/L (ref 98–107)
CLUMPED PLATELETS: PRESENT
CO2 SERPL-SCNC: 27.7 MMOL/L (ref 22–29)
CREAT SERPL-MCNC: 1.05 MG/DL (ref 0.57–1)
DACRYOCYTES BLD QL SMEAR: NORMAL
DEPRECATED RDW RBC AUTO: 77.8 FL (ref 37–54)
EGFRCR SERPLBLD CKD-EPI 2021: 64.1 ML/MIN/1.73
EOSINOPHIL # BLD AUTO: 0 10*3/MM3 (ref 0–0.4)
EOSINOPHIL NFR BLD AUTO: 0 % (ref 0.3–6.2)
ERYTHROCYTE [DISTWIDTH] IN BLOOD BY AUTOMATED COUNT: 20.1 % (ref 12.3–15.4)
GLOBULIN UR ELPH-MCNC: 3.6 GM/DL
GLUCOSE SERPL-MCNC: 98 MG/DL (ref 65–99)
HCT VFR BLD AUTO: 27.7 % (ref 34–46.6)
HGB BLD-MCNC: 8.5 G/DL (ref 12–15.9)
HYPOCHROMIA BLD QL: NORMAL
IMM GRANULOCYTES # BLD AUTO: 0.07 10*3/MM3 (ref 0–0.05)
IMM GRANULOCYTES NFR BLD AUTO: 0.8 % (ref 0–0.5)
LARGE PLATELETS: NORMAL
LYMPHOCYTES # BLD AUTO: 0.75 10*3/MM3 (ref 0.7–3.1)
LYMPHOCYTES NFR BLD AUTO: 8.5 % (ref 19.6–45.3)
MACROCYTES BLD QL SMEAR: NORMAL
MCH RBC QN AUTO: 32.4 PG (ref 26.6–33)
MCHC RBC AUTO-ENTMCNC: 30.7 G/DL (ref 31.5–35.7)
MCV RBC AUTO: 105.7 FL (ref 79–97)
MICROCYTES BLD QL: NORMAL
MONOCYTES # BLD AUTO: 0.57 10*3/MM3 (ref 0.1–0.9)
MONOCYTES NFR BLD AUTO: 6.5 % (ref 5–12)
NEUTROPHILS NFR BLD AUTO: 7.41 10*3/MM3 (ref 1.7–7)
NEUTROPHILS NFR BLD AUTO: 84.1 % (ref 42.7–76)
NRBC BLD AUTO-RTO: 0 /100 WBC (ref 0–0.2)
OVALOCYTES BLD QL SMEAR: NORMAL
PLATELET # BLD AUTO: 268 10*3/MM3 (ref 140–450)
PMV BLD AUTO: 11.1 FL (ref 6–12)
POTASSIUM SERPL-SCNC: 4.5 MMOL/L (ref 3.5–5.2)
PROT SERPL-MCNC: 7 G/DL (ref 6–8.5)
RBC # BLD AUTO: 2.62 10*6/MM3 (ref 3.77–5.28)
SMALL PLATELETS BLD QL SMEAR: ADEQUATE
SODIUM SERPL-SCNC: 137 MMOL/L (ref 136–145)
STOMATOCYTES BLD QL SMEAR: NORMAL
TARGETS BLD QL SMEAR: NORMAL
WBC MORPH BLD: NORMAL
WBC NRBC COR # BLD: 8.81 10*3/MM3 (ref 3.4–10.8)

## 2023-06-10 PROCEDURE — 94664 DEMO&/EVAL PT USE INHALER: CPT

## 2023-06-10 PROCEDURE — 25010000002 ENOXAPARIN PER 10 MG: Performed by: INTERNAL MEDICINE

## 2023-06-10 PROCEDURE — 94799 UNLISTED PULMONARY SVC/PX: CPT

## 2023-06-10 PROCEDURE — 85007 BL SMEAR W/DIFF WBC COUNT: CPT | Performed by: INTERNAL MEDICINE

## 2023-06-10 PROCEDURE — 85025 COMPLETE CBC W/AUTO DIFF WBC: CPT | Performed by: INTERNAL MEDICINE

## 2023-06-10 PROCEDURE — 25010000002 HYDRALAZINE PER 20 MG: Performed by: INTERNAL MEDICINE

## 2023-06-10 PROCEDURE — 25010000002 LEVETIRACETAM IN NACL 0.82% 500 MG/100ML SOLUTION: Performed by: PSYCHIATRY & NEUROLOGY

## 2023-06-10 PROCEDURE — 25010000002 CYANOCOBALAMIN PER 1000 MCG: Performed by: INTERNAL MEDICINE

## 2023-06-10 PROCEDURE — 80053 COMPREHEN METABOLIC PANEL: CPT | Performed by: INTERNAL MEDICINE

## 2023-06-10 RX ORDER — IPRATROPIUM BROMIDE AND ALBUTEROL SULFATE 2.5; .5 MG/3ML; MG/3ML
3 SOLUTION RESPIRATORY (INHALATION) EVERY 6 HOURS PRN
Status: DISCONTINUED | OUTPATIENT
Start: 2023-06-10 | End: 2023-06-15 | Stop reason: HOSPADM

## 2023-06-10 RX ORDER — LEVETIRACETAM 500 MG/1
500 TABLET ORAL 2 TIMES DAILY
Status: DISCONTINUED | OUTPATIENT
Start: 2023-06-10 | End: 2023-06-15 | Stop reason: HOSPADM

## 2023-06-10 RX ORDER — HYDRALAZINE HYDROCHLORIDE 20 MG/ML
10 INJECTION INTRAMUSCULAR; INTRAVENOUS EVERY 4 HOURS PRN
Status: DISCONTINUED | OUTPATIENT
Start: 2023-06-10 | End: 2023-06-15 | Stop reason: HOSPADM

## 2023-06-10 RX ORDER — QUETIAPINE 200 MG/1
200 TABLET, FILM COATED, EXTENDED RELEASE ORAL EVERY EVENING
Status: DISCONTINUED | OUTPATIENT
Start: 2023-06-10 | End: 2023-06-10

## 2023-06-10 RX ORDER — CYANOCOBALAMIN 1000 UG/ML
1000 INJECTION, SOLUTION INTRAMUSCULAR; SUBCUTANEOUS DAILY
Status: DISCONTINUED | OUTPATIENT
Start: 2023-06-10 | End: 2023-06-15 | Stop reason: HOSPADM

## 2023-06-10 RX ORDER — QUETIAPINE 200 MG/1
200 TABLET, FILM COATED, EXTENDED RELEASE ORAL NIGHTLY
Status: DISCONTINUED | OUTPATIENT
Start: 2023-06-10 | End: 2023-06-15 | Stop reason: HOSPADM

## 2023-06-10 RX ADMIN — LEVETIRACETAM 500 MG: 5 INJECTION, SOLUTION INTRAVENOUS at 08:21

## 2023-06-10 RX ADMIN — HYDROCODONE BITARTRATE AND ACETAMINOPHEN 1 TABLET: 10; 325 TABLET ORAL at 14:28

## 2023-06-10 RX ADMIN — DULOXETINE HYDROCHLORIDE 60 MG: 30 CAPSULE, DELAYED RELEASE ORAL at 08:19

## 2023-06-10 RX ADMIN — DULOXETINE HYDROCHLORIDE 60 MG: 30 CAPSULE, DELAYED RELEASE ORAL at 20:37

## 2023-06-10 RX ADMIN — GABAPENTIN 200 MG: 100 CAPSULE ORAL at 20:37

## 2023-06-10 RX ADMIN — AMLODIPINE BESYLATE 5 MG: 5 TABLET ORAL at 08:19

## 2023-06-10 RX ADMIN — QUETIAPINE FUMARATE 200 MG: 200 TABLET, EXTENDED RELEASE ORAL at 20:38

## 2023-06-10 RX ADMIN — SENNOSIDES AND DOCUSATE SODIUM 2 TABLET: 50; 8.6 TABLET ORAL at 08:19

## 2023-06-10 RX ADMIN — FOLIC ACID 5 MG: 5 INJECTION, SOLUTION INTRAMUSCULAR; INTRAVENOUS; SUBCUTANEOUS at 08:20

## 2023-06-10 RX ADMIN — METOPROLOL SUCCINATE 50 MG: 50 TABLET, EXTENDED RELEASE ORAL at 20:37

## 2023-06-10 RX ADMIN — HYDRALAZINE HYDROCHLORIDE 20 MG: 20 INJECTION, SOLUTION INTRAMUSCULAR; INTRAVENOUS at 08:20

## 2023-06-10 RX ADMIN — LEVETIRACETAM 500 MG: 500 TABLET, FILM COATED ORAL at 20:37

## 2023-06-10 RX ADMIN — GABAPENTIN 200 MG: 100 CAPSULE ORAL at 08:19

## 2023-06-10 RX ADMIN — MONTELUKAST 10 MG: 10 TABLET, FILM COATED ORAL at 20:37

## 2023-06-10 RX ADMIN — CYANOCOBALAMIN 1000 MCG: 1000 INJECTION, SOLUTION INTRAMUSCULAR at 14:28

## 2023-06-10 RX ADMIN — FAMOTIDINE 20 MG: 10 INJECTION INTRAVENOUS at 20:37

## 2023-06-10 RX ADMIN — IPRATROPIUM BROMIDE AND ALBUTEROL SULFATE 3 ML: .5; 3 SOLUTION RESPIRATORY (INHALATION) at 07:25

## 2023-06-10 RX ADMIN — HYDROCODONE BITARTRATE AND ACETAMINOPHEN 1 TABLET: 10; 325 TABLET ORAL at 08:32

## 2023-06-10 RX ADMIN — PRAVASTATIN SODIUM 40 MG: 40 TABLET ORAL at 20:38

## 2023-06-10 RX ADMIN — ENOXAPARIN SODIUM 40 MG: 100 INJECTION SUBCUTANEOUS at 20:37

## 2023-06-10 RX ADMIN — METOPROLOL SUCCINATE 50 MG: 50 TABLET, EXTENDED RELEASE ORAL at 08:19

## 2023-06-10 RX ADMIN — Medication 5 MG: at 08:19

## 2023-06-10 RX ADMIN — SENNOSIDES AND DOCUSATE SODIUM 2 TABLET: 50; 8.6 TABLET ORAL at 20:37

## 2023-06-10 RX ADMIN — LISINOPRIL 40 MG: 20 TABLET ORAL at 08:20

## 2023-06-10 RX ADMIN — FAMOTIDINE 20 MG: 10 INJECTION INTRAVENOUS at 08:20

## 2023-06-10 RX ADMIN — CETIRIZINE HYDROCHLORIDE 10 MG: 10 TABLET, FILM COATED ORAL at 08:19

## 2023-06-10 NOTE — PROGRESS NOTES
[facility]     Progress Note    Patient Name: Carol Abarca  : 1970  MRN: [id number]  Primary Care Physician:  Sonia Mosquera APRN  Date of admission: 2023      Subjective   Brief summary.  Patient admitted with syncope and anemia.  Further transferred to PCU for recurrent syncope.  Since then patient having hypo and hypertension episodes, cardiologist on board      HPI:  Patient blood pressure was high this morning again and dropped after hydralazine, blood pressure dropped around 80 systolic.  When I checked it back again it was around 100.  Patient lethargic as usual.  Mostly sleepy all the time.  Complains of aches and pains      Review of Systems     No shortness of breath  Complains of nonspecific pains and aches and abdominal discomfort  Weakness and dizziness  No chest pain  No blood in the stools      Objective     Vitals:   Temp:  [97.3 °F (36.3 °C)-98.6 °F (37 °C)] 98.6 °F (37 °C)  Heart Rate:  [67-83] 70  Resp:  [18-20] 18  BP: ()/() 164/111  Flow (L/min):  [1-4] 1    Physical Exam :     Middle-age obese female not in acute distress, tired looking..  Neck supple.  Heart regular.  Lungs diminished breath sounds.  Abdomen is obese and soft obese, no guarding no rebound.  Extremities trace of edema.  Neurologically awake alert and oriented.      Result Review:  I have personally reviewed the results from the time of this admission to 6/10/2023 11:38 EDT and agree with these findings:  [x]  Laboratory  []  Microbiology  []  Radiology  []  EKG/Telemetry   []  Cardiology/Vascular   []  Pathology  []  Old records  []  Other:    Hemoglobin 8.5      Assessment / Plan       Active Hospital Problems:  Active Hospital Problems    Diagnosis     **Severe anemia     Hypotension     Abdominal pain     B12 deficiency     Syncope and collapse     Essential hypertension        Plan:   Patient blood pressure continues to fluctuate, will adjust antihypertensives, also decrease hydralazine to 10  mg IV if needed for blood pressure.  We will reduce Seroquel as patient sleepy most of the time.  Also patient continues to remain anemic despite aggressive vitamin B12 replacement.  RBC is low.  We will consult hematologist Dr. ELIAN Lambert, notified  Encourage increase activity  Discussed with RN  Monitor labs    DVT prophylaxis:  Medical DVT prophylaxis orders are present.    CODE STATUS:   Level Of Support Discussed With: Patient  Code Status (Patient has no pulse and is not breathing): CPR (Attempt to Resuscitate)  Medical Interventions (Patient has pulse or is breathing): Full Support  Release to patient: Routine Release    .  Time spent approximately 41 minutes.    .Total  time spent in patient care, approximately .  41.minutes.  I personally saw and examined the patient. I have reviewed all diagnostic interpretations including labs and x-rays, also I have reviewed treatment plans as written. I was present for care of my patient, time includes patient management by me, time spent at the patients bedside/exam,  time to review lab and imaging results, discussing patient care with nursing staff, consultants and documentation in the medical record.      Electronically signed by Dung Hall MD, 06/10/23, 11:44 AM EDT.

## 2023-06-10 NOTE — PLAN OF CARE
Goal Outcome Evaluation:  Plan of Care Reviewed With: patient        Progress: no change          Pt has been resting throughout the night with no signs of distress. Will continue plan of care.

## 2023-06-10 NOTE — PLAN OF CARE
Goal Outcome Evaluation:   Pt blood pressure very responsive to narcotics and prn hydralazine. MD changed hydralazine dose. Oral pain medications being prioritized. No acute events. Call bell in reach.

## 2023-06-10 NOTE — CONSULTS
[facility]   Consult Note    Patient Name: Carol Abarca  : 1970  MRN: [id number]  Primary Care Physician:  Sonia Mosquera APRN  Referring Physician: No Known Provider  Date of admission: 2023    Subjective   Subjective     Reason for Consult/ Chief Complaint: B12 deficiency anemia, syncope, hypertension    HPI:  Carol Abarca is a 52 y.o. female was diagnosed with B12 deficiency anemia in .  He was taking B12 replacement and then was became very busy with her college courses and stopped taking them.  She has also history of back pain and neuropathy.  She developed weakness dizziness repeated falls and severe anemia and was admitted.  She was found to have low B12 and started on replacement therapy and received transfusion of packed red cells.    Review of Systems   Review of Systems   Constitutional: Positive for fatigue. Activity change: up in bed.   HENT: Negative.    Eyes: Negative.    Respiratory: Negative.    Cardiovascular: Negative.    Gastrointestinal: Negative.    Endocrine: Negative.    Genitourinary: Negative.    Musculoskeletal: Positive for back pain.   Skin: Negative.    Allergic/Immunologic: Negative.    Neurological: Positive for dizziness, syncope and numbness.   Hematological: Negative.    Psychiatric/Behavioral: Negative.         Personal History     Past Medical History:   Diagnosis Date   • Anemia    • Arthritis    • Diabetes    • Essential hypertension 2021   • History of pulmonary embolism    • Lumbago     Low back pain   • Mild left ventricular hypertrophy 2021   • Mixed hyperlipidemia 2021   • Obstructive sleep apnea    • Reflux esophagitis    • Seasonal allergies        Past Surgical History:   Procedure Laterality Date   • APPENDECTOMY     •  SECTION      , , ,    • COLONOSCOPY      2019   • COLONOSCOPY N/A 2022    Procedure: COLONOSCOPY;  Surgeon: Radha James MD;  Location: [facility] ENDOSCOPY;   Service: Gastroenterology;  Laterality: N/A;  COLON POLYP    • ENDOSCOPY  2019   • ENDOSCOPY N/A 6/8/2023    Procedure: ESOPHAGOGASTRODUODENOSCOPY WITH BIPOSIES;  Surgeon: Coy Patrick MD;  Location: [facility] ENDOSCOPY;  Service: Gastroenterology;  Laterality: N/A;  PRIOR LAPBAND, GASTRITIS   • HEMORRHOIDECTOMY N/A 07/25/2022    Procedure: HEMORRHOIDECTOMY;  Surgeon: Remi Reeder MD;  Location: [facility] MAIN OR;  Service: General;  Laterality: N/A;   • HERNIA REPAIR      2005, 2006, 2007, 2008   • HYSTERECTOMY  2004   • LAPAROSCOPIC GASTRIC BANDING     • OTHER SURGICAL HISTORY      Metal implants       Family History: family history includes Arthritis in her mother; Diabetes in her mother and son; Heart disease in her father and mother. Otherwise pertinent FHx was reviewed and not pertinent to current issue.    Social History:  reports that she has been smoking cigarettes. She has been smoking an average of .5 packs per day. She has never used smokeless tobacco. She reports current alcohol use. She reports that she does not use drugs.    Home Medications:  ALPRAZolam, DULoxetine, HYDROcodone-acetaminophen, Insulin Glargine (1 Unit Dial), QUEtiapine XR, albuterol sulfate HFA, aspirin, cetirizine, gabapentin, glipizide, lisinopril, metoprolol succinate XL, montelukast, potassium chloride, pravastatin, and zolpidem    Allergies:  Allergies   Allergen Reactions   • Tramadol Anaphylaxis   • Tramadol Hcl Anaphylaxis   • Moxifloxacin Hives   • Sulfa Antibiotics Hives       Objective    Objective     Vitals:   Temp:  [97.3 °F (36.3 °C)-98.6 °F (37 °C)] 98.4 °F (36.9 °C)  Heart Rate:  [67-83] 80  Resp:  [18-20] 20  BP: ()/() 102/59  Flow (L/min):  [1-4] 3    Physical Exam:   Constitutional: Awake, alert   Eyes: PERRLA, sclerae anicteric, no conjunctival injection   HENT: NCAT, mucous membranes moist   Neck: Supple, no thyromegaly, no lymphadenopathy, trachea midline   Respiratory: Clear to  auscultation bilaterally, nonlabored respirations    Cardiovascular: RRR, no murmurs, rubs, or gallops, palpable pedal pulses bilaterally   Gastrointestinal: Positive bowel sounds, soft, nontender, nondistended   Musculoskeletal: No bilateral ankle edema, no clubbing or cyanosis to extremities   Psychiatric: Appropriate affect, cooperative   Neurologic: Oriented x 3, strength symmetric in all extremities, Cranial Nerves grossly intact to confrontation, speech clear   Skin: No rashes     Result Review    Result Review:  I have personally reviewed the results from the time of this admission to 6/10/2023 13:45 EDT and agree with these findings:  [x]  Laboratory  []  Microbiology  [x]  Radiology  []  EKG/Telemetry   []  Cardiology/Vascular   []  Pathology  []  Old records  []  Other:  Most notable findings include: B12 very low on admission at 132.  Iron studies were okay.  CT abdomen pelvis and MRI and MRIs done for falls    Assessment & Plan   Assessment / Plan     Brief Patient Summary:  Carol Abarca is a 52 y.o. female who has severe B12 deficiency may be because of her severe anemia and neuropathy.  Started on replacement therapy and transfusion    Active Hospital Problems:  Active Hospital Problems    Diagnosis    • **Severe anemia    • Hypotension    • Abdominal pain    • B12 deficiency    • Syncope and collapse    • Essential hypertension      Plan:   Discussed with patient dangers of stopping her B12  Continue vitamin B12 1000 mcg IM daily for a week and then weekly for a month and then monthly  Ordered IEP,PEP  Electronically signed by Viviana Lambert MD, 06/10/23, 1:45 PM EDT.

## 2023-06-11 LAB
HOLD SPECIMEN: NORMAL
WHOLE BLOOD HOLD SPECIMEN: NORMAL

## 2023-06-11 PROCEDURE — 94799 UNLISTED PULMONARY SVC/PX: CPT

## 2023-06-11 PROCEDURE — 86334 IMMUNOFIX E-PHORESIS SERUM: CPT | Performed by: INTERNAL MEDICINE

## 2023-06-11 PROCEDURE — 97530 THERAPEUTIC ACTIVITIES: CPT

## 2023-06-11 PROCEDURE — 97116 GAIT TRAINING THERAPY: CPT

## 2023-06-11 PROCEDURE — 25010000002 CYANOCOBALAMIN PER 1000 MCG: Performed by: INTERNAL MEDICINE

## 2023-06-11 PROCEDURE — 82784 ASSAY IGA/IGD/IGG/IGM EACH: CPT | Performed by: INTERNAL MEDICINE

## 2023-06-11 PROCEDURE — 84165 PROTEIN E-PHORESIS SERUM: CPT | Performed by: INTERNAL MEDICINE

## 2023-06-11 PROCEDURE — 25010000002 ENOXAPARIN PER 10 MG: Performed by: INTERNAL MEDICINE

## 2023-06-11 PROCEDURE — 25010000002 HYDROMORPHONE 1 MG/ML SOLUTION: Performed by: INTERNAL MEDICINE

## 2023-06-11 PROCEDURE — 84155 ASSAY OF PROTEIN SERUM: CPT | Performed by: INTERNAL MEDICINE

## 2023-06-11 PROCEDURE — 25010000002 ONDANSETRON PER 1 MG: Performed by: INTERNAL MEDICINE

## 2023-06-11 RX ADMIN — MONTELUKAST 10 MG: 10 TABLET, FILM COATED ORAL at 20:22

## 2023-06-11 RX ADMIN — SENNOSIDES AND DOCUSATE SODIUM 2 TABLET: 50; 8.6 TABLET ORAL at 20:22

## 2023-06-11 RX ADMIN — HYDROMORPHONE HYDROCHLORIDE 0.25 MG: 1 INJECTION, SOLUTION INTRAMUSCULAR; INTRAVENOUS; SUBCUTANEOUS at 09:10

## 2023-06-11 RX ADMIN — LEVETIRACETAM 500 MG: 500 TABLET, FILM COATED ORAL at 20:22

## 2023-06-11 RX ADMIN — GABAPENTIN 200 MG: 100 CAPSULE ORAL at 08:46

## 2023-06-11 RX ADMIN — ENOXAPARIN SODIUM 40 MG: 100 INJECTION SUBCUTANEOUS at 20:22

## 2023-06-11 RX ADMIN — HYDROCODONE BITARTRATE AND ACETAMINOPHEN 1 TABLET: 10; 325 TABLET ORAL at 21:39

## 2023-06-11 RX ADMIN — DULOXETINE HYDROCHLORIDE 60 MG: 30 CAPSULE, DELAYED RELEASE ORAL at 20:22

## 2023-06-11 RX ADMIN — QUETIAPINE FUMARATE 200 MG: 200 TABLET, EXTENDED RELEASE ORAL at 20:22

## 2023-06-11 RX ADMIN — METOPROLOL SUCCINATE 50 MG: 50 TABLET, EXTENDED RELEASE ORAL at 20:24

## 2023-06-11 RX ADMIN — LISINOPRIL 40 MG: 20 TABLET ORAL at 08:46

## 2023-06-11 RX ADMIN — FAMOTIDINE 20 MG: 10 INJECTION INTRAVENOUS at 20:23

## 2023-06-11 RX ADMIN — CYANOCOBALAMIN 1000 MCG: 1000 INJECTION, SOLUTION INTRAMUSCULAR at 08:48

## 2023-06-11 RX ADMIN — HYDROCODONE BITARTRATE AND ACETAMINOPHEN 1 TABLET: 10; 325 TABLET ORAL at 15:39

## 2023-06-11 RX ADMIN — PRAVASTATIN SODIUM 40 MG: 40 TABLET ORAL at 20:22

## 2023-06-11 RX ADMIN — GABAPENTIN 200 MG: 100 CAPSULE ORAL at 20:23

## 2023-06-11 RX ADMIN — Medication 5 MG: at 08:46

## 2023-06-11 RX ADMIN — AMLODIPINE BESYLATE 5 MG: 5 TABLET ORAL at 08:47

## 2023-06-11 RX ADMIN — ONDANSETRON 4 MG: 2 INJECTION INTRAMUSCULAR; INTRAVENOUS at 15:36

## 2023-06-11 RX ADMIN — FAMOTIDINE 20 MG: 10 INJECTION INTRAVENOUS at 08:48

## 2023-06-11 RX ADMIN — LEVETIRACETAM 500 MG: 500 TABLET, FILM COATED ORAL at 08:46

## 2023-06-11 RX ADMIN — FOLIC ACID 5 MG: 5 INJECTION, SOLUTION INTRAMUSCULAR; INTRAVENOUS; SUBCUTANEOUS at 08:48

## 2023-06-11 RX ADMIN — METOPROLOL SUCCINATE 50 MG: 50 TABLET, EXTENDED RELEASE ORAL at 08:47

## 2023-06-11 NOTE — PROGRESS NOTES
[facility]     Progress Note    Patient Name: Carol Abarca  : 1970  MRN: [id number]  Primary Care Physician:  Sonia Mosquera APRN  Date of admission: 2023      Subjective   Brief summary.  Patient admitted with syncope and anemia.  Further transferred to PCU for recurrent syncope.  Since then patient having hypo and hypertension episodes, cardiologist on board      HPI:  Awake and alert sitting in chair  Complains of weakness  Difficulty walking  Aches and pains  No chest pain or shortness of breath      Review of Systems     Complains of fatigue and weakness  No blood in the stools  Dizzy      Objective     Vitals:   Temp:  [97.3 °F (36.3 °C)-98.1 °F (36.7 °C)] 97.9 °F (36.6 °C)  Heart Rate:  [65-69] 67  Resp:  [16-20] 18  BP: (100-136)/(62-90) 136/84  Flow (L/min):  [2-3] 2    Physical Exam :     Middle-age obese female not in acute distress, tired looking..  Neck supple.  Heart regular.  Lungs diminished breath sounds.  Abdomen is obese, nontender.  Extremities trace of edema.  Neurologically awake alert and oriented.      Result Review:  I have personally reviewed the results from the time of this admission to 2023 11:51 EDT and agree with these findings:  [x]  Laboratory  []  Microbiology  []  Radiology  []  EKG/Telemetry   []  Cardiology/Vascular   []  Pathology  []  Old records  []  Other:          Assessment / Plan       Active Hospital Problems:  Active Hospital Problems    Diagnosis    • **Severe anemia    • Hypotension    • Abdominal pain    • B12 deficiency    • Syncope and collapse    • Essential hypertension        Plan:   Blood pressure stable so far.  More alert with cutting down Seroquel.  Check orthostats today.  Increase activity with PT OT.      DVT prophylaxis:  Medical DVT prophylaxis orders are present.    CODE STATUS:   Level Of Support Discussed With: Patient  Code Status (Patient has no pulse and is not breathing): CPR (Attempt to Resuscitate)  Medical Interventions  (Patient has pulse or is breathing): Full Support  Release to patient: Routine Release      Electronically signed by Dung Hall MD, 06/11/23, 11:53 AM EDT.

## 2023-06-11 NOTE — PLAN OF CARE
Goal Outcome Evaluation:  Plan of Care Reviewed With: patient        Progress: no change.

## 2023-06-11 NOTE — THERAPY TREATMENT NOTE
Acute Care - Physical Therapy Progress Note   Edi     Patient Name: Carol Abarca  : 1970  MRN: [id number]  Today's Date: 2023      Visit Dx:     ICD-10-CM ICD-9-CM   1. Syncope and collapse  R55 780.2   2. Pancytopenia  D61.818 284.19   3. Difficulty walking  R26.2 719.7   4. Decreased activities of daily living (ADL)  Z78.9 V49.89   5. Difficulty in walking  R26.2 719.7   6. Severe anemia  D64.9 285.9     Patient Active Problem List   Diagnosis   • Mixed hyperlipidemia   • Essential hypertension   • Mild left ventricular hypertrophy   • Atypical chest pain   • Obstructive sleep apnea   • History of pulmonary embolism   • Screening for colon cancer   • Hemorrhoids   • Syncope and collapse   • Severe anemia   • Abdominal pain   • B12 deficiency   • Hypotension     Past Medical History:   Diagnosis Date   • Anemia    • Arthritis    • Diabetes    • Essential hypertension 2021   • History of pulmonary embolism    • Lumbago     Low back pain   • Mild left ventricular hypertrophy 2021   • Mixed hyperlipidemia 2021   • Obstructive sleep apnea    • Reflux esophagitis    • Seasonal allergies      Past Surgical History:   Procedure Laterality Date   • APPENDECTOMY     •  SECTION      , , ,    • COLONOSCOPY      2019   • COLONOSCOPY N/A 2022    Procedure: COLONOSCOPY;  Surgeon: Radha James MD;  Location: [facility] ENDOSCOPY;  Service: Gastroenterology;  Laterality: N/A;  COLON POLYP    • ENDOSCOPY     • ENDOSCOPY N/A 2023    Procedure: ESOPHAGOGASTRODUODENOSCOPY WITH BIPOSIES;  Surgeon: Coy Patrick MD;  Location: [facility] ENDOSCOPY;  Service: Gastroenterology;  Laterality: N/A;  PRIOR LAPBAND, GASTRITIS   • HEMORRHOIDECTOMY N/A 2022    Procedure: HEMORRHOIDECTOMY;  Surgeon: Remi Reeder MD;  Location: [facility] MAIN OR;  Service: General;  Laterality: N/A;   • HERNIA REPAIR      , , ,    • HYSTERECTOMY   2004   • LAPAROSCOPIC GASTRIC BANDING     • OTHER SURGICAL HISTORY      Metal implants     PT Assessment (last 12 hours)     PT Evaluation and Treatment     Row Name 06/11/23 0900          Physical Therapy Time and Intention    Subjective Information complains of;fatigue  -CS     Document Type therapy note (daily note)  -CS     Mode of Treatment individual therapy;physical therapy  -CS     Patient Effort good  -CS     Symptoms Noted During/After Treatment none  -CS     Row Name 06/11/23 0900          Pain    Pretreatment Pain Rating 0/10 - no pain  -CS     Posttreatment Pain Rating 0/10 - no pain  -CS     Row Name 06/11/23 0900          Bed Mobility    Supine-Sit [place] (Bed Mobility) verbal cues;minimum assist (75% patient effort);1 person assist  -CS     Assistive Device (Bed Mobility) bed rails  -CS     Row Name 06/11/23 0900          Transfers    Transfers toilet transfer  -CS     Row Name 06/11/23 0900          Sit-Stand Transfer    Sit-Stand [place] (Transfers) verbal cues;minimum assist (75% patient effort);1 person assist  -CS     Assistive Device (Sit-Stand Transfers) other (see comments)  Hand held assistance (HHA)  -CS     Row Name 06/11/23 0900          Stand-Sit Transfer    Stand-Sit [place] (Transfers) contact guard  -CS     Assistive Device (Stand-Sit Transfers) other (see comments)  HHA  -CS     Row Name 06/11/23 0900          Toilet Transfer    Type (Toilet Transfer) sit-stand;stand-sit  -CS     [place] Level (Toilet Transfer) minimum assist (75% patient effort);1 person assist  -CS     Assistive Device (Toilet Transfer) grab bars/safety frame  HHA  -CS     Row Name 06/11/23 0900          Gait/Stairs (Locomotion)    [place] Level (Gait) minimum assist (75% patient effort);1 person assist  -CS     Assistive Device (Gait) other (see comments)  HHA  -CS     Distance in Feet (Gait) 160  -CS     Pattern (Gait) 3-point;step-through  -CS     Deviations/Abnormal Patterns (Gait)  base of support, narrow;gait speed decreased;stride length decreased  -CS     Bilateral Gait Deviations heel strike decreased  -CS     Row Name 06/11/23 0900          Vital Signs    Pretreatment Heart Rate (beats/min) 71  -CS     Intratreatment Heart Rate (beats/min) 83  -CS     Posttreatment Heart Rate (beats/min) 73  -CS     Pre Patient Position Sitting  -CS     Intra Patient Position Standing  -CS     Post Patient Position Sitting  -CS     Row Name 06/11/23 0900          Positioning and Restraints    Pre-Treatment Position in bed  -CS     Post Treatment Position chair  -CS     In Chair sitting;call light within reach;encouraged to call for assist;exit alarm on;notified nsg  -CS     Row Name 06/11/23 0900          Progress Summary (PT)    Progress Toward Functional Goals (PT) progress toward functional goals is fair  -CS           User Key  (r) = Recorded By, (t) = Taken By, (c) = Cosigned By    Initials Name Provider Type    CS David Martin PTA Physical Therapist Assistant                Physical Therapy Education     Title: PT OT SLP Therapies (Done)     Topic: Physical Therapy (Done)     Point: Mobility training (Done)     Learning Progress Summary           Patient Acceptance, E,TB, VU by DESEAN at 6/7/2023 0918                               User Key     Initials Effective Dates Name Provider Type Discipline    DESEAN 06/03/21 -  Aravind Meade, PT Physical Therapist PT              PT Recommendation and Plan     Progress Summary (PT)  Progress Toward Functional Goals (PT): progress toward functional goals is fair   Outcome Measures     Row Name 06/11/23 0900 06/09/23 0900          How much help from another person do you currently need...    Turning from your back to your side while in flat bed without using bedrails? 4  -CS 4  -RH     Moving from lying on back to sitting on the side of a flat bed without bedrails? 3  -CS 4  -RH     Moving to and from a bed to a chair (including a wheelchair)? 3  -CS 3  -RH      Standing up from a chair using your arms (e.g., wheelchair, bedside chair)? 3  -CS 3  -RH     Climbing 3-5 steps with a railing? 3  -CS 3  -RH     To walk in hospital room? 3  -CS 3  -RH     AM-PAC 6 Clicks Score (PT) 19  -CS 20  -RH        Functional Assessment    Outcome Measure Options AM-PAC 6 Clicks Basic Mobility (PT)  -CS --           User Key  (r) = Recorded By, (t) = Taken By, (c) = Cosigned By    Initials Name Provider Type     Osmany Avila PTA Physical Therapist Assistant     David Martin PTA Physical Therapist Assistant                 Time Calculation:    PT Charges     Row Name 06/11/23 0912             Time Calculation    Start Time 0812  -CS      PT Received On 06/11/23  -CS         Timed Charges    54431 - Gait Training Minutes  12  -CS      80229 - PT Therapeutic Activity Minutes 11  -CS         Total Minutes    Timed Charges Total Minutes 23  -CS       Total Minutes 23  -CS            User Key  (r) = Recorded By, (t) = Taken By, (c) = Cosigned By    Initials Name Provider Type     David Martin PTA Physical Therapist Assistant              Therapy Charges for Today     Code Description Service Date Service Provider Modifiers Qty    49960102196 HC GAIT TRAINING EA 15 MIN 6/11/2023 David Martin PTA GP 1    63225612325 HC PT THERAPEUTIC ACT EA 15 MIN 6/11/2023 David Martin PTA GP 1          PT G-Codes  Outcome Measure Options: AM-PAC 6 Clicks Basic Mobility (PT)  AM-PAC 6 Clicks Score (PT): 19  AM-PAC 6 Clicks Score (OT): 24    David Martin PTA  6/11/2023

## 2023-06-11 NOTE — PROGRESS NOTES
[facility]     Progress Note    Patient Name: Carol Abarca  : 1970  MRN: [id number]  Primary Care Physician:  Sonia Mosquera APRN  Date of admission: 2023    Subjective   Subjective     Chief Complaint: B12 deficiency, syncope, hypertension    HPI:  Patient Reports weakness and syncope.  Doing a little better now after adjusting her medications and starting on vitamin B12.    Review of Systems   Review of Systems   Constitutional:  Positive for fatigue. Activity change: up in chair.  HENT: Negative.     Eyes: Negative.    Respiratory: Negative.     Cardiovascular: Negative.    Gastrointestinal: Negative.    Endocrine: Negative.    Genitourinary: Negative.    Musculoskeletal: Negative.    Skin: Negative.    Allergic/Immunologic: Negative.    Neurological:  Positive for weakness.   Hematological: Negative.    Psychiatric/Behavioral: Negative.       Objective   Objective     Vitals:   Temp:  [97.3 °F (36.3 °C)-98.1 °F (36.7 °C)] 97.9 °F (36.6 °C)  Heart Rate:  [65-71] 70  Resp:  [16-20] 18  BP: (100-141)/(62-92) 141/90  Flow (L/min):  [2-3] 2  Physical Exam    Constitutional: Awake, alert   Eyes: PERRLA, sclerae anicteric, no conjunctival injection   HENT: NCAT, mucous membranes moist   Neck: Supple, no thyromegaly, no lymphadenopathy, trachea midline   Respiratory: Clear to auscultation bilaterally, nonlabored respirations    Cardiovascular: RRR, no murmurs, rubs, or gallops, palpable pedal pulses bilaterally   Gastrointestinal: Positive bowel sounds, soft, nontender, nondistended   Musculoskeletal: No bilateral ankle edema, no clubbing or cyanosis to extremities   Psychiatric: Appropriate affect, cooperative   Neurologic: Oriented x 3, strength symmetric in all extremities, Cranial Nerves grossly intact to confrontation, speech clear   Skin: No rashes     Result Review    Result Review:  I have personally reviewed the results from the time of this admission to 2023 13:41 EDT and agree with  these findings:  [x]  Laboratory  []  Microbiology  []  Radiology  []  EKG/Telemetry   []  Cardiology/Vascular   []  Pathology  []  Old records  []  Other:  Most notable findings include: Hemo-globin 8.5 yesterday    Assessment & Plan   Assessment / Plan     Brief Patient Summary:  Carol Abarca is a 52 y.o. female who is slowly improving on B12 shots and adjusting her blood pressure medications.  Seen by neurology and cardiology for syncope and hypertension    Active Hospital Problems:  Active Hospital Problems    Diagnosis     **Severe anemia     Hypotension     Abdominal pain     B12 deficiency     Syncope and collapse     Essential hypertension      Plan:   Continue B12 shots  Monitor hemoglobin    DVT prophylaxis:  Medical DVT prophylaxis orders are present.    CODE STATUS:   Level Of Support Discussed With: Patient  Code Status (Patient has no pulse and is not breathing): CPR (Attempt to Resuscitate)  Medical Interventions (Patient has pulse or is breathing): Full Support  Release to patient: Routine Release      Electronically signed by Viviana Lambert MD, 06/11/23, 1:41 PM EDT.

## 2023-06-12 ENCOUNTER — APPOINTMENT (OUTPATIENT)
Dept: CT IMAGING | Facility: HOSPITAL | Age: 53
End: 2023-06-12
Payer: MEDICARE

## 2023-06-12 PROBLEM — J90 PLEURAL EFFUSION: Status: ACTIVE | Noted: 2023-06-12

## 2023-06-12 PROBLEM — J90 PLEURAL EFFUSION: Status: ACTIVE | Noted: 2023-01-01

## 2023-06-12 LAB
ARTERIAL PATENCY WRIST A: POSITIVE
BASE EXCESS BLDA CALC-SCNC: -0.3 MMOL/L (ref -2–2)
BASOPHILS # BLD AUTO: 0.01 10*3/MM3 (ref 0–0.2)
BASOPHILS NFR BLD AUTO: 0.2 % (ref 0–1.5)
BDY SITE: ABNORMAL
CA-I BLDA-SCNC: 1.12 MMOL/L (ref 1.13–1.32)
CHLORIDE BLDA-SCNC: 101 MMOL/L (ref 98–106)
COHGB MFR BLD: 0.3 % (ref 0–1.5)
DEPRECATED RDW RBC AUTO: 77.4 FL (ref 37–54)
EOSINOPHIL # BLD AUTO: 0.02 10*3/MM3 (ref 0–0.4)
EOSINOPHIL NFR BLD AUTO: 0.3 % (ref 0.3–6.2)
ERYTHROCYTE [DISTWIDTH] IN BLOOD BY AUTOMATED COUNT: 20.3 % (ref 12.3–15.4)
FHHB: 6.6 % (ref 0–5)
GAS FLOW AIRWAY: 3 LPM
GLUCOSE BLDA-MCNC: 88 MG/DL (ref 65–99)
GLUCOSE BLDC GLUCOMTR-MCNC: 96 MG/DL (ref 70–99)
HCO3 BLDA-SCNC: 26.2 MMOL/L (ref 22–26)
HCT VFR BLD AUTO: 26.9 % (ref 34–46.6)
HGB BLD-MCNC: 8.3 G/DL (ref 12–15.9)
HGB BLDA-MCNC: 9.4 G/DL (ref 11.7–14.6)
IMM GRANULOCYTES # BLD AUTO: 0.02 10*3/MM3 (ref 0–0.05)
IMM GRANULOCYTES NFR BLD AUTO: 0.3 % (ref 0–0.5)
INHALED O2 CONCENTRATION: 32 %
LACTATE BLDA-SCNC: 0.73 MMOL/L (ref 0.5–2)
LYMPHOCYTES # BLD AUTO: 0.78 10*3/MM3 (ref 0.7–3.1)
LYMPHOCYTES NFR BLD AUTO: 11.7 % (ref 19.6–45.3)
MCH RBC QN AUTO: 32.2 PG (ref 26.6–33)
MCHC RBC AUTO-ENTMCNC: 30.9 G/DL (ref 31.5–35.7)
MCV RBC AUTO: 104.3 FL (ref 79–97)
METHGB BLD QL: 0.3 % (ref 0–1.5)
MODALITY: ABNORMAL
MONOCYTES # BLD AUTO: 0.54 10*3/MM3 (ref 0.1–0.9)
MONOCYTES NFR BLD AUTO: 8.1 % (ref 5–12)
NEUTROPHILS NFR BLD AUTO: 5.29 10*3/MM3 (ref 1.7–7)
NEUTROPHILS NFR BLD AUTO: 79.4 % (ref 42.7–76)
NRBC BLD AUTO-RTO: 0 /100 WBC (ref 0–0.2)
OXYHGB MFR BLDV: 92.8 % (ref 94–99)
PCO2 BLDA: 52.2 MM HG (ref 35–45)
PH BLDA: 7.32 PH UNITS (ref 7.35–7.45)
PLATELET # BLD AUTO: 354 10*3/MM3 (ref 140–450)
PMV BLD AUTO: 10.7 FL (ref 6–12)
PO2 BLD: 261 MM[HG] (ref 0–500)
PO2 BLDA: 83.4 MM HG (ref 80–100)
POTASSIUM BLDA-SCNC: 4.45 MMOL/L (ref 3.5–5)
RBC # BLD AUTO: 2.58 10*6/MM3 (ref 3.77–5.28)
SAO2 % BLDCOA: 93.4 % (ref 95–99)
SODIUM BLDA-SCNC: 134.9 MMOL/L (ref 136–146)
WBC NRBC COR # BLD: 6.66 10*3/MM3 (ref 3.4–10.8)

## 2023-06-12 PROCEDURE — 99222 1ST HOSP IP/OBS MODERATE 55: CPT | Performed by: STUDENT IN AN ORGANIZED HEALTH CARE EDUCATION/TRAINING PROGRAM

## 2023-06-12 PROCEDURE — 94761 N-INVAS EAR/PLS OXIMETRY MLT: CPT

## 2023-06-12 PROCEDURE — 25510000001 IOPAMIDOL PER 1 ML: Performed by: INTERNAL MEDICINE

## 2023-06-12 PROCEDURE — 94799 UNLISTED PULMONARY SVC/PX: CPT

## 2023-06-12 PROCEDURE — 82805 BLOOD GASES W/O2 SATURATION: CPT | Performed by: INTERNAL MEDICINE

## 2023-06-12 PROCEDURE — 83050 HGB METHEMOGLOBIN QUAN: CPT | Performed by: INTERNAL MEDICINE

## 2023-06-12 PROCEDURE — 70450 CT HEAD/BRAIN W/O DYE: CPT

## 2023-06-12 PROCEDURE — 82375 ASSAY CARBOXYHB QUANT: CPT | Performed by: INTERNAL MEDICINE

## 2023-06-12 PROCEDURE — 25010000002 ENOXAPARIN PER 10 MG: Performed by: INTERNAL MEDICINE

## 2023-06-12 PROCEDURE — 82948 REAGENT STRIP/BLOOD GLUCOSE: CPT

## 2023-06-12 PROCEDURE — 85025 COMPLETE CBC W/AUTO DIFF WBC: CPT | Performed by: INTERNAL MEDICINE

## 2023-06-12 PROCEDURE — 36600 WITHDRAWAL OF ARTERIAL BLOOD: CPT | Performed by: INTERNAL MEDICINE

## 2023-06-12 PROCEDURE — 70498 CT ANGIOGRAPHY NECK: CPT

## 2023-06-12 PROCEDURE — 0042T HC CT CEREBRAL PERFUSION W/WO CONTRAST: CPT

## 2023-06-12 PROCEDURE — 70496 CT ANGIOGRAPHY HEAD: CPT

## 2023-06-12 PROCEDURE — 25010000002 HYDRALAZINE PER 20 MG: Performed by: INTERNAL MEDICINE

## 2023-06-12 RX ORDER — FUROSEMIDE 20 MG/1
20 TABLET ORAL DAILY
Status: DISCONTINUED | OUTPATIENT
Start: 2023-06-12 | End: 2023-06-15 | Stop reason: HOSPADM

## 2023-06-12 RX ADMIN — FOLIC ACID 5 MG: 5 INJECTION, SOLUTION INTRAMUSCULAR; INTRAVENOUS; SUBCUTANEOUS at 10:46

## 2023-06-12 RX ADMIN — FUROSEMIDE 20 MG: 20 TABLET ORAL at 16:20

## 2023-06-12 RX ADMIN — QUETIAPINE FUMARATE 200 MG: 200 TABLET, EXTENDED RELEASE ORAL at 20:46

## 2023-06-12 RX ADMIN — LEVETIRACETAM 500 MG: 500 TABLET, FILM COATED ORAL at 20:46

## 2023-06-12 RX ADMIN — ENOXAPARIN SODIUM 40 MG: 100 INJECTION SUBCUTANEOUS at 20:45

## 2023-06-12 RX ADMIN — FAMOTIDINE 20 MG: 10 INJECTION INTRAVENOUS at 20:45

## 2023-06-12 RX ADMIN — METOPROLOL SUCCINATE 50 MG: 50 TABLET, EXTENDED RELEASE ORAL at 20:46

## 2023-06-12 RX ADMIN — GABAPENTIN 200 MG: 100 CAPSULE ORAL at 20:46

## 2023-06-12 RX ADMIN — HYDRALAZINE HYDROCHLORIDE 10 MG: 20 INJECTION, SOLUTION INTRAMUSCULAR; INTRAVENOUS at 16:31

## 2023-06-12 RX ADMIN — IOPAMIDOL 100 ML: 755 INJECTION, SOLUTION INTRAVENOUS at 10:17

## 2023-06-12 RX ADMIN — MONTELUKAST 10 MG: 10 TABLET, FILM COATED ORAL at 20:46

## 2023-06-12 RX ADMIN — PRAVASTATIN SODIUM 40 MG: 40 TABLET ORAL at 20:46

## 2023-06-12 RX ADMIN — DULOXETINE HYDROCHLORIDE 60 MG: 30 CAPSULE, DELAYED RELEASE ORAL at 20:46

## 2023-06-12 NOTE — PLAN OF CARE
Goal Outcome Evaluation:  Plan of Care Reviewed With: patient        Progress: no change     Pt is resting with no signs of distress. Will continue with plan of care.

## 2023-06-12 NOTE — PROGRESS NOTES
[facility]     Progress Note    Patient Name: Carol Abarca  : 1970  MRN: [id number]  Primary Care Physician:  Sonia Mosquera APRN  Date of admission: 2023    Subjective   Subjective     Chief Complaint: Vitamin B12 deficiency anemia,Hypotension,Syncope,AMS this am    HPI:  Patient Reports alert and talking now,CO2 high am,CT Head was negative    Review of Systems   Review of Systems   Constitutional: Positive for fatigue. Activity change: in bed.   HENT: Negative.    Eyes: Negative.    Respiratory: Negative.  Shortness of breath: CO2 high am.    Cardiovascular: Negative.    Gastrointestinal: Negative.    Endocrine: Negative.    Genitourinary: Negative.    Musculoskeletal: Negative.    Skin: Negative.    Neurological: Positive for weakness.   Hematological: Negative.    Psychiatric/Behavioral: Negative.        Objective   Objective     Vitals:   Temp:  [98 °F (36.7 °C)-98.6 °F (37 °C)] 98.6 °F (37 °C)  Heart Rate:  [65-78] 67  Resp:  [16-18] 18  BP: ()/() 158/108  Flow (L/min):  [2-4] 4  Physical Exam    Constitutional: Awake, alert   Eyes: PERRLA, sclerae anicteric, no conjunctival injection   HENT: NCAT, mucous membranes moist   Neck: Supple, no thyromegaly, no lymphadenopathy, trachea midline   Respiratory: Clear to auscultation bilaterally, nonlabored respirations    Cardiovascular: RRR, no murmurs, rubs, or gallops, palpable pedal pulses bilaterally   Gastrointestinal: Positive bowel sounds, soft, nontender, nondistended   Musculoskeletal: No bilateral ankle edema, no clubbing or cyanosis to extremities   Psychiatric: Appropriate affect, cooperative   Neurologic: Oriented x 3, strength symmetric in all extremities, Cranial Nerves grossly intact to confrontation, speech clear   Skin: No rashes     Result Review    Result Review:  I have personally reviewed the results from the time of this admission to 2023 18:16 EDT and agree with these findings:  [x]  Laboratory  []   Microbiology  []  Radiology  []  EKG/Telemetry   []  Cardiology/Vascular   []  Pathology  []  Old records  []  Other:  Most notable findings include: Vitamin B12 deficiency,CO2 high  Assessment & Plan   Assessment / Plan     Brief Patient Summary:  Carol Abarca is a 52 y.o. female who has hypertension,syncope,B12 deficiency    Active Hospital Problems:  Active Hospital Problems    Diagnosis    • **Severe anemia    • Pleural effusion    • Hypotension    • Abdominal pain    • B12 deficiency    • Syncope and collapse    • Essential hypertension      Plan:    Continue care  Evaluated by Tele Neurology    DVT prophylaxis:  Medical DVT prophylaxis orders are present.    CODE STATUS:   Level Of Support Discussed With: Patient  Code Status (Patient has no pulse and is not breathing): CPR (Attempt to Resuscitate)  Medical Interventions (Patient has pulse or is breathing): Full Support  Release to patient: Routine Release      Electronically signed by Viviana Lambert MD, 06/12/23, 6:16 PM EDT.

## 2023-06-12 NOTE — CONSULTS
Addendum: Hypercapnic and acidotic on ABG, suspect metabolic encephalopathy.     [facility]  [facility] Tele[facility]urology Consult Services      Patient Name:   Carol Abarca  YOB: 1970  Identification Number:   MRN - [id number]  Date of Service:   06/12/2023 08:58:43    Diagnosis:      •  G93.49 - Encephalopathy Multifactorial    Impression:      • 1. altered mental status   - in the setting of hypoxia, b12 deficiency, and remote gastric band   - b12 was <150 on 7/26/2023, has been on 1000mcg IM daily this admission.   - CTH no acute findings   - CTA no lvo or significant stenosis or aneurysm, incidental R pleural effusion and aorta aneursym ->work up per primary team   - suspect toxic/metabolic encephalopathy, history less suggestive of acute ischemic stroke or seizure or CNS infection. If no other cause for her AMS is found, then recommend LP for CSF infectious work up.          Our recommendations are outlined below.    Recommendations:        •  Stroke/Telemetry Floor      •  Neuro Checks q4h      •  Bedside Swallow Eval      •  DVT Prophylaxis      •  IV Fluids, Normal Saline      •  Head of Bed 30 Degrees      •  Euglycemia and Avoid Hyperthermia (PRN Acetaminophen)      •  Toxic/metabolic work up per primary team      •  If no other cause for her AMS is found, then recommend LP for CSF infectious work up.    Recommended Scan:      • MRI Head Without Contrast    Therapies:      •  Physical Therapy, Occupational Therapy, Speech Therapy Assessment When Applicable    Dysphagia:      •  Swallow Evaluation, Bedside      •  NPO Until Swallow Evaluation    DVT prophylaxis:      •  Choice of Primary Team    Disposition:      •  Neurology Follow Up Recommended    Sign Out:      •  Discussed with Primary Attending        ------------------------------------------------------------------------------    Advanced Imaging:  CTA Head and Neck Completed.    LVO:No    Patient doesn't meet criteria for  "emergent LUCIA consideration      Metrics:  Last Known Well: Unknown  [facility] Notification Time: 06/12/2023 08:58:10  Stamp Time: 06/12/2023 08:58:43  Initial Response Time: 06/12/2023 08:59:08  Symptoms: AMS.  Initial patient interaction: 06/12/2023 09:04:24  NIHSS Assessment Completed: 06/12/2023 09:09:44  Patient is not a candidate for Thrombolytic.  Thrombolytic Medical Decision: 06/12/2023 09:11:00  Patient was not deemed candidate for Thrombolytic because of following reasons:  Last Well Known Above 4.5 Hours.  Other Diagnosis suspected.    CT head showed no acute hemorrhage or acute core infarct.  I personally Reviewed the CT Head and it Showed No Acute Hemorrhage or Acute Core Infarct    Primary Provider Notified of Diagnostic Impression and Management Plan on: 06/12/2023 10:01:18  Spoke With: Dr. Razo  Able to Reach  06/12/2023 10:01:18        ------------------------------------------------------------------------------    History of Present Illness:  Patient is a 52 year old Female.    Inpatient stroke alert was called for symptoms of AMS.  Carol Abarca is a 53yo woman pmh HTN, HLD, SOCO noncompliant w/Tx, PE not on AC, anemia, diabetes mellitus, arthritis, gastric lap band, gastritis, esophagitis who presented to the ED on 5/31 after syncopal episode and is being treatemtn for being treated for severe anemia, B12 deficiency, hypotension, syncope. Stroke alert called today for altered mental status. History obtained from patient's primary RN at bedside. Says she had the patient yesterday and her baseline is normal, when she came in and saw the patient at shift change today she had altered mental status and was not acting the way she was yesterday. Came in later to check on patient to find her minimally responsive, initially was not on her bipap and 02 in the \"70s\", 02 saturation improved to normal with bipap but patient's mentation did not improve, could not say birthday or where she was and is " "generally weakness.    LNW sometime overnight    Dr. Hall  [phone]       Past Medical History:      • There is no history of Stroke      • There is no history of Seizures    Medications:    No Anticoagulant use   No Antiplatelet use  Reviewed EMR for current medications    Allergies:   Reviewed    Social History:  Unable To Obtain Due To Patient Status : Patient Cannot Communicate Relevant Social History    Family History:    There is no family history of premature cerebrovascular disease pertinent to this consultation    ROS :  14 Points Review of Systems was performed and was negative except mentioned in HPI.    Past Surgical History:  There Is No Surgical History Contributory To Today’s Visit         Examination:  BP(113/66), Pulse(78), Blood Glucose(96)  1A: Level of Consciousness - Alert; keenly responsive + 0  1B: Ask Month and Age - Could Not Answer Either Question Correctly + 2  1C: Blink Eyes & Squeeze Hands - Performs Both Tasks + 0  2: Test Horizontal Extraocular Movements - Normal + 0  3: Test Visual Fields - No Visual Loss + 0  4: Test Facial Palsy (Use Grimace if Obtunded) - Normal symmetry + 0  5A: Test Left Arm Motor Drift - Some Effort Against Gravity + 2  5B: Test Right Arm Motor Drift - Some Effort Against Gravity + 2  6A: Test Left Leg Motor Drift - Some Effort Against Gravity + 2  6B: Test Right Leg Motor Drift - Some Effort Against Gravity + 2  7: Test Limb Ataxia (FNF/Heel-Shin) - No Ataxia + 0  8: Test Sensation - Mild-Moderate Loss: Can Sense Being Touched + 1  9: Test Language/Aphasia - Severe Aphasia: Fragmentary Expression, Inference Needed, Cannot Identify Materials + 2  10: Test Dysarthria - Normal + 0  11: Test Extinction/Inattention - No abnormality + 0    NIHSS Score: 13  NIHSS Free Text : Poor attention, requires repeat prompt to follow simple commands. Cannot follow multistep commands. Age/month/location/situation \"I don't know\". Decreased LT in R face. Fluctuating effort, " does better on strength testing with coaching.    Pre-Morbid Modified Camas Scale:  0 Points = No symptoms at all    I reviewed the available imaging via A.I. software Rapid and initiated discussion with the primary provider    Patient/Family was informed the Neurology Consult would occur via TeleHealth consult by way of interactive audio and video telecommunications and consented to receiving care in this manner.      Patient is being evaluated for possible acute neurologic impairment and high probability of imminent or life-threatening deterioration. I spent total of 35 minutes providing care to this patient, including time for face to face visit via telemedicine, review of medical records, imaging studies and discussion of findings with providers, the patient and/or family.      Dr Sourav Miller      [facility]  [phone]    Case [id number]

## 2023-06-12 NOTE — PROGRESS NOTES
[facility]     Progress Note    Patient Name: Carol Abarca  : 1970  MRN: [id number]  Primary Care Physician:  Sonia Mosquera APRN  Date of admission: 2023      Subjective   Brief summary.  Patient admitted with syncope and anemia.  Further transferred to PCU for recurrent syncope.  Since then patient having hypo and hypertension episodes, cardiologist on board      HPI:  RRT called this morning  Seen patient during RRT, arousable.  But weak and fatigued, answering questions with a low volume.  Complains of weakness fatigue and tingling numbness, very nonspecific.  No shortness of breath, blood pressure better    Review of Systems     Tingling numbness and weakness of the upper extremity  No chest pain, no shortness of  Fatigue and weakness  Blood pressure stabilized    Objective     Vitals:   Temp:  [98 °F (36.7 °C)-98.6 °F (37 °C)] 98.6 °F (37 °C)  Heart Rate:  [65-78] 72  Resp:  [16-18] 18  BP: ()/() 161/105  Flow (L/min):  [2-4] 4    Physical Exam :     Middle-age obese female not in acute distress, tired looking..  Neck supple.  Heart regular.  Lungs diminished breath sounds.  Abdomen is obese, nontender.  Neurologically awake alert but slow to respond.  Generalized weakness/global weakness noted.  Extremities trace of edema.        Result Review:  I have personally reviewed the results from the time of this admission to 2023 15:53 EDT and agree with these findings:  [x]  Laboratory  []  Microbiology  []  Radiology  []  EKG/Telemetry   []  Cardiology/Vascular   []  Pathology  []  Old records  []  Other:  CT brain negative  CTA without any acute finding  CT angiograms reviewed      Assessment / Plan       Active Hospital Problems:  Active Hospital Problems    Diagnosis    • **Severe anemia    • Pleural effusion    • Hypotension    • Abdominal pain    • B12 deficiency    • Syncope and collapse    • Essential hypertension        Plan:   RRT called this morning.  Stable.  No acute  findings.  CO2 retention noted.  Restart BiPAP.  Lasix 20 daily.  Otherwise negative work-up.  PT OT.  Continue rehab efforts/  Check labs in a.m.    Total time spent in patient eval exam discussion with the nursing staff and on-call  neurologist on telemetry neurology, discussion with patient approximately 41 minutes      DVT prophylaxis:  Medical DVT prophylaxis orders are present.    CODE STATUS:   Level Of Support Discussed With: Patient  Code Status (Patient has no pulse and is not breathing): CPR (Attempt to Resuscitate)  Medical Interventions (Patient has pulse or is breathing): Full Support  Release to patient: Routine Release      Electronically signed by Dung Hall MD, 06/12/23, 3:55 PM EDT.

## 2023-06-13 LAB
ALBUMIN SERPL ELPH-MCNC: 3.1 G/DL (ref 2.9–4.4)
ALBUMIN/GLOB SERPL: 0.9 {RATIO} (ref 0.7–1.7)
ALPHA1 GLOB SERPL ELPH-MCNC: 0.3 G/DL (ref 0–0.4)
ALPHA2 GLOB SERPL ELPH-MCNC: 0.7 G/DL (ref 0.4–1)
B-GLOBULIN SERPL ELPH-MCNC: 1 G/DL (ref 0.7–1.3)
GAMMA GLOB SERPL ELPH-MCNC: 1.6 G/DL (ref 0.4–1.8)
GLOBULIN SER-MCNC: 3.6 G/DL (ref 2.2–3.9)
IGA SERPL-MCNC: 430 MG/DL (ref 87–352)
IGG SERPL-MCNC: 1698 MG/DL (ref 586–1602)
IGM SERPL-MCNC: 51 MG/DL (ref 26–217)
INTERPRETATION SERPL IEP-IMP: ABNORMAL
LABORATORY COMMENT REPORT: ABNORMAL
M PROTEIN SERPL ELPH-MCNC: ABNORMAL G/DL
PROT SERPL-MCNC: 6.7 G/DL (ref 6–8.5)

## 2023-06-13 PROCEDURE — 94761 N-INVAS EAR/PLS OXIMETRY MLT: CPT

## 2023-06-13 PROCEDURE — 25010000002 CYANOCOBALAMIN PER 1000 MCG: Performed by: INTERNAL MEDICINE

## 2023-06-13 PROCEDURE — 25010000002 ENOXAPARIN PER 10 MG: Performed by: INTERNAL MEDICINE

## 2023-06-13 PROCEDURE — 94799 UNLISTED PULMONARY SVC/PX: CPT

## 2023-06-13 PROCEDURE — 97110 THERAPEUTIC EXERCISES: CPT

## 2023-06-13 PROCEDURE — 97116 GAIT TRAINING THERAPY: CPT

## 2023-06-13 RX ADMIN — LEVETIRACETAM 500 MG: 500 TABLET, FILM COATED ORAL at 20:52

## 2023-06-13 RX ADMIN — DULOXETINE HYDROCHLORIDE 60 MG: 30 CAPSULE, DELAYED RELEASE ORAL at 20:52

## 2023-06-13 RX ADMIN — PRAVASTATIN SODIUM 40 MG: 40 TABLET ORAL at 20:52

## 2023-06-13 RX ADMIN — MONTELUKAST 10 MG: 10 TABLET, FILM COATED ORAL at 20:52

## 2023-06-13 RX ADMIN — LISINOPRIL 40 MG: 20 TABLET ORAL at 08:40

## 2023-06-13 RX ADMIN — CYANOCOBALAMIN 1000 MCG: 1000 INJECTION, SOLUTION INTRAMUSCULAR at 08:41

## 2023-06-13 RX ADMIN — AMLODIPINE BESYLATE 5 MG: 5 TABLET ORAL at 08:40

## 2023-06-13 RX ADMIN — CETIRIZINE HYDROCHLORIDE 10 MG: 10 TABLET, FILM COATED ORAL at 08:40

## 2023-06-13 RX ADMIN — FOLIC ACID 5 MG: 5 INJECTION, SOLUTION INTRAMUSCULAR; INTRAVENOUS; SUBCUTANEOUS at 08:42

## 2023-06-13 RX ADMIN — FAMOTIDINE 20 MG: 10 INJECTION INTRAVENOUS at 20:52

## 2023-06-13 RX ADMIN — ENOXAPARIN SODIUM 40 MG: 100 INJECTION SUBCUTANEOUS at 20:51

## 2023-06-13 RX ADMIN — Medication 5 MG: at 08:40

## 2023-06-13 RX ADMIN — METOPROLOL SUCCINATE 50 MG: 50 TABLET, EXTENDED RELEASE ORAL at 20:52

## 2023-06-13 RX ADMIN — GABAPENTIN 200 MG: 100 CAPSULE ORAL at 20:52

## 2023-06-13 RX ADMIN — METOPROLOL SUCCINATE 50 MG: 50 TABLET, EXTENDED RELEASE ORAL at 08:40

## 2023-06-13 RX ADMIN — LEVETIRACETAM 500 MG: 500 TABLET, FILM COATED ORAL at 08:40

## 2023-06-13 RX ADMIN — DULOXETINE HYDROCHLORIDE 60 MG: 30 CAPSULE, DELAYED RELEASE ORAL at 08:40

## 2023-06-13 RX ADMIN — QUETIAPINE FUMARATE 200 MG: 200 TABLET, EXTENDED RELEASE ORAL at 20:52

## 2023-06-13 RX ADMIN — GABAPENTIN 200 MG: 100 CAPSULE ORAL at 08:40

## 2023-06-13 RX ADMIN — FAMOTIDINE 20 MG: 10 INJECTION INTRAVENOUS at 08:40

## 2023-06-13 RX ADMIN — SENNOSIDES AND DOCUSATE SODIUM 2 TABLET: 50; 8.6 TABLET ORAL at 08:40

## 2023-06-13 RX ADMIN — FUROSEMIDE 20 MG: 20 TABLET ORAL at 08:40

## 2023-06-13 NOTE — PROGRESS NOTES
[facility]     Progress Note    Patient Name: Carol Abarca  : 1970  MRN: [id number]  Primary Care Physician:  Sonia Mosquera APRN  Date of admission: 2023      Subjective   Brief summary.  Patient admitted with syncope and anemia.  Further transferred to PCU for recurrent syncope.  Since then patient having hypo and hypertension episodes, cardiologist on board      HPI:  Patient more awake alert this morning still extremely tired fatigue, blood pressure stable, no chest pain or shortness of breath reported, complains of aches and pains, chronic in nature    Review of Systems     Reports fatigue and weakness.  No chest pain or abdominal pain.  Not doing much activity at all    Objective     Vitals:   Temp:  [97.3 °F (36.3 °C)-98.4 °F (36.9 °C)] 98.4 °F (36.9 °C)  Heart Rate:  [67-76] 75  Resp:  [18-20] 18  BP: (140-182)/() 176/98  Flow (L/min):  [2-4] 2    Physical Exam :     Middle-age obese female not in acute distress, tired looking..  Neck supple.  Heart regular.  Lungs diminished breath sounds.  Abdomen is obese, nontender.  Neurologically alert and oriented, slowed response. generalized weakness/global weakness noted.  Extremities trace of edema.        Result Review:  I have personally reviewed the results from the time of this admission to 2023 15:55 EDT and agree with these findings:  [x]  Laboratory  []  Microbiology  []  Radiology  []  EKG/Telemetry   []  Cardiology/Vascular   []  Pathology  []  Old records  []  Other:  CT brain negative  CTA without any acute finding  CT angiograms reviewed      Assessment / Plan       Active Hospital Problems:  Active Hospital Problems    Diagnosis     **Severe anemia     Pleural effusion     Hypotension     Abdominal pain     B12 deficiency     Syncope and collapse     Essential hypertension        Plan:   Stable today  Continue current treatment.  Check labs in a.m..  Increase activity as tolerated      DVT prophylaxis:  Medical DVT  prophylaxis orders are present.    CODE STATUS:   Level Of Support Discussed With: Patient  Code Status (Patient has no pulse and is not breathing): CPR (Attempt to Resuscitate)  Medical Interventions (Patient has pulse or is breathing): Full Support  Release to patient: Routine Release      Electronically signed by Dung Hall MD, 06/13/23, 3:57 PM EDT.

## 2023-06-13 NOTE — THERAPY TREATMENT NOTE
Acute Care - Physical Therapy Treatment Note   Edi     Patient Name: Carol Abarca  : 1970  MRN: [id number]  Today's Date: 2023      Visit Dx:     ICD-10-CM ICD-9-CM   1. Syncope and collapse  R55 780.2   2. Pancytopenia  D61.818 284.19   3. Difficulty walking  R26.2 719.7   4. Decreased activities of daily living (ADL)  Z78.9 V49.89   5. Difficulty in walking  R26.2 719.7   6. Severe anemia  D64.9 285.9     Patient Active Problem List   Diagnosis    Mixed hyperlipidemia    Essential hypertension    Mild left ventricular hypertrophy    Atypical chest pain    Obstructive sleep apnea    History of pulmonary embolism    Screening for colon cancer    Hemorrhoids    Syncope and collapse    Severe anemia    Abdominal pain    B12 deficiency    Hypotension    Pleural effusion     Past Medical History:   Diagnosis Date    Anemia     Arthritis     Diabetes     Essential hypertension 2021    History of pulmonary embolism     Lumbago     Low back pain    Mild left ventricular hypertrophy 2021    Mixed hyperlipidemia 2021    Obstructive sleep apnea     Reflux esophagitis     Seasonal allergies      Past Surgical History:   Procedure Laterality Date    APPENDECTOMY  2010     SECTION      , , ,     COLONOSCOPY      2019 2018    COLONOSCOPY N/A 2022    Procedure: COLONOSCOPY;  Surgeon: Radha James MD;  Location: [facility] ENDOSCOPY;  Service: Gastroenterology;  Laterality: N/A;  COLON POLYP     ENDOSCOPY  2019    ENDOSCOPY N/A 2023    Procedure: ESOPHAGOGASTRODUODENOSCOPY WITH BIPOSIES;  Surgeon: Coy Patrick MD;  Location: [facility] ENDOSCOPY;  Service: Gastroenterology;  Laterality: N/A;  PRIOR LAPBAND, GASTRITIS    HEMORRHOIDECTOMY N/A 2022    Procedure: HEMORRHOIDECTOMY;  Surgeon: Remi Reeder MD;  Location: [facility] MAIN OR;  Service: General;  Laterality: N/A;    HERNIA REPAIR      2005, 2006, 2007, 2008    HYSTERECTOMY  2004     LAPAROSCOPIC GASTRIC BANDING      OTHER SURGICAL HISTORY      Metal implants     PT Assessment (last 12 hours)       PT Evaluation and Treatment       [facility] Name 06/13/23 1443          Physical Therapy Time and Intention    Subjective Information no complaints (P)   -KD     Document Type therapy note (daily note) (P)   -KD     Mode of Treatment individual therapy;physical therapy (P)   -KD     Patient Effort good (P)   -KD       Row Name 06/13/23 1443          Bed Mobility    Bed Mobility bed mobility (all) activities (P)   patient was up in recliner upon PT entry  -KD       Row Name 06/13/23 1443          Transfers    Transfers sit-stand transfer;stand-sit transfer (P)   -KD       Row Name 06/13/23 1443          Sit-Stand Transfer    Sit-Stand Brule (Transfers) standby assist (P)   -KD     Assistive Device (Sit-Stand Transfers) walker, front-wheeled (P)   -KD       Row Name 06/13/23 1443          Stand-Sit Transfer    Stand-Sit Brule (Transfers) standby assist (P)   -KD     Assistive Device (Stand-Sit Transfers) walker, front-wheeled (P)   -KD       Row Name 06/13/23 1443          Gait/Stairs (Locomotion)    Gait/Stairs Locomotion gait/ambulation assistive device (P)   -KD     Brule Level (Gait) contact guard (P)   -KD     Assistive Device (Gait) walker, front-wheeled (P)   -KD     Distance in Feet (Gait) 60 feet (P)   -KD     Deviations/Abnormal Patterns (Gait) base of support, narrow;gait speed decreased;stride length decreased (P)   -KD       Row Name 06/13/23 1443          Balance    Balance Assessment standing dynamic balance (P)   -KD     Dynamic Standing Balance contact guard (P)   -KD     Position/Device Used, Standing Balance supported;walker, front-wheeled (P)   -KD       [facility] Name 06/13/23 1443          Motor Skills    Therapeutic Exercise ankle;knee;hip (P)   -KD       Row Name 06/13/23 1443          Hip (Therapeutic Exercise)    Hip (Therapeutic Exercise) strengthening exercise (P)    -KD     Hip Strengthening (Therapeutic Exercise) bilateral;marching while standing;marching while seated;20 repititions;aBduction (P)   10 standing marches, 10 seated marches  -KD       Row Name 06/13/23 1443          Knee (Therapeutic Exercise)    Knee (Therapeutic Exercise) strengthening exercise (P)   -KD     Knee Strengthening (Therapeutic Exercise) bilateral;LAQ (long arc quad);sitting;20 repititions (P)   -KD       Row Name 06/13/23 1443          Ankle (Therapeutic Exercise)    Ankle (Therapeutic Exercise) AROM (active range of motion) (P)   -KD     Ankle AROM (Therapeutic Exercise) bilateral;dorsiflexion;plantarflexion;sitting;20 repititions (P)   -KD       Row Name 06/13/23 1443          Vital Signs    Pre SpO2 (%) 100 (P)   -KD     O2 Delivery Pre Treatment supplemental O2 (P)   2L  -KD     Intra SpO2 (%) 89 (P)   -KD     O2 Delivery Intra Treatment supplemental O2 (P)   2L  -KD     Post SpO2 (%) 100 (P)   -KD     O2 Delivery Post Treatment supplemental O2 (P)   2L  -KD       Row Name 06/13/23 1443          Progress Summary (PT)    Daily Progress Summary (PT) Patient tolerated ambulating 60 feet and performing therapeutic exercises in the recliner. She would continue to benefit from inpatient PT services during her stay at the hospital. (P)   -KD               User Key  (r) = Recorded By, (t) = Taken By, (c) = Cosigned By      Initials Name Provider Type    Lynne Schwab, PT Student PT Student                    Physical Therapy Education       Title: PT OT SLP Therapies (Done)       Topic: Physical Therapy (Done)       Point: Mobility training (Done)       Learning Progress Summary             Patient Acceptance, E,TB, VU by MD at 6/13/2023 1015    Acceptance, E,TB, VU by DESEAN at 6/7/2023 0918                                         User Key       Initials Effective Dates Name Provider Type Discipline    DESEAN 06/03/21 -  Aravind Meade PT Physical Therapist PT    MD 01/17/22 -  Nury Liu, RN  Registered Nurse Nurse                  PT Recommendation and Plan     Progress Summary (PT)  Daily Progress Summary (PT): (P) Patient tolerated ambulating 60 feet and performing therapeutic exercises in the recliner. She would continue to benefit from inpatient PT services during her stay at the hospital.   Outcome Measures       Row Name 06/13/23 1400 06/11/23 0900          How much help from another person do you currently need...    Turning from your back to your side while in flat bed without using bedrails? 4 (P)   -KD 4  -CS     Moving from lying on back to sitting on the side of a flat bed without bedrails? 4 (P)   -KD 3  -CS     Moving to and from a bed to a chair (including a wheelchair)? 3 (P)   -KD 3  -CS     Standing up from a chair using your arms (e.g., wheelchair, bedside chair)? 4 (P)   -KD 3  -CS     Climbing 3-5 steps with a railing? 3 (P)   -KD 3  -CS     To walk in hospital room? 4 (P)   -KD 3  -CS     AM-PAC 6 Clicks Score (PT) 22 (P)   -KD 19  -CS        Functional Assessment    Outcome Measure Options AM-PAC 6 Clicks Basic Mobility (PT) (P)   -KD AM-PAC 6 Clicks Basic Mobility (PT)  -CS               User Key  (r) = Recorded By, (t) = Taken By, (c) = Cosigned By      Initials Name Provider Type    David Oconnell, PTA Physical Therapist Assistant    Lynne Schwab, PT Student PT Student                     Time Calculation:    PT Charges       Row Name 06/13/23 1448             Time Calculation    PT Received On 06/13/23 (P)   -KD         Timed Charges    38441 - PT Therapeutic Exercise Minutes 15 (P)   -KD      92432 - Gait Training Minutes  10 (P)   -KD      13832 - PT Therapeutic Activity Minutes 3 (P)   -KD         Total Minutes    Timed Charges Total Minutes 28 (P)   -KD       Total Minutes 28 (P)   -KD                User Key  (r) = Recorded By, (t) = Taken By, (c) = Cosigned By      Initials Name Provider Type    Lynne Schwab, PT Student PT Student                      PT  G-Codes  Outcome Measure Options: (P) AM-PAC 6 Clicks Basic Mobility (PT)  AM-PAC 6 Clicks Score (PT): (P) 22  AM-PAC 6 Clicks Score (OT): 24    Lynne Yost, PT Student  6/13/2023

## 2023-06-13 NOTE — PROGRESS NOTES
[facility]     Progress Note    Patient Name: Carol Abarac  : 1970  MRN: [id number]  Primary Care Physician:  Sonia Mosquera APRN  Date of admission: 2023    Subjective   Subjective     Chief Complaint: Syncope,hypotension,B12 deficiency    HPI:  Patient Reports she is doing better.    Review of Systems   Review of Systems   Constitutional: Activity change: up in chair.   HENT: Negative.    Eyes: Negative.    Respiratory: Negative.    Cardiovascular: Negative.    Gastrointestinal: Negative.    Endocrine: Negative.    Genitourinary: Negative.    Musculoskeletal: Negative.    Skin: Negative.    Neurological: Positive for dizziness.   Hematological: Negative.    Psychiatric/Behavioral: Negative.        Objective   Objective     Vitals:   Temp:  [97.3 °F (36.3 °C)-98.6 °F (37 °C)] 98.4 °F (36.9 °C)  Heart Rate:  [67-76] 70  Resp:  [18-20] 18  BP: (140-182)/() 177/100  Flow (L/min):  [2-4] 2  Physical Exam    Constitutional: Awake, alert   Eyes: PERRLA, sclerae anicteric, no conjunctival injection   HENT: NCAT, mucous membranes moist   Neck: Supple, no thyromegaly, no lymphadenopathy, trachea midline   Respiratory: Clear to auscultation bilaterally, nonlabored respirations    Cardiovascular: RRR, no murmurs, rubs, or gallops, palpable pedal pulses bilaterally   Gastrointestinal: Positive bowel sounds, soft, nontender, nondistended   Musculoskeletal: No bilateral ankle edema, no clubbing or cyanosis to extremities   Psychiatric: Appropriate affect, cooperative   Neurologic: Oriented x 3, strength symmetric in all extremities, Cranial Nerves grossly intact to confrontation, speech clear   Skin: No rashes     Result Review    Result Review:  I have personally reviewed the results from the time of this admission to 2023 14:05 EDT and agree with these findings:  [x]  Laboratory  []  Microbiology  []  Radiology  []  EKG/Telemetry   []  Cardiology/Vascular   []  Pathology  []  Old records  []   Other:  Most notable findings include: Anemia,hypotension  Assessment & Plan   Assessment / Plan     Brief Patient Summary:  Carol Abarca is a 52 y.o. female who is being treated for hypotension/hypertension,dyspnea    Active Hospital Problems:  Active Hospital Problems    Diagnosis    • **Severe anemia    • Pleural effusion    • Hypotension    • Abdominal pain    • B12 deficiency    • Syncope and collapse    • Essential hypertension      Plan:    Continue care    DVT prophylaxis:  Medical DVT prophylaxis orders are present.    CODE STATUS:   Level Of Support Discussed With: Patient  Code Status (Patient has no pulse and is not breathing): CPR (Attempt to Resuscitate)  Medical Interventions (Patient has pulse or is breathing): Full Support  Release to patient: Routine Release      Electronically signed by Viviana Lambert MD, 06/13/23, 2:05 PM EDT.

## 2023-06-14 LAB
ALBUMIN SERPL-MCNC: 3.6 G/DL (ref 3.5–5.2)
ALBUMIN/GLOB SERPL: 0.9 G/DL
ALP SERPL-CCNC: 86 U/L (ref 39–117)
ALT SERPL W P-5'-P-CCNC: 7 U/L (ref 1–33)
ANION GAP SERPL CALCULATED.3IONS-SCNC: 11.6 MMOL/L (ref 5–15)
AST SERPL-CCNC: 14 U/L (ref 1–32)
BASOPHILS # BLD AUTO: 0.02 10*3/MM3 (ref 0–0.2)
BASOPHILS NFR BLD AUTO: 0.3 % (ref 0–1.5)
BILIRUB SERPL-MCNC: 0.7 MG/DL (ref 0–1.2)
BUN SERPL-MCNC: 11 MG/DL (ref 6–20)
BUN/CREAT SERPL: 14.5 (ref 7–25)
CALCIUM SPEC-SCNC: 8.8 MG/DL (ref 8.6–10.5)
CHLORIDE SERPL-SCNC: 93 MMOL/L (ref 98–107)
CO2 SERPL-SCNC: 32.4 MMOL/L (ref 22–29)
CREAT SERPL-MCNC: 0.76 MG/DL (ref 0.57–1)
DEPRECATED RDW RBC AUTO: 69.6 FL (ref 37–54)
EGFRCR SERPLBLD CKD-EPI 2021: 94.4 ML/MIN/1.73
EOSINOPHIL # BLD AUTO: 0.05 10*3/MM3 (ref 0–0.4)
EOSINOPHIL NFR BLD AUTO: 0.8 % (ref 0.3–6.2)
ERYTHROCYTE [DISTWIDTH] IN BLOOD BY AUTOMATED COUNT: 19.1 % (ref 12.3–15.4)
GLOBULIN UR ELPH-MCNC: 3.8 GM/DL
GLUCOSE SERPL-MCNC: 97 MG/DL (ref 65–99)
HCT VFR BLD AUTO: 29.3 % (ref 34–46.6)
HGB BLD-MCNC: 9.3 G/DL (ref 12–15.9)
IMM GRANULOCYTES # BLD AUTO: 0.03 10*3/MM3 (ref 0–0.05)
IMM GRANULOCYTES NFR BLD AUTO: 0.5 % (ref 0–0.5)
LYMPHOCYTES # BLD AUTO: 0.81 10*3/MM3 (ref 0.7–3.1)
LYMPHOCYTES NFR BLD AUTO: 12.5 % (ref 19.6–45.3)
MCH RBC QN AUTO: 31.7 PG (ref 26.6–33)
MCHC RBC AUTO-ENTMCNC: 31.7 G/DL (ref 31.5–35.7)
MCV RBC AUTO: 100 FL (ref 79–97)
MONOCYTES # BLD AUTO: 0.63 10*3/MM3 (ref 0.1–0.9)
MONOCYTES NFR BLD AUTO: 9.7 % (ref 5–12)
NEUTROPHILS NFR BLD AUTO: 4.94 10*3/MM3 (ref 1.7–7)
NEUTROPHILS NFR BLD AUTO: 76.2 % (ref 42.7–76)
NRBC BLD AUTO-RTO: 0 /100 WBC (ref 0–0.2)
PLATELET # BLD AUTO: 387 10*3/MM3 (ref 140–450)
PMV BLD AUTO: 11 FL (ref 6–12)
POTASSIUM SERPL-SCNC: 3.6 MMOL/L (ref 3.5–5.2)
PROT SERPL-MCNC: 7.4 G/DL (ref 6–8.5)
RBC # BLD AUTO: 2.93 10*6/MM3 (ref 3.77–5.28)
SODIUM SERPL-SCNC: 137 MMOL/L (ref 136–145)
WBC NRBC COR # BLD: 6.48 10*3/MM3 (ref 3.4–10.8)

## 2023-06-14 PROCEDURE — 25010000002 CYANOCOBALAMIN PER 1000 MCG: Performed by: INTERNAL MEDICINE

## 2023-06-14 PROCEDURE — 25010000002 ENOXAPARIN PER 10 MG: Performed by: INTERNAL MEDICINE

## 2023-06-14 PROCEDURE — 94799 UNLISTED PULMONARY SVC/PX: CPT

## 2023-06-14 PROCEDURE — 80053 COMPREHEN METABOLIC PANEL: CPT | Performed by: INTERNAL MEDICINE

## 2023-06-14 PROCEDURE — 25010000002 ONDANSETRON PER 1 MG: Performed by: INTERNAL MEDICINE

## 2023-06-14 PROCEDURE — 85025 COMPLETE CBC W/AUTO DIFF WBC: CPT | Performed by: INTERNAL MEDICINE

## 2023-06-14 PROCEDURE — 97116 GAIT TRAINING THERAPY: CPT

## 2023-06-14 PROCEDURE — 94761 N-INVAS EAR/PLS OXIMETRY MLT: CPT

## 2023-06-14 PROCEDURE — 25010000002 HYDRALAZINE PER 20 MG: Performed by: INTERNAL MEDICINE

## 2023-06-14 RX ADMIN — AMLODIPINE BESYLATE 5 MG: 5 TABLET ORAL at 08:55

## 2023-06-14 RX ADMIN — MONTELUKAST 10 MG: 10 TABLET, FILM COATED ORAL at 21:36

## 2023-06-14 RX ADMIN — GABAPENTIN 200 MG: 100 CAPSULE ORAL at 21:36

## 2023-06-14 RX ADMIN — Medication 5 MG: at 08:54

## 2023-06-14 RX ADMIN — CETIRIZINE HYDROCHLORIDE 10 MG: 10 TABLET, FILM COATED ORAL at 08:55

## 2023-06-14 RX ADMIN — METOPROLOL SUCCINATE 50 MG: 50 TABLET, EXTENDED RELEASE ORAL at 08:55

## 2023-06-14 RX ADMIN — HYDROCODONE BITARTRATE AND ACETAMINOPHEN 1 TABLET: 10; 325 TABLET ORAL at 19:44

## 2023-06-14 RX ADMIN — LEVETIRACETAM 500 MG: 500 TABLET, FILM COATED ORAL at 21:36

## 2023-06-14 RX ADMIN — FOLIC ACID 5 MG: 5 INJECTION, SOLUTION INTRAMUSCULAR; INTRAVENOUS; SUBCUTANEOUS at 08:55

## 2023-06-14 RX ADMIN — CYANOCOBALAMIN 1000 MCG: 1000 INJECTION, SOLUTION INTRAMUSCULAR at 08:54

## 2023-06-14 RX ADMIN — FAMOTIDINE 20 MG: 10 INJECTION INTRAVENOUS at 08:54

## 2023-06-14 RX ADMIN — METOPROLOL SUCCINATE 50 MG: 50 TABLET, EXTENDED RELEASE ORAL at 21:36

## 2023-06-14 RX ADMIN — QUETIAPINE FUMARATE 200 MG: 200 TABLET, EXTENDED RELEASE ORAL at 21:36

## 2023-06-14 RX ADMIN — LISINOPRIL 40 MG: 20 TABLET ORAL at 08:55

## 2023-06-14 RX ADMIN — DULOXETINE HYDROCHLORIDE 60 MG: 30 CAPSULE, DELAYED RELEASE ORAL at 08:54

## 2023-06-14 RX ADMIN — ENOXAPARIN SODIUM 40 MG: 100 INJECTION SUBCUTANEOUS at 19:44

## 2023-06-14 RX ADMIN — ONDANSETRON 4 MG: 2 INJECTION INTRAMUSCULAR; INTRAVENOUS at 19:43

## 2023-06-14 RX ADMIN — PRAVASTATIN SODIUM 40 MG: 40 TABLET ORAL at 21:36

## 2023-06-14 RX ADMIN — DULOXETINE HYDROCHLORIDE 60 MG: 30 CAPSULE, DELAYED RELEASE ORAL at 21:36

## 2023-06-14 RX ADMIN — HYDRALAZINE HYDROCHLORIDE 10 MG: 20 INJECTION, SOLUTION INTRAMUSCULAR; INTRAVENOUS at 19:43

## 2023-06-14 RX ADMIN — FUROSEMIDE 20 MG: 20 TABLET ORAL at 08:55

## 2023-06-14 RX ADMIN — GABAPENTIN 200 MG: 100 CAPSULE ORAL at 08:56

## 2023-06-14 RX ADMIN — FAMOTIDINE 20 MG: 10 INJECTION INTRAVENOUS at 21:36

## 2023-06-14 RX ADMIN — LEVETIRACETAM 500 MG: 500 TABLET, FILM COATED ORAL at 08:55

## 2023-06-14 NOTE — PROGRESS NOTES
[facility]     Progress Note    Patient Name: Carol Abarca  : 1970  MRN: [id number]  Primary Care Physician:  Sonia Mosquera APRN  Date of admission: 2023    Subjective   Subjective     Chief Complaint: B12 deficiency Anemia,Syncope,Hypertension    HPI:  Patient Reports she has not had any syncopal episodes,did have breathing problems a few days ago.    Review of Systems   Review of Systems   Constitutional: Positive for fatigue. Activity change: sitting up side of bed.   HENT: Negative.    Eyes: Negative.    Respiratory: Negative.    Cardiovascular: Negative.    Gastrointestinal: Negative.    Endocrine: Negative.    Genitourinary: Negative.    Musculoskeletal: Negative.    Skin: Negative.    Allergic/Immunologic: Negative.    Neurological: Positive for dizziness and weakness.   Hematological: Negative.    Psychiatric/Behavioral: Negative.        Objective   Objective     Vitals:   Temp:  [98 °F (36.7 °C)-99.7 °F (37.6 °C)] 98.6 °F (37 °C)  Heart Rate:  [70-84] 71  Resp:  [17-18] 18  BP: (103-185)/() 146/98  Flow (L/min):  [2] 2  Physical Exam    Constitutional: Awake, alert   Eyes: PERRLA, sclerae anicteric, no conjunctival injection   HENT: NCAT, mucous membranes moist   Neck: Supple, no thyromegaly, no lymphadenopathy, trachea midline   Respiratory: Clear to auscultation bilaterally, nonlabored respirations    Cardiovascular: RRR, no murmurs, rubs, or gallops, palpable pedal pulses bilaterally   Gastrointestinal: Positive bowel sounds, soft, nontender, nondistended   Musculoskeletal: No bilateral ankle edema, no clubbing or cyanosis to extremities   Psychiatric: Appropriate affect, cooperative   Neurologic: Oriented x 3, strength symmetric in all extremities, Cranial Nerves grossly intact to confrontation, speech clear   Skin: No rashes     Result Review    Result Review:  I have personally reviewed the results from the time of this admission to 2023 17:23 EDT and agree with these  findings:  []  Laboratory  []  Microbiology  []  Radiology  []  EKG/Telemetry   []  Cardiology/Vascular   []  Pathology  []  Old records  []  Other:  Most notable findings include: Anemia,Hypertension    Assessment & Plan   Assessment / Plan     Brief Patient Summary:  Carol Abarca is a 52 y.o. female who has dyspnea,better now    Active Hospital Problems:  Active Hospital Problems    Diagnosis    • **Severe anemia    • Pleural effusion    • Hypotension    • Abdominal pain    • B12 deficiency    • Syncope and collapse    • Essential hypertension      Plan:    Continue care    DVT prophylaxis:  Medical DVT prophylaxis orders are present.    CODE STATUS:   Level Of Support Discussed With: Patient  Code Status (Patient has no pulse and is not breathing): CPR (Attempt to Resuscitate)  Medical Interventions (Patient has pulse or is breathing): Full Support  Release to patient: Routine Release      Electronically signed by Viviana Lambert MD, 06/14/23, 5:23 PM EDT.

## 2023-06-14 NOTE — PROGRESS NOTES
[facility]     Progress Note    Patient Name: Carol Abarca  : 1970  MRN: [id number]  Primary Care Physician:  Sonia Mosquera APRN  Date of admission: 2023      Subjective   Brief summary.  Patient admitted with syncope and anemia.  Further transferred to PCU for recurrent syncope.  Since then patient having hypo and hypertension episodes, cardiologist on board.      HPI:  Patient feeling better more alert and oriented today sitting in bed.  Still not eating much.  Still feels weak.    Review of Systems     Reports fatigue and weakness.  No chest pain or abdominal pain.  Able to walk with assistance    Objective     Vitals:   Temp:  [98 °F (36.7 °C)-99.7 °F (37.6 °C)] 99.7 °F (37.6 °C)  Heart Rate:  [70-84] 84  Resp:  [17-18] 18  BP: (103-185)/() 103/67  Flow (L/min):  [2] 2    Physical Exam :     Middle-age obese female not in acute distress, tired looking..  Neck supple.  Heart regular.  Lungs diminished breath sounds.  Abdomen is obese, nontender.  Neurologically alert and oriented, slowed response. generalized weakness/global weakness noted.  Extremities trace of edema.        Result Review:  I have personally reviewed the results from the time of this admission to 2023 15:24 EDT and agree with these findings:  [x]  Laboratory  []  Microbiology  []  Radiology  []  EKG/Telemetry   []  Cardiology/Vascular   []  Pathology  []  Old records  []  Other:        Assessment / Plan       Active Hospital Problems:  Active Hospital Problems    Diagnosis    • **Severe anemia    • Pleural effusion    • Hypotension    • Abdominal pain    • B12 deficiency    • Syncope and collapse    • Essential hypertension        Plan:   Stable today  Continue current treatment.  Check labs in a.m.  Transfer out of PCU.  Increase activity as tolerated      DVT prophylaxis:  Medical DVT prophylaxis orders are present.    CODE STATUS:   Level Of Support Discussed With: Patient  Code Status (Patient has no pulse and  is not breathing): CPR (Attempt to Resuscitate)  Medical Interventions (Patient has pulse or is breathing): Full Support  Release to patient: Routine Release        Electronically signed by Dung Hall MD, 06/14/23, 3:25 PM EDT.

## 2023-06-14 NOTE — PLAN OF CARE
Goal Outcome Evaluation:           Progress: no change  Outcome Evaluation: VSS. No acute changes or complaints from patient.

## 2023-06-14 NOTE — THERAPY TREATMENT NOTE
Acute Care - Physical Therapy Treatment Note   Edi     Patient Name: Carol Abarca  : 1970  MRN: [id number]  Today's Date: 2023      Visit Dx:     ICD-10-CM ICD-9-CM   1. Syncope and collapse  R55 780.2   2. Pancytopenia  D61.818 284.19   3. Difficulty walking  R26.2 719.7   4. Decreased activities of daily living (ADL)  Z78.9 V49.89   5. Difficulty in walking  R26.2 719.7   6. Severe anemia  D64.9 285.9     Patient Active Problem List   Diagnosis    Mixed hyperlipidemia    Essential hypertension    Mild left ventricular hypertrophy    Atypical chest pain    Obstructive sleep apnea    History of pulmonary embolism    Screening for colon cancer    Hemorrhoids    Syncope and collapse    Severe anemia    Abdominal pain    B12 deficiency    Hypotension    Pleural effusion     Past Medical History:   Diagnosis Date    Anemia     Arthritis     Diabetes     Essential hypertension 2021    History of pulmonary embolism     Lumbago     Low back pain    Mild left ventricular hypertrophy 2021    Mixed hyperlipidemia 2021    Obstructive sleep apnea     Reflux esophagitis     Seasonal allergies      Past Surgical History:   Procedure Laterality Date    APPENDECTOMY  2010     SECTION      , , ,     COLONOSCOPY      2019 2018    COLONOSCOPY N/A 2022    Procedure: COLONOSCOPY;  Surgeon: Radha James MD;  Location: [facility] ENDOSCOPY;  Service: Gastroenterology;  Laterality: N/A;  COLON POLYP     ENDOSCOPY  2019    ENDOSCOPY N/A 2023    Procedure: ESOPHAGOGASTRODUODENOSCOPY WITH BIPOSIES;  Surgeon: Coy Patrick MD;  Location: [facility] ENDOSCOPY;  Service: Gastroenterology;  Laterality: N/A;  PRIOR LAPBAND, GASTRITIS    HEMORRHOIDECTOMY N/A 2022    Procedure: HEMORRHOIDECTOMY;  Surgeon: Remi Reeder MD;  Location: [facility] MAIN OR;  Service: General;  Laterality: N/A;    HERNIA REPAIR      2005, 2006, 2007, 2008    HYSTERECTOMY  2004     Continue allopurinol once verified.   LAPAROSCOPIC GASTRIC BANDING      OTHER SURGICAL HISTORY      Metal implants     PT Assessment (last 12 hours)       PT Evaluation and Treatment       Row Name 06/14/23 1300          Physical Therapy Time and Intention    Subjective Information no complaints (P)   -MB     Document Type therapy note (daily note) (P)   -MB     Mode of Treatment individual therapy;physical therapy (P)   -MB     Patient Effort excellent (P)   -MB     Symptoms Noted During/After Treatment fatigue (P)   -MB       Row Name 06/14/23 1300          Transfers    Transfers sit-stand transfer;stand-sit transfer (P)   -MB     Maintains Weight-bearing Status (Transfers) able to maintain (P)   -MB       Row Name 06/14/23 1300          Sit-Stand Transfer    Sit-Stand [place] (Transfers) standby assist (P)   -MB     Assistive Device (Sit-Stand Transfers) walker, front-wheeled (P)   -MB       Row Name 06/14/23 1300          Stand-Sit Transfer    Stand-Sit [place] (Transfers) contact guard (P)   -MB     Assistive Device (Stand-Sit Transfers) walker, front-wheeled (P)   -MB       Row Name 06/14/23 1300          Gait/Stairs (Locomotion)    Gait/Stairs Locomotion gait/ambulation assistive device (P)   -MB     [place] Level (Gait) contact guard (P)   -MB     Assistive Device (Gait) walker, front-wheeled (P)   -MB     Distance in Feet (Gait) 100 (P)   -MB       Row Name 06/14/23 1300          Safety Issues, Functional Mobility    Impairments Affecting Function (Mobility) balance;endurance/activity tolerance (P)   -MB       Row Name 06/14/23 1300          Balance    Balance Assessment standing dynamic balance (P)   -MB     Dynamic Standing Balance contact guard (P)   -MB     Position/Device Used, Standing Balance walker, front-wheeled (P)   -MB       Row Name 06/14/23 1300          Plan of Care Review    Plan of Care Reviewed With patient (P)   -MB     Progress improving (P)   -MB     Outcome Evaluation Pt tolerated treatment well today. She  demonstrates improvement in ambulation distance and transfer independence. She is progressing toward meeting her goals. Pt will continue to benefit from skilled PT intervention. (P)   -MB       Row Name 06/14/23 1300          Positioning and Restraints    Pre-Treatment Position in bed (P)   -MB     Post Treatment Position bed (P)   -MB     In Bed sitting (P)   -MB               User Key  (r) = Recorded By, (t) = Taken By, (c) = Cosigned By      Initials Name Provider Type    MB Gabriel Jeffrey, PT Student PT Student                    Physical Therapy Education       Title: PT OT SLP Therapies (Done)       Topic: Physical Therapy (Done)       Point: Mobility training (Done)       Learning Progress Summary             Patient Acceptance, E,TB, VU,DU by  at 6/14/2023 1032    Acceptance, E,TB, VU by MD at 6/13/2023 1015    Acceptance, E,TB, VU by DESEAN at 6/7/2023 0918                                         User Key       Initials Effective Dates Name Provider Type Discipline     06/16/21 -  Staci Salcido, RN Registered Nurse Nurse    DESEAN 06/03/21 -  Aravind Meade PT Physical Therapist PT    MD 01/17/22 -  Nury Liu, JIGNESH Registered Nurse Nurse                  PT Recommendation and Plan     Plan of Care Reviewed With: (P) patient  Progress: (P) improving  Outcome Evaluation: (P) Pt tolerated treatment well today. She demonstrates improvement in ambulation distance and transfer independence. She is progressing toward meeting her goals. Pt will continue to benefit from skilled PT intervention.   Outcome Measures       Row Name 06/14/23 1300 06/13/23 1400          How much help from another person do you currently need...    Turning from your back to your side while in flat bed without using bedrails? 4 (P)   -MB 4 (P)   -KD     Moving from lying on back to sitting on the side of a flat bed without bedrails? 4 (P)   -MB 4 (P)   -KD     Moving to and from a bed to a chair (including a wheelchair)? 3 (P)   -MB 3  (P)   -KD     Standing up from a chair using your arms (e.g., wheelchair, bedside chair)? 3 (P)   -MB 4 (P)   -KD     Climbing 3-5 steps with a railing? 3 (P)   -MB 3 (P)   -KD     To walk in hospital room? 3 (P)   -MB 4 (P)   -KD     AM-PAC 6 Clicks Score (PT) 20 (P)   -MB 22 (P)   -KD        Functional Assessment    Outcome Measure Options -- AM-PAC 6 Clicks Basic Mobility (PT) (P)   -KD               User Key  (r) = Recorded By, (t) = Taken By, (c) = Cosigned By      Initials Name Provider Type    KD Lynne Yost, PT Student PT Student    Gabriel Calabrese, PT Student PT Student                     Time Calculation:    PT Charges       Row Name 06/14/23 1339             Time Calculation    PT Received On 06/14/23 (P)   -MB         Timed Charges    60748 - Gait Training Minutes  10 (P)   -MB         Total Minutes    Timed Charges Total Minutes 10 (P)   -MB       Total Minutes 10 (P)   -MB                User Key  (r) = Recorded By, (t) = Taken By, (c) = Cosigned By      Initials Name Provider Type    Gabriel Calabrese, PT Student PT Student                  Therapy Charges for Today       Code Description Service Date Service Provider Modifiers Qty    14341017691 HC GAIT TRAINING EA 15 MIN 6/14/2023 Gabriel Jeffrey, PT Student GP 1            PT G-Codes  Outcome Measure Options: (P) AM-PAC 6 Clicks Basic Mobility (PT)  AM-PAC 6 Clicks Score (PT): (P) 20  AM-PAC 6 Clicks Score (OT): 24    Gabriel Jeffrey PT Student  6/14/2023

## 2023-06-14 NOTE — PLAN OF CARE
Goal Outcome Evaluation:      No changes this shift, pts transferred to regular bed.

## 2023-06-15 ENCOUNTER — READMISSION MANAGEMENT (OUTPATIENT)
Dept: CALL CENTER | Facility: HOSPITAL | Age: 53
End: 2023-06-15
Payer: MEDICARE

## 2023-06-15 VITALS
DIASTOLIC BLOOD PRESSURE: 83 MMHG | OXYGEN SATURATION: 95 % | HEIGHT: 62 IN | WEIGHT: 215.61 LBS | SYSTOLIC BLOOD PRESSURE: 129 MMHG | RESPIRATION RATE: 18 BRPM | HEART RATE: 69 BPM | BODY MASS INDEX: 39.68 KG/M2 | TEMPERATURE: 97.3 F

## 2023-06-15 PROBLEM — I95.9 HYPOTENSION: Status: RESOLVED | Noted: 2023-01-01 | Resolved: 2023-01-01

## 2023-06-15 PROBLEM — G40.909 SEIZURE DISORDER: Status: RESOLVED | Noted: 2023-06-15 | Resolved: 2023-06-15

## 2023-06-15 PROBLEM — R10.9 ABDOMINAL PAIN: Status: RESOLVED | Noted: 2023-01-01 | Resolved: 2023-01-01

## 2023-06-15 PROBLEM — G40.909 SEIZURE DISORDER: Status: ACTIVE | Noted: 2023-06-15

## 2023-06-15 PROBLEM — R10.9 ABDOMINAL PAIN: Status: RESOLVED | Noted: 2023-06-07 | Resolved: 2023-06-15

## 2023-06-15 PROBLEM — R55 SYNCOPE AND COLLAPSE: Status: RESOLVED | Noted: 2023-05-31 | Resolved: 2023-06-15

## 2023-06-15 PROBLEM — I95.9 HYPOTENSION: Status: RESOLVED | Noted: 2023-06-09 | Resolved: 2023-06-15

## 2023-06-15 PROBLEM — R55 SYNCOPE AND COLLAPSE: Status: RESOLVED | Noted: 2023-01-01 | Resolved: 2023-01-01

## 2023-06-15 PROBLEM — G40.909 SEIZURE DISORDER: Status: ACTIVE | Noted: 2023-01-01

## 2023-06-15 PROBLEM — G40.909 SEIZURE DISORDER: Status: RESOLVED | Noted: 2023-01-01 | Resolved: 2023-01-01

## 2023-06-15 PROCEDURE — 94799 UNLISTED PULMONARY SVC/PX: CPT

## 2023-06-15 PROCEDURE — 25010000002 CYANOCOBALAMIN PER 1000 MCG: Performed by: INTERNAL MEDICINE

## 2023-06-15 RX ORDER — QUETIAPINE 300 MG/1
300 TABLET, FILM COATED, EXTENDED RELEASE ORAL EVERY EVENING
Qty: 30 TABLET | Refills: 0 | Status: ON HOLD | OUTPATIENT
Start: 2023-06-15 | End: 2023-07-16

## 2023-06-15 RX ORDER — FOLIC ACID 1 MG/1
1 TABLET ORAL DAILY
Qty: 30 TABLET | Refills: 0 | Status: ON HOLD | OUTPATIENT
Start: 2023-06-16 | End: 2023-07-16

## 2023-06-15 RX ORDER — AMLODIPINE BESYLATE 5 MG/1
5 TABLET ORAL
Qty: 30 TABLET | Refills: 0 | Status: ON HOLD | OUTPATIENT
Start: 2023-06-16 | End: 2023-07-16

## 2023-06-15 RX ORDER — GLIPIZIDE 5 MG/1
5 TABLET, FILM COATED, EXTENDED RELEASE ORAL DAILY
Qty: 30 TABLET | Refills: 0 | Status: ON HOLD | OUTPATIENT
Start: 2023-06-15 | End: 2023-07-15

## 2023-06-15 RX ORDER — OMEPRAZOLE 20 MG/1
20 CAPSULE, DELAYED RELEASE ORAL DAILY
Qty: 30 CAPSULE | Refills: 0 | Status: ON HOLD | OUTPATIENT
Start: 2023-06-15 | End: 2023-07-15

## 2023-06-15 RX ORDER — LEVETIRACETAM 500 MG/1
500 TABLET ORAL 2 TIMES DAILY
Qty: 60 TABLET | Refills: 0 | Status: ON HOLD | OUTPATIENT
Start: 2023-06-15 | End: 2023-07-15

## 2023-06-15 RX ORDER — INSULIN GLARGINE 300 U/ML
30 INJECTION, SOLUTION SUBCUTANEOUS NIGHTLY
Qty: 9 ML | Refills: 0 | Status: ON HOLD | OUTPATIENT
Start: 2023-06-15 | End: 2023-09-13

## 2023-06-15 RX ORDER — METOPROLOL SUCCINATE 50 MG/1
50 TABLET, EXTENDED RELEASE ORAL 2 TIMES DAILY
Qty: 60 TABLET | Refills: 0 | Status: ON HOLD | OUTPATIENT
Start: 2023-06-15 | End: 2023-07-15

## 2023-06-15 RX ADMIN — GABAPENTIN 200 MG: 100 CAPSULE ORAL at 08:52

## 2023-06-15 RX ADMIN — FUROSEMIDE 20 MG: 20 TABLET ORAL at 08:51

## 2023-06-15 RX ADMIN — METOPROLOL SUCCINATE 50 MG: 50 TABLET, EXTENDED RELEASE ORAL at 08:51

## 2023-06-15 RX ADMIN — LEVETIRACETAM 500 MG: 500 TABLET, FILM COATED ORAL at 08:51

## 2023-06-15 RX ADMIN — LISINOPRIL 40 MG: 20 TABLET ORAL at 08:52

## 2023-06-15 RX ADMIN — CETIRIZINE HYDROCHLORIDE 10 MG: 10 TABLET, FILM COATED ORAL at 08:52

## 2023-06-15 RX ADMIN — DULOXETINE HYDROCHLORIDE 60 MG: 30 CAPSULE, DELAYED RELEASE ORAL at 08:51

## 2023-06-15 RX ADMIN — Medication 5 MG: at 08:51

## 2023-06-15 RX ADMIN — AMLODIPINE BESYLATE 5 MG: 5 TABLET ORAL at 08:51

## 2023-06-15 RX ADMIN — FAMOTIDINE 20 MG: 10 INJECTION INTRAVENOUS at 08:51

## 2023-06-15 RX ADMIN — CYANOCOBALAMIN 1000 MCG: 1000 INJECTION, SOLUTION INTRAMUSCULAR at 08:51

## 2023-06-15 NOTE — DISCHARGE INSTR - APPOINTMENTS
Follow up with Sonia Mosquera On Monday June 19 th at 10:15 am..    [address] [facility] [address] [address]  ( [phone] )

## 2023-06-15 NOTE — PLAN OF CARE
Goal Outcome Evaluation:           Progress: no change  Outcome Evaluation: alert and oriented x 4. up ad bailey. no complaints at this time.

## 2023-06-15 NOTE — CONSULTS
Discharge Planning Assessment   Edi     Patient Name: Carol Abarca  MRN: [id number]  Today's Date: 6/15/2023    Admit Date: 5/31/2023     Discharge Plan       Row Name 06/15/23 1014       Plan    Final Discharge Disposition Code 06 - home with home health care    Final Note Pt to discharge home today. [facility] notified. Pt on RA at this time- no additional needs noted at this time.              Home Medical Care       Service Provider Request Status Selected Services Address Phone Fax Patient Preferred    [facility] [address] [facility] [address] [phone] --             Expected Discharge Date and Time       Expected Discharge Date Expected Discharge Time    Erik 15, 2023      BREONNA Mccauley

## 2023-06-15 NOTE — PLAN OF CARE
Goal Outcome Evaluation:  Plan of Care Reviewed With: patient           Outcome Evaluation: Pt c/o pain/discomfort this shift, administered prn pain med as ordered. Pt rested well this shift. No new issues or needs noted at this time.

## 2023-06-15 NOTE — DISCHARGE SUMMARY
[facility]         DISCHARGE SUMMARY    Patient Name: Carol Abarca  : 1970  MRN: [id number]    Date of Admission: 2023  Date of Discharge: [place] 15  Primary Care Physician: Sonia Mosquera APRN    Consults     No orders found from 2023 to 2023.          Presenting Problem:   Syncope and collapse [R55]  Pancytopenia [D61.818]    Active and Resolved Hospital Problems:  Active Hospital Problems    Diagnosis POA   • **Severe anemia [D64.9] Yes   • Pleural effusion [J90] Clinically Undetermined   • B12 deficiency [E53.8] Yes   • Essential hypertension [I10] Yes      Resolved Hospital Problems    Diagnosis POA   • Seizure disorder [G40.909] Clinically Undetermined   • Hypotension [I95.9] No   • Abdominal pain [R10.9] No   • Syncope and collapse [R55] Yes         Hospital Course     Hospital Course:  Carol Abarca is a 52 y.o. female admitted to hospital for syncope and dizziness and lightheadedness.  Further work-up revealed anemia.  Patient did not have any acute blood loss.  Patient was seen by GI and she has known history of chronic anemia.  Work-up revealed severe B12 deficiency she was started on B12 shots.    She was also started on folic acid supplements.    Patient had severe episodes of dizziness and lightheadedness and passing out spells with weakness.  She was followed by cardiologist for syncope as well as gastroenterologist.  Further she was seen by hematologist oncologist for anemia as well as neurologist Dr. Rajan.  Dr. Rajan suspected seizures but no medications were initiated work-up was initiated including imaging studies.  EEG showed slow background activity suggestive of seizures.    Patient improved subsequently.  Her medications were adjusted Seroquel was reduced as she was sleeping most of the time..  Her blood sugars were stabilized blood pressure fluctuated initially but improved after adjustment of medications..  Hemoglobin remained above 8 g.    As she  remained stable she was recommended to go into inpatient rehab for her weakness but patient refused.  She will be discharged to home.      .        DISCHARGE Follow Up Recommendations for labs and diagnostics:   Discharge to home  Follow-up with me in office next week.  To continue B12 shots        Day of Discharge     Vital Signs:  Temp:  [97.3 °F (36.3 °C)-99.1 °F (37.3 °C)] 97.3 °F (36.3 °C)  Heart Rate:  [63-75] 69  Resp:  [18] 18  BP: ()/() 129/83  Flow (L/min):  [2] 2    Physical Exam:    Lady morbidly obese female not in acute.  Heart regular lungs clear*  Abdominal soft and obese.  Extremities trace of edema      Pertinent  and/or Most Recent Results     LAB RESULTS:      Lab 06/14/23  0419 06/12/23  0949 06/12/23  0421 06/10/23  0422 06/09/23  0443   WBC 6.48  --  6.66 8.81 4.55   HEMOGLOBIN 9.3*  --  8.3* 8.5* 8.0*   HEMATOCRIT 29.3*  --  26.9* 27.7* 25.4*   PLATELETS 387  --  354 268 207   NEUTROS ABS 4.94  --  5.29 7.41* 3.15   IMMATURE GRANS (ABS) 0.03  --  0.02 0.07* 0.02   LYMPHS ABS 0.81  --  0.78 0.75 0.89   MONOS ABS 0.63  --  0.54 0.57 0.47   EOS ABS 0.05  --  0.02 0.00 0.01   .0*  --  104.3* 105.7* 104.5*   LACTATE, ARTERIAL  --  0.73  --   --   --          Lab 06/14/23 0419 06/12/23 0949 06/10/23  0422 06/09/23  0443   SODIUM 137  --  137 139   SODIUM, ARTERIAL  --  134.9*  --   --    POTASSIUM 3.6  --  4.5 4.5   CHLORIDE 93*  --  101 104   CO2 32.4*  --  27.7 28.1   ANION GAP 11.6  --  8.3 6.9   BUN 11  --  15 12   CREATININE 0.76  --  1.05* 0.56*   EGFR 94.4  --  64.1 110.0   GLUCOSE 97  --  98 98   GLUCOSE, ARTERIAL  --  88  --   --    CALCIUM 8.8  --  8.3* 8.5*   IONIZED CALCIUM  --  1.12*  --   --    TSH  --   --   --  2.050         Lab 06/14/23  0419 06/11/23  0402 06/10/23  0422 06/09/23  0443   TOTAL PROTEIN 7.4  --  7.0 6.8   ALBUMIN 3.6 3.1 3.4* 3.1*   GLOBULIN 3.8  --  3.6 3.7   ALT (SGPT) 7  --  7 11   AST (SGOT) 14  --  22 17   BILIRUBIN 0.7  --  0.9 0.7   ALK  PHOS 86  --  105 95                     Lab 06/12/23  0949   PH, ARTERIAL 7.318*   PCO2, ARTERIAL 52.2*   PO2 ART 83.4   O2 SATURATION ART 93.4*   FIO2 32   HCO3 ART 26.2*   BASE EXCESS ART -0.3   CARBOXYHEMOGLOBIN 0.3     Brief Urine Lab Results     None        Microbiology Results (last 10 days)     ** No results found for the last 240 hours. **          PROCEDURES:    [unfilled]    CT Head Without Contrast    Result Date: 6/12/2023  Impression:    1. No intracranial hemorrhage  2. No CT changes of acute major vascular territory brain ischemia at this time.  This report was called to the floor at 9:15 a.m. and communicated to the floor charge floor nurse at 9:19 a.m.     SEUN SMITH MD       Electronically Signed and Approved By: SEUN SMITH MD on 6/12/2023 at 9:19             CT Head Without Contrast    Result Date: 6/6/2023  Impression:  No acute intracranial abnormality.  Stable bilateral orbital proptosis.  Chronic sinus changes.     AYE PIÑA MD       Electronically Signed and Approved By: AYE PIÑA MD on 6/06/2023 at 11:21             CT Head Without Contrast    Result Date: 5/31/2023  Impression:  No acute brain abnormality is seen. Specifically, no acute intracranial hemorrhage, no acute infarct, no intracranial mass lesion, and no acute intracranial mass effect are appreciated.  Please see above comments for further detail.   Please note that portions of this note were completed with a voice recognition program.  PARVEZ PIERCE JR, MD       Electronically Signed and Approved By: PARVEZ PIERCE JR, MD on 5/31/2023 at 6:41             CT Angiogram Neck    Result Date: 6/12/2023  Impression:   1. No significant vascular abnormality in the head or the neck. 2. Evidence of chronic left maxillary sinusitis. 3. Right-sided pleural effusion. 4. Cardiomegaly. 5. Aneurysmal dilatation of the ascending aorta up to 41 mm.     ALEX YOUNG MD       Electronically Signed and Approved By: ALEX YOUNG MD  on 6/12/2023 at 10:58             CT Chest With Contrast Diagnostic    Result Date: 5/31/2023  Impression:   1. The study is limited.  2. No definite proximal (or central) pulmonary embolism is identified.  3. Evidence for pulmonary arterial hypertension is noted.  4. There is mild-to-moderate cardiomegaly.  5. The maximum diameter of the ascending aorta is about 4 cm, which is at the upper limits of normal.  Consider close interval clinical and imaging follow-up of this finding to ensure a benign progression and to exclude an expanding or developing fusiform aneurysm of the thoracic aorta.  6. A small amount of pericardial effusion is seen.  7. The patient has undergone median sternotomy.  8. There is suspected hepatosplenomegaly.  9. Subsegmental atelectasis involves the lungs.  Acute infiltrate is thought to be less likely.  10. Grossly, no suspicious pulmonary nodules are appreciated.  11. There may be mild levoscoliosis of the upper thoracic spine.  12. There is a gastric band in place.  13. There is debris in the upper thoracic esophageal lumen, which may be related to esophageal dysmotility, gastroesophageal reflux disease (GERD), and/or the gastric band, possibly being too constrictive. 14. Please see above comments for further detail.    Please note that portions of this note were completed with a voice recognition program.  PARVEZ PIERCE JR, MD       Electronically Signed and Approved By: PARVEZ PIERCE JR, MD on 5/31/2023 at 7:01              CT Cervical Spine Without Contrast    Result Date: 5/31/2023  Impression:   No acute cervical spine fracture is seen.  No acute subluxation abnormality.    Please note that portions of this note were completed with a voice recognition program.  PARVEZ PIERCE JR, MD       Electronically Signed and Approved By: PARVEZ PIERCE JR, MD on 5/31/2023 at 6:31            MRI Angiogram Head Without Contrast    Result Date: 6/7/2023  Impression:   1. Negative intracranial  MRA.     COOPER COOMBS MD       Electronically Signed and Approved By: COOPER COOMBS MD on 6/07/2023 at 20:38             MRI Angiogram Neck Without Contrast    Result Date: 6/7/2023  Impression:   1. No evidence for vessel occlusion.     COOPER COOMBS MD       Electronically Signed and Approved By: COOPER COOMBS MD on 6/07/2023 at 20:40             MRI Brain Without Contrast    Result Date: 6/7/2023  Impression:   1. No evidence for acute or subacute ischemia. 2. Nonspecific increased T2 signal is noted within the region of the bilateral basal ganglia.  The differential for these changes is vast including neuro degenerative disorders, metabolic abnormalities, toxic changes, and infectious changes.          COOPER COOMBS MD       Electronically Signed and Approved By: COOPER COOMBS MD on 6/07/2023 at 20:36             CT Abdomen Pelvis With Contrast    Result Date: 6/7/2023  Impression:   1. No acute process identified within abdomen/pelvis. 2. Small right and trace left pleural effusions with bibasilar atelectasis.     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  Please correlate clinically.    Please note that portions of this note were completed with a voice recognition program.  PARVEZ PIERCE JR, MD       Electronically Signed and Approved By: PARVEZ PIERCE JR, MD on 5/31/2023 at 7:40              Result Date: 5/31/2023  Impression:   No acute infiltrate is appreciated.    Please note that portions of this note were completed with a voice recognition program.  PARVEZ PIERCE JR, MD       Electronically Signed and Approved By: PARVEZ PIERCE JR, MD on 5/31/2023 at 6:10              CT Angiogram Head w AI Analysis of  LVO    Result Date: 6/12/2023  Impression:   1. No significant vascular abnormality in the head or the neck. 2. Evidence of chronic left maxillary sinusitis. 3. Right-sided pleural effusion. 4. Cardiomegaly. 5. Aneurysmal dilatation of the ascending aorta up to 41 mm.     ALEX YOUNG MD       Electronically Signed and Approved By: ALEX YOUNG MD on 6/12/2023 at 10:58             CT CEREBRAL PERFUSION WITH & WITHOUT CONTRAST    Result Date: 6/12/2023  Impression:  No significant perfusion abnormality in the brain.      ALEX YOUNG MD       Electronically Signed and Approved By: ALEX YOUNG MD on 6/12/2023 at 11:00                       Results for orders placed during the hospital encounter of 05/31/23    Adult Transthoracic Echo Complete W/ Cont if Necessary Per Protocol    Interpretation Summary  •  Left ventricular ejection fraction appears to be 61 - 65%.  •  The left ventricular cavity is borderline dilated.  •  Left ventricular wall thickness is consistent with mild concentric hypertrophy.  •  Left ventricular diastolic function is consistent with (grade Ia w/high LAP) impaired relaxation.  •  Left atrial volume is mildly increased.  •  Estimated right ventricular systolic pressure from tricuspid regurgitation is mildly elevated (35-45 mmHg).      Labs Pending at Discharge:        Discharge Details        Discharge Medications      New Medications      Instructions Start Date   amLODIPine 5 MG tablet  Commonly known as: NORVASC   5 mg, Oral, Every 24 Hours Scheduled   Start Date: June 16, 2023     folic acid 1 MG tablet  Commonly known as: FOLVITE   1 mg, Oral, Daily   Start Date: June 16, 2023     levETIRAcetam 500 MG tablet  Commonly known as: Keppra   500 mg, Oral, 2 Times Daily      omeprazole 20 MG capsule  Commonly known as: priLOSEC   20 mg, Oral, Daily         Changes to Medications      Instructions Start Date   glipizide 5 MG ER tablet  Commonly known as: GLUCOTROL XL  What changed:    · medication strength  · how much to take   5 mg, Oral, Daily      metoprolol succinate XL 50 MG 24 hr tablet  Commonly known as: TOPROL-XL  What changed: Another medication with the same name was removed. Continue taking this medication, and follow the directions you see here.   50 mg, Oral, 2 Times Daily      QUEtiapine  MG 24 hr tablet  Commonly known as: SEROquel XR  What changed: how much to take   300 mg, Oral, Every Evening      Toujeo SoloStar 300 UNIT/ML solution pen-injector injection  Generic drug: Insulin Glargine (1 Unit Dial)  What changed: how much to take   30 Units, Subcutaneous, Nightly         Continue These Medications      Instructions Start Date   albuterol sulfate  (90 Base) MCG/ACT inhaler  Commonly known as: PROVENTIL HFA;VENTOLIN HFA;PROAIR HFA   2 puffs, Inhalation, Every 4 Hours PRN      ALPRAZolam 1 MG tablet  Commonly known as: XANAX   1 mg, Oral, 2 Times Daily PRN      aspirin 81 MG EC tablet   Take 1 tablet by mouth Daily.      cetirizine 10 MG tablet  Commonly known as: zyrTEC   Every 24 Hours      DULoxetine 60 MG capsule  Commonly known as: CYMBALTA   Take 1 capsule by mouth 2 (Two) Times a Day.      gabapentin 600 MG tablet  Commonly known as: NEURONTIN   600 mg, Oral, 2 Times Daily PRN      HYDROcodone-acetaminophen  MG per tablet  Commonly known as: NORCO   1 tablet, Oral, Every 6 Hours PRN      lisinopril 40 MG tablet  Commonly known as: PRINIVIL,ZESTRIL   Take 1 tablet by mouth Daily.      montelukast 10 MG tablet  Commonly known as: SINGULAIR   10 mg, Oral, Nightly      potassium chloride 10 MEQ CR tablet  Commonly known as: K-DUR,KLOR-CON   Take 1 tablet by mouth 2 (Two) Times a Day.      pravastatin 40 MG tablet  Commonly known as: PRAVACHOL   40 mg, Oral, Daily      zolpidem 10 MG tablet  Commonly known as: AMBIEN   10 mg, Oral, Nightly PRN             Allergies   Allergen Reactions   • Tramadol Anaphylaxis   • Tramadol Hcl Anaphylaxis   • Moxifloxacin  Hives   • Sulfa Antibiotics Hives         Discharge Disposition:    Home or Self Care    Diet:  Heart healthy [address]      Discharge Activity:     Activity Instructions     Activity as Tolerated              Future Appointments   Date Time Provider Department Center   7/26/2023  1:15 PM Remi [place] [facility]       Additional Instructions for the Follow-ups that You Need to Schedule     Discharge Follow-up with PCP   As directed       Currently Documented PCP:    Sonia Mosquera APRN    PCP Phone Number:    [phone]     Follow Up Details: next week               Time spent on Discharge including face to face service: 37 minutes.            I have dictated this note utilizing Dragon Dictation.             Please note that portions of this note were completed with a voice recognition program.             Part of this note may be an electronic transcription/translation of spoken language to printed text         using the Dragon Dictation System.       Electronically signed by Dung Hall MD, 06/15/23, 9:17 AM EDT.    Addendum..    I reviewed patient's EEG which was ordered by neurologist Dr. Rajan.  Suggestive of seizure disorder.  Post discharge I have sent prescription for Keppra.  Patient notified to pick prescription from the pharmacy, her mother was also notified to remind her to pick the prescription.      Electronically signed by Dung Hall MD, 06/15/23, 5:16 PM EDT.

## 2023-06-16 NOTE — OUTREACH NOTE
Prep Survey      Flowsheet Row Responses   Religion facility patient discharged from? [facility]   Is LACE score < 7 ? No   Eligibility Readm Mgmt   Discharge diagnosis Severe anemia   Does the patient have one of the following disease processes/diagnoses(primary or secondary)? Other   Does the patient have Home health ordered? Yes   What is the Home health agency?  [facility]    Is there a DME ordered? No   Prep survey completed? Yes            MEI GABRIEL - Registered Nurse

## 2023-06-17 PROBLEM — G93.41 METABOLIC ENCEPHALOPATHY: Status: ACTIVE | Noted: 2023-06-17

## 2023-06-17 PROBLEM — G93.41 METABOLIC ENCEPHALOPATHY: Status: ACTIVE | Noted: 2023-01-01

## 2023-06-18 ENCOUNTER — READMISSION MANAGEMENT (OUTPATIENT)
Dept: CALL CENTER | Facility: HOSPITAL | Age: 53
End: 2023-06-18
Payer: MEDICARE

## 2023-06-18 NOTE — OUTREACH NOTE
Medical Week 1 Survey      Flowsheet Row Responses   [facility] patient discharged from? [facility]   Does the patient have one of the following disease processes/diagnoses(primary or secondary)? Other   Week 1 attempt successful? No   Unsuccessful attempts Attempt 1   Revoke Readmitted            Lilibeth NI - Registered Nurse

## 2023-06-19 PROBLEM — E11.9 TYPE 2 DIABETES MELLITUS: Chronic | Status: ACTIVE | Noted: 2023-06-19

## 2023-06-19 PROBLEM — E11.9 TYPE 2 DIABETES MELLITUS: Chronic | Status: ACTIVE | Noted: 2023-01-01

## 2023-06-30 PROBLEM — I95.9 HYPOTENSION: Status: RESOLVED | Noted: 2023-06-09 | Resolved: 2023-06-30

## 2023-06-30 PROBLEM — G93.41 METABOLIC ENCEPHALOPATHY: Status: RESOLVED | Noted: 2023-01-01 | Resolved: 2023-01-01

## 2023-06-30 PROBLEM — R53.1 GENERALIZED WEAKNESS: Status: ACTIVE | Noted: 2023-06-30

## 2023-06-30 PROBLEM — R26.81 UNSTEADY GAIT WHEN WALKING: Status: ACTIVE | Noted: 2023-06-30

## 2023-06-30 PROBLEM — I95.9 HYPOTENSION: Status: RESOLVED | Noted: 2023-01-01 | Resolved: 2023-01-01

## 2023-06-30 PROBLEM — R53.1 GENERALIZED WEAKNESS: Status: ACTIVE | Noted: 2023-01-01

## 2023-06-30 PROBLEM — G93.41 METABOLIC ENCEPHALOPATHY: Status: RESOLVED | Noted: 2023-06-17 | Resolved: 2023-06-30

## 2023-06-30 PROBLEM — R26.81 UNSTEADY GAIT WHEN WALKING: Status: ACTIVE | Noted: 2023-01-01

## 2023-07-13 ENCOUNTER — APPOINTMENT (OUTPATIENT)
Dept: GENERAL RADIOLOGY | Facility: HOSPITAL | Age: 53
DRG: 291 | End: 2023-07-13
Payer: MEDICARE

## 2023-07-13 ENCOUNTER — HOSPITAL ENCOUNTER (INPATIENT)
Facility: HOSPITAL | Age: 53
LOS: 8 days | Discharge: HOME HEALTH | DRG: 291 | End: 2023-07-21
Attending: INTERNAL MEDICINE | Admitting: INTERNAL MEDICINE
Payer: MEDICARE

## 2023-07-13 DIAGNOSIS — R06.00 DYSPNEA, UNSPECIFIED TYPE: ICD-10-CM

## 2023-07-13 DIAGNOSIS — Z78.9 DECREASED ACTIVITIES OF DAILY LIVING (ADL): ICD-10-CM

## 2023-07-13 DIAGNOSIS — R26.2 DIFFICULTY WALKING: ICD-10-CM

## 2023-07-13 DIAGNOSIS — I50.33 ACUTE ON CHRONIC HEART FAILURE WITH PRESERVED EJECTION FRACTION (HFPEF): ICD-10-CM

## 2023-07-13 DIAGNOSIS — R06.09 DYSPNEA ON EXERTION: Primary | ICD-10-CM

## 2023-07-13 DIAGNOSIS — G47.33 OBSTRUCTIVE SLEEP APNEA: ICD-10-CM

## 2023-07-13 PROBLEM — R60.1 ANASARCA: Status: ACTIVE | Noted: 2023-07-13

## 2023-07-13 PROBLEM — R60.1 ANASARCA: Status: ACTIVE | Noted: 2023-01-01

## 2023-07-13 LAB
ALBUMIN SERPL-MCNC: 3.5 G/DL (ref 3.5–5.2)
ALBUMIN/GLOB SERPL: 0.9 G/DL
ALP SERPL-CCNC: 114 U/L (ref 39–117)
ALT SERPL W P-5'-P-CCNC: <5 U/L (ref 1–33)
ANION GAP SERPL CALCULATED.3IONS-SCNC: 8.4 MMOL/L (ref 5–15)
ARTERIAL PATENCY WRIST A: POSITIVE
AST SERPL-CCNC: 13 U/L (ref 1–32)
BASE EXCESS BLDA CALC-SCNC: 2.3 MMOL/L (ref -2–2)
BASOPHILS # BLD AUTO: 0.02 10*3/MM3 (ref 0–0.2)
BASOPHILS NFR BLD AUTO: 0.4 % (ref 0–1.5)
BDY SITE: ABNORMAL
BILIRUB SERPL-MCNC: 0.4 MG/DL (ref 0–1.2)
BILIRUB UR QL STRIP: NEGATIVE
BUN SERPL-MCNC: 6 MG/DL (ref 6–20)
BUN/CREAT SERPL: 10.5 (ref 7–25)
CALCIUM SPEC-SCNC: 9.2 MG/DL (ref 8.6–10.5)
CHLORIDE SERPL-SCNC: 104 MMOL/L (ref 98–107)
CLARITY UR: CLEAR
CO2 SERPL-SCNC: 27.6 MMOL/L (ref 22–29)
COHGB MFR BLD: 7.9 % (ref 0–1.5)
COLOR UR: YELLOW
CREAT SERPL-MCNC: 0.57 MG/DL (ref 0.57–1)
DEPRECATED RDW RBC AUTO: 57.9 FL (ref 37–54)
EGFRCR SERPLBLD CKD-EPI 2021: 109.5 ML/MIN/1.73
EOSINOPHIL # BLD AUTO: 0.19 10*3/MM3 (ref 0–0.4)
EOSINOPHIL NFR BLD AUTO: 3.7 % (ref 0.3–6.2)
ERYTHROCYTE [DISTWIDTH] IN BLOOD BY AUTOMATED COUNT: 16.9 % (ref 12.3–15.4)
FHHB: 15.9 % (ref 0–5)
GLOBULIN UR ELPH-MCNC: 4.1 GM/DL
GLUCOSE BLDC GLUCOMTR-MCNC: 65 MG/DL (ref 70–99)
GLUCOSE BLDC GLUCOMTR-MCNC: 79 MG/DL (ref 70–99)
GLUCOSE BLDC GLUCOMTR-MCNC: 93 MG/DL (ref 70–99)
GLUCOSE SERPL-MCNC: 62 MG/DL (ref 65–99)
GLUCOSE UR STRIP-MCNC: NEGATIVE MG/DL
HCO3 BLDA-SCNC: 27.7 MMOL/L (ref 22–26)
HCT VFR BLD AUTO: 29.9 % (ref 34–46.6)
HGB BLD-MCNC: 9.4 G/DL (ref 12–15.9)
HGB BLDA-MCNC: 10.5 G/DL (ref 11.7–14.6)
HGB UR QL STRIP.AUTO: NEGATIVE
IMM GRANULOCYTES # BLD AUTO: 0.01 10*3/MM3 (ref 0–0.05)
IMM GRANULOCYTES NFR BLD AUTO: 0.2 % (ref 0–0.5)
INHALED O2 CONCENTRATION: 21 %
KETONES UR QL STRIP: NEGATIVE
LEUKOCYTE ESTERASE UR QL STRIP.AUTO: NEGATIVE
LYMPHOCYTES # BLD AUTO: 1.28 10*3/MM3 (ref 0.7–3.1)
LYMPHOCYTES NFR BLD AUTO: 25 % (ref 19.6–45.3)
MAGNESIUM SERPL-MCNC: 2 MG/DL (ref 1.6–2.6)
MCH RBC QN AUTO: 29.6 PG (ref 26.6–33)
MCHC RBC AUTO-ENTMCNC: 31.4 G/DL (ref 31.5–35.7)
MCV RBC AUTO: 94 FL (ref 79–97)
METHGB BLD QL: 0.1 % (ref 0–1.5)
MODALITY: ABNORMAL
MONOCYTES # BLD AUTO: 0.29 10*3/MM3 (ref 0.1–0.9)
MONOCYTES NFR BLD AUTO: 5.7 % (ref 5–12)
NEUTROPHILS NFR BLD AUTO: 3.34 10*3/MM3 (ref 1.7–7)
NEUTROPHILS NFR BLD AUTO: 65 % (ref 42.7–76)
NITRITE UR QL STRIP: NEGATIVE
NOTE: ABNORMAL
NRBC BLD AUTO-RTO: 0 /100 WBC (ref 0–0.2)
NT-PROBNP SERPL-MCNC: 116 PG/ML (ref 0–900)
OXYHGB MFR BLDV: 76.1 % (ref 94–99)
PCO2 BLDA: 46.5 MM HG (ref 35–45)
PH BLDA: 7.39 PH UNITS (ref 7.35–7.45)
PH UR STRIP.AUTO: 5.5 [PH] (ref 5–8)
PLATELET # BLD AUTO: 313 10*3/MM3 (ref 140–450)
PMV BLD AUTO: 10.5 FL (ref 6–12)
PO2 BLD: 230 MM[HG] (ref 0–500)
PO2 BLDA: 48.3 MM HG (ref 80–100)
POTASSIUM SERPL-SCNC: 3.6 MMOL/L (ref 3.5–5.2)
PROCALCITONIN SERPL-MCNC: 0.04 NG/ML (ref 0–0.25)
PROT SERPL-MCNC: 7.6 G/DL (ref 6–8.5)
PROT UR QL STRIP: NEGATIVE
RBC # BLD AUTO: 3.18 10*6/MM3 (ref 3.77–5.28)
SAO2 % BLDCOA: 82.7 % (ref 95–99)
SODIUM SERPL-SCNC: 140 MMOL/L (ref 136–145)
SP GR UR STRIP: 1.01 (ref 1–1.03)
TSH SERPL DL<=0.05 MIU/L-ACNC: 0.78 UIU/ML (ref 0.27–4.2)
UROBILINOGEN UR QL STRIP: NORMAL
WBC NRBC COR # BLD: 5.13 10*3/MM3 (ref 3.4–10.8)

## 2023-07-13 PROCEDURE — 82375 ASSAY CARBOXYHB QUANT: CPT | Performed by: INTERNAL MEDICINE

## 2023-07-13 PROCEDURE — 25010000002 FUROSEMIDE PER 20 MG: Performed by: INTERNAL MEDICINE

## 2023-07-13 PROCEDURE — 85025 COMPLETE CBC W/AUTO DIFF WBC: CPT | Performed by: INTERNAL MEDICINE

## 2023-07-13 PROCEDURE — 83050 HGB METHEMOGLOBIN QUAN: CPT | Performed by: INTERNAL MEDICINE

## 2023-07-13 PROCEDURE — 25010000002 ENOXAPARIN PER 10 MG: Performed by: INTERNAL MEDICINE

## 2023-07-13 PROCEDURE — 93005 ELECTROCARDIOGRAM TRACING: CPT | Performed by: INTERNAL MEDICINE

## 2023-07-13 PROCEDURE — 84145 PROCALCITONIN (PCT): CPT | Performed by: INTERNAL MEDICINE

## 2023-07-13 PROCEDURE — 71045 X-RAY EXAM CHEST 1 VIEW: CPT

## 2023-07-13 PROCEDURE — 82948 REAGENT STRIP/BLOOD GLUCOSE: CPT

## 2023-07-13 PROCEDURE — 80053 COMPREHEN METABOLIC PANEL: CPT | Performed by: INTERNAL MEDICINE

## 2023-07-13 PROCEDURE — 82805 BLOOD GASES W/O2 SATURATION: CPT | Performed by: INTERNAL MEDICINE

## 2023-07-13 PROCEDURE — 94799 UNLISTED PULMONARY SVC/PX: CPT

## 2023-07-13 PROCEDURE — 83880 ASSAY OF NATRIURETIC PEPTIDE: CPT | Performed by: INTERNAL MEDICINE

## 2023-07-13 PROCEDURE — 93010 ELECTROCARDIOGRAM REPORT: CPT | Performed by: INTERNAL MEDICINE

## 2023-07-13 PROCEDURE — 81003 URINALYSIS AUTO W/O SCOPE: CPT | Performed by: INTERNAL MEDICINE

## 2023-07-13 PROCEDURE — 94640 AIRWAY INHALATION TREATMENT: CPT

## 2023-07-13 PROCEDURE — 36600 WITHDRAWAL OF ARTERIAL BLOOD: CPT | Performed by: INTERNAL MEDICINE

## 2023-07-13 PROCEDURE — 84443 ASSAY THYROID STIM HORMONE: CPT | Performed by: INTERNAL MEDICINE

## 2023-07-13 PROCEDURE — 83735 ASSAY OF MAGNESIUM: CPT | Performed by: INTERNAL MEDICINE

## 2023-07-13 RX ORDER — DEXTROSE MONOHYDRATE 25 G/50ML
25 INJECTION, SOLUTION INTRAVENOUS
Status: DISCONTINUED | OUTPATIENT
Start: 2023-07-13 | End: 2023-07-21 | Stop reason: HOSPADM

## 2023-07-13 RX ORDER — AMOXICILLIN 250 MG
2 CAPSULE ORAL NIGHTLY
Status: DISCONTINUED | OUTPATIENT
Start: 2023-07-13 | End: 2023-07-21 | Stop reason: HOSPADM

## 2023-07-13 RX ORDER — ENOXAPARIN SODIUM 100 MG/ML
40 INJECTION SUBCUTANEOUS DAILY
Status: DISCONTINUED | OUTPATIENT
Start: 2023-07-13 | End: 2023-07-21 | Stop reason: HOSPADM

## 2023-07-13 RX ORDER — SODIUM CHLORIDE 9 MG/ML
40 INJECTION, SOLUTION INTRAVENOUS AS NEEDED
Status: DISCONTINUED | OUTPATIENT
Start: 2023-07-13 | End: 2023-07-21 | Stop reason: HOSPADM

## 2023-07-13 RX ORDER — FUROSEMIDE 10 MG/ML
20 INJECTION INTRAMUSCULAR; INTRAVENOUS EVERY 12 HOURS
Status: DISCONTINUED | OUTPATIENT
Start: 2023-07-13 | End: 2023-07-15

## 2023-07-13 RX ORDER — HYDROCODONE BITARTRATE AND ACETAMINOPHEN 5; 325 MG/1; MG/1
1 TABLET ORAL EVERY 4 HOURS PRN
Status: DISPENSED | OUTPATIENT
Start: 2023-07-13 | End: 2023-07-20

## 2023-07-13 RX ORDER — SODIUM CHLORIDE 0.9 % (FLUSH) 0.9 %
10 SYRINGE (ML) INJECTION AS NEEDED
Status: DISCONTINUED | OUTPATIENT
Start: 2023-07-13 | End: 2023-07-21 | Stop reason: HOSPADM

## 2023-07-13 RX ORDER — FAMOTIDINE 20 MG/1
20 TABLET, FILM COATED ORAL DAILY
Status: DISCONTINUED | OUTPATIENT
Start: 2023-07-13 | End: 2023-07-21 | Stop reason: HOSPADM

## 2023-07-13 RX ORDER — ACETAMINOPHEN 650 MG/1
650 SUPPOSITORY RECTAL EVERY 4 HOURS PRN
Status: DISCONTINUED | OUTPATIENT
Start: 2023-07-13 | End: 2023-07-21 | Stop reason: HOSPADM

## 2023-07-13 RX ORDER — ACETAMINOPHEN 160 MG/5ML
650 SOLUTION ORAL EVERY 4 HOURS PRN
Status: DISCONTINUED | OUTPATIENT
Start: 2023-07-13 | End: 2023-07-21 | Stop reason: HOSPADM

## 2023-07-13 RX ORDER — ACETAMINOPHEN 325 MG/1
650 TABLET ORAL EVERY 4 HOURS PRN
Status: DISCONTINUED | OUTPATIENT
Start: 2023-07-13 | End: 2023-07-21 | Stop reason: HOSPADM

## 2023-07-13 RX ORDER — SODIUM CHLORIDE 0.9 % (FLUSH) 0.9 %
10 SYRINGE (ML) INJECTION EVERY 12 HOURS SCHEDULED
Status: DISCONTINUED | OUTPATIENT
Start: 2023-07-13 | End: 2023-07-21 | Stop reason: HOSPADM

## 2023-07-13 RX ORDER — ONDANSETRON 2 MG/ML
4 INJECTION INTRAMUSCULAR; INTRAVENOUS EVERY 6 HOURS PRN
Status: DISCONTINUED | OUTPATIENT
Start: 2023-07-13 | End: 2023-07-21 | Stop reason: HOSPADM

## 2023-07-13 RX ORDER — BISACODYL 10 MG
10 SUPPOSITORY, RECTAL RECTAL DAILY PRN
Status: DISCONTINUED | OUTPATIENT
Start: 2023-07-13 | End: 2023-07-21 | Stop reason: HOSPADM

## 2023-07-13 RX ORDER — IPRATROPIUM BROMIDE AND ALBUTEROL SULFATE 2.5; .5 MG/3ML; MG/3ML
3 SOLUTION RESPIRATORY (INHALATION)
Status: DISCONTINUED | OUTPATIENT
Start: 2023-07-13 | End: 2023-07-17

## 2023-07-13 RX ORDER — INSULIN LISPRO 100 [IU]/ML
2-9 INJECTION, SOLUTION INTRAVENOUS; SUBCUTANEOUS
Status: DISCONTINUED | OUTPATIENT
Start: 2023-07-13 | End: 2023-07-20

## 2023-07-13 RX ORDER — BISACODYL 5 MG/1
5 TABLET, DELAYED RELEASE ORAL DAILY PRN
Status: DISCONTINUED | OUTPATIENT
Start: 2023-07-13 | End: 2023-07-21 | Stop reason: HOSPADM

## 2023-07-13 RX ORDER — NALOXONE HCL 0.4 MG/ML
0.4 VIAL (ML) INJECTION
Status: DISCONTINUED | OUTPATIENT
Start: 2023-07-13 | End: 2023-07-21 | Stop reason: HOSPADM

## 2023-07-13 RX ORDER — NICOTINE POLACRILEX 4 MG
15 LOZENGE BUCCAL
Status: DISCONTINUED | OUTPATIENT
Start: 2023-07-13 | End: 2023-07-21 | Stop reason: HOSPADM

## 2023-07-13 RX ORDER — METOPROLOL SUCCINATE 25 MG/1
25 TABLET, EXTENDED RELEASE ORAL
Status: DISCONTINUED | OUTPATIENT
Start: 2023-07-13 | End: 2023-07-21 | Stop reason: HOSPADM

## 2023-07-13 RX ORDER — ASPIRIN 81 MG/1
81 TABLET ORAL DAILY
COMMUNITY

## 2023-07-13 RX ORDER — POLYETHYLENE GLYCOL 3350 17 G/17G
17 POWDER, FOR SOLUTION ORAL DAILY PRN
Status: DISCONTINUED | OUTPATIENT
Start: 2023-07-13 | End: 2023-07-21 | Stop reason: HOSPADM

## 2023-07-13 RX ORDER — HYDROCODONE BITARTRATE AND ACETAMINOPHEN 10; 325 MG/1; MG/1
1 TABLET ORAL EVERY 8 HOURS PRN
COMMUNITY

## 2023-07-13 RX ADMIN — QUETIAPINE FUMARATE 300 MG: 200 TABLET, EXTENDED RELEASE ORAL at 23:29

## 2023-07-13 RX ADMIN — METOPROLOL SUCCINATE 25 MG: 25 TABLET, EXTENDED RELEASE ORAL at 18:42

## 2023-07-13 RX ADMIN — ENOXAPARIN SODIUM 40 MG: 100 INJECTION SUBCUTANEOUS at 18:42

## 2023-07-13 RX ADMIN — IPRATROPIUM BROMIDE AND ALBUTEROL SULFATE 3 ML: 2.5; .5 SOLUTION RESPIRATORY (INHALATION) at 18:48

## 2023-07-13 RX ADMIN — FUROSEMIDE 20 MG: 10 INJECTION, SOLUTION INTRAMUSCULAR; INTRAVENOUS at 18:42

## 2023-07-13 RX ADMIN — IPRATROPIUM BROMIDE AND ALBUTEROL SULFATE 3 ML: 2.5; .5 SOLUTION RESPIRATORY (INHALATION) at 23:35

## 2023-07-13 RX ADMIN — FAMOTIDINE 20 MG: 20 TABLET ORAL at 18:42

## 2023-07-13 NOTE — H&P
[facility]   HISTORY AND PHYSICAL    Patient Name: Carol Abarca  : 1970  MRN: [id number]  Primary Care Physician:  Sonia Mosquera APRN  Date of admission: (Not on file)    Subjective   Subjective     Chief Complaint:   Swelling and shortness of breath      HPI:    Carol Abarca is a 52 y.o. female who was seen in office, brought into office by her son, patient was discharged from hospital recently after prolonged stay.  She was very weak and unsteady and difficulty walking.  She was discharged to home as insurance did not approved inpatient rehab.  Further patient got worse at home unable to take care of.  Now short of breath and increased edema of legs.  There is no fever or chills.        Review of Systems:    Shortness of breath and fatigue.  No chest pain but tightness.  Minimal cough.  Sleeping most of the time.  No nausea vomiting or diarrhea    Personal History     Past Medical History:   Diagnosis Date    Anemia     Arthritis     Diabetes     Essential hypertension 2021    History of pulmonary embolism     Lumbago     Low back pain    Mild left ventricular hypertrophy 2021    Mixed hyperlipidemia 2021    Obstructive sleep apnea     Reflux esophagitis     Seasonal allergies        Past Surgical History:   Procedure Laterality Date    APPENDECTOMY  2010     SECTION      , , ,     COLONOSCOPY       2018    COLONOSCOPY N/A 2022    Procedure: COLONOSCOPY;  Surgeon: Radha James MD;  Location: [facility] ENDOSCOPY;  Service: Gastroenterology;  Laterality: N/A;  COLON POLYP     ENDOSCOPY  2019    ENDOSCOPY N/A 2023    Procedure: ESOPHAGOGASTRODUODENOSCOPY WITH BIPOSIES;  Surgeon: Coy Patrick MD;  Location: [facility] ENDOSCOPY;  Service: Gastroenterology;  Laterality: N/A;  PRIOR LAPBAND, GASTRITIS    HEMORRHOIDECTOMY N/A 2022    Procedure: HEMORRHOIDECTOMY;  Surgeon: Remi Reeder MD;  Location: [facility] MAIN OR;  Service:  General;  Laterality: N/A;    HERNIA REPAIR      2005, 2006, 2007, 2008    HYSTERECTOMY  2004    LAPAROSCOPIC GASTRIC BANDING      OTHER SURGICAL HISTORY      Metal implants       Family History: family history includes Arthritis in her mother; Diabetes in her mother and son; Heart disease in her father and mother. Otherwise pertinent FHx was reviewed and not pertinent to current issue.    Social History:  reports that she has quit smoking. Her smoking use included cigarettes. She smoked an average of .5 packs per day. She has never used smokeless tobacco. She reports current alcohol use. She reports that she does not use drugs.    Home Medications:  ALPRAZolam, DULoxetine, HYDROcodone-acetaminophen, Insulin Glargine (1 Unit Dial), QUEtiapine XR, albuterol sulfate HFA, cetirizine, folic acid, gabapentin, glipizide, levETIRAcetam, metoprolol succinate XL, montelukast, naloxone, omeprazole, potassium chloride, pravastatin, and zolpidem      Allergies:  Allergies   Allergen Reactions    Tramadol Anaphylaxis    Tramadol Hcl Anaphylaxis    Moxifloxacin Hives    Sulfa Antibiotics Hives       Objective   Objective     Vitals:        Physical Exam      Middle-aged female looks older than his stated age, in mild to moderate distress with breathing.  HEENT reveals oral mucosa dry and tongue slightly swollen.  Lips slightly swollen.  Facial swelling with edema.  Neck supple no JVD heart regular, lungs diminished breath sounds bilaterally.  Expiratory wheezing and rhonchi.  Abdomen is obese and soft.  Extremities 3-4+ edema both lower extremities.      I have personally reviewed the results from the time of this admission to 7/13/2023 16:27 EDT and agree with these findings:  [x]  Laboratory  []  Microbiology  [x]  Radiology  []  EKG/Telemetry   []  Cardiology/Vascular   []  Pathology  []  Old records  []  Other:    CBC:    No results found for: WBC, RBC, HGB, HCT, MCV, MCH, MCHC, RDW, RDWSD, MPV, PLT, NEUTRORELPCT,  LYMPHORELPCT, MONORELPCT, EOSRELPCT, BASORELPCT, AUTOIGPER, NEUTROABS, LYMPHSABS, MONOSABS, EOSABS, BASOSABS, AUTOIGNUM, NRBC     BMP:    Lab Results   Component Value Date    GLUCOSE 92 07/09/2023    BUN 6 07/09/2023    CREATININE 0.54 (L) 07/09/2023    EGFRIFAFRI 133 02/05/2022    BCR 11.1 07/09/2023    K 3.7 07/09/2023    CO2 25.2 07/09/2023    CALCIUM 8.9 07/09/2023    PROTENTOTREF 6.7 06/11/2023    ALBUMIN 3.3 (L) 07/09/2023    LABIL2 0.9 06/11/2023    AST 13 07/09/2023    ALT 5 07/09/2023        No radiology results for the last day           Assessment & Plan   Assessment / Plan       Current Diagnosis:  Active Hospital Problems    Diagnosis     **Dyspnea     Anasarca      Plan:   Patient recently discharged from hospital, in 2 weeks she has deteriorated a lot.  Unable to take care of self, increased edema.  We will start Lasix 20 twice daily.  Neb treatment for COPD exacerbation.  Resume essential home meds.  Further management based on clinical course, lab results      DVT prophylaxis:  No DVT prophylaxis order currently exists.    GI Prophylaxis:       Pepcid/Protonix    CODE STATUS:       Admission Status:  I believe this patient meets inpatient status.             I have dictated this note utilizing Dragon Dictation.             Please note that portions of this note were completed with a voice recognition program.             Part of this note may be an electronic transcription/translation of spoken language to printed text         using the Dragon Dictation System.       Electronically signed by Dung Hall MD, 07/13/23, 4:25 PM EDT.    Total time spent with in evaluation and management:

## 2023-07-13 NOTE — PLAN OF CARE
Goal Outcome Evaluation:              Outcome Evaluation: Direct admit this shift. AOx4. Placed on 2L NC, sats at >90%. Placed on remote cardiac monitoring. Found 1 bed bug on patient. Gave a patient bath, have not seen anymore. Up to bathroom x1 assist.

## 2023-07-14 ENCOUNTER — APPOINTMENT (OUTPATIENT)
Dept: CT IMAGING | Facility: HOSPITAL | Age: 53
DRG: 291 | End: 2023-07-14
Payer: MEDICARE

## 2023-07-14 PROBLEM — I50.33 ACUTE ON CHRONIC HEART FAILURE WITH PRESERVED EJECTION FRACTION (HFPEF): Status: ACTIVE | Noted: 2023-07-14

## 2023-07-14 PROBLEM — I50.33 ACUTE ON CHRONIC HEART FAILURE WITH PRESERVED EJECTION FRACTION (HFPEF): Status: ACTIVE | Noted: 2023-01-01

## 2023-07-14 LAB
ALBUMIN SERPL-MCNC: 3.3 G/DL (ref 3.5–5.2)
ALBUMIN/GLOB SERPL: 0.8 G/DL
ALP SERPL-CCNC: 109 U/L (ref 39–117)
ALT SERPL W P-5'-P-CCNC: <5 U/L (ref 1–33)
ANION GAP SERPL CALCULATED.3IONS-SCNC: 10.8 MMOL/L (ref 5–15)
AST SERPL-CCNC: 12 U/L (ref 1–32)
BASOPHILS # BLD AUTO: 0.02 10*3/MM3 (ref 0–0.2)
BASOPHILS NFR BLD AUTO: 0.5 % (ref 0–1.5)
BILIRUB SERPL-MCNC: 0.3 MG/DL (ref 0–1.2)
BUN SERPL-MCNC: 6 MG/DL (ref 6–20)
BUN/CREAT SERPL: 8.6 (ref 7–25)
CALCIUM SPEC-SCNC: 9 MG/DL (ref 8.6–10.5)
CHLORIDE SERPL-SCNC: 103 MMOL/L (ref 98–107)
CO2 SERPL-SCNC: 27.2 MMOL/L (ref 22–29)
CREAT SERPL-MCNC: 0.7 MG/DL (ref 0.57–1)
DEPRECATED RDW RBC AUTO: 58.4 FL (ref 37–54)
EGFRCR SERPLBLD CKD-EPI 2021: 104.2 ML/MIN/1.73
EOSINOPHIL # BLD AUTO: 0.14 10*3/MM3 (ref 0–0.4)
EOSINOPHIL NFR BLD AUTO: 3.4 % (ref 0.3–6.2)
ERYTHROCYTE [DISTWIDTH] IN BLOOD BY AUTOMATED COUNT: 17.1 % (ref 12.3–15.4)
GLOBULIN UR ELPH-MCNC: 4.3 GM/DL
GLUCOSE BLDC GLUCOMTR-MCNC: 123 MG/DL (ref 70–99)
GLUCOSE BLDC GLUCOMTR-MCNC: 86 MG/DL (ref 70–99)
GLUCOSE BLDC GLUCOMTR-MCNC: 95 MG/DL (ref 70–99)
GLUCOSE BLDC GLUCOMTR-MCNC: 96 MG/DL (ref 70–99)
GLUCOSE SERPL-MCNC: 142 MG/DL (ref 65–99)
HCT VFR BLD AUTO: 31.8 % (ref 34–46.6)
HGB BLD-MCNC: 9.9 G/DL (ref 12–15.9)
IMM GRANULOCYTES # BLD AUTO: 0.01 10*3/MM3 (ref 0–0.05)
IMM GRANULOCYTES NFR BLD AUTO: 0.2 % (ref 0–0.5)
LYMPHOCYTES # BLD AUTO: 1.23 10*3/MM3 (ref 0.7–3.1)
LYMPHOCYTES NFR BLD AUTO: 30 % (ref 19.6–45.3)
MCH RBC QN AUTO: 29.2 PG (ref 26.6–33)
MCHC RBC AUTO-ENTMCNC: 31.1 G/DL (ref 31.5–35.7)
MCV RBC AUTO: 93.8 FL (ref 79–97)
MONOCYTES # BLD AUTO: 0.25 10*3/MM3 (ref 0.1–0.9)
MONOCYTES NFR BLD AUTO: 6.1 % (ref 5–12)
NEUTROPHILS NFR BLD AUTO: 2.45 10*3/MM3 (ref 1.7–7)
NEUTROPHILS NFR BLD AUTO: 59.8 % (ref 42.7–76)
NRBC BLD AUTO-RTO: 0 /100 WBC (ref 0–0.2)
PLATELET # BLD AUTO: 309 10*3/MM3 (ref 140–450)
PMV BLD AUTO: 10.7 FL (ref 6–12)
POTASSIUM SERPL-SCNC: 3.5 MMOL/L (ref 3.5–5.2)
PROT SERPL-MCNC: 7.6 G/DL (ref 6–8.5)
QT INTERVAL: 294 MS
QT INTERVAL: 428 MS
RBC # BLD AUTO: 3.39 10*6/MM3 (ref 3.77–5.28)
SODIUM SERPL-SCNC: 141 MMOL/L (ref 136–145)
WBC NRBC COR # BLD: 4.1 10*3/MM3 (ref 3.4–10.8)

## 2023-07-14 PROCEDURE — 71250 CT THORAX DX C-: CPT

## 2023-07-14 PROCEDURE — 82948 REAGENT STRIP/BLOOD GLUCOSE: CPT

## 2023-07-14 PROCEDURE — 94799 UNLISTED PULMONARY SVC/PX: CPT

## 2023-07-14 PROCEDURE — 25010000002 HYDROMORPHONE 1 MG/ML SOLUTION: Performed by: INTERNAL MEDICINE

## 2023-07-14 PROCEDURE — 80053 COMPREHEN METABOLIC PANEL: CPT | Performed by: INTERNAL MEDICINE

## 2023-07-14 PROCEDURE — 25010000002 ENOXAPARIN PER 10 MG: Performed by: INTERNAL MEDICINE

## 2023-07-14 PROCEDURE — 85025 COMPLETE CBC W/AUTO DIFF WBC: CPT | Performed by: INTERNAL MEDICINE

## 2023-07-14 PROCEDURE — 25010000002 FUROSEMIDE PER 20 MG: Performed by: INTERNAL MEDICINE

## 2023-07-14 PROCEDURE — 99222 1ST HOSP IP/OBS MODERATE 55: CPT | Performed by: INTERNAL MEDICINE

## 2023-07-14 RX ORDER — MONTELUKAST SODIUM 10 MG/1
10 TABLET ORAL NIGHTLY
Status: DISCONTINUED | OUTPATIENT
Start: 2023-07-14 | End: 2023-07-21 | Stop reason: HOSPADM

## 2023-07-14 RX ORDER — PRAVASTATIN SODIUM 40 MG
40 TABLET ORAL NIGHTLY
Status: DISCONTINUED | OUTPATIENT
Start: 2023-07-14 | End: 2023-07-21 | Stop reason: HOSPADM

## 2023-07-14 RX ORDER — GABAPENTIN 300 MG/1
300 CAPSULE ORAL EVERY 8 HOURS SCHEDULED
Status: DISCONTINUED | OUTPATIENT
Start: 2023-07-14 | End: 2023-07-21 | Stop reason: HOSPADM

## 2023-07-14 RX ORDER — CETIRIZINE HYDROCHLORIDE 10 MG/1
10 TABLET ORAL DAILY
Status: DISCONTINUED | OUTPATIENT
Start: 2023-07-14 | End: 2023-07-21 | Stop reason: HOSPADM

## 2023-07-14 RX ORDER — HYDROCODONE BITARTRATE AND ACETAMINOPHEN 10; 325 MG/1; MG/1
1 TABLET ORAL EVERY 8 HOURS PRN
Status: DISCONTINUED | OUTPATIENT
Start: 2023-07-14 | End: 2023-07-21 | Stop reason: HOSPADM

## 2023-07-14 RX ADMIN — FAMOTIDINE 20 MG: 20 TABLET ORAL at 08:50

## 2023-07-14 RX ADMIN — GABAPENTIN 300 MG: 300 CAPSULE ORAL at 21:04

## 2023-07-14 RX ADMIN — HYDROMORPHONE HYDROCHLORIDE 0.5 MG: 1 INJECTION, SOLUTION INTRAMUSCULAR; INTRAVENOUS; SUBCUTANEOUS at 11:15

## 2023-07-14 RX ADMIN — ENOXAPARIN SODIUM 40 MG: 100 INJECTION SUBCUTANEOUS at 08:50

## 2023-07-14 RX ADMIN — Medication 10 ML: at 08:51

## 2023-07-14 RX ADMIN — QUETIAPINE FUMARATE 300 MG: 200 TABLET, EXTENDED RELEASE ORAL at 21:03

## 2023-07-14 RX ADMIN — CETIRIZINE HYDROCHLORIDE 10 MG: 10 TABLET, FILM COATED ORAL at 17:46

## 2023-07-14 RX ADMIN — IPRATROPIUM BROMIDE AND ALBUTEROL SULFATE 3 ML: 2.5; .5 SOLUTION RESPIRATORY (INHALATION) at 03:32

## 2023-07-14 RX ADMIN — MONTELUKAST 10 MG: 10 TABLET, FILM COATED ORAL at 21:21

## 2023-07-14 RX ADMIN — GABAPENTIN 300 MG: 300 CAPSULE ORAL at 17:46

## 2023-07-14 RX ADMIN — FUROSEMIDE 20 MG: 10 INJECTION, SOLUTION INTRAMUSCULAR; INTRAVENOUS at 05:09

## 2023-07-14 RX ADMIN — IPRATROPIUM BROMIDE AND ALBUTEROL SULFATE 3 ML: 2.5; .5 SOLUTION RESPIRATORY (INHALATION) at 19:31

## 2023-07-14 RX ADMIN — Medication 10 ML: at 21:05

## 2023-07-14 RX ADMIN — PRAVASTATIN SODIUM 40 MG: 40 TABLET ORAL at 21:26

## 2023-07-14 RX ADMIN — HYDROMORPHONE HYDROCHLORIDE 0.5 MG: 1 INJECTION, SOLUTION INTRAMUSCULAR; INTRAVENOUS; SUBCUTANEOUS at 21:03

## 2023-07-14 RX ADMIN — METOPROLOL SUCCINATE 25 MG: 25 TABLET, EXTENDED RELEASE ORAL at 08:50

## 2023-07-14 RX ADMIN — FUROSEMIDE 20 MG: 10 INJECTION, SOLUTION INTRAMUSCULAR; INTRAVENOUS at 17:46

## 2023-07-14 NOTE — PLAN OF CARE
Goal Outcome Evaluation:   Patient been on 3L nc with no issues tonight. Patient said she was going to wear bipap unit but never ended up being ready for it.

## 2023-07-14 NOTE — CONSULTS
Cardiology Consult Note  [address]          Patient Identification:  Carol Abarca      [id number]  52 y.o.        female  1970           Reason for Consultation: Edema    PCP: Sonia Mosquera APRN    History of Present Illness:     Patient is a 52-year-old female with a history of type 2 diabetes essential hypertension, previous pulmonary embolism, dyslipidemia, and LVH recently has been in the hospital for extended period just recently released the first time was at the beginning of  was for severe anemia and hypotension with syncope.  The patient was discharged and then readmitted for generalized weakness issues and unsteady gait.  She been discharged few days ago was seen back by her PCP reported issues of increasing numbness in her hands and feet along with edema.  She also had increasing weakness.  She does report some shortness of breath with exertion.  No fever chills or cough.  She has not been experiencing any chest discomfort    Past History:  Past Medical History:   Diagnosis Date    Anemia     Arthritis     Diabetes     Essential hypertension 2021    History of pulmonary embolism     Lumbago     Low back pain    Mild left ventricular hypertrophy 2021    Mixed hyperlipidemia 2021    Obstructive sleep apnea     Reflux esophagitis     Seasonal allergies      Past Surgical History:   Procedure Laterality Date    APPENDECTOMY  2010     SECTION      , , ,     COLONOSCOPY      2019 2018    COLONOSCOPY N/A 2022    Procedure: COLONOSCOPY;  Surgeon: Radha James MD;  Location: [facility] ENDOSCOPY;  Service: Gastroenterology;  Laterality: N/A;  COLON POLYP     ENDOSCOPY  2019    ENDOSCOPY N/A 2023    Procedure: ESOPHAGOGASTRODUODENOSCOPY WITH BIPOSIES;  Surgeon: Coy Patrick MD;  Location: [facility] ENDOSCOPY;  Service: Gastroenterology;  Laterality: N/A;  PRIOR LAPBAND, GASTRITIS    HEMORRHOIDECTOMY N/A 2022     Procedure: HEMORRHOIDECTOMY;  Surgeon: Remi Reeder MD;  Location: [facility] MAIN OR;  Service: General;  Laterality: N/A;    HERNIA REPAIR      2005, 2006, 2007, 2008    HYSTERECTOMY  2004    LAPAROSCOPIC GASTRIC BANDING      OTHER SURGICAL HISTORY      Metal implants     Allergies   Allergen Reactions    Tramadol Anaphylaxis    Tramadol Hcl Anaphylaxis    Moxifloxacin Hives    Sulfa Antibiotics Hives     Social History     Socioeconomic History    Marital status: Single   Tobacco Use    Smoking status: Every Day     Packs/day: 1.00     Years: 23.00     Pack years: 23.00     Types: Cigarettes    Smokeless tobacco: Never    Tobacco comments:     Smoked 11-20 years. last 7/24/22 1700   Vaping Use    Vaping Use: Never used   Substance and Sexual Activity    Alcohol use: Yes     Comment: Occasionally drinks, less than 1 drink per day, has been drinking for less than 1 year    Drug use: Never    Sexual activity: Defer     Family History   Problem Relation Age of Onset    Heart disease Mother     Diabetes Mother         Unspecified type    Arthritis Mother     Heart disease Father     Diabetes Son         Unspecified type    Malig Hyperthermia Neg Hx        Medications:  Prior to Admission medications    Medication Sig Start Date End Date Taking? Authorizing Provider   ALPRAZolam (XANAX) 1 MG tablet Take 1 tablet by mouth 3 (Three) Times a Day As Needed for Anxiety.   Yes Silas Bhatt MD   aspirin 81 MG EC tablet Take 1 tablet by mouth Daily.   Yes Silas Bhatt MD   cetirizine (zyrTEC) 10 MG tablet Take 1 tablet by mouth Daily.   Yes Silas Bhatt MD   folic acid (FOLVITE) 1 MG tablet Take 1 tablet by mouth Daily for 30 days. 6/16/23 7/16/23 Yes Dung Hall MD   gabapentin (NEURONTIN) 300 MG capsule Take 1 capsule by mouth Every 8 (Eight) Hours for 30 days. 6/30/23 7/30/23 Yes Dung Hall MD   glipizide (GLUCOTROL XL) 5 MG ER tablet Take 1 tablet by mouth Daily for 30 days. 6/15/23  7/15/23 Yes Dung Hall MD   HYDROcodone-acetaminophen (NORCO)  MG per tablet Take 1 tablet by mouth Every 8 (Eight) Hours As Needed for Moderate Pain, Severe Pain or Mild Pain.   Yes Silas Bhatt MD   levETIRAcetam (Keppra) 500 MG tablet Take 1 tablet by mouth 2 (Two) Times a Day for 30 days. 6/15/23 7/15/23 Yes Dung Hall MD   metoprolol succinate XL (TOPROL-XL) 50 MG 24 hr tablet Take 0.5 tablets by mouth 2 (Two) Times a Day for 30 days.  Patient taking differently: Take 1.5 tablets by mouth 2 (Two) Times a Day. 6/30/23 7/30/23 Yes Dung Hall MD   montelukast (SINGULAIR) 10 MG tablet Take 1 tablet by mouth Every Night.   Yes Silas Bhatt MD   pravastatin (PRAVACHOL) 40 MG tablet Take 1 tablet by mouth Daily.   Yes Silas Bhatt MD   QUEtiapine XR (SEROquel XR) 300 MG 24 hr tablet Take 1 tablet by mouth Every Evening for 30 days.  Patient taking differently: Take 2 tablets by mouth Every Night. 6/15/23 7/15/23 Yes Dung Hall MD Toucharano SoloStar 300 UNIT/ML solution pen-injector injection Inject 30 Units under the skin into the appropriate area as directed Every Night for 30 days.  Patient taking differently: Inject 40 Units under the skin into the appropriate area as directed Every Night. 6/15/23 9/13/23 Yes Dung Hall MD   potassium chloride (K-DUR,KLOR-CON) 10 MEQ CR tablet Take 1 tablet by mouth 2 (Two) Times a Day.    Silas Bhatt MD      Current medications:  cetirizine, 10 mg, Oral, Daily  enoxaparin, 40 mg, Subcutaneous, Daily  famotidine, 20 mg, Oral, Daily  furosemide, 20 mg, Intravenous, Q12H  gabapentin, 300 mg, Oral, Q8H  insulin lispro, 2-9 Units, Subcutaneous, 4x Daily AC & at Bedtime  ipratropium-albuterol, 3 mL, Nebulization, Q4H - RT  metoprolol succinate XL, 25 mg, Oral, Q24H  montelukast, 10 mg, Oral, Nightly  pravastatin, 40 mg, Oral, Nightly  QUEtiapine fumarate ER, 300 mg, Oral, Nightly  senna-docusate sodium, 2 tablet, Oral, Nightly  sodium  "chloride, 10 mL, Intravenous, Q12H      Current IV drips:  Pharmacy to Dose enoxaparin (LOVENOX),         Review of Systems   Constitutional: Negative for chills, fever and weight loss.   HENT:  Negative for congestion and nosebleeds.    Cardiovascular:  Negative for orthopnea and paroxysmal nocturnal dyspnea.   Respiratory:  Positive for shortness of breath. Negative for cough.    Endocrine: Negative for cold intolerance and heat intolerance.   Skin:  Negative for rash.   Musculoskeletal:  Positive for muscle weakness. Negative for back pain.   Gastrointestinal:  Negative for abdominal pain, nausea and vomiting.   Genitourinary:  Negative for dysuria and nocturia.   Neurological:  Positive for numbness. Negative for dizziness and light-headedness.   Psychiatric/Behavioral:  Negative for altered mental status and hallucinations.        Physical Exam    /97 (BP Location: Left arm, Patient Position: Lying)   Pulse 82   Temp 98.8 °F (37.1 °C) (Oral)   Resp 20   Ht 157.5 cm (62\")   Wt 92.6 kg (204 lb 2.3 oz)   SpO2 100%   BMI 37.34 kg/m²  Body mass index is 37.34 kg/m².   Oxygen saturation   @FLOWAN(10::1)@ SpO2  Min: 89 %  Max: 100 %    General Appearance:   no acute distress  Alert and oriented x3  HENT:   lips not cyanotic  Atraumatic  Neck:  thyroid not enlarged  supple  Respiratory:  no respiratory distress  normal breath sounds  no rales  Cardiovascular:  no jugular venous distention  regular rhythm  apical impulse normal  S1 normal, S2 normal  no S3, no S4   no murmur  no rub, no thrill  no carotid bruit  pedal pulses normal  lower extremity edema: +1  Gastrointestinal:   bowel sounds normal  non-tender  no hepatomegaly, no splenomegaly  Musculoskeletal:  no clubbing of fingers.   normocephalic, head atraumatic  Skin:   warm, dry  No rashes  Neuro/Psychiatric:  normal mood and affect  judgement and insight appropriate      Cardiographics:     ECG  (personally reviewed)       Results for orders placed " during the hospital encounter of 05/31/23    Adult Transthoracic Echo Complete W/ Cont if Necessary Per Protocol    Interpretation Summary    Left ventricular ejection fraction appears to be 61 - 65%.    The left ventricular cavity is borderline dilated.    Left ventricular wall thickness is consistent with mild concentric hypertrophy.    Left ventricular diastolic function is consistent with (grade Ia w/high LAP) impaired relaxation.    Left atrial volume is mildly increased.    Estimated right ventricular systolic pressure from tricuspid regurgitation is mildly elevated (35-45 mmHg).         No results found for this or any previous visit.      Cardiolite (Tc-99m Sestamibi) stress test   Lab Review:       CBC          6/30/2023    11:39 7/9/2023    15:56 7/13/2023    17:30   CBC   WBC 3.62  4.02  5.13    RBC 3.19  3.32  3.18    Hemoglobin 9.6  9.8  9.4    Hematocrit 31.1  31.8  29.9    MCV 97.5  95.8  94.0    MCH 30.1  29.5  29.6    MCHC 30.9  30.8  31.4    RDW 17.4  17.3  16.9    Platelets 163  270  313        CMP          6/30/2023    11:39 7/9/2023    15:56 7/13/2023    17:30   CMP   Glucose 75  92  62    BUN 12  6  6    Creatinine 0.57  0.54  0.57    EGFR 109.5  110.9  109.5    Sodium 140  139  140    Potassium 3.9  3.7  3.6    Chloride 105  105  104    Calcium 8.8  8.9  9.2    Total Protein 6.7  7.5  7.6    Albumin 3.0  3.3  3.5    Globulin 3.7  4.2  4.1    Total Bilirubin 0.3  0.4  0.4    Alkaline Phosphatase 79  111  114    AST (SGOT) 16  13  13    ALT (SGPT) 5  5  <5    Albumin/Globulin Ratio 0.8  0.8  0.9    BUN/Creatinine Ratio 21.1  11.1  10.5    Anion Gap 5.3  8.8  8.4         CARDIAC LABS:      Lab 07/13/23  1730 07/09/23  1921 07/09/23  1556   PROBNP 116.0  --  79.7   HSTROP T  --  12* 12*      No results found for: DIGOXIN   Lab Results   Component Value Date    TSH 0.782 07/13/2023           Invalid input(s): LDLCALC  Lab Results   Component Value Date    POCTROP 0.00 02/05/2022     No components  found for: MEENA  Lab Results   Component Value Date    MG 2.0 07/13/2023             CARDIAC LABS:      Lab 07/13/23  1730 07/09/23  1921 07/09/23  1556   PROBNP 116.0  --  79.7   HSTROP T  --  12* 12*        Imaging:  CXR   ew bilateral airspace opacities are seen.  The findings may represent infectious multifocal   pneumonia.  Pulmonary edema is possible.  Also, small to moderate bilateral pleural effusions are   possible.  There is mild-to-moderate cardiomegaly, seen previously.  Assessment:    Dyspnea    Essential hypertension    Mild left ventricular hypertrophy    Type 2 diabetes mellitus    Anasarca    Acute on chronic heart failure with preserved ejection fraction (HFpEF)      HFpEF acute on chronic with signs of volume overload patient having other multiple medical issues not sure this is contributing to her weakness necessarily.  Do agree with cautious IV diuresis with close monitoring given her previous issues with hypotension and syncope.  We will attempt to decrease fluid Less than 2 L/day strict I's and O's low-sodium diet    Plan:  1.  IV Lasix 20 mg twice daily  2.  Strict I's and O's  3.  2 L fluid in per day      Thank you for allowing us to share in [facility].            Remi Leyva MD   7/14/2023    16:38 EDT

## 2023-07-14 NOTE — PLAN OF CARE
Goal Outcome Evaluation:  Plan of Care Reviewed With: patient        Progress: no change  Outcome Evaluation: Patient remains on room air this morning. V60 BIPAP at bedside on standby. Patient did refuse unit last night. This unit will remain at bedside as PRN for patient today/tonight. Patient is refusing breathing treatments. Patient does not take them at home. Patient has clear breath sounds, not short of breath and remains comfortable.

## 2023-07-14 NOTE — CONSULTS
Discharge Planning Assessment  [facility]     Patient Name: Carol Abarca  MRN: [id number]  Today's Date: 7/14/2023    Admit Date: 7/13/2023  Plan: Pt admitted due to swelling and shortness of breath. Pt was recently admitted to [facility] for similar complaints on 06/17 to 06/30. Rehab was attempted upon that admission, however insurance denied and pt returned home with family. SW met with pt this admission to assess needs. Pt lives at home with her two sons. States she requires assistance with ADLs. Pt would like to attempt rehab placement again if possible, no preference in facility. If insurance continues to deny, pt would like [facility]. PTOT pending. Pharmacy/PCP confirmed. Will follow.     Discharge Needs Assessment       Row Name 07/14/23 1235       Living Environment    People in Home child(cory), adult    Name(s) of People in Home Pt lives in [facility] [place] [facility] with her two sons    Current Living Arrangements home    Primary Care Provided by self;child(cory)    Provides Primary Care For no one, unable/limited ability to care for self    Family Caregiver if Needed child(cory), adult    Quality of Family Relationships helpful    Able to Return to Prior Arrangements yes       Resource/Environmental Concerns    Resource/Environmental Concerns none       Transition Planning    Patient/Family Anticipates Transition to home with help/services;inpatient rehabilitation facility    Patient/Family Anticipated Services at Transition durable medical equipment;home health care;rehabilitation services    Transportation Anticipated family or friend will provide;health plan transportation       Discharge Needs Assessment    Readmission Within the Last 30 Days previous discharge plan unsuccessful    Equipment Currently Used at Home walker, rolling;cpap    Concerns to be Addressed basic needs;discharge planning    Anticipated Changes Related to Illness none    Equipment Needed After Discharge oxygen    Outpatient/Agency/Support Group Needs skilled  nursing facility    Discharge Facility/Level of Care Needs nursing facility, skilled;home with home health;rehabilitation facility              Discharge Plan       Row Name 07/14/23 1236       Plan    Plan Pt admitted due to swelling and shortness of breath. Pt was recently admitted to [facility] for similar complaints on 06/17 to 06/30. Rehab was attempted upon that admission, however insurance denied and pt returned home with family. SW met with pt this admission to assess needs. Pt lives at home with her two sons. States she requires assistance with ADLs. Pt would like to attempt rehab placement again if possible, no preference in facility. If insurance continues to deny, pt would like [facility]. PTOT pending. Pharmacy/PCP confirmed. Will follow.    Patient/Family in Agreement with Plan yes              Demographic Summary       Row Name 07/14/23 1230       General Information    Admission Type inpatient    Arrived From emergency department    Referral Source admission list    Reason for Consult discharge planning    Preferred Language English       Contact Information    Permission Granted to Share Info With family/designee              Functional Status       Row Name 07/14/23 1233       Functional Status    Usual Activity Tolerance moderate    Current Activity Tolerance moderate       Functional Status, IADL    Medications independent    Meal Preparation assistive person    Housekeeping assistive person    Laundry assistive person    Shopping assistive person       Mental Status    General Appearance WDL WDL       Mental Status Summary    Recent Changes in Mental Status/Cognitive Functioning no changes              Psychosocial       Row Name 07/14/23 1233       Behavior WDL    Behavior WDL WDL       Emotion Mood WDL    Emotion/Mood/Affect WDL WDL       Speech WDL    Speech WDL WDL       Perceptual State WDL    Perceptual State WDL WDL       Thought Process WDL    Thought Process WDL WDL       Intellectual Performance WDL     Intellectual Performance WDL WDL    Level of Consciousness Alert       Coping/Stress    Sources of Support adult child(cory)    Reaction to Health Status accepting    Understanding of Condition and Treatment adequate understanding of medical condition       Developmental Stage (Eriksson's)    Developmental Stage Stage 7 (35-65 years/Middle Adulthood) Generativity vs. Stagnation           BREONNA Mccauley

## 2023-07-14 NOTE — PLAN OF CARE
Goal Outcome Evaluation:   Patient been on 2L nc most of the night with no issues. Patient said she would wear cpap unit tonight but every time rt asked she said she wasn't ready yet.

## 2023-07-14 NOTE — PLAN OF CARE
Goal Outcome Evaluation:              Outcome Evaluation: A&O4. Remains on 2L of O2 per nasal cannual. assisted to BSC x1 assist. medicated for c/o pain per mar. blood glucose monitored. pt CT obtained this shift.

## 2023-07-14 NOTE — PROGRESS NOTES
[facility]     Progress Note    Patient Name: Carol Abarca  : 1970  MRN: [id number]  Primary Care Physician:  Sonia Mosquera APRN  Date of admission: 2023      Subjective   Brief summary.  Patient admitted with volume overload and anasarca and weakness and shortness of breath      HPI:  Slightly better after diuretics.  Still short of breath, still complaining of swelling all over the body.  Patient complains of numbness tingling.  Achy all over did not receive pain meds and Neurontin yesterday    Review of Systems     No chest pain, positive for shortness of breath.  No weakness.  No nausea vomiting.  Numbness tingling all over      Objective     Vitals:   Temp:  [98.1 °F (36.7 °C)-99.5 °F (37.5 °C)] 98.8 °F (37.1 °C)  Heart Rate:  [] 82  Resp:  [20] 20  BP: (100-137)/(67-99) 137/97  Flow (L/min):  [2] 2    Physical Exam :     Elderly looking obese female, not in acute distress,  Generalized edema of the body noted including face  Heart regular  Lungs diminished breath sounds with crackles  Abdomen obese and soft  Extremities with 2+ edema      Result Review:  I have personally reviewed the results from the time of this admission to 2023 17:52 EDT and agree with these findings:  [x]  Laboratory  []  Microbiology  [x]  Radiology  []  EKG/Telemetry   []  Cardiology/Vascular   []  Pathology  []  Old records  []  Other:       CT scan.  Pulmonary edema versus multifocal pneumonia    Assessment / Plan       Active Hospital Problems:  Active Hospital Problems    Diagnosis     **Dyspnea     Acute on chronic heart failure with preserved ejection fraction (HFpEF)     Anasarca     Type 2 diabetes mellitus     Seizure disorder     B12 deficiency     Severe anemia     Essential hypertension     Mild left ventricular hypertrophy        Plan:   Patient slightly better continue IV diuresis, consult cardiologist Dr. Leyva.  Chest x-ray with bilateral infiltrates, check CT chest to rule out fluid versus  pneumonia.  Recheck labs in a.m. check B12 level in a.m.  Restart gabapentin and pain meds.  Increase activity.       DVT prophylaxis:  Medical DVT prophylaxis orders are present.    CODE STATUS:   Level Of Support Discussed With: Patient  Code Status (Patient has no pulse and is not breathing): CPR (Attempt to Resuscitate)  Medical Interventions (Patient has pulse or is breathing): Full Support            Electronically signed by Dung Hall MD, 07/14/23, 5:50 PM EDT.

## 2023-07-15 LAB
GLUCOSE BLDC GLUCOMTR-MCNC: 105 MG/DL (ref 70–99)
GLUCOSE BLDC GLUCOMTR-MCNC: 111 MG/DL (ref 70–99)
GLUCOSE BLDC GLUCOMTR-MCNC: 131 MG/DL (ref 70–99)
GLUCOSE BLDC GLUCOMTR-MCNC: 96 MG/DL (ref 70–99)
HOLD SPECIMEN: NORMAL
VIT B12 BLD-MCNC: 782 PG/ML (ref 211–946)
WHOLE BLOOD HOLD SPECIMEN: NORMAL

## 2023-07-15 PROCEDURE — 25010000002 FUROSEMIDE PER 20 MG: Performed by: INTERNAL MEDICINE

## 2023-07-15 PROCEDURE — 82607 VITAMIN B-12: CPT | Performed by: INTERNAL MEDICINE

## 2023-07-15 PROCEDURE — 97161 PT EVAL LOW COMPLEX 20 MIN: CPT

## 2023-07-15 PROCEDURE — 94799 UNLISTED PULMONARY SVC/PX: CPT

## 2023-07-15 PROCEDURE — 99233 SBSQ HOSP IP/OBS HIGH 50: CPT | Performed by: INTERNAL MEDICINE

## 2023-07-15 PROCEDURE — 94660 CPAP INITIATION&MGMT: CPT

## 2023-07-15 PROCEDURE — 99232 SBSQ HOSP IP/OBS MODERATE 35: CPT | Performed by: INTERNAL MEDICINE

## 2023-07-15 PROCEDURE — 25010000002 HYDROMORPHONE 1 MG/ML SOLUTION: Performed by: INTERNAL MEDICINE

## 2023-07-15 PROCEDURE — 25010000002 ENOXAPARIN PER 10 MG: Performed by: INTERNAL MEDICINE

## 2023-07-15 PROCEDURE — 82948 REAGENT STRIP/BLOOD GLUCOSE: CPT

## 2023-07-15 PROCEDURE — 94761 N-INVAS EAR/PLS OXIMETRY MLT: CPT

## 2023-07-15 RX ORDER — FUROSEMIDE 10 MG/ML
40 INJECTION INTRAMUSCULAR; INTRAVENOUS EVERY 12 HOURS
Status: DISCONTINUED | OUTPATIENT
Start: 2023-07-15 | End: 2023-07-20

## 2023-07-15 RX ORDER — LEVETIRACETAM 500 MG/1
500 TABLET ORAL 2 TIMES DAILY
Status: DISCONTINUED | OUTPATIENT
Start: 2023-07-15 | End: 2023-07-21 | Stop reason: HOSPADM

## 2023-07-15 RX ORDER — SPIRONOLACTONE 25 MG/1
25 TABLET ORAL DAILY
Status: DISCONTINUED | OUTPATIENT
Start: 2023-07-15 | End: 2023-07-21 | Stop reason: HOSPADM

## 2023-07-15 RX ADMIN — IPRATROPIUM BROMIDE AND ALBUTEROL SULFATE 3 ML: 2.5; .5 SOLUTION RESPIRATORY (INHALATION) at 15:34

## 2023-07-15 RX ADMIN — CETIRIZINE HYDROCHLORIDE 10 MG: 10 TABLET, FILM COATED ORAL at 08:23

## 2023-07-15 RX ADMIN — QUETIAPINE FUMARATE 300 MG: 200 TABLET, EXTENDED RELEASE ORAL at 21:09

## 2023-07-15 RX ADMIN — FUROSEMIDE 20 MG: 10 INJECTION, SOLUTION INTRAMUSCULAR; INTRAVENOUS at 05:48

## 2023-07-15 RX ADMIN — METOPROLOL SUCCINATE 25 MG: 25 TABLET, EXTENDED RELEASE ORAL at 08:23

## 2023-07-15 RX ADMIN — Medication 10 ML: at 08:22

## 2023-07-15 RX ADMIN — HYDROMORPHONE HYDROCHLORIDE 0.5 MG: 1 INJECTION, SOLUTION INTRAMUSCULAR; INTRAVENOUS; SUBCUTANEOUS at 02:56

## 2023-07-15 RX ADMIN — IPRATROPIUM BROMIDE AND ALBUTEROL SULFATE 3 ML: 2.5; .5 SOLUTION RESPIRATORY (INHALATION) at 19:10

## 2023-07-15 RX ADMIN — IPRATROPIUM BROMIDE AND ALBUTEROL SULFATE 3 ML: 2.5; .5 SOLUTION RESPIRATORY (INHALATION) at 04:11

## 2023-07-15 RX ADMIN — GABAPENTIN 300 MG: 300 CAPSULE ORAL at 21:08

## 2023-07-15 RX ADMIN — HYDROMORPHONE HYDROCHLORIDE 0.5 MG: 1 INJECTION, SOLUTION INTRAMUSCULAR; INTRAVENOUS; SUBCUTANEOUS at 13:12

## 2023-07-15 RX ADMIN — IPRATROPIUM BROMIDE AND ALBUTEROL SULFATE 3 ML: 2.5; .5 SOLUTION RESPIRATORY (INHALATION) at 00:48

## 2023-07-15 RX ADMIN — Medication 10 ML: at 21:09

## 2023-07-15 RX ADMIN — FUROSEMIDE 40 MG: 10 INJECTION, SOLUTION INTRAMUSCULAR; INTRAVENOUS at 17:09

## 2023-07-15 RX ADMIN — FAMOTIDINE 20 MG: 20 TABLET ORAL at 08:23

## 2023-07-15 RX ADMIN — EMPAGLIFLOZIN 10 MG: 10 TABLET, FILM COATED ORAL at 13:12

## 2023-07-15 RX ADMIN — ENOXAPARIN SODIUM 40 MG: 100 INJECTION SUBCUTANEOUS at 08:22

## 2023-07-15 RX ADMIN — HYDROCODONE BITARTRATE AND ACETAMINOPHEN 1 TABLET: 10; 325 TABLET ORAL at 10:21

## 2023-07-15 RX ADMIN — HYDROMORPHONE HYDROCHLORIDE 0.5 MG: 1 INJECTION, SOLUTION INTRAMUSCULAR; INTRAVENOUS; SUBCUTANEOUS at 21:10

## 2023-07-15 RX ADMIN — GABAPENTIN 300 MG: 300 CAPSULE ORAL at 13:12

## 2023-07-15 RX ADMIN — IPRATROPIUM BROMIDE AND ALBUTEROL SULFATE 3 ML: 2.5; .5 SOLUTION RESPIRATORY (INHALATION) at 23:19

## 2023-07-15 RX ADMIN — GABAPENTIN 300 MG: 300 CAPSULE ORAL at 05:48

## 2023-07-15 RX ADMIN — IPRATROPIUM BROMIDE AND ALBUTEROL SULFATE 3 ML: 2.5; .5 SOLUTION RESPIRATORY (INHALATION) at 11:34

## 2023-07-15 RX ADMIN — MONTELUKAST 10 MG: 10 TABLET, FILM COATED ORAL at 21:08

## 2023-07-15 RX ADMIN — SPIRONOLACTONE 25 MG: 25 TABLET ORAL at 13:12

## 2023-07-15 RX ADMIN — LEVETIRACETAM 500 MG: 500 TABLET, FILM COATED ORAL at 21:08

## 2023-07-15 RX ADMIN — PRAVASTATIN SODIUM 40 MG: 40 TABLET ORAL at 21:08

## 2023-07-15 RX ADMIN — LEVETIRACETAM 500 MG: 500 TABLET, FILM COATED ORAL at 13:12

## 2023-07-15 NOTE — THERAPY EVALUATION
Acute Care - Physical Therapy Initial Evaluation   [place]     Patient Name: Carol Abarca  : 1970  MRN: [id number]  Today's Date: 7/15/2023      Visit Dx: Admit date: 2023     Referring Physician: Dung Hall MD     Surgery Date:* No surgery found *          ICD-10-CM ICD-9-CM   1. Difficulty walking  R26.2 719.7     Patient Active Problem List   Diagnosis    Mixed hyperlipidemia    Essential hypertension    Mild left ventricular hypertrophy    Atypical chest pain    Obstructive sleep apnea    History of pulmonary embolism    Screening for colon cancer    Hemorrhoids    Severe anemia    B12 deficiency    Pleural effusion    Seizure disorder    Type 2 diabetes mellitus    Generalized weakness    Unsteady gait when walking    Dyspnea    Anasarca    Acute on chronic heart failure with preserved ejection fraction (HFpEF)     Past Medical History:   Diagnosis Date    Anemia     Arthritis     Diabetes     Essential hypertension 2021    History of pulmonary embolism     Lumbago     Low back pain    Mild left ventricular hypertrophy 2021    Mixed hyperlipidemia 2021    Obstructive sleep apnea     Reflux esophagitis     Seasonal allergies      Past Surgical History:   Procedure Laterality Date    APPENDECTOMY  2010     SECTION      , , ,     COLONOSCOPY      2019    COLONOSCOPY N/A 2022    Procedure: COLONOSCOPY;  Surgeon: Radha James MD;  Location: [facility] ENDOSCOPY;  Service: Gastroenterology;  Laterality: N/A;  COLON POLYP     ENDOSCOPY      ENDOSCOPY N/A 2023    Procedure: ESOPHAGOGASTRODUODENOSCOPY WITH BIPOSIES;  Surgeon: Coy Patrick MD;  Location: [facility] ENDOSCOPY;  Service: Gastroenterology;  Laterality: N/A;  PRIOR LAPBAND, GASTRITIS    HEMORRHOIDECTOMY N/A 2022    Procedure: HEMORRHOIDECTOMY;  Surgeon: Remi Reeder MD;  Location: [facility] MAIN OR;  Service: General;  Laterality: N/A;    HERNIA REPAIR       2005, 2006, 2007, 2008    HYSTERECTOMY  2004    LAPAROSCOPIC GASTRIC BANDING      OTHER SURGICAL HISTORY      Metal implants     PT Assessment (last 12 hours)       PT Evaluation and Treatment       Row Name 07/15/23 1138          Physical Therapy Time and Intention    Subjective Information no complaints (P)   -KD     Document Type evaluation (P)   -KD     Mode of Treatment individual therapy;physical therapy (P)   -KD     Patient Effort good (P)   -KD       Row Name 07/15/23 1138          General Information    Patient Profile Reviewed yes (P)   -KD     Patient Observations alert;cooperative;agree to therapy (P)   -KD     Prior Level of Function -- (P)   Patient is independent with ADLs but requires assistance from her son for ambulating to the bathroom.  -KD     Equipment Currently Used at Home walker, rolling (P)   -KD     Existing Precautions/Restrictions fall (P)   -KD     Barriers to Rehab none identified (P)   -KD       Row Name 07/15/23 1138          Living Environment    Current Living Arrangements home (P)   -KD     Home Accessibility stairs to enter home (P)   -KD     People in Home child(cory), adult (P)   -KD     Primary Care Provided by self (P)   -KD       Row Name 07/15/23 1138          Home Main Entrance    Number of Stairs, Main Entrance one (P)   -KD       Row Name 07/15/23 1138          Home Use of Assistive/Adaptive Equipment    Equipment Currently Used at Home walker, rolling (P)   -KD       Row Name 07/15/23 1138          Range of Motion (ROM)    Range of Motion bilateral lower extremities;ROM is WFL (P)   -KD       [facility] Name 07/15/23 1138          Strength (Manual Muscle Testing)    Strength (Manual Muscle Testing) bilateral lower extremities (P)   BLE: 3+/5  -KD       Row Name 07/15/23 1138          Bed Mobility    Bed Mobility other (see comments) (P)   Patient sitting EOB upon PT entry  -KD       Row Name 07/15/23 1138          Transfers    Transfers sit-stand transfer;stand-sit transfer  (P)   -KD       Row Name 07/15/23 1138          Sit-Stand Transfer    Sit-Stand [place] (Transfers) contact guard (P)   -KD     Assistive Device (Sit-Stand Transfers) walker, front-wheeled (P)   -KD       Row Name 07/15/23 1138          Stand-Sit Transfer    Stand-Sit [place] (Transfers) contact guard (P)   -KD     Assistive Device (Stand-Sit Transfers) walker, front-wheeled (P)   -KD       Row Name 07/15/23 1138          Gait/Stairs (Locomotion)    Gait/Stairs Locomotion gait/ambulation assistive device (P)   -KD     [place] Level (Gait) contact guard (P)   -KD     Assistive Device (Gait) walker, front-wheeled (P)   -KD     Distance in Feet (Gait) 100 (P)   -KD     Pattern (Gait) step-through (P)   -KD     Deviations/Abnormal Patterns (Gait) stride length decreased;gait speed decreased;mirna decreased (P)   -KD       Row Name 07/15/23 1138          Safety Issues, Functional Mobility    Impairments Affecting Function (Mobility) strength;balance;endurance/activity tolerance (P)   -KD       Row Name 07/15/23 1138          Balance    Balance Assessment standing dynamic balance (P)   -KD     Dynamic Standing Balance contact guard (P)   -KD     Position/Device Used, Standing Balance supported;walker, front-wheeled (P)   -KD       Row Name 07/15/23 1138          Plan of Care Review    Plan of Care Reviewed With patient (P)   -KD     Outcome Evaluation Patient presents with strength, balance, and endurance/activity tolerance deficits that impede her ability to safely ambulate and transfer. She would benefit from inpatient PT services to address these functional mobility deficits. (P)   -KD       Row Name 07/15/23 1138          Positioning and Restraints    Pre-Treatment Position in bed (P)   -KD     Post Treatment Position chair (P)   -KD     In Chair call light within reach;encouraged to call for assist (P)   -KD       Row Name 07/15/23 1138          Therapy Assessment/Plan (PT)    Rehab Potential (PT)  good, to achieve stated therapy goals (P)   -KD     Criteria for Skilled Interventions Met (PT) yes;meets criteria (P)   -KD     Therapy Frequency (PT) daily (P)   -KD     Predicted Duration of Therapy Intervention (PT) 10 (P)   -KD     Problem List (PT) problems related to;balance;strength (P)   -KD     Activity Limitations Related to Problem List (PT) unable to ambulate safely (P)   -KD       Row Name 07/15/23 1138          PT Evaluation Complexity    History, PT Evaluation Complexity no personal factors and/or comorbidities (P)   -KD     Examination of Body Systems (PT Eval Complexity) total of 4 or more elements (P)   -KD     Clinical Presentation (PT Evaluation Complexity) stable (P)   -KD     Clinical Decision Making (PT Evaluation Complexity) low complexity (P)   -KD     Overall Complexity (PT Evaluation Complexity) low complexity (P)   -KD       Row Name 07/15/23 1138          Physical Therapy Goals    Bed Mobility Goal Selection (PT) bed mobility, PT goal 1 (P)   -KD     Transfer Goal Selection (PT) transfer, PT goal 1 (P)   -KD     Gait Training Goal Selection (PT) gait training, PT goal 1 (P)   -KD       Row Name 07/15/23 1138          Bed Mobility Goal 1 (PT)    Activity/Assistive Device (Bed Mobility Goal 1, PT) sit to supine;supine to sit (P)   -KD     [place] Level/Cues Needed (Bed Mobility Goal 1, PT) modified independence (P)   -KD     Time Frame (Bed Mobility Goal 1, PT) 10 days (P)   -KD       Row Name 07/15/23 1138          Transfer Goal 1 (PT)    Activity/Assistive Device (Transfer Goal 1, PT) sit-to-stand/stand-to-sit (P)   -KD     [place] Level/Cues Needed (Transfer Goal 1, PT) modified independence (P)   -KD     Time Frame (Transfer Goal 1, PT) 10 days (P)   -KD       Row Name 07/15/23 1138          Gait Training Goal 1 (PT)    Activity/Assistive Device (Gait Training Goal 1, PT) gait (walking locomotion);assistive device use;walker, rolling (P)   -KD     [place] Level (Gait  Training Goal 1, PT) supervision required (P)   -KD     Distance (Gait Training Goal 1, PT) 300 (P)   -KD     Time Frame (Gait Training Goal 1, PT) 10 days (P)   -KD               User Key  (r) = Recorded By, (t) = Taken By, (c) = Cosigned By      Initials Name Provider Type    Lynne Schwab, PT Student PT Student                      PT Recommendation and Plan  Anticipated Discharge Disposition (PT): (P) inpatient rehabilitation facility  Planned Therapy Interventions (PT): (P) balance training, bed mobility training, gait training, strengthening, stretching, transfer training  Therapy Frequency (PT): (P) daily  Plan of Care Reviewed With: (P) patient  Outcome Evaluation: (P) Patient presents with strength, balance, and endurance/activity tolerance deficits that impede her ability to safely ambulate and transfer. She would benefit from inpatient PT services to address these functional mobility deficits.   Outcome Measures       Row Name 07/15/23 1100             How much help from another person do you currently need...    Turning from your back to your side while in flat bed without using bedrails? 4 (P)   -KD      Moving from lying on back to sitting on the side of a flat bed without bedrails? 3 (P)   -KD      Moving to and from a bed to a chair (including a wheelchair)? 3 (P)   -KD      Standing up from a chair using your arms (e.g., wheelchair, bedside chair)? 3 (P)   -KD      Climbing 3-5 steps with a railing? 2 (P)   -KD      To walk in hospital room? 3 (P)   -KD      AM-PAC 6 Clicks Score (PT) 18 (P)   -KD         Functional Assessment    Outcome Measure Options AM-PAC 6 Clicks Basic Mobility (PT) (P)   -KD                User Key  (r) = Recorded By, (t) = Taken By, (c) = Cosigned By      Initials Name Provider Type    Lynne Schwab, PT Student PT Student                     Time Calculation:    PT Charges       Row Name 07/15/23 1145             Time Calculation    PT Received On 07/15/23 (P)   -KD       PT Goal Re-Cert Due Date 07/24/23 (P)   -KD         Untimed Charges    PT Eval/Re-eval Minutes 30 (P)   -KD         Total Minutes    Untimed Charges Total Minutes 30 (P)   -KD       Total Minutes 30 (P)   -KD                User Key  (r) = Recorded By, (t) = Taken By, (c) = Cosigned By      Initials Name Provider Type    Lynne Schwab, PT Student PT Student                      PT G-Codes  Outcome Measure Options: (P) AM-PAC 6 Clicks Basic Mobility (PT)  AM-PAC 6 Clicks Score (PT): (P) 18    Lynne Yost, PT Student  7/15/2023

## 2023-07-15 NOTE — PROGRESS NOTES
[facility]     Progress Note    Patient Name: Carol Abarca  : 1970  MRN: [id number]  Primary Care Physician:  Sonia Mosquera APRN  Date of admission: 2023      Subjective   Brief summary.  Patient admitted with volume overload and anasarca and weakness and shortness of breath      HPI:  Vital signs stable using hospital BiPAP at night.  Remains on 1 L nasal cannula with 97% saturation.  Complaining of numbness from face down in the chest for a month.  No labs today  Slightly better after diuretics.  Still short of breath, still complaining of swelling all over the body.   Positive fluid balance.    Review of Systems     No chest pain, positive for shortness of breath.  No weakness.  No nausea vomiting.  Numbness tingling all over      Objective     Vitals:   Temp:  [98.2 °F (36.8 °C)-98.8 °F (37.1 °C)] 98.8 °F (37.1 °C)  Heart Rate:  [79-91] 83  Resp:  [9-20] 18  BP: (115-146)/(78-97) 133/95  Flow (L/min):  [1-2] 1    Physical Exam :     Elderly looking obese female, not in acute distress, talking in full sentences  Generalized edema of the body   Heart regular  Lungs diminished breath sounds with crackles  Abdomen obese and soft  Extremities with 2+ edema  Neuro nonfocal.    Result Review:  I have personally reviewed the results from the time of this admission to 7/15/2023 13:47 EDT and agree with these findings:  [x]  Laboratory  []  Microbiology  [x]  Radiology  []  EKG/Telemetry   []  Cardiology/Vascular   []  Pathology  []  Old records  []  Other:       CT scan.  Pulmonary edema versus multifocal pneumonia    Assessment / Plan       Active Hospital Problems:  Active Hospital Problems    Diagnosis     **Dyspnea     Acute on chronic heart failure with preserved ejection fraction (HFpEF)     Anasarca     Type 2 diabetes mellitus     Seizure disorder     B12 deficiency     Severe anemia     Essential hypertension     Mild left ventricular hypertrophy      History of PE.  Obstructive sleep apnea on  home CPAP.    Plan:   Low-salt diet fluid restriction strict input output.  Wean off oxygen keep sats more than 90%  Patient slightly better continue IV diuresis, dose increased by cardiology  Appreciate cardiology input  CT chest noted with bilateral basal atelectasis no pleural effusion or infiltrate.  Sliding-scale insulin.  Incentive spirometry and flutter valve.  Started on Jardiance    B12 level within normal range  Restart gabapentin and pain meds.  Continue home Keppra and Seroquel   increase activity.  Home CPAP offered but patient wants to stay with the hospital BiPAP.  Monitor CBC and renal panel  Continue flex monitor  DVT prophylaxis:  Medical DVT prophylaxis orders are present.    CODE STATUS:   Level Of Support Discussed With: Patient  Code Status (Patient has no pulse and is not breathing): CPR (Attempt to Resuscitate)  Medical Interventions (Patient has pulse or is breathing): Full Support      Electronically signed by John Conrad MD, 07/15/23, 1:51 PM EDT.

## 2023-07-15 NOTE — PLAN OF CARE
Goal Outcome Evaluation:  Plan of Care Reviewed With: (P) patient           Outcome Evaluation: (P) Patient presents with strength, balance, and endurance/activity tolerance deficits that impede her ability to safely ambulate and transfer. She would benefit from inpatient PT services to address these functional mobility deficits.      Anticipated Discharge Disposition (PT): (P) inpatient rehabilitation facility

## 2023-07-15 NOTE — PROGRESS NOTES
Respiratory Therapist Broncho-Pulmonary Hygiene Progress Note      Patient Name:  Carol Abarca  YOB: 1970    Carol Abarca meets the qualification for Level 1 of the Bronco-Pulmonary Hygiene Protocol. This was based on my daily patient assessment and includes review of chest x-ray results, cough ability and quality, oxygenation, secretions or risk for secretion development and patient mobility.     Broncho-Pulmonary Hygiene Assessment:    Level of Movement: Actively changing positions without assistance  Alert/ oriented/ cooperative    Breath Sounds: Clear to slightly diminished    Cough: Strong, effective    Chest X-Ray: Mild consolidation and/or atelectasis.    Sputum Productions: None or small amount of thin or watery secretions with effective cough    History and Physical: Chronic condition    SpO2 to Oxygen Need: greater than 92% on room air or  less than 3L nasal canula    Current SpO2 is: 97% on 1 lpm NC    Based on this information, I have completed the following interventions: Teach/Instruct patient on cough and deep breathe. Instructed patient [employer] Therapy for Positive Expiratory Pressure therapy for airway clearance. Patient tolerated very well; 10 breaths x1.        Electronically signed by Marysol Haddad RRT, 07/15/23, 11:36 AM EDT.

## 2023-07-15 NOTE — PROGRESS NOTES
CARDIOLOGY  INPATIENT PROGRESS NOTE                [address]    7/15/2023      PATIENT IDENTIFICATION:   Name:  Carol Abarca      MRN:  [id number]     52 y.o.  female                 SUBJECTIVE:   Patient sitting in chair today no events overnight  OBJECTIVE:  Vitals:    07/15/23 0811 07/15/23 1118 07/15/23 1134 07/15/23 1148   BP: 115/78 133/95     BP Location: Left arm Left arm     Patient Position: Lying Sitting     Pulse: 84 85 79 83   Resp: 16 20 18 18   Temp: 98.2 °F (36.8 °C) 98.8 °F (37.1 °C)     TempSrc: Oral Oral     SpO2: 97% 99% 97% 98%   Weight:       Height:               Body mass index is 37.34 kg/m².    Intake/Output Summary (Last 24 hours) at 7/15/2023 1330  Last data filed at 7/15/2023 0811  Gross per 24 hour   Intake 840 ml   Output 1155 ml   Net -315 ml       Telemetry: Normal sinus rhythm    Physical Exam  General Appearance:   no acute distress  Alert and oriented x3  HENT:   lips not cyanotic  Atraumatic  Neck:  No jvd   supple  Respiratory:  no respiratory distress  normal breath sounds  no rales  Cardiovascular:    regular rhythm  no S3, no S4   no murmur  no rub  lower extremity edema: none    Skin:   warm, dry  No rashes      Allergies   Allergen Reactions    Tramadol Anaphylaxis    Tramadol Hcl Anaphylaxis    Moxifloxacin Hives    Sulfa Antibiotics Hives     Scheduled meds:  cetirizine, 10 mg, Oral, Daily  empagliflozin, 10 mg, Oral, Daily  enoxaparin, 40 mg, Subcutaneous, Daily  famotidine, 20 mg, Oral, Daily  furosemide, 20 mg, Intravenous, Q12H  gabapentin, 300 mg, Oral, Q8H  insulin lispro, 2-9 Units, Subcutaneous, 4x Daily AC & at Bedtime  ipratropium-albuterol, 3 mL, Nebulization, Q4H - RT  levETIRAcetam, 500 mg, Oral, BID  metoprolol succinate XL, 25 mg, Oral, Q24H  montelukast, 10 mg, Oral, Nightly  pravastatin, 40 mg, Oral, Nightly  QUEtiapine fumarate ER, 300 mg, Oral, Nightly  senna-docusate sodium, 2 tablet, Oral, Nightly  sodium chloride, 10 mL,  Intravenous, Q12H  spironolactone, 25 mg, Oral, Daily      IV meds:                      Pharmacy to Dose enoxaparin (LOVENOX),       Data Review:  CBC          7/9/2023    15:56 7/13/2023    17:30 7/14/2023    18:28   CBC   WBC 4.02  5.13  4.10    RBC 3.32  3.18  3.39    Hemoglobin 9.8  9.4  9.9    Hematocrit 31.8  29.9  31.8    MCV 95.8  94.0  93.8    MCH 29.5  29.6  29.2    MCHC 30.8  31.4  31.1    RDW 17.3  16.9  17.1    Platelets 270  313  309      CMP          7/9/2023    15:56 7/13/2023    17:30 7/14/2023    18:28   CMP   Glucose 92  62  142    BUN 6  6  6    Creatinine 0.54  0.57  0.70    EGFR 110.9  109.5  104.2    Sodium 139  140  141    Potassium 3.7  3.6  3.5    Chloride 105  104  103    Calcium 8.9  9.2  9.0    Total Protein 7.5  7.6  7.6    Albumin 3.3  3.5  3.3    Globulin 4.2  4.1  4.3    Total Bilirubin 0.4  0.4  0.3    Alkaline Phosphatase 111  114  109    AST (SGOT) 13  13  12    ALT (SGPT) 5  <5  <5    Albumin/Globulin Ratio 0.8  0.9  0.8    BUN/Creatinine Ratio 11.1  10.5  8.6    Anion Gap 8.8  8.4  10.8       CARDIAC LABS:      Lab 07/13/23  1730 07/09/23  1921 07/09/23  1556   PROBNP 116.0  --  79.7   HSTROP T  --  12* 12*        No results found for: DIGOXIN   Lab Results   Component Value Date    TSH 0.782 07/13/2023           Invalid input(s): LDLCALC  Lab Results   Component Value Date    POCTROP 0.00 02/05/2022     Lab Results   Component Value Date    TROPONINT 12 (H) 07/09/2023   (  Lab Results   Component Value Date    MG 2.0 07/13/2023     Results for orders placed during the hospital encounter of 05/31/23    Adult Transthoracic Echo Complete W/ Cont if Necessary Per Protocol    Interpretation Summary    Left ventricular ejection fraction appears to be 61 - 65%.    The left ventricular cavity is borderline dilated.    Left ventricular wall thickness is consistent with mild concentric hypertrophy.    Left ventricular diastolic function is consistent with (grade Ia w/high LAP) impaired  relaxation.    Left atrial volume is mildly increased.    Estimated right ventricular systolic pressure from tricuspid regurgitation is mildly elevated (35-45 mmHg).           ASSESSMENT:    Dyspnea    Essential hypertension    Mild left ventricular hypertrophy    Severe anemia    B12 deficiency    Seizure disorder    Type 2 diabetes mellitus    Anasarca    Acute on chronic heart failure with preserved ejection fraction (HFpEF)        PLAN:  1.  Increase her Lasix to 40 twice daily  2.  Strict I's and O's          Remi Leyva MD  7/15/2023    13:30 EDT

## 2023-07-15 NOTE — PLAN OF CARE
Goal Outcome Evaluation: Placed patient on CPAP @ 12 cm, decreased FIO2% from 32 to 28%.

## 2023-07-15 NOTE — PLAN OF CARE
Goal Outcome Evaluation:  Plan of Care Reviewed With: patient        Progress: no change  Outcome Evaluation: Up to bsc standby to x1 assist frequently during shift. Remains on 2L. Medicated for 9/10 pain per MAR. IS in use. Blood glucose monitored. VS WDL.

## 2023-07-15 NOTE — PLAN OF CARE
Goal Outcome Evaluation:              Outcome Evaluation: Patient wore Cpap +12 28% last night and is on 1 lpm NC tolerating well at this time. Respiratory Therapy will continue to monitor and make changes as needed.

## 2023-07-16 LAB
ALBUMIN SERPL-MCNC: 3 G/DL (ref 3.5–5.2)
ANION GAP SERPL CALCULATED.3IONS-SCNC: 11.5 MMOL/L (ref 5–15)
BASOPHILS # BLD AUTO: 0.01 10*3/MM3 (ref 0–0.2)
BASOPHILS NFR BLD AUTO: 0.2 % (ref 0–1.5)
BUN SERPL-MCNC: 8 MG/DL (ref 6–20)
BUN/CREAT SERPL: 13.8 (ref 7–25)
CALCIUM SPEC-SCNC: 8.6 MG/DL (ref 8.6–10.5)
CHLORIDE SERPL-SCNC: 103 MMOL/L (ref 98–107)
CO2 SERPL-SCNC: 29.5 MMOL/L (ref 22–29)
CREAT SERPL-MCNC: 0.58 MG/DL (ref 0.57–1)
DEPRECATED RDW RBC AUTO: 59.2 FL (ref 37–54)
EGFRCR SERPLBLD CKD-EPI 2021: 109 ML/MIN/1.73
EOSINOPHIL # BLD AUTO: 0.13 10*3/MM3 (ref 0–0.4)
EOSINOPHIL NFR BLD AUTO: 3.1 % (ref 0.3–6.2)
ERYTHROCYTE [DISTWIDTH] IN BLOOD BY AUTOMATED COUNT: 17.1 % (ref 12.3–15.4)
GLUCOSE BLDC GLUCOMTR-MCNC: 101 MG/DL (ref 70–99)
GLUCOSE BLDC GLUCOMTR-MCNC: 106 MG/DL (ref 70–99)
GLUCOSE BLDC GLUCOMTR-MCNC: 91 MG/DL (ref 70–99)
GLUCOSE SERPL-MCNC: 96 MG/DL (ref 65–99)
HCT VFR BLD AUTO: 28.6 % (ref 34–46.6)
HGB BLD-MCNC: 8.9 G/DL (ref 12–15.9)
IMM GRANULOCYTES # BLD AUTO: 0 10*3/MM3 (ref 0–0.05)
IMM GRANULOCYTES NFR BLD AUTO: 0 % (ref 0–0.5)
LYMPHOCYTES # BLD AUTO: 1.46 10*3/MM3 (ref 0.7–3.1)
LYMPHOCYTES NFR BLD AUTO: 35.3 % (ref 19.6–45.3)
MAGNESIUM SERPL-MCNC: 2 MG/DL (ref 1.6–2.6)
MCH RBC QN AUTO: 29.5 PG (ref 26.6–33)
MCHC RBC AUTO-ENTMCNC: 31.1 G/DL (ref 31.5–35.7)
MCV RBC AUTO: 94.7 FL (ref 79–97)
MONOCYTES # BLD AUTO: 0.34 10*3/MM3 (ref 0.1–0.9)
MONOCYTES NFR BLD AUTO: 8.2 % (ref 5–12)
NEUTROPHILS NFR BLD AUTO: 2.2 10*3/MM3 (ref 1.7–7)
NEUTROPHILS NFR BLD AUTO: 53.2 % (ref 42.7–76)
NRBC BLD AUTO-RTO: 0 /100 WBC (ref 0–0.2)
PHOSPHATE SERPL-MCNC: 5.3 MG/DL (ref 2.5–4.5)
PLATELET # BLD AUTO: 256 10*3/MM3 (ref 140–450)
PMV BLD AUTO: 10.3 FL (ref 6–12)
POTASSIUM SERPL-SCNC: 3.3 MMOL/L (ref 3.5–5.2)
RBC # BLD AUTO: 3.02 10*6/MM3 (ref 3.77–5.28)
SODIUM SERPL-SCNC: 144 MMOL/L (ref 136–145)
WBC NRBC COR # BLD: 4.14 10*3/MM3 (ref 3.4–10.8)

## 2023-07-16 PROCEDURE — 99232 SBSQ HOSP IP/OBS MODERATE 35: CPT | Performed by: INTERNAL MEDICINE

## 2023-07-16 PROCEDURE — 80069 RENAL FUNCTION PANEL: CPT | Performed by: INTERNAL MEDICINE

## 2023-07-16 PROCEDURE — 85025 COMPLETE CBC W/AUTO DIFF WBC: CPT | Performed by: INTERNAL MEDICINE

## 2023-07-16 PROCEDURE — 82948 REAGENT STRIP/BLOOD GLUCOSE: CPT

## 2023-07-16 PROCEDURE — 97116 GAIT TRAINING THERAPY: CPT

## 2023-07-16 PROCEDURE — 83735 ASSAY OF MAGNESIUM: CPT | Performed by: INTERNAL MEDICINE

## 2023-07-16 PROCEDURE — 25010000002 HYDROMORPHONE 1 MG/ML SOLUTION: Performed by: INTERNAL MEDICINE

## 2023-07-16 PROCEDURE — 94799 UNLISTED PULMONARY SVC/PX: CPT

## 2023-07-16 PROCEDURE — 25010000002 FUROSEMIDE PER 20 MG: Performed by: INTERNAL MEDICINE

## 2023-07-16 PROCEDURE — 25010000002 ENOXAPARIN PER 10 MG: Performed by: INTERNAL MEDICINE

## 2023-07-16 PROCEDURE — 94664 DEMO&/EVAL PT USE INHALER: CPT

## 2023-07-16 RX ORDER — POTASSIUM CHLORIDE 750 MG/1
40 CAPSULE, EXTENDED RELEASE ORAL ONCE
Status: COMPLETED | OUTPATIENT
Start: 2023-07-16 | End: 2023-07-16

## 2023-07-16 RX ORDER — POTASSIUM CHLORIDE 1.5 G/1.58G
20 POWDER, FOR SOLUTION ORAL 2 TIMES DAILY
Status: DISCONTINUED | OUTPATIENT
Start: 2023-07-16 | End: 2023-07-21 | Stop reason: HOSPADM

## 2023-07-16 RX ADMIN — QUETIAPINE FUMARATE 300 MG: 200 TABLET, EXTENDED RELEASE ORAL at 20:25

## 2023-07-16 RX ADMIN — LEVETIRACETAM 500 MG: 500 TABLET, FILM COATED ORAL at 08:52

## 2023-07-16 RX ADMIN — GABAPENTIN 300 MG: 300 CAPSULE ORAL at 21:01

## 2023-07-16 RX ADMIN — MONTELUKAST 10 MG: 10 TABLET, FILM COATED ORAL at 20:25

## 2023-07-16 RX ADMIN — ENOXAPARIN SODIUM 40 MG: 100 INJECTION SUBCUTANEOUS at 08:52

## 2023-07-16 RX ADMIN — EMPAGLIFLOZIN 10 MG: 10 TABLET, FILM COATED ORAL at 08:52

## 2023-07-16 RX ADMIN — FAMOTIDINE 20 MG: 20 TABLET ORAL at 08:52

## 2023-07-16 RX ADMIN — GABAPENTIN 300 MG: 300 CAPSULE ORAL at 14:33

## 2023-07-16 RX ADMIN — FUROSEMIDE 40 MG: 10 INJECTION, SOLUTION INTRAMUSCULAR; INTRAVENOUS at 05:12

## 2023-07-16 RX ADMIN — HYDROMORPHONE HYDROCHLORIDE 0.5 MG: 1 INJECTION, SOLUTION INTRAMUSCULAR; INTRAVENOUS; SUBCUTANEOUS at 05:12

## 2023-07-16 RX ADMIN — Medication 10 ML: at 08:53

## 2023-07-16 RX ADMIN — Medication 10 ML: at 20:25

## 2023-07-16 RX ADMIN — POTASSIUM CHLORIDE 20 MEQ: 1.5 POWDER, FOR SOLUTION ORAL at 12:07

## 2023-07-16 RX ADMIN — FUROSEMIDE 40 MG: 10 INJECTION, SOLUTION INTRAMUSCULAR; INTRAVENOUS at 17:02

## 2023-07-16 RX ADMIN — PRAVASTATIN SODIUM 40 MG: 40 TABLET ORAL at 20:25

## 2023-07-16 RX ADMIN — LEVETIRACETAM 500 MG: 500 TABLET, FILM COATED ORAL at 20:25

## 2023-07-16 RX ADMIN — GABAPENTIN 300 MG: 300 CAPSULE ORAL at 05:12

## 2023-07-16 RX ADMIN — IPRATROPIUM BROMIDE AND ALBUTEROL SULFATE 3 ML: 2.5; .5 SOLUTION RESPIRATORY (INHALATION) at 12:50

## 2023-07-16 RX ADMIN — IPRATROPIUM BROMIDE AND ALBUTEROL SULFATE 3 ML: 2.5; .5 SOLUTION RESPIRATORY (INHALATION) at 18:24

## 2023-07-16 RX ADMIN — METOPROLOL SUCCINATE 25 MG: 25 TABLET, EXTENDED RELEASE ORAL at 08:52

## 2023-07-16 RX ADMIN — HYDROMORPHONE HYDROCHLORIDE 0.5 MG: 1 INJECTION, SOLUTION INTRAMUSCULAR; INTRAVENOUS; SUBCUTANEOUS at 20:26

## 2023-07-16 RX ADMIN — SPIRONOLACTONE 25 MG: 25 TABLET ORAL at 08:52

## 2023-07-16 RX ADMIN — HYDROMORPHONE HYDROCHLORIDE 0.5 MG: 1 INJECTION, SOLUTION INTRAMUSCULAR; INTRAVENOUS; SUBCUTANEOUS at 10:13

## 2023-07-16 RX ADMIN — POTASSIUM CHLORIDE 40 MEQ: 750 CAPSULE, EXTENDED RELEASE ORAL at 14:33

## 2023-07-16 RX ADMIN — IPRATROPIUM BROMIDE AND ALBUTEROL SULFATE 3 ML: 2.5; .5 SOLUTION RESPIRATORY (INHALATION) at 07:45

## 2023-07-16 RX ADMIN — CETIRIZINE HYDROCHLORIDE 10 MG: 10 TABLET, FILM COATED ORAL at 08:52

## 2023-07-16 RX ADMIN — IPRATROPIUM BROMIDE AND ALBUTEROL SULFATE 3 ML: 2.5; .5 SOLUTION RESPIRATORY (INHALATION) at 22:57

## 2023-07-16 RX ADMIN — POTASSIUM CHLORIDE 20 MEQ: 1.5 POWDER, FOR SOLUTION ORAL at 20:26

## 2023-07-16 NOTE — PLAN OF CARE
Goal Outcome Evaluation:  Plan of Care Reviewed With: patient        Progress: no change  Outcome Evaluation: patient alert and oriented this shift. medicated with prn pain medication. potassium replacement given. resting in bed currently no other changes at this time.

## 2023-07-16 NOTE — PROGRESS NOTES
[facility]     Progress Note    Patient Name: Carol Abarca  : 1970  MRN: [id number]  Primary Care Physician:  Sonia Mosquera APRN  Date of admission: 2023      Subjective   Brief summary.  Patient admitted with volume overload and anasarca and weakness and shortness of breath      HPI:  Vital signs stable  Refused hospital BiPAP last night.  On room air during my rounding.  Off oxygen .  Complaining of numbness from face down in the chest for a month.  Patient sitting with legs hanging down.  Advised patient to keep legs up  Slightly better after diuretics.  Still short of breath, still complaining of swelling all over the body.    no evidence of bleeding    Review of Systems     No chest pain, positive for shortness of breath.  No weakness.  No nausea vomiting.  Numbness tingling all over      Objective     Vitals:   Temp:  [97.9 °F (36.6 °C)-99 °F (37.2 °C)] 98.6 °F (37 °C)  Heart Rate:  [] 97  Resp:  [16-20] 16  BP: ()/(60-82) 93/66  Flow (L/min):  [1] 1    Physical Exam :     Elderly looking obese female, not in acute distress, talking in full sentences  Generalized edema of the body   Heart regular  Lungs diminished breath sounds with crackles  Abdomen obese and soft  Extremities with 2+ edema  Neuro nonfocal.    Result Review:  I have personally reviewed the results from the time of this admission to 2023 15:40 EDT and agree with these findings:  [x]  Laboratory  []  Microbiology  [x]  Radiology  []  EKG/Telemetry   []  Cardiology/Vascular   []  Pathology  []  Old records  []  Other:       CT scan.  Pulmonary edema versus multifocal pneumonia    Assessment / Plan       Active Hospital Problems:  Active Hospital Problems    Diagnosis     **Dyspnea     Acute on chronic heart failure with preserved ejection fraction (HFpEF)     Anasarca     Type 2 diabetes mellitus     Seizure disorder     B12 deficiency     Severe anemia     Essential hypertension     Mild left ventricular  hypertrophy    Anemia  History of PE.  Obstructive sleep apnea on home CPAP.    Plan:   Low-salt diet fluid restriction strict input output.  Wean off oxygen keep sats more than 90%  Monitor H&H  continue IV diuresis, dose increased by cardiology  Appreciate cardiology input  CT chest noted with bilateral basal atelectasis no pleural effusion or infiltrate.  Sliding-scale insulin.  Incentive spirometry and flutter valve.  Started on Jardiance    B12 level within normal range  Restarted gabapentin and pain meds.  Continue home Keppra and Seroquel   increase activity.  Home CPAP offered but patient wants to stay with the hospital BiPAP.  Monitor CBC and renal panel  Likely discharge home in a.m. if cleared by cardiology  Continue flex monitor  DVT prophylaxis:  Medical DVT prophylaxis orders are present.    CODE STATUS:   Level Of Support Discussed With: Patient  Code Status (Patient has no pulse and is not breathing): CPR (Attempt to Resuscitate)  Medical Interventions (Patient has pulse or is breathing): Full Support      Electronically signed by John Conrad MD, 07/16/23, 3:41 PM EDT.

## 2023-07-16 NOTE — PROGRESS NOTES
CARDIOLOGY  INPATIENT PROGRESS NOTE                [address]    7/16/2023      PATIENT IDENTIFICATION:   Name:  Carol Abarca      MRN:  [id number]     52 y.o.  female                 SUBJECTIVE:   Patient still with complaints of decreased feeling below her neck in general and weakness problems.  OBJECTIVE:  Vitals:    07/16/23 0415 07/16/23 0727 07/16/23 0745 07/16/23 1035   BP: 116/76 119/76  99/60   BP Location: Left arm Left arm  Left arm   Patient Position: Lying Lying  Lying   Pulse: 89 84 92 87   Resp: 18 16 18 20   Temp: 98.2 °F (36.8 °C) 97.9 °F (36.6 °C)  99 °F (37.2 °C)   TempSrc: Oral Oral  Oral   SpO2: 100% 97% 96% 90%   Weight:       Height:               Body mass index is 37.34 kg/m².    Intake/Output Summary (Last 24 hours) at 7/16/2023 1219  Last data filed at 7/16/2023 0930  Gross per 24 hour   Intake 960 ml   Output 1450 ml   Net -490 ml           Physical Exam  General Appearance:   no acute distress  Alert and oriented x3  HENT:   lips not cyanotic  Atraumatic  Neck:  No jvd   supple  Respiratory:  no respiratory distress  normal breath sounds  no rales  Cardiovascular:    regular rhythm  no S3, no S4   no murmur  no rub  lower extremity edema: Mild bilateral  Skin:   warm, dry  No rashes      Allergies   Allergen Reactions    Tramadol Anaphylaxis    Tramadol Hcl Anaphylaxis    Moxifloxacin Hives    Sulfa Antibiotics Hives     Scheduled meds:  cetirizine, 10 mg, Oral, Daily  empagliflozin, 10 mg, Oral, Daily  enoxaparin, 40 mg, Subcutaneous, Daily  famotidine, 20 mg, Oral, Daily  furosemide, 40 mg, Intravenous, Q12H  gabapentin, 300 mg, Oral, Q8H  insulin lispro, 2-9 Units, Subcutaneous, 4x Daily AC & at Bedtime  ipratropium-albuterol, 3 mL, Nebulization, Q4H - RT  levETIRAcetam, 500 mg, Oral, BID  metoprolol succinate XL, 25 mg, Oral, Q24H  montelukast, 10 mg, Oral, Nightly  potassium chloride, 20 mEq, Oral, BID  pravastatin, 40 mg, Oral, Nightly  QUEtiapine fumarate  ER, 300 mg, Oral, Nightly  senna-docusate sodium, 2 tablet, Oral, Nightly  sodium chloride, 10 mL, Intravenous, Q12H  spironolactone, 25 mg, Oral, Daily      IV meds:                      Pharmacy to Dose enoxaparin (LOVENOX),       Data Review:  CBC          7/13/2023    17:30 7/14/2023    18:28 7/16/2023    04:08   CBC   WBC 5.13  4.10  4.14    RBC 3.18  3.39  3.02    Hemoglobin 9.4  9.9  8.9    Hematocrit 29.9  31.8  28.6    MCV 94.0  93.8  94.7    MCH 29.6  29.2  29.5    MCHC 31.4  31.1  31.1    RDW 16.9  17.1  17.1    Platelets 313  309  256      CMP          7/13/2023    17:30 7/14/2023    18:28 7/16/2023    04:08   CMP   Glucose 62  142  96    BUN 6  6  8    Creatinine 0.57  0.70  0.58    EGFR 109.5  104.2  109.0    Sodium 140  141  144    Potassium 3.6  3.5  3.3    Chloride 104  103  103    Calcium 9.2  9.0  8.6    Total Protein 7.6  7.6     Albumin 3.5  3.3  3.0    Globulin 4.1  4.3     Total Bilirubin 0.4  0.3     Alkaline Phosphatase 114  109     AST (SGOT) 13  12     ALT (SGPT) <5  <5     Albumin/Globulin Ratio 0.9  0.8     BUN/Creatinine Ratio 10.5  8.6  13.8    Anion Gap 8.4  10.8  11.5       CARDIAC LABS:      Lab 07/13/23  1730 07/09/23  1921 07/09/23  1556   PROBNP 116.0  --  79.7   HSTROP T  --  12* 12*        No results found for: DIGOXIN   Lab Results   Component Value Date    TSH 0.782 07/13/2023           Invalid input(s): LDLCALC  Lab Results   Component Value Date    POCTROP 0.00 02/05/2022     Lab Results   Component Value Date    TROPONINT 12 (H) 07/09/2023   (  Lab Results   Component Value Date    MG 2.0 07/16/2023     Results for orders placed during the hospital encounter of 05/31/23    Adult Transthoracic Echo Complete W/ Cont if Necessary Per Protocol    Interpretation Summary    Left ventricular ejection fraction appears to be 61 - 65%.    The left ventricular cavity is borderline dilated.    Left ventricular wall thickness is consistent with mild concentric hypertrophy.    Left  ventricular diastolic function is consistent with (grade Ia w/high LAP) impaired relaxation.    Left atrial volume is mildly increased.    Estimated right ventricular systolic pressure from tricuspid regurgitation is mildly elevated (35-45 mmHg).           ASSESSMENT:    Dyspnea    Essential hypertension    Mild left ventricular hypertrophy    Severe anemia    B12 deficiency    Seizure disorder    Type 2 diabetes mellitus    Anasarca    Acute on chronic heart failure with preserved ejection fraction (HFpEF)        PLAN:  1.  Good urine output continue with IV Lasix today for continued diuresis  2.  Strict I's and O's  3.  Replace potassium  4.  Repeat BMP in a.m.          Remi Leyva MD  7/16/2023    12:19 EDT

## 2023-07-16 NOTE — PLAN OF CARE
Goal Outcome Evaluation:  Plan of Care Reviewed With: patient        Progress: no change  Outcome Evaluation: Patient did not want to wear bipap last night. Weaned o2 to .5l, no oxygen at home.

## 2023-07-16 NOTE — THERAPY TREATMENT NOTE
Acute Care - Physical Therapy Treatment Note   Edi     Patient Name: Carol Abarca  : 1970  MRN: [id number]  Today's Date: 2023      Visit Dx:     ICD-10-CM ICD-9-CM   1. Difficulty walking  R26.2 719.7     Patient Active Problem List   Diagnosis    Mixed hyperlipidemia    Essential hypertension    Mild left ventricular hypertrophy    Atypical chest pain    Obstructive sleep apnea    History of pulmonary embolism    Screening for colon cancer    Hemorrhoids    Severe anemia    B12 deficiency    Pleural effusion    Seizure disorder    Type 2 diabetes mellitus    Generalized weakness    Unsteady gait when walking    Dyspnea    Anasarca    Acute on chronic heart failure with preserved ejection fraction (HFpEF)     Past Medical History:   Diagnosis Date    Anemia     Arthritis     Diabetes     Essential hypertension 2021    History of pulmonary embolism     Lumbago     Low back pain    Mild left ventricular hypertrophy 2021    Mixed hyperlipidemia 2021    Obstructive sleep apnea     Reflux esophagitis     Seasonal allergies      Past Surgical History:   Procedure Laterality Date    APPENDECTOMY  2010     SECTION      , , ,     COLONOSCOPY      2019    COLONOSCOPY N/A 2022    Procedure: COLONOSCOPY;  Surgeon: Radha James MD;  Location: [facility] ENDOSCOPY;  Service: Gastroenterology;  Laterality: N/A;  COLON POLYP     ENDOSCOPY  2019    ENDOSCOPY N/A 2023    Procedure: ESOPHAGOGASTRODUODENOSCOPY WITH BIPOSIES;  Surgeon: Coy Patrick MD;  Location: [facility] ENDOSCOPY;  Service: Gastroenterology;  Laterality: N/A;  PRIOR LAPBAND, GASTRITIS    HEMORRHOIDECTOMY N/A 2022    Procedure: HEMORRHOIDECTOMY;  Surgeon: Remi Reeder MD;  Location: [facility] MAIN OR;  Service: General;  Laterality: N/A;    HERNIA REPAIR      2005, 2006, 2007, 2008    HYSTERECTOMY  2004    LAPAROSCOPIC GASTRIC BANDING      OTHER SURGICAL HISTORY       Metal implants     PT Assessment (last 12 hours)       PT Evaluation and Treatment       Row Name 07/16/23 1044          Physical Therapy Time and Intention    Subjective Information complains of;weakness;fatigue;pain  -DK     Document Type therapy note (daily note)  -DK     Mode of Treatment individual therapy;physical therapy  -DK     Patient Effort good  -DK     Symptoms Noted During/After Treatment fatigue;increased pain  -DK       Row Name 07/16/23 1044          Pain    Pretreatment Pain Rating 2/10  -DK     Posttreatment Pain Rating 3/10  -DK     Pain Location generalized  -DK     Pain Location - back  -DK     Pain Intervention(s) Repositioned;Ambulation/increased activity;Distraction;Therapeutic presence  -       Row Name 07/16/23 1044          Cognition    Affect/Mental Status (Cognition) WFL  -DK     Orientation Status (Cognition) oriented x 4  -DK     Follows Commands (Cognition) WFL  -DK     Cognitive Function WFL  -DK     Personal Safety Interventions gait belt;nonskid shoes/slippers when out of bed;supervised activity  -       Row Name 07/16/23 1044          Transfers    Transfers sit-stand transfer;stand-sit transfer  -       Row Name 07/16/23 1044          Sit-Stand Transfer    Sit-Stand [place] (Transfers) standby assist  -     Assistive Device (Sit-Stand Transfers) walker, front-wheeled  -       Row Name 07/16/23 1044          Stand-Sit Transfer    Stand-Sit [place] (Transfers) standby assist  -     Assistive Device (Stand-Sit Transfers) walker, front-wheeled  -DK       Row Name 07/16/23 1044          Gait/Stairs (Locomotion)    Gait/Stairs Locomotion gait/ambulation independence;gait/ambulation assistive device;distance ambulated;gait pattern  -DK     [place] Level (Gait) standby assist  -     Assistive Device (Gait) walker, front-wheeled  -     Distance in Feet (Gait) 100  -DK     Pattern (Gait) step-through  -DK     Deviations/Abnormal Patterns (Gait)  festinating/shuffling  -DK     Comment, (Gait/Stairs) Pt ambulated on room air with a rolling walker.  She was left sitting (I) EOB on alert post treatment.  -       Row Name 07/16/23 1044          Safety Issues, Functional Mobility    Impairments Affecting Function (Mobility) balance;endurance/activity tolerance;strength  -DK       Row Name 07/16/23 1044          Balance    Balance Assessment sitting static balance;sitting dynamic balance;standing static balance;standing dynamic balance  -DK     Static Sitting Balance standby assist  -DK     Dynamic Sitting Balance standby assist  -DK     Position, Sitting Balance unsupported;sitting edge of bed  -DK     Static Standing Balance standby assist  -DK     Dynamic Standing Balance standby assist  -DK     Position/Device Used, Standing Balance walker, front-wheeled  -DK     Balance Interventions standing;dynamic;tandem gait  -DK       Row Name 07/16/23 1044          Plan of Care Review    Plan of Care Reviewed With patient  -DK     Progress improving  -       Row Name 07/16/23 1044          Positioning and Restraints    Pre-Treatment Position in bed  -DK     Post Treatment Position bed  -DK     In Bed sitting EOB;call light within reach;encouraged to call for assist;exit alarm on;side rails up x2  -       Row Name 07/16/23 1044          Therapy Assessment/Plan (PT)    Rehab Potential (PT) good, to achieve stated therapy goals  -DK     Criteria for Skilled Interventions Met (PT) skilled treatment is necessary  -DK     Therapy Frequency (PT) daily  -DK     Problem List (PT) problems related to;balance;mobility;strength  -DK     Activity Limitations Related to Problem List (PT) unable to ambulate safely  -       Row Name 07/16/23 1044          Progress Summary (PT)    Progress Toward Functional Goals (PT) progress toward functional goals is good  -DK               User Key  (r) = Recorded By, (t) = Taken By, (c) = Cosigned By      Initials Name Provider Type    GUNNAR  Melina Meyer PTA Physical Therapist Assistant                      PT Recommendation and Plan  Planned Therapy Interventions (PT): balance training, gait training, bed mobility training, strengthening, transfer training  Therapy Frequency (PT): daily  Progress Summary (PT)  Progress Toward Functional Goals (PT): progress toward functional goals is good  Plan of Care Reviewed With: patient  Progress: improving   Outcome Measures       Row Name 07/16/23 1044 07/15/23 1100          How much help from another person do you currently need...    Turning from your back to your side while in flat bed without using bedrails? 4  -DK 4  -DP (r) KD (t) DP (c)     Moving from lying on back to sitting on the side of a flat bed without bedrails? 4  -DK 3  -DP (r) KD (t) DP (c)     Moving to and from a bed to a chair (including a wheelchair)? 3  -DK 3  -DP (r) KD (t) DP (c)     Standing up from a chair using your arms (e.g., wheelchair, bedside chair)? 3  -DK 3  -DP (r) KD (t) DP (c)     Climbing 3-5 steps with a railing? 2  -DK 2  -DP (r) KD (t) DP (c)     To walk in hospital room? 3  -DK 3  -DP (r) KD (t) DP (c)     AM-PAC 6 Clicks Score (PT) 19  -DK 18  -DP (r) KD (t)        Functional Assessment    Outcome Measure Options AM-PAC 6 Clicks Basic Mobility (PT)  -DK AM-PAC 6 Clicks Basic Mobility (PT)  -DP (r) KD (t) DP (c)               User Key  (r) = Recorded By, (t) = Taken By, (c) = Cosigned By      Initials Name Provider Type    Melina Aquino, ENEDELIA Physical Therapist Assistant    Arnulfo Forde, PT Physical Therapist    Lynne Schwab, PT Student PT Student                     Time Calculation:    PT Charges       Row Name 07/16/23 1048             Time Calculation    PT Received On 07/16/23  -DK      PT Goal Re-Cert Due Date 07/24/23  -DK         Timed Charges    36089 - Gait Training Minutes  8  -DK      54638 - PT Therapeutic Activity Minutes 8  -DK         Total Minutes    Timed Charges Total Minutes 16  -DK        Total Minutes 16  -DK                User Key  (r) = Recorded By, (t) = Taken By, (c) = Cosigned By      Initials Name Provider Type    Melina Aquino PTA Physical Therapist Assistant                  Therapy Charges for Today       Code Description Service Date Service Provider Modifiers Qty    [id number] HC GAIT TRAINING EA 15 MIN 7/16/2023 Melina Meyer PTA GP 1            PT G-Codes  Outcome Measure Options: AM-PAC 6 Clicks Basic Mobility (PT)  AM-PAC 6 Clicks Score (PT): 19    Melina Meyer PTA  7/16/2023

## 2023-07-16 NOTE — PLAN OF CARE
Goal Outcome Evaluation:   Patient requested not to wear unit tonight and is doing well on the 1L Nc

## 2023-07-17 LAB
ALBUMIN SERPL-MCNC: 3.2 G/DL (ref 3.5–5.2)
ANION GAP SERPL CALCULATED.3IONS-SCNC: 7.7 MMOL/L (ref 5–15)
BUN SERPL-MCNC: 8 MG/DL (ref 6–20)
BUN/CREAT SERPL: 12.5 (ref 7–25)
CALCIUM SPEC-SCNC: 9 MG/DL (ref 8.6–10.5)
CHLORIDE SERPL-SCNC: 101 MMOL/L (ref 98–107)
CO2 SERPL-SCNC: 32.3 MMOL/L (ref 22–29)
CREAT SERPL-MCNC: 0.64 MG/DL (ref 0.57–1)
EGFRCR SERPLBLD CKD-EPI 2021: 106.5 ML/MIN/1.73
GLUCOSE BLDC GLUCOMTR-MCNC: 106 MG/DL (ref 70–99)
GLUCOSE BLDC GLUCOMTR-MCNC: 108 MG/DL (ref 70–99)
GLUCOSE BLDC GLUCOMTR-MCNC: 109 MG/DL (ref 70–99)
GLUCOSE BLDC GLUCOMTR-MCNC: 136 MG/DL (ref 70–99)
GLUCOSE BLDC GLUCOMTR-MCNC: 139 MG/DL (ref 70–99)
GLUCOSE SERPL-MCNC: 88 MG/DL (ref 65–99)
HCT VFR BLD AUTO: 30.7 % (ref 34–46.6)
HGB BLD-MCNC: 9.4 G/DL (ref 12–15.9)
NT-PROBNP SERPL-MCNC: 64.7 PG/ML (ref 0–900)
PHOSPHATE SERPL-MCNC: 5.1 MG/DL (ref 2.5–4.5)
POTASSIUM SERPL-SCNC: 3.9 MMOL/L (ref 3.5–5.2)
SODIUM SERPL-SCNC: 141 MMOL/L (ref 136–145)

## 2023-07-17 PROCEDURE — 94799 UNLISTED PULMONARY SVC/PX: CPT

## 2023-07-17 PROCEDURE — 99232 SBSQ HOSP IP/OBS MODERATE 35: CPT | Performed by: INTERNAL MEDICINE

## 2023-07-17 PROCEDURE — 97110 THERAPEUTIC EXERCISES: CPT

## 2023-07-17 PROCEDURE — 82948 REAGENT STRIP/BLOOD GLUCOSE: CPT

## 2023-07-17 PROCEDURE — 25010000002 FUROSEMIDE PER 20 MG: Performed by: INTERNAL MEDICINE

## 2023-07-17 PROCEDURE — 85018 HEMOGLOBIN: CPT | Performed by: INTERNAL MEDICINE

## 2023-07-17 PROCEDURE — 97116 GAIT TRAINING THERAPY: CPT

## 2023-07-17 PROCEDURE — 80069 RENAL FUNCTION PANEL: CPT | Performed by: INTERNAL MEDICINE

## 2023-07-17 PROCEDURE — 25010000002 ENOXAPARIN PER 10 MG: Performed by: INTERNAL MEDICINE

## 2023-07-17 PROCEDURE — 85014 HEMATOCRIT: CPT | Performed by: INTERNAL MEDICINE

## 2023-07-17 PROCEDURE — 25010000002 HYDROMORPHONE 1 MG/ML SOLUTION: Performed by: INTERNAL MEDICINE

## 2023-07-17 PROCEDURE — 94664 DEMO&/EVAL PT USE INHALER: CPT

## 2023-07-17 PROCEDURE — 83880 ASSAY OF NATRIURETIC PEPTIDE: CPT | Performed by: INTERNAL MEDICINE

## 2023-07-17 RX ORDER — IPRATROPIUM BROMIDE AND ALBUTEROL SULFATE 2.5; .5 MG/3ML; MG/3ML
3 SOLUTION RESPIRATORY (INHALATION) EVERY 6 HOURS PRN
Status: DISCONTINUED | OUTPATIENT
Start: 2023-07-17 | End: 2023-07-21 | Stop reason: HOSPADM

## 2023-07-17 RX ORDER — METOLAZONE 5 MG/1
5 TABLET ORAL ONCE
Status: COMPLETED | OUTPATIENT
Start: 2023-07-17 | End: 2023-07-17

## 2023-07-17 RX ADMIN — GABAPENTIN 300 MG: 300 CAPSULE ORAL at 05:35

## 2023-07-17 RX ADMIN — GABAPENTIN 300 MG: 300 CAPSULE ORAL at 21:07

## 2023-07-17 RX ADMIN — MONTELUKAST 10 MG: 10 TABLET, FILM COATED ORAL at 20:07

## 2023-07-17 RX ADMIN — GABAPENTIN 300 MG: 300 CAPSULE ORAL at 14:29

## 2023-07-17 RX ADMIN — POTASSIUM CHLORIDE 20 MEQ: 1.5 POWDER, FOR SOLUTION ORAL at 20:07

## 2023-07-17 RX ADMIN — HYDROMORPHONE HYDROCHLORIDE 0.5 MG: 1 INJECTION, SOLUTION INTRAMUSCULAR; INTRAVENOUS; SUBCUTANEOUS at 14:09

## 2023-07-17 RX ADMIN — FUROSEMIDE 40 MG: 10 INJECTION, SOLUTION INTRAMUSCULAR; INTRAVENOUS at 05:35

## 2023-07-17 RX ADMIN — EMPAGLIFLOZIN 10 MG: 10 TABLET, FILM COATED ORAL at 08:42

## 2023-07-17 RX ADMIN — CETIRIZINE HYDROCHLORIDE 10 MG: 10 TABLET, FILM COATED ORAL at 08:42

## 2023-07-17 RX ADMIN — IPRATROPIUM BROMIDE AND ALBUTEROL SULFATE 3 ML: 2.5; .5 SOLUTION RESPIRATORY (INHALATION) at 07:19

## 2023-07-17 RX ADMIN — LEVETIRACETAM 500 MG: 500 TABLET, FILM COATED ORAL at 08:42

## 2023-07-17 RX ADMIN — ENOXAPARIN SODIUM 40 MG: 100 INJECTION SUBCUTANEOUS at 08:42

## 2023-07-17 RX ADMIN — Medication 10 ML: at 08:43

## 2023-07-17 RX ADMIN — QUETIAPINE FUMARATE 300 MG: 200 TABLET, EXTENDED RELEASE ORAL at 20:07

## 2023-07-17 RX ADMIN — METOLAZONE 5 MG: 5 TABLET ORAL at 11:45

## 2023-07-17 RX ADMIN — METOPROLOL SUCCINATE 25 MG: 25 TABLET, EXTENDED RELEASE ORAL at 08:42

## 2023-07-17 RX ADMIN — Medication 10 ML: at 20:07

## 2023-07-17 RX ADMIN — POTASSIUM CHLORIDE 20 MEQ: 1.5 POWDER, FOR SOLUTION ORAL at 08:43

## 2023-07-17 RX ADMIN — FAMOTIDINE 20 MG: 20 TABLET ORAL at 08:42

## 2023-07-17 RX ADMIN — PRAVASTATIN SODIUM 40 MG: 40 TABLET ORAL at 20:07

## 2023-07-17 RX ADMIN — HYDROMORPHONE HYDROCHLORIDE 0.5 MG: 1 INJECTION, SOLUTION INTRAMUSCULAR; INTRAVENOUS; SUBCUTANEOUS at 20:07

## 2023-07-17 RX ADMIN — HYDROMORPHONE HYDROCHLORIDE 0.5 MG: 1 INJECTION, SOLUTION INTRAMUSCULAR; INTRAVENOUS; SUBCUTANEOUS at 23:25

## 2023-07-17 RX ADMIN — FUROSEMIDE 40 MG: 10 INJECTION, SOLUTION INTRAMUSCULAR; INTRAVENOUS at 17:37

## 2023-07-17 RX ADMIN — HYDROMORPHONE HYDROCHLORIDE 0.5 MG: 1 INJECTION, SOLUTION INTRAMUSCULAR; INTRAVENOUS; SUBCUTANEOUS at 01:58

## 2023-07-17 RX ADMIN — LEVETIRACETAM 500 MG: 500 TABLET, FILM COATED ORAL at 20:07

## 2023-07-17 RX ADMIN — HYDROMORPHONE HYDROCHLORIDE 0.5 MG: 1 INJECTION, SOLUTION INTRAMUSCULAR; INTRAVENOUS; SUBCUTANEOUS at 08:43

## 2023-07-17 RX ADMIN — SPIRONOLACTONE 25 MG: 25 TABLET ORAL at 08:42

## 2023-07-17 NOTE — PROGRESS NOTES
CARDIOLOGY  INPATIENT PROGRESS NOTE                [address]    7/17/2023      PATIENT IDENTIFICATION:   Name:  Carol Abarca      MRN:  [id number]     52 y.o.  female                 SUBJECTIVE:   Patient has been doing well not been experiencing any problems with chest pain or shortness of breath.  Still weak with some numbness in her body as well.    OBJECTIVE:  Vitals:    07/17/23 0035 07/17/23 0320 07/17/23 0717 07/17/23 0719   BP:  109/81 100/73    BP Location:  Left arm Left arm    Patient Position:  Lying Sitting    Pulse:  98 92 91   Resp:  18 18 18   Temp:  98.1 °F (36.7 °C) 97.7 °F (36.5 °C)    TempSrc:  Oral Oral    SpO2: 92% 94% 100% 96%   Weight:       Height:               Body mass index is 37.34 kg/m².    Intake/Output Summary (Last 24 hours) at 7/17/2023 0907  Last data filed at 7/16/2023 1819  Gross per 24 hour   Intake 240 ml   Output --   Net 240 ml           Physical Exam  General Appearance:   no acute distress  Alert and oriented x3  HENT:   lips not cyanotic  Atraumatic  Neck:  No jvd   supple  Respiratory:  no respiratory distress  normal breath sounds  no rales  Cardiovascular:    regular rhythm  no S3, no S4   no murmur  no rub  lower extremity edema: Mild   Hypotensive normal skin:   warm, dry  No rashes      Allergies   Allergen Reactions    Tramadol Anaphylaxis    Tramadol Hcl Anaphylaxis    Moxifloxacin Hives    Sulfa Antibiotics Hives     Scheduled meds:  cetirizine, 10 mg, Oral, Daily  empagliflozin, 10 mg, Oral, Daily  enoxaparin, 40 mg, Subcutaneous, Daily  famotidine, 20 mg, Oral, Daily  furosemide, 40 mg, Intravenous, Q12H  gabapentin, 300 mg, Oral, Q8H  insulin lispro, 2-9 Units, Subcutaneous, 4x Daily AC & at Bedtime  levETIRAcetam, 500 mg, Oral, BID  metoprolol succinate XL, 25 mg, Oral, Q24H  montelukast, 10 mg, Oral, Nightly  potassium chloride, 20 mEq, Oral, BID  pravastatin, 40 mg, Oral, Nightly  QUEtiapine fumarate ER, 300 mg, Oral,  Nightly  senna-docusate sodium, 2 tablet, Oral, Nightly  sodium chloride, 10 mL, Intravenous, Q12H  spironolactone, 25 mg, Oral, Daily      IV meds:                      Pharmacy to Dose enoxaparin (LOVENOX),       Data Review:  CBC          7/14/2023    18:28 7/16/2023    04:08 7/17/2023    06:00   CBC   WBC 4.10  4.14     RBC 3.39  3.02     Hemoglobin 9.9  8.9  9.4    Hematocrit 31.8  28.6  30.7    MCV 93.8  94.7     MCH 29.2  29.5     MCHC 31.1  31.1     RDW 17.1  17.1     Platelets 309  256       CMP          7/14/2023    18:28 7/16/2023    04:08 7/17/2023    06:00   CMP   Glucose 142  96  88    BUN 6  8  8    Creatinine 0.70  0.58  0.64    EGFR 104.2  109.0  106.5    Sodium 141  144  141    Potassium 3.5  3.3  3.9    Chloride 103  103  101    Calcium 9.0  8.6  9.0    Total Protein 7.6      Albumin 3.3  3.0  3.2    Globulin 4.3      Total Bilirubin 0.3      Alkaline Phosphatase 109      AST (SGOT) 12      ALT (SGPT) <5      Albumin/Globulin Ratio 0.8      BUN/Creatinine Ratio 8.6  13.8  12.5    Anion Gap 10.8  11.5  7.7       CARDIAC LABS:      Lab 07/13/23  1730   PROBNP 116.0        No results found for: DIGOXIN   Lab Results   Component Value Date    TSH 0.782 07/13/2023           Invalid input(s): LDLCALC  Lab Results   Component Value Date    POCTROP 0.00 02/05/2022     Lab Results   Component Value Date    TROPONINT 12 (H) 07/09/2023   (  Lab Results   Component Value Date    MG 2.0 07/16/2023     Results for orders placed during the hospital encounter of 05/31/23    Adult Transthoracic Echo Complete W/ Cont if Necessary Per Protocol    Interpretation Summary    Left ventricular ejection fraction appears to be 61 - 65%.    The left ventricular cavity is borderline dilated.    Left ventricular wall thickness is consistent with mild concentric hypertrophy.    Left ventricular diastolic function is consistent with (grade Ia w/high LAP) impaired relaxation.    Left atrial volume is mildly increased.     Estimated right ventricular systolic pressure from tricuspid regurgitation is mildly elevated (35-45 mmHg).           ASSESSMENT:    Dyspnea    Essential hypertension    Mild left ventricular hypertrophy    Severe anemia    B12 deficiency    Seizure disorder    Type 2 diabetes mellitus    Anasarca    Acute on chronic heart failure with preserved ejection fraction (HFpEF)        PLAN:  1.  Decreased urine output yesterday recommend addition of Zaroxolyn x1 today  2.  Continue with Lasix Lasix 40 IV twice daily          Remi Leyva MD  7/17/2023    09:07 EDT

## 2023-07-17 NOTE — PLAN OF CARE
Goal Outcome Evaluation:   Patient is resting on 3L NC and doing well as of this time. Patient didn't wish to wear the CPAP  tonight. JIGNESH Coe also had asked the patient if she wished to wear and patient stated no.

## 2023-07-17 NOTE — PROGRESS NOTES
[facility]     Progress Note    Patient Name: Carol Abarca  : 1970  MRN: [id number]  Primary Care Physician:  Sonia Mosquera APRN  Date of admission: 2023      Subjective   Brief summary.  Patient admitted with volume overload and anasarca and weakness and shortness of breath      HPI:  Swelling coming down.  Feeling better  But still have numbness tingling in the face and the neck in the upper chest area      Review of Systems   No fever  No chest pain, positive for shortness of breath.  No weakness.  No nausea vomiting.  Numbness tingling all over especially in the face and upper neck and chest      Objective     Vitals:   Temp:  [97.7 °F (36.5 °C)-99.1 °F (37.3 °C)] 98.1 °F (36.7 °C)  Heart Rate:  [86-98] 86  Resp:  [16-18] 18  BP: (100-109)/(61-81) 105/80  Flow (L/min):  [1-3] 1    Physical Exam :     Elderly looking obese female, not in acute distress,  Increasing facial swelling  Heart regular  Lungs diminished breath sounds with crackles  Abdomen obese and soft  Extremities with 2+ edema      Result Review:  I have personally reviewed the results from the time of this admission to 2023 16:11 EDT and agree with these findings:  [x]  Laboratory  []  Microbiology  [x]  Radiology  []  EKG/Telemetry   []  Cardiology/Vascular   []  Pathology  []  Old records  []  Other:      Assessment / Plan       Active Hospital Problems:  Active Hospital Problems    Diagnosis     **Dyspnea     Acute on chronic heart failure with preserved ejection fraction (HFpEF)     Anasarca     Type 2 diabetes mellitus     Seizure disorder     B12 deficiency     Severe anemia     Essential hypertension     Mild left ventricular hypertrophy        Plan:   Improving  Appreciate cardiologist input  Continue diuresis  Consult neurologist Dr. Rajan for opinion on numbness and tingling.  We will proceed with MRI of the C-spine  Increase activity with PT OT       DVT prophylaxis:  Medical DVT prophylaxis orders are  present.    CODE STATUS:   Level Of Support Discussed With: Patient  Code Status (Patient has no pulse and is not breathing): CPR (Attempt to Resuscitate)  Medical Interventions (Patient has pulse or is breathing): Full Support            Electronically signed by Dung Hall MD, 07/17/23, 4:13 PM EDT.

## 2023-07-17 NOTE — THERAPY TREATMENT NOTE
Acute Care - Physical Therapy Treatment Note   Edi     Patient Name: Carol Abarca  : 1970  MRN: [id number]  Today's Date: 2023      Visit Dx:     ICD-10-CM ICD-9-CM   1. Difficulty walking  R26.2 719.7     Patient Active Problem List   Diagnosis    Mixed hyperlipidemia    Essential hypertension    Mild left ventricular hypertrophy    Atypical chest pain    Obstructive sleep apnea    History of pulmonary embolism    Screening for colon cancer    Hemorrhoids    Severe anemia    B12 deficiency    Pleural effusion    Seizure disorder    Type 2 diabetes mellitus    Generalized weakness    Unsteady gait when walking    Dyspnea    Anasarca    Acute on chronic heart failure with preserved ejection fraction (HFpEF)     Past Medical History:   Diagnosis Date    Anemia     Arthritis     Diabetes     Essential hypertension 2021    History of pulmonary embolism     Lumbago     Low back pain    Mild left ventricular hypertrophy 2021    Mixed hyperlipidemia 2021    Obstructive sleep apnea     Reflux esophagitis     Seasonal allergies      Past Surgical History:   Procedure Laterality Date    APPENDECTOMY  2010     SECTION      , , ,     COLONOSCOPY      2019    COLONOSCOPY N/A 2022    Procedure: COLONOSCOPY;  Surgeon: Radha James MD;  Location: [facility] ENDOSCOPY;  Service: Gastroenterology;  Laterality: N/A;  COLON POLYP     ENDOSCOPY  2019    ENDOSCOPY N/A 2023    Procedure: ESOPHAGOGASTRODUODENOSCOPY WITH BIPOSIES;  Surgeon: Coy Patrick MD;  Location: [facility] ENDOSCOPY;  Service: Gastroenterology;  Laterality: N/A;  PRIOR LAPBAND, GASTRITIS    HEMORRHOIDECTOMY N/A 2022    Procedure: HEMORRHOIDECTOMY;  Surgeon: Remi Reeder MD;  Location: [facility] MAIN OR;  Service: General;  Laterality: N/A;    HERNIA REPAIR      2005, 2006, 2007, 2008    HYSTERECTOMY  2004    LAPAROSCOPIC GASTRIC BANDING      OTHER SURGICAL HISTORY       Metal implants     PT Assessment (last 12 hours)       PT Evaluation and Treatment       Row Name 07/17/23 0818          Physical Therapy Time and Intention    Subjective Information complains of;weakness;fatigue;pain  -DK     Document Type therapy note (daily note)  -DK     Mode of Treatment individual therapy;physical therapy  -DK     Patient Effort good  -DK     Symptoms Noted During/After Treatment fatigue;increased pain  -DK     Comment Pt reports feeling tired today.  Possible discharge home today.  -       Row Name 07/17/23 0818          Pain    Pretreatment Pain Rating 2/10  -DK     Posttreatment Pain Rating 3/10  -DK     Pain Location generalized  -DK     Pain Location - back;chest;hip;knee  -DK     Pain Intervention(s) Repositioned;Ambulation/increased activity;Distraction;Therapeutic presence  -       Row Name 07/17/23 0818          Cognition    Affect/Mental Status (Cognition) WFL  -DK     Orientation Status (Cognition) oriented x 4  -DK     Follows Commands (Cognition) WFL  -     Cognitive Function WFL  -DK     Personal Safety Interventions gait belt;nonskid shoes/slippers when out of bed;supervised activity  -       Row Name 07/17/23 0818          Transfers    Transfers sit-stand transfer;stand-sit transfer  -       Row Name 07/17/23 0818          Sit-Stand Transfer    Sit-Stand [place] (Transfers) standby assist  -     Assistive Device (Sit-Stand Transfers) walker, front-wheeled  -       Row Name 07/17/23 0818          Stand-Sit Transfer    Stand-Sit [place] (Transfers) standby assist  -     Assistive Device (Stand-Sit Transfers) walker, front-wheeled  -       Row Name 07/17/23 0818          Gait/Stairs (Locomotion)    Gait/Stairs Locomotion gait/ambulation independence;gait/ambulation assistive device;distance ambulated;gait pattern  -     [place] Level (Gait) standby assist;contact guard;1 person assist  -     Assistive Device (Gait) walker, front-wheeled  -      Distance in Feet (Gait) 100  -DK     Pattern (Gait) step-through  -DK     Deviations/Abnormal Patterns (Gait) festinating/shuffling  -DK     Comment, (Gait/Stairs) Pt ambulated on room air with a rolling walker.  She returned to bed on alert post treatment.  -DK       Row Name 07/17/23 0818          Safety Issues, Functional Mobility    Impairments Affecting Function (Mobility) balance;endurance/activity tolerance;strength  -DK       Row Name 07/17/23 0818          Balance    Balance Assessment sitting static balance;sitting dynamic balance;standing static balance;standing dynamic balance  -DK     Static Sitting Balance standby assist  -DK     Dynamic Sitting Balance standby assist  -DK     Position, Sitting Balance unsupported;sitting edge of bed  -DK     Static Standing Balance standby assist  -DK     Dynamic Standing Balance standby assist;contact guard;1-person assist  -DK     Position/Device Used, Standing Balance walker, front-wheeled  -DK     Balance Interventions standing;dynamic;tandem gait  -DK       Row Name 07/17/23 0818          Plan of Care Review    Plan of Care Reviewed With patient  -DK     Progress improving  -DK       Row Name 07/17/23 0818          Positioning and Restraints    Pre-Treatment Position in bed  -DK     Post Treatment Position bed  -DK     In Bed supine;call light within reach;encouraged to call for assist;exit alarm on;side rails up x2;legs elevated  -DK       Row Name 07/17/23 0818          Therapy Assessment/Plan (PT)    Rehab Potential (PT) good, to achieve stated therapy goals  -DK     Criteria for Skilled Interventions Met (PT) skilled treatment is necessary  -DK     Therapy Frequency (PT) daily  -DK     Problem List (PT) problems related to;balance;mobility;strength  -DK     Activity Limitations Related to Problem List (PT) unable to ambulate safely  -DK       Row Name 07/17/23 0818          Progress Summary (PT)    Progress Toward Functional Goals (PT) progress toward  functional goals is good  -DK               User Key  (r) = Recorded By, (t) = Taken By, (c) = Cosigned By      Initials Name Provider Type    Melina Aquino PTA Physical Therapist Assistant                      PT Recommendation and Plan  Planned Therapy Interventions (PT): balance training, bed mobility training, gait training, home exercise program, strengthening, transfer training  Therapy Frequency (PT): daily  Progress Summary (PT)  Progress Toward Functional Goals (PT): progress toward functional goals is good  Plan of Care Reviewed With: patient  Progress: improving   Outcome Measures       Row Name 07/17/23 0818 07/16/23 1044 07/15/23 1100       How much help from another person do you currently need...    Turning from your back to your side while in flat bed without using bedrails? 4  -DK 4  -DK 4  -DP (r) KD (t) DP (c)    Moving from lying on back to sitting on the side of a flat bed without bedrails? 4  -DK 4  -DK 3  -DP (r) KD (t) DP (c)    Moving to and from a bed to a chair (including a wheelchair)? 3  -DK 3  -DK 3  -DP (r) KD (t) DP (c)    Standing up from a chair using your arms (e.g., wheelchair, bedside chair)? 3  -DK 3  -DK 3  -DP (r) KD (t) DP (c)    Climbing 3-5 steps with a railing? 2  -DK 2  -DK 2  -DP (r) KD (t) DP (c)    To walk in hospital room? 3  -DK 3  -DK 3  -DP (r) KD (t) DP (c)    AM-PAC 6 Clicks Score (PT) 19  -DK 19  -DK 18  -DP (r) KD (t)       Functional Assessment    Outcome Measure Options AM-PAC 6 Clicks Basic Mobility (PT)  -DK AM-PAC 6 Clicks Basic Mobility (PT)  -DK AM-PAC 6 Clicks Basic Mobility (PT)  -DP (r) KD (t) DP (c)              User Key  (r) = Recorded By, (t) = Taken By, (c) = Cosigned By      Initials Name Provider Type    Melina Aquino, ENEDELIA Physical Therapist Assistant    Arnulfo Forde, PT Physical Therapist    Lynne Schwab, PT Student PT Student                     Time Calculation:    PT Charges       Row Name 07/17/23 0820             Time  Calculation    PT Received On 07/17/23  -DK      PT Goal Re-Cert Due Date 07/24/23  -DK         Timed Charges    82232 - PT Therapeutic Exercise Minutes 12  -DK      96944 - Gait Training Minutes  8  -DK      17946 - PT Therapeutic Activity Minutes 8  -DK         Total Minutes    Timed Charges Total Minutes 28  -DK       Total Minutes 28  -DK                User Key  (r) = Recorded By, (t) = Taken By, (c) = Cosigned By      Initials Name Provider Type    Melina Aquino PTA Physical Therapist Assistant                  Therapy Charges for Today       Code Description Service Date Service Provider Modifiers Qty    95066303333 HC GAIT TRAINING EA 15 MIN 7/16/2023 Melina Meyer, PTA GP 1    24899287577 HC PT THER PROC EA 15 MIN 7/17/2023 Melina Meyer, PTA GP 1    34862580482 HC GAIT TRAINING EA 15 MIN 7/17/2023 Melina Meyer, ENEDELIA GP 1            PT G-Codes  Outcome Measure Options: AM-PAC 6 Clicks Basic Mobility (PT)  AM-PAC 6 Clicks Score (PT): 19    Melina Meyer PTA  7/17/2023

## 2023-07-17 NOTE — PLAN OF CARE
Goal Outcome Evaluation:              Outcome Evaluation: Patient on 1LNC, complained of pain and medicated per orders, vitals stable, no insulin coverage needed.

## 2023-07-17 NOTE — PLAN OF CARE
Goal Outcome Evaluation:  Plan of Care Reviewed With: patient        Progress: no change  Outcome Evaluation: Patient did not wear cpap last night, currently on 1L nasal cannula tolerating fine.

## 2023-07-18 ENCOUNTER — APPOINTMENT (OUTPATIENT)
Dept: MRI IMAGING | Facility: HOSPITAL | Age: 53
DRG: 291 | End: 2023-07-18
Payer: MEDICARE

## 2023-07-18 LAB
ALBUMIN SERPL-MCNC: 3.3 G/DL (ref 3.5–5.2)
ANION GAP SERPL CALCULATED.3IONS-SCNC: 9.9 MMOL/L (ref 5–15)
BUN SERPL-MCNC: 10 MG/DL (ref 6–20)
BUN/CREAT SERPL: 17.2 (ref 7–25)
CALCIUM SPEC-SCNC: 9.3 MG/DL (ref 8.6–10.5)
CHLORIDE SERPL-SCNC: 98 MMOL/L (ref 98–107)
CO2 SERPL-SCNC: 31.1 MMOL/L (ref 22–29)
CREAT SERPL-MCNC: 0.58 MG/DL (ref 0.57–1)
EGFRCR SERPLBLD CKD-EPI 2021: 109 ML/MIN/1.73
GLUCOSE BLDC GLUCOMTR-MCNC: 116 MG/DL (ref 70–99)
GLUCOSE BLDC GLUCOMTR-MCNC: 119 MG/DL (ref 70–99)
GLUCOSE BLDC GLUCOMTR-MCNC: 124 MG/DL (ref 70–99)
GLUCOSE BLDC GLUCOMTR-MCNC: 148 MG/DL (ref 70–99)
GLUCOSE SERPL-MCNC: 94 MG/DL (ref 65–99)
HCT VFR BLD AUTO: 31.7 % (ref 34–46.6)
HGB BLD-MCNC: 9.8 G/DL (ref 12–15.9)
PHOSPHATE SERPL-MCNC: 5.8 MG/DL (ref 2.5–4.5)
POTASSIUM SERPL-SCNC: 4.1 MMOL/L (ref 3.5–5.2)
SODIUM SERPL-SCNC: 139 MMOL/L (ref 136–145)

## 2023-07-18 PROCEDURE — 85014 HEMATOCRIT: CPT | Performed by: INTERNAL MEDICINE

## 2023-07-18 PROCEDURE — 94799 UNLISTED PULMONARY SVC/PX: CPT

## 2023-07-18 PROCEDURE — 97110 THERAPEUTIC EXERCISES: CPT

## 2023-07-18 PROCEDURE — 80069 RENAL FUNCTION PANEL: CPT | Performed by: INTERNAL MEDICINE

## 2023-07-18 PROCEDURE — 25010000002 FUROSEMIDE PER 20 MG: Performed by: INTERNAL MEDICINE

## 2023-07-18 PROCEDURE — 99232 SBSQ HOSP IP/OBS MODERATE 35: CPT | Performed by: INTERNAL MEDICINE

## 2023-07-18 PROCEDURE — 85018 HEMOGLOBIN: CPT | Performed by: INTERNAL MEDICINE

## 2023-07-18 PROCEDURE — 25010000002 ENOXAPARIN PER 10 MG: Performed by: INTERNAL MEDICINE

## 2023-07-18 PROCEDURE — 72141 MRI NECK SPINE W/O DYE: CPT

## 2023-07-18 PROCEDURE — 97166 OT EVAL MOD COMPLEX 45 MIN: CPT

## 2023-07-18 PROCEDURE — 82948 REAGENT STRIP/BLOOD GLUCOSE: CPT

## 2023-07-18 PROCEDURE — 25010000002 HYDROMORPHONE 1 MG/ML SOLUTION: Performed by: INTERNAL MEDICINE

## 2023-07-18 RX ORDER — METOLAZONE 2.5 MG/1
2.5 TABLET ORAL ONCE
Status: COMPLETED | OUTPATIENT
Start: 2023-07-18 | End: 2023-07-18

## 2023-07-18 RX ADMIN — LEVETIRACETAM 500 MG: 500 TABLET, FILM COATED ORAL at 08:44

## 2023-07-18 RX ADMIN — GABAPENTIN 300 MG: 300 CAPSULE ORAL at 13:43

## 2023-07-18 RX ADMIN — SPIRONOLACTONE 25 MG: 25 TABLET ORAL at 08:44

## 2023-07-18 RX ADMIN — FUROSEMIDE 40 MG: 10 INJECTION, SOLUTION INTRAMUSCULAR; INTRAVENOUS at 04:59

## 2023-07-18 RX ADMIN — POTASSIUM CHLORIDE 20 MEQ: 1.5 POWDER, FOR SOLUTION ORAL at 08:45

## 2023-07-18 RX ADMIN — EMPAGLIFLOZIN 10 MG: 10 TABLET, FILM COATED ORAL at 08:45

## 2023-07-18 RX ADMIN — METOPROLOL SUCCINATE 25 MG: 25 TABLET, EXTENDED RELEASE ORAL at 08:44

## 2023-07-18 RX ADMIN — PRAVASTATIN SODIUM 40 MG: 40 TABLET ORAL at 21:56

## 2023-07-18 RX ADMIN — LEVETIRACETAM 500 MG: 500 TABLET, FILM COATED ORAL at 21:56

## 2023-07-18 RX ADMIN — CETIRIZINE HYDROCHLORIDE 10 MG: 10 TABLET, FILM COATED ORAL at 08:45

## 2023-07-18 RX ADMIN — Medication 10 ML: at 08:44

## 2023-07-18 RX ADMIN — Medication 10 ML: at 10:13

## 2023-07-18 RX ADMIN — POTASSIUM CHLORIDE 20 MEQ: 1.5 POWDER, FOR SOLUTION ORAL at 21:56

## 2023-07-18 RX ADMIN — HYDROMORPHONE HYDROCHLORIDE 0.5 MG: 1 INJECTION, SOLUTION INTRAMUSCULAR; INTRAVENOUS; SUBCUTANEOUS at 03:29

## 2023-07-18 RX ADMIN — MONTELUKAST 10 MG: 10 TABLET, FILM COATED ORAL at 21:56

## 2023-07-18 RX ADMIN — HYDROMORPHONE HYDROCHLORIDE 0.5 MG: 1 INJECTION, SOLUTION INTRAMUSCULAR; INTRAVENOUS; SUBCUTANEOUS at 22:02

## 2023-07-18 RX ADMIN — GABAPENTIN 300 MG: 300 CAPSULE ORAL at 21:56

## 2023-07-18 RX ADMIN — Medication 10 ML: at 21:57

## 2023-07-18 RX ADMIN — QUETIAPINE FUMARATE 300 MG: 200 TABLET, EXTENDED RELEASE ORAL at 21:56

## 2023-07-18 RX ADMIN — METOLAZONE 2.5 MG: 2.5 TABLET ORAL at 17:03

## 2023-07-18 RX ADMIN — FUROSEMIDE 40 MG: 10 INJECTION, SOLUTION INTRAMUSCULAR; INTRAVENOUS at 17:03

## 2023-07-18 RX ADMIN — ENOXAPARIN SODIUM 40 MG: 100 INJECTION SUBCUTANEOUS at 08:45

## 2023-07-18 RX ADMIN — HYDROMORPHONE HYDROCHLORIDE 0.5 MG: 1 INJECTION, SOLUTION INTRAMUSCULAR; INTRAVENOUS; SUBCUTANEOUS at 17:04

## 2023-07-18 RX ADMIN — HYDROMORPHONE HYDROCHLORIDE 0.5 MG: 1 INJECTION, SOLUTION INTRAMUSCULAR; INTRAVENOUS; SUBCUTANEOUS at 06:15

## 2023-07-18 RX ADMIN — FAMOTIDINE 20 MG: 20 TABLET ORAL at 08:44

## 2023-07-18 RX ADMIN — HYDROMORPHONE HYDROCHLORIDE 0.5 MG: 1 INJECTION, SOLUTION INTRAMUSCULAR; INTRAVENOUS; SUBCUTANEOUS at 10:12

## 2023-07-18 NOTE — PLAN OF CARE
Goal Outcome Evaluation:  Plan of Care Reviewed With: patient        Progress: no change (first session: evaluation)  Outcome Evaluation: Patient presents with limitations of balance, strength, and endurance/activity tolerance which are impacting ADL/ transfer independence. Skilled OT is indicated to remediate/compensate for deficits to maximize independence and safety with functional tasks.      Anticipated Discharge Disposition (OT): inpatient rehabilitation facility

## 2023-07-18 NOTE — PLAN OF CARE
Goal Outcome Evaluation:         Patient placed on cpap at 23:43. She tolerated it well. Around 4:30 she pulled it off and began to desat. She was placed back on n/c.

## 2023-07-18 NOTE — PROGRESS NOTES
[facility]     Progress Note    Patient Name: Carol Abarca  : 1970  MRN: [id number]  Primary Care Physician:  Sonia Mosquera APRN  Date of admission: 2023      Subjective   Brief summary.  Patient admitted with volume overload and anasarca and weakness and shortness of breath, continues to complain of tongue numbness      HPI:  Overall feeling better but still very weak  Tongue feels numb and swollen  No chest pain  No shortness of breath at rest but exertional dyspnea and oxygen dropping      Review of Systems     No chest pain, positive for shortness of breath.  No weakness.  No nausea vomiting.  Numbness tingling all over especially in the face and upper neck and chest, tongue feels weak      Objective     Vitals:   Temp:  [97.5 °F (36.4 °C)-99 °F (37.2 °C)] 98 °F (36.7 °C)  Heart Rate:  [84-96] 84  Resp:  [16-18] 16  BP: ()/(72-81) 108/73  Flow (L/min):  [1-2] 1    Physical Exam :     Elderly looking obese female, not in acute distress,  Positive for facial and tongue swelling  Heart regular  Lungs diminished breath sounds with crackles  Abdomen obese and soft  Extremities with 2+ edema      Result Review:  I have personally reviewed the results from the time of this admission to 2023 16:15 EDT and agree with these findings:  [x]  Laboratory  []  Microbiology  [x]  Radiology  []  EKG/Telemetry   []  Cardiology/Vascular   []  Pathology  []  Old records  []  Other:      Assessment / Plan       Active Hospital Problems:  Active Hospital Problems    Diagnosis     **Dyspnea     Acute on chronic heart failure with preserved ejection fraction (HFpEF)     Anasarca     Type 2 diabetes mellitus     Seizure disorder     B12 deficiency     Severe anemia     Essential hypertension     Mild left ventricular hypertrophy        Plan:   Improving  MRI CT spine with DJD and herniated disc, questionable etiology for numbness.  We will see recommendations from neurologist, discussed with Dr. Rajan.   Continue diuresis, check labs       DVT prophylaxis:  Medical DVT prophylaxis orders are present.    CODE STATUS:   Level Of Support Discussed With: Patient  Code Status (Patient has no pulse and is not breathing): CPR (Attempt to Resuscitate)  Medical Interventions (Patient has pulse or is breathing): Full Support            Electronically signed by Dung Hall MD, 07/18/23, 4:17 PM EDT.

## 2023-07-18 NOTE — PLAN OF CARE
Goal Outcome Evaluation:  Plan of Care Reviewed With: patient        Progress: no change  Outcome Evaluation: Patient is currently on 2L NC. CPAP is on standby in the room.

## 2023-07-18 NOTE — PROGRESS NOTES
CARDIOLOGY  INPATIENT PROGRESS NOTE                [address]    7/18/2023      PATIENT IDENTIFICATION:   Name:  Carol Abarca      MRN:  [id number]     52 y.o.  female                 SUBJECTIVE:   Patient with persistent edema but no new events overnight  OBJECTIVE:  Vitals:    07/18/23 0325 07/18/23 0721 07/18/23 1121 07/18/23 1124   BP: 105/77 111/81 98/72    BP Location: Left arm Right arm Right arm    Patient Position: Lying Lying Sitting    Pulse: 92 86 86 88   Resp: 18 16 18 18   Temp: 97.7 °F (36.5 °C) 97.7 °F (36.5 °C) 97.8 °F (36.6 °C)    TempSrc: Axillary Oral Oral    SpO2: 98% 95% 98% 98%   Weight:       Height:               Body mass index is 37.34 kg/m².    Intake/Output Summary (Last 24 hours) at 7/18/2023 1550  Last data filed at 7/18/2023 1200  Gross per 24 hour   Intake 720 ml   Output 1450 ml   Net -730 ml       T    Physical Exam  General Appearance:   no acute distress  Alert and oriented x3  HENT:   lips not cyanotic  Atraumatic  Neck:  No jvd   supple  Respiratory:  no respiratory distress  normal breath sounds  no rales  Cardiovascular:    regular rhythm  no S3, no S4   no murmur  no rub  lower extremity edema: +1 left greater than right skin:   warm, dry  No rashes      Allergies   Allergen Reactions    Tramadol Anaphylaxis    Tramadol Hcl Anaphylaxis    Moxifloxacin Hives    Sulfa Antibiotics Hives     Scheduled meds:  cetirizine, 10 mg, Oral, Daily  empagliflozin, 10 mg, Oral, Daily  enoxaparin, 40 mg, Subcutaneous, Daily  famotidine, 20 mg, Oral, Daily  furosemide, 40 mg, Intravenous, Q12H  gabapentin, 300 mg, Oral, Q8H  insulin lispro, 2-9 Units, Subcutaneous, 4x Daily AC & at Bedtime  levETIRAcetam, 500 mg, Oral, BID  metoprolol succinate XL, 25 mg, Oral, Q24H  montelukast, 10 mg, Oral, Nightly  potassium chloride, 20 mEq, Oral, BID  pravastatin, 40 mg, Oral, Nightly  QUEtiapine fumarate ER, 300 mg, Oral, Nightly  senna-docusate sodium, 2 tablet, Oral,  Nightly  sodium chloride, 10 mL, Intravenous, Q12H  spironolactone, 25 mg, Oral, Daily      IV meds:                      Pharmacy to Dose enoxaparin (LOVENOX),       Data Review:  CBC          7/16/2023    04:08 7/17/2023    06:00 7/18/2023    05:07   CBC   WBC 4.14      RBC 3.02      Hemoglobin 8.9  9.4  9.8    Hematocrit 28.6  30.7  31.7    MCV 94.7      MCH 29.5      MCHC 31.1      RDW 17.1      Platelets 256        CMP          7/16/2023    04:08 7/17/2023    06:00 7/18/2023    05:07   CMP   Glucose 96  88  94    BUN 8  8  10    Creatinine 0.58  0.64  0.58    EGFR 109.0  106.5  109.0    Sodium 144  141  139    Potassium 3.3  3.9  4.1    Chloride 103  101  98    Calcium 8.6  9.0  9.3    Albumin 3.0  3.2  3.3    BUN/Creatinine Ratio 13.8  12.5  17.2    Anion Gap 11.5  7.7  9.9       CARDIAC LABS:      Lab 07/17/23  0600 07/13/23  1730   PROBNP 64.7 116.0        No results found for: DIGOXIN   Lab Results   Component Value Date    TSH 0.782 07/13/2023           Invalid input(s): LDLCALC  Lab Results   Component Value Date    POCTROP 0.00 02/05/2022     Lab Results   Component Value Date    TROPONINT 12 (H) 07/09/2023   (  Lab Results   Component Value Date    MG 2.0 07/16/2023     Results for orders placed during the hospital encounter of 05/31/23    Adult Transthoracic Echo Complete W/ Cont if Necessary Per Protocol    Interpretation Summary    Left ventricular ejection fraction appears to be 61 - 65%.    The left ventricular cavity is borderline dilated.    Left ventricular wall thickness is consistent with mild concentric hypertrophy.    Left ventricular diastolic function is consistent with (grade Ia w/high LAP) impaired relaxation.    Left atrial volume is mildly increased.    Estimated right ventricular systolic pressure from tricuspid regurgitation is mildly elevated (35-45 mmHg).           ASSESSMENT:    Dyspnea    Essential hypertension    Mild left ventricular hypertrophy    Severe anemia    B12  deficiency    Seizure disorder    Type 2 diabetes mellitus    Anasarca    Acute on chronic heart failure with preserved ejection fraction (HFpEF)        PLAN:  1. Repeat Zaroxolyn 2.5 mg today  2.  Continue Lasix 40 twice daily  3.  Repeat renal panel in          Remi Leyva MD  7/18/2023    15:50 EDT

## 2023-07-18 NOTE — THERAPY EVALUATION
Patient Name: Carol Abarca  : 1970    MRN: [id number]                              Today's Date: 2023       Admit Date: 2023    Visit Dx:     ICD-10-CM ICD-9-CM   1. Difficulty walking  R26.2 719.7   2. Decreased activities of daily living (ADL)  Z78.9 V49.89     Patient Active Problem List   Diagnosis    Mixed hyperlipidemia    Essential hypertension    Mild left ventricular hypertrophy    Atypical chest pain    Obstructive sleep apnea    History of pulmonary embolism    Screening for colon cancer    Hemorrhoids    Severe anemia    B12 deficiency    Pleural effusion    Seizure disorder    Type 2 diabetes mellitus    Generalized weakness    Unsteady gait when walking    Dyspnea    Anasarca    Acute on chronic heart failure with preserved ejection fraction (HFpEF)     Past Medical History:   Diagnosis Date    Anemia     Arthritis     Diabetes     Essential hypertension 2021    History of pulmonary embolism     Lumbago     Low back pain    Mild left ventricular hypertrophy 2021    Mixed hyperlipidemia 2021    Obstructive sleep apnea     Reflux esophagitis     Seasonal allergies      Past Surgical History:   Procedure Laterality Date    APPENDECTOMY  2010     SECTION      , , ,     COLONOSCOPY      2019    COLONOSCOPY N/A 2022    Procedure: COLONOSCOPY;  Surgeon: Radha James MD;  Location: [facility] ENDOSCOPY;  Service: Gastroenterology;  Laterality: N/A;  COLON POLYP     ENDOSCOPY  2019    ENDOSCOPY N/A 2023    Procedure: ESOPHAGOGASTRODUODENOSCOPY WITH BIPOSIES;  Surgeon: Coy Patrick MD;  Location: [facility] ENDOSCOPY;  Service: Gastroenterology;  Laterality: N/A;  PRIOR LAPBAND, GASTRITIS    HEMORRHOIDECTOMY N/A 2022    Procedure: HEMORRHOIDECTOMY;  Surgeon: Remi Reeder MD;  Location: [facility] MAIN OR;  Service: General;  Laterality: N/A;    HERNIA REPAIR      2005, 2006, 2007, 2008    HYSTERECTOMY  2004     LAPAROSCOPIC GASTRIC BANDING      OTHER SURGICAL HISTORY      Metal implants      General Information       Row Name 07/18/23 1045          OT Time and Intention    Document Type evaluation  -AV     Mode of Treatment individual therapy;occupational therapy  -AV       Row Name 07/18/23 1045          General Information    Patient Profile Reviewed yes  -AV     Prior Level of Function independent:;ADL's;home management;all household mobility;transfer  Independent ADL and occasional home management tasks. stands to shower (tub). stands at sink to groom. standard commode. ambulates with RW. no home O2.  -AV     Existing Precautions/Restrictions oxygen therapy device and L/min  -AV     Barriers to Rehab none identified  -AV       Row Name 07/18/23 1045          Occupational Profile    Reason for Services/Referral (Occupational Profile) Patient is a 52 year old female admitted to [facility] on 7/13/23 with shortness of air and swelling. She is currently on 4NT/ 2L O2 with sats 96%. OT consulted due to recent decline in ADL/transfer independence. No previous OT services for current condition.  -AV       Row Name 07/18/23 1045          Living Environment    People in Home child(cory), adult  2 sons  -AV       Row Name 07/18/23 1045          Home Main Entrance    Number of Stairs, Main Entrance one  -AV       Row Name 07/18/23 1045          Cognition    Orientation Status (Cognition) --  drowsy, pleasant and cooperative. able to retain information and follow commands.  -AV       Row Name 07/18/23 1045          Safety Issues, Functional Mobility    Impairments Affecting Function (Mobility) balance;endurance/activity tolerance;strength  -AV               User Key  (r) = Recorded By, (t) = Taken By, (c) = Cosigned By      Initials Name Provider Type    AV Brayan Henderson, OT Occupational Therapist                     Mobility/ADL's       Row Name 07/18/23 1049          Transfers    Comment, (Transfers) CGA/RW  -AV        Row Name 07/18/23 1049          Activities of Daily Living    BADL Assessment/Intervention --  Independent feeding and grooming with setup while seated. Min assist bathing and dressing. CGA toilet hygiene using BSC.  -AV               User Key  (r) = Recorded By, (t) = Taken By, (c) = Cosigned By      Initials Name Provider Type    Brayan Leo OT Occupational Therapist                   Obj/Interventions       [facility] Name 07/18/23 1049          Sensory Assessment (Somatosensory)    Sensory Assessment (Somatosensory) UE sensation intact  -AV       Row Name 07/18/23 1049          Vision Assessment/Intervention    Visual Impairment/Limitations WFL  -AV     Vision Assessment Comment glasses not here  -AV       Row Name 07/18/23 1049          Range of Motion Comprehensive    General Range of Motion bilateral upper extremity ROM WFL  -AV     Comment, General Range of Motion AROM with slowed movements noted  -AV       Row Name 07/18/23 1049          Strength Comprehensive (MMT)    Comment, General Manual Muscle Testing (MMT) Assessment 4(-)/5 bilateral upper extremities  -AV       Row Name 07/18/23 1049          Motor Skills    Motor Skills coordination;functional endurance  -AV     Coordination --  right dominant  -AV     Functional Endurance fair minus  -AV       Row Name 07/18/23 1049          Balance    Dynamic Sitting Balance independent  -AV     Dynamic Standing Balance contact guard  -AV               User Key  (r) = Recorded By, (t) = Taken By, (c) = Cosigned By      Initials Name Provider Type    Brayan Leo OT Occupational Therapist                   Goals/Plan       Row Name 07/18/23 1053          Transfer Goal 1 (OT)    Activity/Assistive Device (Transfer Goal 1, OT) transfers, all;walker, rolling  -AV     [place] Level/Cues Needed (Transfer Goal 1, OT) modified independence  -AV     Time Frame (Transfer Goal 1, OT) long term goal (LTG);10 days  -AV       [facility] Name 07/18/23 1053          Bathing  Goal 1 (OT)    Activity/Device (Bathing Goal 1, OT) bathing skills, all;tub bench  -AV     [place] Level/Cues Needed (Bathing Goal 1, OT) modified independence  -AV     Time Frame (Bathing Goal 1, OT) long term goal (LTG);10 days  -AV       Row Name 07/18/23 1053          Dressing Goal 1 (OT)    Activity/Device (Dressing Goal 1, OT) dressing skills, all  -AV     [place]/Cues Needed (Dressing Goal 1, OT) modified independence  -AV     Time Frame (Dressing Goal 1, OT) long term goal (LTG);10 days  -AV       Row Name 07/18/23 1053          Toileting Goal 1 (OT)    Activity/Device (Toileting Goal 1, OT) toileting skills, all;raised toilet seat  -AV     [place] Level/Cues Needed (Toileting Goal 1, OT) modified independence  -AV     Time Frame (Toileting Goal 1, OT) long term goal (LTG);10 days  -AV       Row Name 07/18/23 1053          Grooming Goal 1 (OT)    Activity/Device (Grooming Goal 1, OT) grooming skills, all  -AV     [place] (Grooming Goal 1, OT) modified independence  standing at sink  -AV     Time Frame (Grooming Goal 1, OT) long term goal (LTG);10 days  -AV       Row Name 07/18/23 1053          Strength Goal 1 (OT)    Strength Goal 1 (OT) Patient will demonstrate 4/5 bilateral upper extremities to increase ADL/transfer independence.  -AV     Time Frame (Strength Goal 1, OT) long term goal (LTG);10 days  -AV       Row Name 07/18/23 1053          Problem Specific Goal 1 (OT)    Problem Specific Goal 1 (OT) Patient will demonstrate fair plus/good endurance/ activity tolerance needed to support ADLs.  -AV     Time Frame (Problem Specific Goal 1, OT) long term goal (LTG);10 days  -AV       Row Name 07/18/23 1053          Therapy Assessment/Plan (OT)    Planned Therapy Interventions (OT) activity tolerance training;BADL retraining;functional balance retraining;occupation/activity based interventions;patient/caregiver education/training;strengthening exercise;transfer/mobility retraining  -AV                User Key  (r) = Recorded By, (t) = Taken By, (c) = Cosigned By      Initials Name Provider Type    Brayan Leo OT Occupational Therapist                   Clinical Impression       Row Name 07/18/23 1051          Pain Assessment    Additional Documentation Pain Scale: FACES Pre/Post-Treatment (Group)  -AV       Row Name 07/18/23 1051          Pain Scale: FACES Pre/Post-Treatment    Pain: FACES Scale, Pretreatment 0-->no hurt  -AV     Posttreatment Pain Rating 0-->no hurt  -AV       Row Name 07/18/23 1051          Plan of Care Review    Plan of Care Reviewed With patient  -AV     Progress no change  first session: evaluation  -AV     Outcome Evaluation Patient presents with limitations of balance, strength, and endurance/activity tolerance which are impacting ADL/ transfer independence. Skilled OT is indicated to remediate/compensate for deficits to maximize independence and safety with functional tasks.  -AV       Row Name 07/18/23 1051          Therapy Assessment/Plan (OT)    Patient/Family Therapy Goal Statement (OT) none stated  -AV     Rehab Potential (OT) good, to achieve stated therapy goals  -AV     Criteria for Skilled Therapeutic Interventions Met (OT) yes;meets criteria;skilled treatment is necessary  -AV     Therapy Frequency (OT) 5 times/wk  -AV       Row Name 07/18/23 1051          Therapy Plan Review/Discharge Plan (OT)    Equipment Needs Upon Discharge (OT) commode chair;tub bench  -AV     Anticipated Discharge Disposition (OT) inpatient rehabilitation facility  -AV       Row Name 07/18/23 1051          Vital Signs    O2 Delivery Pre Treatment nasal cannula  2  -AV     O2 Delivery Intra Treatment nasal cannula  2  -AV     O2 Delivery Post Treatment nasal cannula  2  -AV               User Key  (r) = Recorded By, (t) = Taken By, (c) = Cosigned By      Initials Name Provider Type    Brayan Leo OT Occupational Therapist                   Outcome Measures       Row Name  07/18/23 1055          How much help from another is currently needed...    Putting on and taking off regular lower body clothing? 3  -AV     Bathing (including washing, rinsing, and drying) 3  -AV     Toileting (which includes using toilet bed pan or urinal) 3  -AV     Putting on and taking off regular upper body clothing 4  -AV     Taking care of personal grooming (such as brushing teeth) 4  -AV     Eating meals 4  -AV     AM-PAC 6 Clicks Score (OT) 21  -AV       Row Name 07/18/23 1001          How much help from another person do you currently need...    Turning from your back to your side while in flat bed without using bedrails? 4  -DK     Moving from lying on back to sitting on the side of a flat bed without bedrails? 4  -DK     Moving to and from a bed to a chair (including a wheelchair)? 3  -DK     Standing up from a chair using your arms (e.g., wheelchair, bedside chair)? 3  -DK     Climbing 3-5 steps with a railing? 2  -DK     To walk in hospital room? 3  -DK     AM-PAC 6 Clicks Score (PT) 19  -DK     Highest level of mobility 6 --> Walked 10 steps or more  -DK       Row Name 07/18/23 1055 07/18/23 1001       Functional Assessment    Outcome Measure Options AM-PAC 6 Clicks Daily Activity (OT);Optimal Instrument  -AV AM-PAC 6 Clicks Basic Mobility (PT)  -      Row Name 07/18/23 1055          Optimal Instrument    Optimal Instrument Optimal - 3  -AV     Bending/Stooping 2  -AV     Standing 2  -AV     Reaching 1  -AV     From the list, choose the 3 activities you would most like to be able to do without any difficulty Bending/stooping;Standing;Reaching  -AV     Total Score Optimal - 3 5  -AV               User Key  (r) = Recorded By, (t) = Taken By, (c) = Cosigned By      Initials Name Provider Type    Melina Aquino PTA Physical Therapist Assistant    Brayan Leo OT Occupational Therapist                    Occupational Therapy Education       Title: PT OT SLP Therapies (In Progress)        Topic: Occupational Therapy (In Progress)       Point: ADL training (Done)       Description:   Instruct learner(s) on proper safety adaptation and remediation techniques during self care or transfers.   Instruct in proper use of assistive devices.                  Learning Progress Summary             Patient Acceptance, E, VU by BRENDA at 7/18/2023 1056                         Point: Home exercise program (Not Started)       Description:   Instruct learner(s) on appropriate technique for monitoring, assisting and/or progressing therapeutic exercises/activities.                  Learner Progress:  Not documented in this visit.              Point: Precautions (Not Started)       Description:   Instruct learner(s) on prescribed precautions during self-care and functional transfers.                  Learner Progress:  Not documented in this visit.              Point: Body mechanics (Not Started)       Description:   Instruct learner(s) on proper positioning and spine alignment during self-care, functional mobility activities and/or exercises.                  Learner Progress:  Not documented in this visit.                              User Key       Initials Effective Dates Name Provider Type Discipline    BRENDA 06/16/21 -  Brayan Henderson OT Occupational Therapist OT                  OT Recommendation and Plan  Planned Therapy Interventions (OT): activity tolerance training, BADL retraining, functional balance retraining, occupation/activity based interventions, patient/caregiver education/training, strengthening exercise, transfer/mobility retraining  Therapy Frequency (OT): 5 times/wk  Plan of Care Review  Plan of Care Reviewed With: patient  Progress: no change (first session: evaluation)  Outcome Evaluation: Patient presents with limitations of balance, strength, and endurance/activity tolerance which are impacting ADL/ transfer independence. Skilled OT is indicated to remediate/compensate for deficits to maximize  independence and safety with functional tasks.     Time Calculation:   Evaluation Complexity (OT)  Review Occupational Profile/Medical/Therapy History Complexity: expanded/moderate complexity  Assessment, Occupational Performance/Identification of Deficit Complexity: 3-5 performance deficits  Clinical Decision Making Complexity (OT): detailed assessment/moderate complexity  Overall Complexity of Evaluation (OT): moderate complexity     Time Calculation- OT       Row Name 07/18/23 1057             Time Calculation- OT    OT Received On 07/18/23  -AV      OT Goal Re-Cert Due Date 07/27/23  -AV         Untimed Charges    OT Eval/Re-eval Minutes 35  -AV         Total Minutes    Untimed Charges Total Minutes 35  -AV       Total Minutes 35  -AV                User Key  (r) = Recorded By, (t) = Taken By, (c) = Cosigned By      Initials Name Provider Type    AV Brayan Henderson OT Occupational Therapist                  Therapy Charges for Today       Code Description Service Date Service Provider Modifiers Qty    57011218763 HC OT EVAL MOD COMPLEXITY 3 7/18/2023 Brayan Henderson OT GO 1                 Brayan Henderson OT  7/18/2023

## 2023-07-18 NOTE — PLAN OF CARE
Goal Outcome Evaluation:              Outcome Evaluation: pain alleviated with prn iv pain med. patient states pain pills have not helped with pain management. up ad bailey to bsc but states will call out if wanting to amb or brp. patient on o2 at beginning of shift and have weaned down to room air. o2 via nc left at bedside for prn use if sats drop which has been noted to be intermittently. continuous pulse ox in use. no new issues/needs noted at this time.

## 2023-07-18 NOTE — PAYOR COMM NOTE
"Tim Abarca (52 y.o. Female)       Date of Birth   1970    Social Security Number       Address   [address] [address]    Home Phone   [phone]    MRN   [id number]       Jewish   None    Marital Status   Single                            Admission Date   7/13/23    Admission Type   Urgent    Admitting Provider   Dung Hall MD    Attending Provider   Dung Hall MD    Department, Room/Bed   [address], 4006/1       Discharge Date       Discharge Disposition       Discharge Destination                                 Attending Provider: Dung Hall MD    Allergies: Tramadol, Tramadol Hcl, Moxifloxacin, Sulfa Antibiotics    Isolation: None   Infection: None   Code Status: CPR    Ht: 157.5 cm (62\")   Wt: 92.6 kg (204 lb 2.3 oz)    Admission Cmt: None   Principal Problem: Dyspnea [R06.00]                   Active Insurance as of 7/13/2023       Primary Coverage       Payor Plan Insurance Group Employer/Plan Group    HUMANA MEDICARE REPLACEMENT HUMANA MEDICARE REPLACEMENT [id number]       Payor Plan Address Payor Plan Phone Number Payor Plan Fax Number Effective Dates    [address] [address] [phone]  1/1/2019 - None Entered    [facility] [address]         Subscriber Name Subscriber Birth Date Member ID       TIM ABARCA 1970 [id number]               Secondary Coverage       Payor Plan Insurance Group Employer/Plan Group    [facility] [employer] BY NAKIA [id number]       Payor Plan Address Payor Plan Phone Number Payor Plan Fax Number Effective Dates    [address] [address]   1/1/2021 - None Entered    [facility] [address]         Subscriber Name Subscriber Birth Date Member ID       TIM ABARCA 1970 [id number]                     Emergency Contacts        (Rel.) Home Phone Work Phone Mobile Phone    VEE CAMACHO (Mother) [phone] -- [phone]                 History & Physical        Dung Hall, " MD at 23 1625           [facility]   HISTORY AND PHYSICAL    Patient Name: Carol Abarca  : 1970  MRN: [id number]  Primary Care Physician:  Sonia Mosquera APRN  Date of admission: (Not on file)    Subjective   Subjective     Chief Complaint:   Swelling and shortness of breath      HPI:    Carol Abarca is a 52 y.o. female who was seen in office, brought into office by her son, patient was discharged from hospital recently after prolonged stay.  She was very weak and unsteady and difficulty walking.  She was discharged to home as insurance did not approved inpatient rehab.  Further patient got worse at home unable to take care of.  Now short of breath and increased edema of legs.  There is no fever or chills.        Review of Systems:    Shortness of breath and fatigue.  No chest pain but tightness.  Minimal cough.  Sleeping most of the time.  No nausea vomiting or diarrhea    Personal History     Past Medical History:   Diagnosis Date    Anemia     Arthritis     Diabetes     Essential hypertension 2021    History of pulmonary embolism     Lumbago     Low back pain    Mild left ventricular hypertrophy 2021    Mixed hyperlipidemia 2021    Obstructive sleep apnea     Reflux esophagitis     Seasonal allergies        Past Surgical History:   Procedure Laterality Date    APPENDECTOMY  2010     SECTION      , , ,     COLONOSCOPY      2019 2018    COLONOSCOPY N/A 2022    Procedure: COLONOSCOPY;  Surgeon: Radha James MD;  Location: [facility] ENDOSCOPY;  Service: Gastroenterology;  Laterality: N/A;  COLON POLYP     ENDOSCOPY  2019    ENDOSCOPY N/A 2023    Procedure: ESOPHAGOGASTRODUODENOSCOPY WITH BIPOSIES;  Surgeon: Coy Patrick MD;  Location: [facility] ENDOSCOPY;  Service: Gastroenterology;  Laterality: N/A;  PRIOR LAPBAND, GASTRITIS    HEMORRHOIDECTOMY N/A 2022    Procedure: HEMORRHOIDECTOMY;  Surgeon: Remi Reeder MD;   Location: [facility] MAIN OR;  Service: General;  Laterality: N/A;    HERNIA REPAIR      2005, 2006, 2007, 2008    HYSTERECTOMY  2004    LAPAROSCOPIC GASTRIC BANDING      OTHER SURGICAL HISTORY      Metal implants       Family History: family history includes Arthritis in her mother; Diabetes in her mother and son; Heart disease in her father and mother. Otherwise pertinent FHx was reviewed and not pertinent to current issue.    Social History:  reports that she has quit smoking. Her smoking use included cigarettes. She smoked an average of .5 packs per day. She has never used smokeless tobacco. She reports current alcohol use. She reports that she does not use drugs.    Home Medications:  ALPRAZolam, DULoxetine, HYDROcodone-acetaminophen, Insulin Glargine (1 Unit Dial), QUEtiapine XR, albuterol sulfate HFA, cetirizine, folic acid, gabapentin, glipizide, levETIRAcetam, metoprolol succinate XL, montelukast, naloxone, omeprazole, potassium chloride, pravastatin, and zolpidem      Allergies:  Allergies   Allergen Reactions    Tramadol Anaphylaxis    Tramadol Hcl Anaphylaxis    Moxifloxacin Hives    Sulfa Antibiotics Hives       Objective   Objective     Vitals:        Physical Exam      Middle-aged female looks older than his stated age, in mild to moderate distress with breathing.  HEENT reveals oral mucosa dry and tongue slightly swollen.  Lips slightly swollen.  Facial swelling with edema.  Neck supple no JVD heart regular, lungs diminished breath sounds bilaterally.  Expiratory wheezing and rhonchi.  Abdomen is obese and soft.  Extremities 3-4+ edema both lower extremities.      I have personally reviewed the results from the time of this admission to 7/13/2023 16:27 EDT and agree with these findings:  [x]  Laboratory  []  Microbiology  [x]  Radiology  []  EKG/Telemetry   []  Cardiology/Vascular   []  Pathology  []  Old records  []  Other:    CBC:    No results found for: WBC, RBC, HGB, HCT, MCV, MCH, MCHC, RDW, RDWSD,  MPV, PLT, NEUTRORELPCT, LYMPHORELPCT, MONORELPCT, EOSRELPCT, BASORELPCT, AUTOIGPER, NEUTROABS, LYMPHSABS, MONOSABS, EOSABS, BASOSABS, AUTOIGNUM, NRBC     BMP:    Lab Results   Component Value Date    GLUCOSE 92 07/09/2023    BUN 6 07/09/2023    CREATININE 0.54 (L) 07/09/2023    EGFRIFAFRI 133 02/05/2022    BCR 11.1 07/09/2023    K 3.7 07/09/2023    CO2 25.2 07/09/2023    CALCIUM 8.9 07/09/2023    PROTENTOTREF 6.7 06/11/2023    ALBUMIN 3.3 (L) 07/09/2023    LABIL2 0.9 06/11/2023    AST 13 07/09/2023    ALT 5 07/09/2023        No radiology results for the last day           Assessment & Plan   Assessment / Plan       Current Diagnosis:  Active Hospital Problems    Diagnosis     **Dyspnea     Anasarca      Plan:   Patient recently discharged from hospital, in 2 weeks she has deteriorated a lot.  Unable to take care of self, increased edema.  We will start Lasix 20 twice daily.  Neb treatment for COPD exacerbation.  Resume essential home meds.  Further management based on clinical course, lab results      DVT prophylaxis:  No DVT prophylaxis order currently exists.    GI Prophylaxis:       Pepcid/Protonix    CODE STATUS:       Admission Status:  I believe this patient meets inpatient status.             I have dictated this note utilizing Dragon Dictation.             Please note that portions of this note were completed with a voice recognition program.             Part of this note may be an electronic transcription/translation of spoken language to printed text         using the Dragon Dictation System.       Electronically signed by Dung Hall MD, 07/13/23, 4:25 PM EDT.    Total time spent with in evaluation and management:           Electronically signed by Dung Hall MD at 07/13/23 0211

## 2023-07-18 NOTE — THERAPY TREATMENT NOTE
Acute Care - Physical Therapy Treatment Note   Edi     Patient Name: Carol Abarca  : 1970  MRN: [id number]  Today's Date: 2023      Visit Dx:     ICD-10-CM ICD-9-CM   1. Difficulty walking  R26.2 719.7     Patient Active Problem List   Diagnosis    Mixed hyperlipidemia    Essential hypertension    Mild left ventricular hypertrophy    Atypical chest pain    Obstructive sleep apnea    History of pulmonary embolism    Screening for colon cancer    Hemorrhoids    Severe anemia    B12 deficiency    Pleural effusion    Seizure disorder    Type 2 diabetes mellitus    Generalized weakness    Unsteady gait when walking    Dyspnea    Anasarca    Acute on chronic heart failure with preserved ejection fraction (HFpEF)     Past Medical History:   Diagnosis Date    Anemia     Arthritis     Diabetes     Essential hypertension 2021    History of pulmonary embolism     Lumbago     Low back pain    Mild left ventricular hypertrophy 2021    Mixed hyperlipidemia 2021    Obstructive sleep apnea     Reflux esophagitis     Seasonal allergies      Past Surgical History:   Procedure Laterality Date    APPENDECTOMY  2010     SECTION      , , ,     COLONOSCOPY      2019    COLONOSCOPY N/A 2022    Procedure: COLONOSCOPY;  Surgeon: Radha James MD;  Location: [facility] ENDOSCOPY;  Service: Gastroenterology;  Laterality: N/A;  COLON POLYP     ENDOSCOPY  2019    ENDOSCOPY N/A 2023    Procedure: ESOPHAGOGASTRODUODENOSCOPY WITH BIPOSIES;  Surgeon: Coy Patrick MD;  Location: [facility] ENDOSCOPY;  Service: Gastroenterology;  Laterality: N/A;  PRIOR LAPBAND, GASTRITIS    HEMORRHOIDECTOMY N/A 2022    Procedure: HEMORRHOIDECTOMY;  Surgeon: Remi Reeder MD;  Location: [facility] MAIN OR;  Service: General;  Laterality: N/A;    HERNIA REPAIR      2005, 2006, 2007, 2008    HYSTERECTOMY  2004    LAPAROSCOPIC GASTRIC BANDING      OTHER SURGICAL HISTORY       Metal implants     PT Assessment (last 12 hours)       PT Evaluation and Treatment       Row Name 07/18/23 1001          Physical Therapy Time and Intention    Subjective Information complains of;weakness;fatigue;pain  -DK     Document Type therapy note (daily note)  -DK     Mode of Treatment individual therapy;physical therapy  -DK     Patient Effort good  -DK     Symptoms Noted During/After Treatment fatigue;increased pain  -DK     Comment Pt reports increased pain levels today.  -       Row Name 07/18/23 1001          Pain    Pretreatment Pain Rating 5/10  -DK     Posttreatment Pain Rating 6/10  -DK     Pain Location - Side/Orientation Bilateral  -DK     Pain Location generalized  -DK     Pain Location - hip;knee;back  -DK     Pain Intervention(s) Distraction;Therapeutic presence  -       Row Name 07/18/23 1001          Cognition    Affect/Mental Status (Cognition) WFL  -DK     Orientation Status (Cognition) oriented x 4  -DK     Follows Commands (Cognition) WFL  -DK     Cognitive Function WFL  -DK     Personal Safety Interventions gait belt;nonskid shoes/slippers when out of bed;supervised activity  -       Row Name 07/18/23 1001          Motor Skills    Motor Skills --  therapeutic exercises  -DK     Therapeutic Exercise hip;knee;ankle  -DK     Additional Documentation --  Pt declined transfers/gait this session.  -       Row Name 07/18/23 1001          Hip (Therapeutic Exercise)    Hip (Therapeutic Exercise) AROM (active range of motion)  -     Hip AROM (Therapeutic Exercise) bilateral;flexion;extension;aBduction;aDduction;sitting;20 repititions  -       Row Name 07/18/23 1001          Knee (Therapeutic Exercise)    Knee (Therapeutic Exercise) AROM (active range of motion)  -     Knee AROM (Therapeutic Exercise) bilateral;flexion;extension;LAQ (long arc quad);sitting;20 repititions  -       Row Name 07/18/23 1001          Ankle (Therapeutic Exercise)    Ankle (Therapeutic Exercise) AROM (active  range of motion)  -DK     Ankle AROM (Therapeutic Exercise) bilateral;dorsiflexion;plantarflexion;sitting;20 repititions  -DK       Row Name 07/18/23 1001          Plan of Care Review    Plan of Care Reviewed With patient  -DK     Progress no change  -DK       Row Name 07/18/23 1001          Positioning and Restraints    Pre-Treatment Position in bed  -DK     Post Treatment Position bed  -DK     In Bed sitting EOB;call light within reach;encouraged to call for assist;side rails up x2;exit alarm on  -DK       Row Name 07/18/23 1001          Therapy Assessment/Plan (PT)    Rehab Potential (PT) good, to achieve stated therapy goals  -DK     Criteria for Skilled Interventions Met (PT) skilled treatment is necessary  -DK     Therapy Frequency (PT) daily  -DK     Problem List (PT) problems related to;balance;mobility;strength;pain  -DK     Activity Limitations Related to Problem List (PT) unable to ambulate safely  -DK       Row Name 07/18/23 1001          Progress Summary (PT)    Progress Toward Functional Goals (PT) progress toward functional goals is good  -               User Key  (r) = Recorded By, (t) = Taken By, (c) = Cosigned By      Initials Name Provider Type    Melina Aquino PTA Physical Therapist Assistant                      PT Recommendation and Plan  Planned Therapy Interventions (PT): balance training, bed mobility training, gait training, strengthening, transfer training  Therapy Frequency (PT): daily  Progress Summary (PT)  Progress Toward Functional Goals (PT): progress toward functional goals is good  Plan of Care Reviewed With: patient  Progress: no change   Outcome Measures       Row Name 07/18/23 1001 07/17/23 0818 07/16/23 1044       How much help from another person do you currently need...    Turning from your back to your side while in flat bed without using bedrails? 4  -DK 4  -DK 4  -DK    Moving from lying on back to sitting on the side of a flat bed without bedrails? 4  -DK 4  -DK 4   -DK    Moving to and from a bed to a chair (including a wheelchair)? 3  -DK 3  -DK 3  -DK    Standing up from a chair using your arms (e.g., wheelchair, bedside chair)? 3  -DK 3  -DK 3  -DK    Climbing 3-5 steps with a railing? 2  -DK 2  -DK 2  -DK    To walk in hospital room? 3  -DK 3  -DK 3  -DK    AM-PAC 6 Clicks Score (PT) 19  -DK 19  -DK 19  -DK       Functional Assessment    Outcome Measure Options AM-PAC 6 Clicks Basic Mobility (PT)  -DK AM-PAC 6 Clicks Basic Mobility (PT)  -DK AM-PAC 6 Clicks Basic Mobility (PT)  -DK      Row Name 07/15/23 1100             How much help from another person do you currently need...    Turning from your back to your side while in flat bed without using bedrails? 4  -DP (r) KD (t) DP (c)      Moving from lying on back to sitting on the side of a flat bed without bedrails? 3  -DP (r) KD (t) DP (c)      Moving to and from a bed to a chair (including a wheelchair)? 3  -DP (r) KD (t) DP (c)      Standing up from a chair using your arms (e.g., wheelchair, bedside chair)? 3  -DP (r) KD (t) DP (c)      Climbing 3-5 steps with a railing? 2  -DP (r) KD (t) DP (c)      To walk in hospital room? 3  -DP (r) KD (t) DP (c)      AM-PAC 6 Clicks Score (PT) 18  -DP (r) KD (t)         Functional Assessment    Outcome Measure Options AM-PAC 6 Clicks Basic Mobility (PT)  -DP (r) KD (t) DP (c)                User Key  (r) = Recorded By, (t) = Taken By, (c) = Cosigned By      Initials Name Provider Type    Melina Aquino, PTA Physical Therapist Assistant    Arnulfo Forde, PT Physical Therapist    Lynne Schwab, PT Student PT Student                     Time Calculation:    PT Charges       Row Name 07/18/23 1004             Time Calculation    PT Received On 07/18/23  -DK      PT Goal Re-Cert Due Date 07/24/23  -DK         Timed Charges    47384 - PT Therapeutic Exercise Minutes 12  -DK      95679 - PT Therapeutic Activity Minutes 4  -DK         Total Minutes    Timed Charges Total Minutes  16  -DK       Total Minutes 16  -DK                User Key  (r) = Recorded By, (t) = Taken By, (c) = Cosigned By      Initials Name Provider Type    Melina Aquino PTA Physical Therapist Assistant                  Therapy Charges for Today       Code Description Service Date Service Provider Modifiers Qty    27277345980 HC PT THER PROC EA 15 MIN 7/17/2023 Melina Meyer, ENEDELIA GP 1    72266228062 HC GAIT TRAINING EA 15 MIN 7/17/2023 Melina Meyer, ENEDELIA GP 1    20831541089 HC PT THER PROC EA 15 MIN 7/18/2023 Melina Meyer, ENDEELIA GP 1            PT G-Codes  Outcome Measure Options: AM-PAC 6 Clicks Basic Mobility (PT)  AM-PAC 6 Clicks Score (PT): 19    Melina Meyer PTA  7/18/2023

## 2023-07-19 PROBLEM — M47.812 CERVICAL SPINE DEGENERATION: Status: ACTIVE | Noted: 2023-07-19

## 2023-07-19 PROBLEM — M47.812 CERVICAL SPINE DEGENERATION: Status: ACTIVE | Noted: 2023-01-01

## 2023-07-19 LAB
ALBUMIN SERPL-MCNC: 3.4 G/DL (ref 3.5–5.2)
ANION GAP SERPL CALCULATED.3IONS-SCNC: 8.7 MMOL/L (ref 5–15)
BUN SERPL-MCNC: 14 MG/DL (ref 6–20)
BUN/CREAT SERPL: 18.2 (ref 7–25)
CALCIUM SPEC-SCNC: 9.2 MG/DL (ref 8.6–10.5)
CHLORIDE SERPL-SCNC: 96 MMOL/L (ref 98–107)
CO2 SERPL-SCNC: 34.3 MMOL/L (ref 22–29)
CREAT SERPL-MCNC: 0.77 MG/DL (ref 0.57–1)
EGFRCR SERPLBLD CKD-EPI 2021: 92.9 ML/MIN/1.73
GLUCOSE BLDC GLUCOMTR-MCNC: 109 MG/DL (ref 70–99)
GLUCOSE BLDC GLUCOMTR-MCNC: 113 MG/DL (ref 70–99)
GLUCOSE BLDC GLUCOMTR-MCNC: 122 MG/DL (ref 70–99)
GLUCOSE BLDC GLUCOMTR-MCNC: 94 MG/DL (ref 70–99)
GLUCOSE SERPL-MCNC: 104 MG/DL (ref 65–99)
HCT VFR BLD AUTO: 31.8 % (ref 34–46.6)
HGB BLD-MCNC: 9.8 G/DL (ref 12–15.9)
PHOSPHATE SERPL-MCNC: 6.4 MG/DL (ref 2.5–4.5)
POTASSIUM SERPL-SCNC: 3.9 MMOL/L (ref 3.5–5.2)
SODIUM SERPL-SCNC: 139 MMOL/L (ref 136–145)

## 2023-07-19 PROCEDURE — 85014 HEMATOCRIT: CPT | Performed by: INTERNAL MEDICINE

## 2023-07-19 PROCEDURE — 94799 UNLISTED PULMONARY SVC/PX: CPT

## 2023-07-19 PROCEDURE — 97116 GAIT TRAINING THERAPY: CPT

## 2023-07-19 PROCEDURE — 25010000002 ENOXAPARIN PER 10 MG: Performed by: INTERNAL MEDICINE

## 2023-07-19 PROCEDURE — 99232 SBSQ HOSP IP/OBS MODERATE 35: CPT | Performed by: INTERNAL MEDICINE

## 2023-07-19 PROCEDURE — 85018 HEMOGLOBIN: CPT | Performed by: INTERNAL MEDICINE

## 2023-07-19 PROCEDURE — 99221 1ST HOSP IP/OBS SF/LOW 40: CPT | Performed by: NEUROLOGICAL SURGERY

## 2023-07-19 PROCEDURE — 80069 RENAL FUNCTION PANEL: CPT | Performed by: INTERNAL MEDICINE

## 2023-07-19 PROCEDURE — 25010000002 FUROSEMIDE PER 20 MG: Performed by: INTERNAL MEDICINE

## 2023-07-19 PROCEDURE — 25010000002 HYDROMORPHONE 1 MG/ML SOLUTION: Performed by: INTERNAL MEDICINE

## 2023-07-19 PROCEDURE — 94660 CPAP INITIATION&MGMT: CPT

## 2023-07-19 PROCEDURE — 94761 N-INVAS EAR/PLS OXIMETRY MLT: CPT

## 2023-07-19 PROCEDURE — 82948 REAGENT STRIP/BLOOD GLUCOSE: CPT

## 2023-07-19 RX ORDER — METOLAZONE 5 MG/1
5 TABLET ORAL ONCE
Status: COMPLETED | OUTPATIENT
Start: 2023-07-19 | End: 2023-07-19

## 2023-07-19 RX ADMIN — HYDROMORPHONE HYDROCHLORIDE 0.5 MG: 1 INJECTION, SOLUTION INTRAMUSCULAR; INTRAVENOUS; SUBCUTANEOUS at 17:45

## 2023-07-19 RX ADMIN — LEVETIRACETAM 500 MG: 500 TABLET, FILM COATED ORAL at 08:24

## 2023-07-19 RX ADMIN — HYDROMORPHONE HYDROCHLORIDE 0.5 MG: 1 INJECTION, SOLUTION INTRAMUSCULAR; INTRAVENOUS; SUBCUTANEOUS at 11:56

## 2023-07-19 RX ADMIN — GABAPENTIN 300 MG: 300 CAPSULE ORAL at 05:26

## 2023-07-19 RX ADMIN — POTASSIUM CHLORIDE 20 MEQ: 1.5 POWDER, FOR SOLUTION ORAL at 08:26

## 2023-07-19 RX ADMIN — FAMOTIDINE 20 MG: 20 TABLET ORAL at 08:25

## 2023-07-19 RX ADMIN — FUROSEMIDE 40 MG: 10 INJECTION, SOLUTION INTRAMUSCULAR; INTRAVENOUS at 17:29

## 2023-07-19 RX ADMIN — METOPROLOL SUCCINATE 25 MG: 25 TABLET, EXTENDED RELEASE ORAL at 08:31

## 2023-07-19 RX ADMIN — LEVETIRACETAM 500 MG: 500 TABLET, FILM COATED ORAL at 20:46

## 2023-07-19 RX ADMIN — EMPAGLIFLOZIN 10 MG: 10 TABLET, FILM COATED ORAL at 08:25

## 2023-07-19 RX ADMIN — METOLAZONE 5 MG: 5 TABLET ORAL at 19:17

## 2023-07-19 RX ADMIN — HYDROCODONE BITARTRATE AND ACETAMINOPHEN 1 TABLET: 5; 325 TABLET ORAL at 15:37

## 2023-07-19 RX ADMIN — Medication 10 ML: at 20:47

## 2023-07-19 RX ADMIN — GABAPENTIN 300 MG: 300 CAPSULE ORAL at 14:32

## 2023-07-19 RX ADMIN — FUROSEMIDE 40 MG: 10 INJECTION, SOLUTION INTRAMUSCULAR; INTRAVENOUS at 05:26

## 2023-07-19 RX ADMIN — SPIRONOLACTONE 25 MG: 25 TABLET ORAL at 08:31

## 2023-07-19 RX ADMIN — GABAPENTIN 300 MG: 300 CAPSULE ORAL at 20:45

## 2023-07-19 RX ADMIN — QUETIAPINE FUMARATE 300 MG: 200 TABLET, EXTENDED RELEASE ORAL at 20:46

## 2023-07-19 RX ADMIN — PRAVASTATIN SODIUM 40 MG: 40 TABLET ORAL at 20:46

## 2023-07-19 RX ADMIN — ENOXAPARIN SODIUM 40 MG: 100 INJECTION SUBCUTANEOUS at 08:25

## 2023-07-19 RX ADMIN — HYDROMORPHONE HYDROCHLORIDE 0.5 MG: 1 INJECTION, SOLUTION INTRAMUSCULAR; INTRAVENOUS; SUBCUTANEOUS at 05:33

## 2023-07-19 RX ADMIN — Medication 10 ML: at 08:26

## 2023-07-19 RX ADMIN — CETIRIZINE HYDROCHLORIDE 10 MG: 10 TABLET, FILM COATED ORAL at 08:25

## 2023-07-19 RX ADMIN — POTASSIUM CHLORIDE 20 MEQ: 1.5 POWDER, FOR SOLUTION ORAL at 20:48

## 2023-07-19 RX ADMIN — HYDROMORPHONE HYDROCHLORIDE 0.5 MG: 1 INJECTION, SOLUTION INTRAMUSCULAR; INTRAVENOUS; SUBCUTANEOUS at 14:37

## 2023-07-19 RX ADMIN — MONTELUKAST 10 MG: 10 TABLET, FILM COATED ORAL at 20:45

## 2023-07-19 RX ADMIN — HYDROMORPHONE HYDROCHLORIDE 0.5 MG: 1 INJECTION, SOLUTION INTRAMUSCULAR; INTRAVENOUS; SUBCUTANEOUS at 20:46

## 2023-07-19 RX ADMIN — HYDROMORPHONE HYDROCHLORIDE 0.5 MG: 1 INJECTION, SOLUTION INTRAMUSCULAR; INTRAVENOUS; SUBCUTANEOUS at 08:25

## 2023-07-19 NOTE — PROGRESS NOTES
[facility]     Progress Note    Patient Name: Carol Abarca  : 1970  MRN: [id number]  Primary Care Physician:  Snoia Mosquera APRN  Date of admission: 2023      Subjective   Brief summary.  Patient admitted with volume overload and anasarca and weakness and shortness of breath, continues to complain of tongue numbness      HPI:  No new issues complains of shortness of breath with exertion no chest pain  Using CPAP  Also seen by neurologist, who recommended neurosurgical evaluation for neck issue    Review of Systems     Neck pain, facial numbness and tongue numbness  Shortness of breath with exertion  Difficulty walking    Objective     Vitals:   Temp:  [97.8 °F (36.6 °C)-98.4 °F (36.9 °C)] 97.9 °F (36.6 °C)  Heart Rate:  [] 109  Resp:  [16-20] 18  BP: ()/(72-90) 107/80  Flow (L/min):  [1-2] 1    Physical Exam :     Elderly looking obese female, not in acute distress,  Positive for facial and tongue swelling.  Tenderness over the paraspinal muscles of the neck and range of movement decreased  Heart regular  Lungs diminished breath sounds with crackles  Abdomen obese and soft  Extremities with 2+ edema      Result Review:  I have personally reviewed the results from the time of this admission to 2023 10:52 EDT and agree with these findings:  [x]  Laboratory  []  Microbiology  [x]  Radiology  []  EKG/Telemetry   []  Cardiology/Vascular   []  Pathology  []  Old records  []  Other:      Assessment / Plan       Active Hospital Problems:  Active Hospital Problems    Diagnosis     **Dyspnea     Cervical spine degeneration     Acute on chronic heart failure with preserved ejection fraction (HFpEF)     Anasarca     Type 2 diabetes mellitus     Seizure disorder     B12 deficiency     Severe anemia     Essential hypertension     Mild left ventricular hypertrophy        Plan:   Improving  Continue diuresis   Patient seen by neurologist and recommended neurosurgical eval, consult   Ed.  Continue PT OT efforts  Waiting for inpatient rehab       DVT prophylaxis:  Medical DVT prophylaxis orders are present.    CODE STATUS:   Level Of Support Discussed With: Patient  Code Status (Patient has no pulse and is not breathing): CPR (Attempt to Resuscitate)  Medical Interventions (Patient has pulse or is breathing): Full Support              Electronically signed by Dung Hall MD, 07/19/23, 10:53 AM EDT.

## 2023-07-19 NOTE — CONSULTS
[facility]   Neurosurgery Consult    Patient Name:Carol Abarca  : 1970  MRN: [id number]  Primary Care Physician: Sonia Mosquera APRN  Date of admission: 2023    Subjective   Subjective     Chief Complaint: Numbness of both hands her chest and her bilateral feet    History of Present Illness   Carol Abarca is a 52 y.o. female who reports a history of numbness in her hands and feet as well as her chest.  An MRI of her cervical spine was obtained which demonstrated abnormality/consulted.  She has some expected neck pain.  She has had falls.  She was admitted in  for possible seizures and passing out.    Review of Systems   Neurological:  Positive for weakness and numbness.       Personal History     Past Medical History:   Diagnosis Date    Anemia     Arthritis     Diabetes     Essential hypertension 2021    History of pulmonary embolism     Lumbago     Low back pain    Mild left ventricular hypertrophy 2021    Mixed hyperlipidemia 2021    Obstructive sleep apnea     Reflux esophagitis     Seasonal allergies        Past Surgical History:   Procedure Laterality Date    APPENDECTOMY  2010     SECTION      , , ,     COLONOSCOPY      2019    COLONOSCOPY N/A 2022    Procedure: COLONOSCOPY;  Surgeon: Radha James MD;  Location: [facility] ENDOSCOPY;  Service: Gastroenterology;  Laterality: N/A;  COLON POLYP     ENDOSCOPY  2019    ENDOSCOPY N/A 2023    Procedure: ESOPHAGOGASTRODUODENOSCOPY WITH BIPOSIES;  Surgeon: Coy Patrick MD;  Location: [facility] ENDOSCOPY;  Service: Gastroenterology;  Laterality: N/A;  PRIOR LAPBAND, GASTRITIS    HEMORRHOIDECTOMY N/A 2022    Procedure: HEMORRHOIDECTOMY;  Surgeon: Remi Reeder MD;  Location: [facility] MAIN OR;  Service: General;  Laterality: N/A;    HERNIA REPAIR      2005, 2006, 2007, 2008    HYSTERECTOMY  2004    LAPAROSCOPIC GASTRIC BANDING      OTHER SURGICAL HISTORY      Metal  implants       Family History: Her family history includes Arthritis in her mother; Diabetes in her mother and son; Heart disease in her father and mother.     Social History: She  reports that she has been smoking cigarettes. She has a 23.00 pack-year smoking history. She has never used smokeless tobacco. She reports current alcohol use. She reports that she does not use drugs.    Home Medications:  ALPRAZolam, HYDROcodone-acetaminophen, Insulin Glargine (1 Unit Dial), QUEtiapine XR, aspirin, cetirizine, gabapentin, glipizide, levETIRAcetam, metoprolol succinate XL, montelukast, potassium chloride, and pravastatin    Allergies:  She is allergic to tramadol, tramadol hcl, moxifloxacin, and sulfa antibiotics.    Objective    Objective     Vitals:    Temp:  [97.9 °F (36.6 °C)-98.4 °F (36.9 °C)] 98.4 °F (36.9 °C)  Heart Rate:  [] 84  Resp:  [16-20] 16  BP: (102-121)/(73-90) 116/80  Flow (L/min):  [1] 1    Physical Exam  Constitutional:       Appearance: She is obese.   Pulmonary:      Effort: Pulmonary effort is normal.   Neurological:      Mental Status: She is alert.      Comments: She has mild bilateral lower extremity weakness which appears effort dependent.  She has decreased sensation in the bilateral upper and lower extremities.  She reports decreased sensation along her chest.  The only area that she reports normal sensation is along her forehead.  She has no evidence of clonus and no evidence of Kayla sign.  Her reflexes are actually hyporeflexic throughout.   Psychiatric:         Mood and Affect: Mood normal.        Result Review    Result Review:  I have personally reviewed the results from the time of this admission to 7/19/2023 15:31 EDT and agree with these findings:  []  Laboratory  []  Microbiology  [x]  Radiology  []  EKG/Telemetry   []  Cardiology/Vascular   []  Pathology  []  Old records  []  Other:  Most notable findings include: I reviewed her cervical MRI which demonstrates a left C6-7  disc protrusion with mild stenosis of the cervical canal.    Assessment & Plan   Assessment / Plan     Brief Patient Summary:  Carol Abarca is a 52 y.o. female with numbness that extends well beyond what would be expected for a left C6-7 disc protrusion.    Active Hospital Problems:  Active Hospital Problems    Diagnosis     **Dyspnea     Cervical spine degeneration     Acute on chronic heart failure with preserved ejection fraction (HFpEF)     Anasarca     Type 2 diabetes mellitus     Seizure disorder     B12 deficiency     Severe anemia     Essential hypertension     Mild left ventricular hypertrophy      Plan:   I would think at some point as an outpatient and EMG/NCV would be indicated.  I do not feel surgery on the C6-7 disc herniation is likely to help unless she were to develop pain into the left tricep and middle finger distribution.  I suspect she would benefit from physical therapy and potentially occupational therapy.    DVT prophylaxis:  Medical DVT prophylaxis orders are present.

## 2023-07-19 NOTE — THERAPY TREATMENT NOTE
Acute Care - Physical Therapy Treatment Note   Edi     Patient Name: Carol Abarca  : 1970  MRN: [id number]  Today's Date: 2023      Visit Dx:     ICD-10-CM ICD-9-CM   1. Difficulty walking  R26.2 719.7   2. Decreased activities of daily living (ADL)  Z78.9 V49.89     Patient Active Problem List   Diagnosis    Mixed hyperlipidemia    Essential hypertension    Mild left ventricular hypertrophy    Atypical chest pain    Obstructive sleep apnea    History of pulmonary embolism    Screening for colon cancer    Hemorrhoids    Severe anemia    B12 deficiency    Pleural effusion    Seizure disorder    Type 2 diabetes mellitus    Generalized weakness    Unsteady gait when walking    Dyspnea    Anasarca    Acute on chronic heart failure with preserved ejection fraction (HFpEF)    Cervical spine degeneration     Past Medical History:   Diagnosis Date    Anemia     Arthritis     Diabetes     Essential hypertension 2021    History of pulmonary embolism     Lumbago     Low back pain    Mild left ventricular hypertrophy 2021    Mixed hyperlipidemia 2021    Obstructive sleep apnea     Reflux esophagitis     Seasonal allergies      Past Surgical History:   Procedure Laterality Date    APPENDECTOMY  2010     SECTION      , , ,     COLONOSCOPY      2019    COLONOSCOPY N/A 2022    Procedure: COLONOSCOPY;  Surgeon: Radha James MD;  Location: [facility] ENDOSCOPY;  Service: Gastroenterology;  Laterality: N/A;  COLON POLYP     ENDOSCOPY  2019    ENDOSCOPY N/A 2023    Procedure: ESOPHAGOGASTRODUODENOSCOPY WITH BIPOSIES;  Surgeon: Coy Patrick MD;  Location: [facility] ENDOSCOPY;  Service: Gastroenterology;  Laterality: N/A;  PRIOR LAPBAND, GASTRITIS    HEMORRHOIDECTOMY N/A 2022    Procedure: HEMORRHOIDECTOMY;  Surgeon: Remi Reeder MD;  Location: [facility] MAIN OR;  Service: General;  Laterality: N/A;    HERNIA REPAIR      , 2006, 2007,  2008    HYSTERECTOMY  2004    LAPAROSCOPIC GASTRIC BANDING      OTHER SURGICAL HISTORY      Metal implants     PT Assessment (last 12 hours)       PT Evaluation and Treatment       Row Name 07/19/23 1400          Physical Therapy Time and Intention    Subjective Information no complaints (P)   -MB     Document Type therapy note (daily note) (P)   -MB     Mode of Treatment individual therapy;physical therapy (P)   -MB     Patient Effort good (P)   -MB     Symptoms Noted During/After Treatment fatigue (P)   -MB       Row Name 07/19/23 1400          Bed Mobility    Bed Mobility bed mobility (all) activities (P)   -MB     All Activities, [place] (Bed Mobility) independent (P)   -MB       Row Name 07/19/23 1400          Transfers    Transfers sit-stand transfer;stand-sit transfer (P)   -MB       Row Name 07/19/23 1400          Sit-Stand Transfer    Sit-Stand [place] (Transfers) standby assist (P)   -MB     Assistive Device (Sit-Stand Transfers) walker, front-wheeled (P)   -MB       Row Name 07/19/23 1400          Stand-Sit Transfer    Stand-Sit [place] (Transfers) standby assist (P)   -MB     Assistive Device (Stand-Sit Transfers) walker, front-wheeled (P)   -MB       Row Name 07/19/23 1400          Gait/Stairs (Locomotion)    Gait/Stairs Locomotion gait/ambulation independence;gait/ambulation assistive device;distance ambulated (P)   -MB     [place] Level (Gait) standby assist (P)   -MB     Assistive Device (Gait) walker, front-wheeled (P)   -MB     Distance in Feet (Gait) 120 (P)   -MB     Pattern (Gait) step-through (P)   -MB     Deviations/Abnormal Patterns (Gait) festinating/shuffling (P)   -MB       Row Name 07/19/23 1400          Safety Issues, Functional Mobility    Safety Issues Affecting Function (Mobility) ability to follow commands (P)   -MB     Impairments Affecting Function (Mobility) balance;endurance/activity tolerance;strength (P)   -MB       Row Name 07/19/23 1400          Balance     Balance Assessment standing dynamic balance (P)   -MB     Dynamic Standing Balance standby assist (P)   -MB       Row Name 07/19/23 1400          Plan of Care Review    Plan of Care Reviewed With patient (P)   -MB     Progress improving (P)   -MB       Row Name 07/19/23 1400          Progress Summary (PT)    Progress Toward Functional Goals (PT) progress toward functional goals is fair (P)   -MB     Daily Progress Summary (PT) Pt tolerated treatment well today. She demonstrates improvement toward meeting her goals and will continue to benefit from skilled PT intervention. (P)   -MB               User Key  (r) = Recorded By, (t) = Taken By, (c) = Cosigned By      Initials Name Provider Type    Gabriel Calabrese, PT Student PT Student                      PT Recommendation and Plan     Progress Summary (PT)  Progress Toward Functional Goals (PT): (P) progress toward functional goals is fair  Daily Progress Summary (PT): (P) Pt tolerated treatment well today. She demonstrates improvement toward meeting her goals and will continue to benefit from skilled PT intervention.  Plan of Care Reviewed With: (P) patient  Progress: (P) improving   Outcome Measures       Row Name 07/19/23 1400 07/18/23 1001 07/17/23 0818       How much help from another person do you currently need...    Turning from your back to your side while in flat bed without using bedrails? 4 (P)   -MB 4  -DK 4  -DK    Moving from lying on back to sitting on the side of a flat bed without bedrails? 4 (P)   -MB 4  -DK 4  -DK    Moving to and from a bed to a chair (including a wheelchair)? 4 (P)   -MB 3  -DK 3  -DK    Standing up from a chair using your arms (e.g., wheelchair, bedside chair)? 4 (P)   -MB 3  -DK 3  -DK    Climbing 3-5 steps with a railing? 3 (P)   -MB 2  -DK 2  -DK    To walk in hospital room? 4 (P)   -MB 3  -DK 3  -DK    AM-PAC 6 Clicks Score (PT) 23 (P)   -MB 19  -DK 19  -DK       Functional Assessment    Outcome Measure Options -- AM-PAC 6  Clicks Basic Mobility (PT)  -DK AM-PAC 6 Clicks Basic Mobility (PT)  -DK              User Key  (r) = Recorded By, (t) = Taken By, (c) = Cosigned By      Initials Name Provider Type    Melina Aquino, PTA Physical Therapist Assistant    Gabriel Calabrese, PT Student PT Student                     Time Calculation:    PT Charges       Row Name 07/19/23 1425             Time Calculation    PT Received On 07/19/23 (P)   -MB         Timed Charges    05853 - Gait Training Minutes  10 (P)   -MB         Total Minutes    Timed Charges Total Minutes 10 (P)   -MB       Total Minutes 10 (P)   -MB                User Key  (r) = Recorded By, (t) = Taken By, (c) = Cosigned By      Initials Name Provider Type    Gabriel Calabrese, PT Student PT Student                  Therapy Charges for Today       Code Description Service Date Service Provider Modifiers Qty    34440833468 HC GAIT TRAINING EA 15 MIN 7/19/2023 Gabriel Jeffrey, PT Student GP 1            PT G-Codes  Outcome Measure Options: AM-PAC 6 Clicks Daily Activity (OT), Optimal Instrument  AM-PAC 6 Clicks Score (PT): (P) 23  AM-PAC 6 Clicks Score (OT): 21    Gabriel Jeffrey, PT Student  7/19/2023

## 2023-07-19 NOTE — CONSULTS
[facility]   Consult Note      Patient Name: Carol Abarca  : 1970  MRN: [id number]  Primary Care Physician:  Sonia Mosquera APRN  Date of admission: 2023    Subjective   Subjective     This 53 years old woman was seen upon request of Dr. Hall for evaluation.    Chief Complaint:   Numbness and weakness    HPI:  She did get the onset of illness sometime on Thursday, 2023 when she started having persistent numbness from the lower jaw extending to lateral portion of her neck and both sides of the trunk to the abdomen.  At the same time, she also had numbness and weakness of her hands and fingers as well as her feet and toes.  All of these were persistent.  There is no associated pain.  She is not incontinent of her urine.    Review of Systems  There is no difficulty in seeing as well as in speaking any swallowing.  No chest pains or abdominal pains.      Past Medical History:   Diagnosis Date    Anemia     Arthritis     Diabetes     Essential hypertension 2021    History of pulmonary embolism     Lumbago     Low back pain    Mild left ventricular hypertrophy 2021    Mixed hyperlipidemia 2021    Obstructive sleep apnea     Reflux esophagitis     Seasonal allergies        Past Surgical History:   Procedure Laterality Date    APPENDECTOMY  2010     SECTION      , 1992, ,     COLONOSCOPY       2018    COLONOSCOPY N/A 2022    Procedure: COLONOSCOPY;  Surgeon: Radha James MD;  Location: [facility] ENDOSCOPY;  Service: Gastroenterology;  Laterality: N/A;  COLON POLYP     ENDOSCOPY  2019    ENDOSCOPY N/A 2023    Procedure: ESOPHAGOGASTRODUODENOSCOPY WITH BIPOSIES;  Surgeon: Coy Patrick MD;  Location: [facility] ENDOSCOPY;  Service: Gastroenterology;  Laterality: N/A;  PRIOR LAPBAND, GASTRITIS    HEMORRHOIDECTOMY N/A 2022    Procedure: HEMORRHOIDECTOMY;  Surgeon: Remi Reeder MD;  Location: [facility] MAIN OR;  Service:  General;  Laterality: N/A;    HERNIA REPAIR      2005, 2006, 2007, 2008    HYSTERECTOMY  2004    LAPAROSCOPIC GASTRIC BANDING      OTHER SURGICAL HISTORY      Metal implants       Family History: family history includes Arthritis in her mother; Diabetes in her mother and son; Heart disease in her father and mother. Otherwise pertinent FHx was reviewed and not pertinent to current issue.    Social History:  reports that she has been smoking cigarettes. She has a 23.00 pack-year smoking history. She has never used smokeless tobacco. She reports current alcohol use. She reports that she does not use drugs.    Psychosocial History: No known psychiatric disturbance.    Home Medications:  ALPRAZolam, HYDROcodone-acetaminophen, Insulin Glargine (1 Unit Dial), QUEtiapine XR, aspirin, cetirizine, gabapentin, glipizide, levETIRAcetam, metoprolol succinate XL, montelukast, potassium chloride, and pravastatin      Allergies:  Allergies   Allergen Reactions    Tramadol Anaphylaxis    Tramadol Hcl Anaphylaxis    Moxifloxacin Hives    Sulfa Antibiotics Hives       Vitals:   Temp:  [97.5 °F (36.4 °C)-98 °F (36.7 °C)] 98 °F (36.7 °C)  Heart Rate:  [84-93] 84  Resp:  [16-18] 16  BP: ()/(72-81) 108/73  Flow (L/min):  [1-2] 1  Physical Exam   She was awake and alert was not in any form of distress was fairly developed and fairly nourished.  The abdomen is flat and protuberant.    Her heart was regular but heart rate was 86/min.  There were no murmurs.  The lungs were clear.    The carotid pulses were 1+ and equal.  There were no bruits on either side.    Neurological Examination:  Her responses were coherent and relevant.  She was aware of what was going on around her.  She has an insight to her condition.  She was able to understand and follow verbal commands.    I did not look into the fundus on either side because of the COVID-19 pandemic social distancing.    The pupils were 3 to 4 mm. They were round and equal reactive to  light directly and consensually.  There was no extraocular muscle weakness.    No facial asymmetry.  No facial weakness.  Was able to hear normal conversational speech.    The strength of the sternomastoid and trapezius muscles was normal and symmetrical.  The uvula and the tongue were in the midline.    She is weak in both upper and lower extremities more more proximally.  The shoulder abductors and elbow flexors and extensors were 4+.  The wrist flexors finger flexors and extensors were 5 -.  The flexors knee extensors were 4+.  The ankle dorsiflexors were 5 -.    She has a glove and stocking type of decree sensation to cold temperature from her fingers to the mid forearms on both sides and from her feet to the level below the knees on both sides.  There is a reduction of the vibration sensation on both upper and lower extremities.  She felt the vibrating tuning fork for 10 over 30 seconds on each index fingers and 5 over 30 seconds on each big toes.    The deep tendon reflexes were absent on both sides.    She did finger-to-nose test fairly well equal on both sides.      Result Review    Result Review:  I have personally reviewed the results from the time of this admission to 7/18/2023 20:10 EDT and agree with these findings:  []  Laboratory  []  Microbiology  [x]  Radiology  []  EKG/Telemetry   []  Cardiology/Vascular   []  Pathology  []  Old records  []  Other:  Most notable findings include:   July 18, 2023  I reviewed the digital images of the MRI of the cervical spine done on July 18, 2023.  This showed disc protrusion at C6 -C7 levels with slight flattening and slight compression of the anterior spinal cord at this level.  There is straightening of the cervical spine.    I reviewed the digital images of the MRI of the brain that was done on June 9, 2029 2023.  Study showed no acute changes.  There is an old lacunar infarct in the basal ganglia on both sides which are unchanged and perhaps less or smaller  compared to study done on June 7, 2023.      Impression:    Cervical Myelopathy  Spinal cord compression, C6-C7 levels  Peripheral Neuropathy Affecting Both Upper and Lower Extremities  Diabetes Mellitus  Obesity with a body mass index of 37.34    Plan:   Continue present treatment.  Neurosurgical evaluation is indicated.  I will be out of town starting tomorrow July 19, 2023.  I will be back Monday, July 24, 2023.  You may reach me on the phone [phone]            Please note that portions of this note were completed with a voice recognition program.  Part of this note is an electric or electronic transcription/translation of spoken language to printed text using the dragon dictating system.    Electronically signed by Jerome Rajan Jr., MD, 07/18/23, 8:10 PM EDT.

## 2023-07-19 NOTE — PLAN OF CARE
Goal Outcome Evaluation:   Placed patient on Cpap with protective barrier and is very compliant with machine. Wore all night with no issues

## 2023-07-19 NOTE — PLAN OF CARE
Goal Outcome Evaluation:  Plan of Care Reviewed With: patient        Progress: improving  Outcome Evaluation: Alert and oriented x4. Patient wore 1 L oxygen while napping this shift, on continuous pulse ox. Oxygen remains above 90% on room air when patient is awake. Several requests for dilaudid this shift, MD notified. Fluid restriction order followed this shift. Blood sugars required no insulin this shift. vital signs stable at this time. no new issues at this time.

## 2023-07-19 NOTE — PLAN OF CARE
Goal Outcome Evaluation:  Plan of Care Reviewed With: patient        Progress: no change  Outcome Evaluation: Pt did wear bipap for a few hours last night. V60 is on standby at this time. Pt is on a1L NC resting comfortably at the moment.

## 2023-07-19 NOTE — PROGRESS NOTES
CARDIOLOGY  INPATIENT PROGRESS NOTE                [address]    7/19/2023      PATIENT IDENTIFICATION:   Name:  Carol Abarca      MRN:  [id number]     52 y.o.  female                 SUBJECTIVE:   Patient with no acute events overnight  OBJECTIVE:  Vitals:    07/18/23 1603 07/18/23 2037 07/18/23 2327 07/19/23 0746   BP: 108/73 121/90 103/73 107/80   BP Location: Right arm Right arm Right arm Right arm   Patient Position: Sitting Sitting Lying Lying   Pulse: 84 88 94 98   Resp: 16 18 20 18   Temp: 98 °F (36.7 °C) 98.4 °F (36.9 °C) 98.4 °F (36.9 °C) 97.9 °F (36.6 °C)   TempSrc: Oral Oral Oral Oral   SpO2: 100% 98% 96% 98%   Weight:       Height:               Body mass index is 37.34 kg/m².    Intake/Output Summary (Last 24 hours) at 7/19/2023 0851  Last data filed at 7/19/2023 0746  Gross per 24 hour   Intake 720 ml   Output 300 ml   Net 420 ml       Physical Exam  General Appearance:   no acute distress  Alert and oriented x3  HENT:   lips not cyanotic  Atraumatic  Neck:  No jvd   supple  Respiratory:  no respiratory distress  normal breath sounds  no rales  Cardiovascular:    regular rhythm  no S3, no S4   no murmur  no rub  lower extremity edema: +1 left  Right skin:   warm, dry  No rashes      Allergies   Allergen Reactions    Tramadol Anaphylaxis    Tramadol Hcl Anaphylaxis    Moxifloxacin Hives    Sulfa Antibiotics Hives     Scheduled meds:  cetirizine, 10 mg, Oral, Daily  empagliflozin, 10 mg, Oral, Daily  enoxaparin, 40 mg, Subcutaneous, Daily  famotidine, 20 mg, Oral, Daily  furosemide, 40 mg, Intravenous, Q12H  gabapentin, 300 mg, Oral, Q8H  insulin lispro, 2-9 Units, Subcutaneous, 4x Daily AC & at Bedtime  levETIRAcetam, 500 mg, Oral, BID  metoprolol succinate XL, 25 mg, Oral, Q24H  montelukast, 10 mg, Oral, Nightly  potassium chloride, 20 mEq, Oral, BID  pravastatin, 40 mg, Oral, Nightly  QUEtiapine fumarate ER, 300 mg, Oral, Nightly  senna-docusate sodium, 2 tablet, Oral,  Nightly  sodium chloride, 10 mL, Intravenous, Q12H  spironolactone, 25 mg, Oral, Daily      IV meds:                      Pharmacy to Dose enoxaparin (LOVENOX),       Data Review:  CBC          7/17/2023    06:00 7/18/2023    05:07 7/19/2023    05:27   CBC   Hemoglobin 9.4  9.8  9.8    Hematocrit 30.7  31.7  31.8      CMP          7/17/2023    06:00 7/18/2023    05:07 7/19/2023    05:27   CMP   Glucose 88  94  104    BUN 8  10  14    Creatinine 0.64  0.58  0.77    EGFR 106.5  109.0  92.9    Sodium 141  139  139    Potassium 3.9  4.1  3.9    Chloride 101  98  96    Calcium 9.0  9.3  9.2    Albumin 3.2  3.3  3.4    BUN/Creatinine Ratio 12.5  17.2  18.2    Anion Gap 7.7  9.9  8.7       CARDIAC LABS:      Lab 07/17/23  0600 07/13/23  1730   PROBNP 64.7 116.0        No results found for: DIGOXIN   Lab Results   Component Value Date    TSH 0.782 07/13/2023           Invalid input(s): LDLCALC  Lab Results   Component Value Date    POCTROP 0.00 02/05/2022     Lab Results   Component Value Date    TROPONINT 12 (H) 07/09/2023   (  Lab Results   Component Value Date    MG 2.0 07/16/2023     Results for orders placed during the hospital encounter of 05/31/23    Adult Transthoracic Echo Complete W/ Cont if Necessary Per Protocol    Interpretation Summary    Left ventricular ejection fraction appears to be 61 - 65%.    The left ventricular cavity is borderline dilated.    Left ventricular wall thickness is consistent with mild concentric hypertrophy.    Left ventricular diastolic function is consistent with (grade Ia w/high LAP) impaired relaxation.    Left atrial volume is mildly increased.    Estimated right ventricular systolic pressure from tricuspid regurgitation is mildly elevated (35-45 mmHg).           ASSESSMENT:    Dyspnea    Essential hypertension    Mild left ventricular hypertrophy    Severe anemia    B12 deficiency    Seizure disorder    Type 2 diabetes mellitus    Anasarca    Acute on chronic heart failure with  preserved ejection fraction (HFpEF)        PLAN:  1. Repeat Zaroxolyn 5 mg today  2.  Lasix 40 mg.  3.  BMP in a.m.          Remi Leyva MD  7/19/2023    08:51 EDT

## 2023-07-20 LAB
ALBUMIN SERPL-MCNC: 3.5 G/DL (ref 3.5–5.2)
ANION GAP SERPL CALCULATED.3IONS-SCNC: 11.1 MMOL/L (ref 5–15)
BUN SERPL-MCNC: 17 MG/DL (ref 6–20)
BUN/CREAT SERPL: 20.7 (ref 7–25)
CALCIUM SPEC-SCNC: 9.3 MG/DL (ref 8.6–10.5)
CHLORIDE SERPL-SCNC: 94 MMOL/L (ref 98–107)
CO2 SERPL-SCNC: 32.9 MMOL/L (ref 22–29)
CREAT SERPL-MCNC: 0.82 MG/DL (ref 0.57–1)
EGFRCR SERPLBLD CKD-EPI 2021: 86.2 ML/MIN/1.73
GLUCOSE BLDC GLUCOMTR-MCNC: 123 MG/DL (ref 70–99)
GLUCOSE SERPL-MCNC: 122 MG/DL (ref 65–99)
HCT VFR BLD AUTO: 32.8 % (ref 34–46.6)
HGB BLD-MCNC: 10.1 G/DL (ref 12–15.9)
PHOSPHATE SERPL-MCNC: 6.7 MG/DL (ref 2.5–4.5)
POTASSIUM SERPL-SCNC: 3.9 MMOL/L (ref 3.5–5.2)
SODIUM SERPL-SCNC: 138 MMOL/L (ref 136–145)

## 2023-07-20 PROCEDURE — 25010000002 HYDROMORPHONE 1 MG/ML SOLUTION: Performed by: INTERNAL MEDICINE

## 2023-07-20 PROCEDURE — 99231 SBSQ HOSP IP/OBS SF/LOW 25: CPT | Performed by: INTERNAL MEDICINE

## 2023-07-20 PROCEDURE — 94799 UNLISTED PULMONARY SVC/PX: CPT

## 2023-07-20 PROCEDURE — 25010000002 ENOXAPARIN PER 10 MG: Performed by: INTERNAL MEDICINE

## 2023-07-20 PROCEDURE — 80069 RENAL FUNCTION PANEL: CPT | Performed by: INTERNAL MEDICINE

## 2023-07-20 PROCEDURE — 25010000002 FUROSEMIDE PER 20 MG: Performed by: INTERNAL MEDICINE

## 2023-07-20 PROCEDURE — 85018 HEMOGLOBIN: CPT | Performed by: INTERNAL MEDICINE

## 2023-07-20 PROCEDURE — 85014 HEMATOCRIT: CPT | Performed by: INTERNAL MEDICINE

## 2023-07-20 PROCEDURE — 82948 REAGENT STRIP/BLOOD GLUCOSE: CPT

## 2023-07-20 RX ORDER — FUROSEMIDE 20 MG/1
20 TABLET ORAL DAILY
Status: DISCONTINUED | OUTPATIENT
Start: 2023-07-21 | End: 2023-07-21 | Stop reason: HOSPADM

## 2023-07-20 RX ADMIN — HYDROMORPHONE HYDROCHLORIDE 0.5 MG: 1 INJECTION, SOLUTION INTRAMUSCULAR; INTRAVENOUS; SUBCUTANEOUS at 22:07

## 2023-07-20 RX ADMIN — Medication 10 ML: at 08:53

## 2023-07-20 RX ADMIN — LEVETIRACETAM 500 MG: 500 TABLET, FILM COATED ORAL at 22:08

## 2023-07-20 RX ADMIN — HYDROMORPHONE HYDROCHLORIDE 0.5 MG: 1 INJECTION, SOLUTION INTRAMUSCULAR; INTRAVENOUS; SUBCUTANEOUS at 13:16

## 2023-07-20 RX ADMIN — MONTELUKAST 10 MG: 10 TABLET, FILM COATED ORAL at 22:08

## 2023-07-20 RX ADMIN — POTASSIUM CHLORIDE 20 MEQ: 1.5 POWDER, FOR SOLUTION ORAL at 08:46

## 2023-07-20 RX ADMIN — HYDROMORPHONE HYDROCHLORIDE 0.5 MG: 1 INJECTION, SOLUTION INTRAMUSCULAR; INTRAVENOUS; SUBCUTANEOUS at 06:21

## 2023-07-20 RX ADMIN — LEVETIRACETAM 500 MG: 500 TABLET, FILM COATED ORAL at 08:46

## 2023-07-20 RX ADMIN — FUROSEMIDE 40 MG: 10 INJECTION, SOLUTION INTRAMUSCULAR; INTRAVENOUS at 06:18

## 2023-07-20 RX ADMIN — Medication 10 ML: at 22:07

## 2023-07-20 RX ADMIN — POTASSIUM CHLORIDE 20 MEQ: 1.5 POWDER, FOR SOLUTION ORAL at 22:14

## 2023-07-20 RX ADMIN — SPIRONOLACTONE 25 MG: 25 TABLET ORAL at 08:53

## 2023-07-20 RX ADMIN — HYDROMORPHONE HYDROCHLORIDE 0.5 MG: 1 INJECTION, SOLUTION INTRAMUSCULAR; INTRAVENOUS; SUBCUTANEOUS at 08:53

## 2023-07-20 RX ADMIN — ENOXAPARIN SODIUM 40 MG: 100 INJECTION SUBCUTANEOUS at 08:53

## 2023-07-20 RX ADMIN — GABAPENTIN 300 MG: 300 CAPSULE ORAL at 06:18

## 2023-07-20 RX ADMIN — GABAPENTIN 300 MG: 300 CAPSULE ORAL at 22:08

## 2023-07-20 RX ADMIN — METOPROLOL SUCCINATE 25 MG: 25 TABLET, EXTENDED RELEASE ORAL at 08:53

## 2023-07-20 RX ADMIN — EMPAGLIFLOZIN 10 MG: 10 TABLET, FILM COATED ORAL at 08:47

## 2023-07-20 RX ADMIN — HYDROCODONE BITARTRATE AND ACETAMINOPHEN 1 TABLET: 10; 325 TABLET ORAL at 18:30

## 2023-07-20 RX ADMIN — QUETIAPINE FUMARATE 300 MG: 200 TABLET, EXTENDED RELEASE ORAL at 22:08

## 2023-07-20 RX ADMIN — CETIRIZINE HYDROCHLORIDE 10 MG: 10 TABLET, FILM COATED ORAL at 08:53

## 2023-07-20 RX ADMIN — PRAVASTATIN SODIUM 40 MG: 40 TABLET ORAL at 22:08

## 2023-07-20 RX ADMIN — GABAPENTIN 300 MG: 300 CAPSULE ORAL at 13:16

## 2023-07-20 RX ADMIN — FAMOTIDINE 20 MG: 20 TABLET ORAL at 08:53

## 2023-07-20 NOTE — PLAN OF CARE
Goal Outcome Evaluation:  Plan of Care Reviewed With: patient        Progress: no change  Outcome Evaluation: Patient taken off BIPAP early this am and is resting on 1L nasal cannula.

## 2023-07-20 NOTE — PLAN OF CARE
Goal Outcome Evaluation:  Plan of Care Reviewed With: patient           Outcome Evaluation: Patient failed walk test this shift. Patient requring at least 1L per nasal canual before desaturating to  85% on room air. Pain managment more tolerable this shift, medicated with IV dilauded at least x2 and norco x1. Patient awaiting possible rehab placement or going home. VSS

## 2023-07-20 NOTE — PROGRESS NOTES
[facility]     Progress Note    Patient Name: Carol Abarca  : 1970  MRN: [id number]  Primary Care Physician:  Sonia Mosquera APRN  Date of admission: 2023      Subjective   Brief summary.  Patient admitted with volume overload and anasarca and weakness and shortness of breath, continues to complain of tongue numbness      HPI:  Continues to feel tired and fatigued, no chest pain.    Shortness of breath with exertion.      Review of Systems     Neck pain, facial numbness and tongue numbness  Shortness of breath with exertion  Difficulty walking, tired and fatigue    Objective     Vitals:   Temp:  [97.7 °F (36.5 °C)-99.6 °F (37.6 °C)] 98.6 °F (37 °C)  Heart Rate:  [91-98] 95  Resp:  [12-20] 18  BP: ()/(65-80) 100/67  Flow (L/min):  [1] 1    Physical Exam :     Elderly looking obese female, not in acute distress,  Tenderness over the paraspinal muscles of the neck and range of movement decreased  Heart regular  Lungs diminished breath sounds with crackles  Abdomen obese and soft  Extremities with 2+ edema      Result Review:  I have personally reviewed the results from the time of this admission to 2023 15:23 EDT and agree with these findings:  [x]  Laboratory  []  Microbiology  [x]  Radiology  []  EKG/Telemetry   []  Cardiology/Vascular   []  Pathology  []  Old records  []  Other:      Assessment / Plan       Active Hospital Problems:  Active Hospital Problems    Diagnosis     **Dyspnea     Cervical spine degeneration     Acute on chronic heart failure with preserved ejection fraction (HFpEF)     Anasarca     Type 2 diabetes mellitus     Seizure disorder     B12 deficiency     Severe anemia     Essential hypertension     Mild left ventricular hypertrophy        Plan:   Improving  Continue diuresis   Patient getting extra doses of diuretics with Zaroxolyn, improving  Oxygenation better  We will do a 6-minute walk test.  Patient did not got approved for inpatient rehab.  Patient has been in  hospital 3 times in last 2 months and has been denied for rehab every time.  Patient is very unsteady and high risk for fall at this point she will be discharged to home with home health.  Possible discharge tomorrow         DVT prophylaxis:  Medical DVT prophylaxis orders are present.    CODE STATUS:   Level Of Support Discussed With: Patient  Code Status (Patient has no pulse and is not breathing): CPR (Attempt to Resuscitate)  Medical Interventions (Patient has pulse or is breathing): Full Support                Electronically signed by Dung Hall MD, 07/20/23, 4:18 PM EDT.

## 2023-07-20 NOTE — PROGRESS NOTES
CARDIOLOGY  INPATIENT PROGRESS NOTE                [address]    7/20/2023      PATIENT IDENTIFICATION:   Name:  Carol Abarca      MRN:  [id number]     52 y.o.  female                 SUBJECTIVE:   Patient with no significant events overnight  OBJECTIVE:  Vitals:    07/20/23 0058 07/20/23 0336 07/20/23 0500 07/20/23 0700   BP: 93/68  105/65 106/80   BP Location: Right arm  Right arm Right arm   Patient Position: Lying  Lying Lying   Pulse: 98 95 95    Resp: 20 12 18 18   Temp: 99.1 °F (37.3 °C)  97.7 °F (36.5 °C) 98.8 °F (37.1 °C)   TempSrc: Oral  Oral Oral   SpO2: 94% 98% 100% 98%   Weight:       Height:               Body mass index is 37.34 kg/m².    Intake/Output Summary (Last 24 hours) at 7/20/2023 0812  Last data filed at 7/19/2023 2045  Gross per 24 hour   Intake 702 ml   Output 1750 ml   Net -1048 ml         Physical Exam  General Appearance:   no acute distress  Alert and oriented x3  HENT:   lips not cyanotic  Atraumatic  Neck:  No jvd   supple  Respiratory:  no respiratory distress  normal breath sounds  no rales  Cardiovascular:    regular rhythm  no S3, no S4   no murmur  no rub  lower extremity edema: Trace to mild  Skin:   warm, dry  No rashes      Allergies   Allergen Reactions    Tramadol Anaphylaxis    Tramadol Hcl Anaphylaxis    Moxifloxacin Hives    Sulfa Antibiotics Hives     Scheduled meds:  cetirizine, 10 mg, Oral, Daily  empagliflozin, 10 mg, Oral, Daily  enoxaparin, 40 mg, Subcutaneous, Daily  famotidine, 20 mg, Oral, Daily  furosemide, 40 mg, Intravenous, Q12H  gabapentin, 300 mg, Oral, Q8H  insulin lispro, 2-9 Units, Subcutaneous, 4x Daily AC & at Bedtime  levETIRAcetam, 500 mg, Oral, BID  metoprolol succinate XL, 25 mg, Oral, Q24H  montelukast, 10 mg, Oral, Nightly  potassium chloride, 20 mEq, Oral, BID  pravastatin, 40 mg, Oral, Nightly  QUEtiapine fumarate ER, 300 mg, Oral, Nightly  senna-docusate sodium, 2 tablet, Oral, Nightly  sodium chloride, 10 mL,  Intravenous, Q12H  spironolactone, 25 mg, Oral, Daily      IV meds:                      Pharmacy to Dose enoxaparin (LOVENOX),       Data Review:  CBC          7/18/2023    05:07 7/19/2023    05:27 7/20/2023    05:29   CBC   Hemoglobin 9.8  9.8  10.1    Hematocrit 31.7  31.8  32.8      CMP          7/18/2023    05:07 7/19/2023    05:27 7/20/2023    05:29   CMP   Glucose 94  104  122    BUN 10  14  17    Creatinine 0.58  0.77  0.82    EGFR 109.0  92.9  86.2    Sodium 139  139  138    Potassium 4.1  3.9  3.9    Chloride 98  96  94    Calcium 9.3  9.2  9.3    Albumin 3.3  3.4  3.5    BUN/Creatinine Ratio 17.2  18.2  20.7    Anion Gap 9.9  8.7  11.1       CARDIAC LABS:      Lab 07/17/23  0600 07/13/23  1730   PROBNP 64.7 116.0        No results found for: DIGOXIN   Lab Results   Component Value Date    TSH 0.782 07/13/2023           Invalid input(s): LDLCALC  Lab Results   Component Value Date    POCTROP 0.00 02/05/2022     Lab Results   Component Value Date    TROPONINT 12 (H) 07/09/2023   (  Lab Results   Component Value Date    MG 2.0 07/16/2023     Results for orders placed during the hospital encounter of 05/31/23    Adult Transthoracic Echo Complete W/ Cont if Necessary Per Protocol    Interpretation Summary    Left ventricular ejection fraction appears to be 61 - 65%.    The left ventricular cavity is borderline dilated.    Left ventricular wall thickness is consistent with mild concentric hypertrophy.    Left ventricular diastolic function is consistent with (grade Ia w/high LAP) impaired relaxation.    Left atrial volume is mildly increased.    Estimated right ventricular systolic pressure from tricuspid regurgitation is mildly elevated (35-45 mmHg).           ASSESSMENT:    Dyspnea    Essential hypertension    Mild left ventricular hypertrophy    Severe anemia    B12 deficiency    Seizure disorder    Type 2 diabetes mellitus    Anasarca    Acute on chronic heart failure with preserved ejection fraction  (HFpEF)    Cervical spine degeneration        PLAN:  1.  Change to oral Lasix 20 mg daily  2.  Patient stable from discharge from a cardiac standpoint will sign off          Remi Leyva MD  7/20/2023    08:12 EDT

## 2023-07-20 NOTE — PLAN OF CARE
Goal Outcome Evaluation:  Plan of Care Reviewed With: patient        Progress: no change  Outcome Evaluation: Patient requested PRN dilaudid. Nurse educated patient on importance of using PO pain meds as she is supposed to discharge soon. Patient said she will have no problem taking PO meds when she discharges. Will desat to low 80s when on room air, but will quickly recover to mid 90s on 1LNC. No shortness of air observed

## 2023-07-20 NOTE — CONSULTS
07/19/23 1353   Coping/Psychosocial   Additional Documentation Spiritual Care (Group)   Spiritual Care   Spiritual Care Source  initiative   Spiritual Care Follow-Up follow-up, none required as presently assessed   Response to Spiritual Care receptive of support   Spiritual Care Interventions supportive conversation provided   Spiritual Care Visit Type initial   Spiritual Care Request coping/stress of illness support;spiritual/moral support   Receptivity to Spiritual Care visit welcomed

## 2023-07-20 NOTE — PLAN OF CARE
Goal Outcome Evaluation:   Patient on 1L nc, tolerating well. No O2 baseline. Patient wears bipap qhs/prn no issues.

## 2023-07-20 NOTE — NURSING NOTE
Exercise Oximetry    Patient Name:Carol Abarca   MRN: [id number]   Date: 07/20/23             ROOM AIR BASELINE   SpO2% 95   Heart Rate 90   Blood Pressure 100/67     EXERCISE ON ROOM AIR SpO2% EXERCISE ON O2 @ 1 LPM SpO2%   1 MINUTE 95 1 MINUTE    2 MINUTES 94 2 MINUTES    3 MINUTES 93 3 MINUTES    4 MINUTES 90 4 MINUTES    5 MINUTES 85 5 MINUTES    6 MINUTES  6 MINUTES 97              Distance Walked  150 Distance Walked   Dyspnea (Eli Scale)   Dyspnea (Eli Scale)   Fatigue (Eli Scale)   Fatigue (Eli Scale)   SpO2% Post Exercise  96 SpO2% Post Exercise   HR Post Exercise  105 HR Post Exercise   Time to Recovery  2 MIN Time to Recovery     Comments: Patient ambulated around room and tolerated well until 5 minute uday. Dropped to 85% on room air. Patient sat on side of bed to recover and put on 1 L nasal cannula. Oxygen went up to 97% and patient tolerated well after oxygen was applied.

## 2023-07-21 VITALS
SYSTOLIC BLOOD PRESSURE: 102 MMHG | BODY MASS INDEX: 37.57 KG/M2 | WEIGHT: 204.15 LBS | HEART RATE: 95 BPM | OXYGEN SATURATION: 100 % | TEMPERATURE: 98.8 F | RESPIRATION RATE: 20 BRPM | DIASTOLIC BLOOD PRESSURE: 78 MMHG | HEIGHT: 62 IN

## 2023-07-21 PROBLEM — R06.00 DYSPNEA: Status: RESOLVED | Noted: 2023-01-01 | Resolved: 2023-01-01

## 2023-07-21 PROBLEM — R60.1 ANASARCA: Status: RESOLVED | Noted: 2023-01-01 | Resolved: 2023-01-01

## 2023-07-21 PROBLEM — R60.1 ANASARCA: Status: RESOLVED | Noted: 2023-07-13 | Resolved: 2023-07-21

## 2023-07-21 PROBLEM — R06.00 DYSPNEA: Status: RESOLVED | Noted: 2023-07-13 | Resolved: 2023-07-21

## 2023-07-21 PROBLEM — I50.33 ACUTE ON CHRONIC HEART FAILURE WITH PRESERVED EJECTION FRACTION (HFPEF): Status: RESOLVED | Noted: 2023-01-01 | Resolved: 2023-01-01

## 2023-07-21 PROBLEM — I50.33 ACUTE ON CHRONIC HEART FAILURE WITH PRESERVED EJECTION FRACTION (HFPEF): Status: RESOLVED | Noted: 2023-07-14 | Resolved: 2023-07-21

## 2023-07-21 LAB
HCT VFR BLD AUTO: 33.4 % (ref 34–46.6)
HGB BLD-MCNC: 10.4 G/DL (ref 12–15.9)

## 2023-07-21 PROCEDURE — 94660 CPAP INITIATION&MGMT: CPT

## 2023-07-21 PROCEDURE — 85014 HEMATOCRIT: CPT | Performed by: INTERNAL MEDICINE

## 2023-07-21 PROCEDURE — 94799 UNLISTED PULMONARY SVC/PX: CPT

## 2023-07-21 PROCEDURE — 25010000002 ENOXAPARIN PER 10 MG: Performed by: INTERNAL MEDICINE

## 2023-07-21 PROCEDURE — 85018 HEMOGLOBIN: CPT | Performed by: INTERNAL MEDICINE

## 2023-07-21 PROCEDURE — 94761 N-INVAS EAR/PLS OXIMETRY MLT: CPT

## 2023-07-21 PROCEDURE — 25010000002 HYDROMORPHONE 1 MG/ML SOLUTION: Performed by: INTERNAL MEDICINE

## 2023-07-21 RX ORDER — FOLIC ACID 1 MG/1
1 TABLET ORAL DAILY
Qty: 30 TABLET | Refills: 0 | Status: SHIPPED | OUTPATIENT
Start: 2023-07-21 | End: 2023-08-20

## 2023-07-21 RX ORDER — QUETIAPINE 300 MG/1
300 TABLET, FILM COATED, EXTENDED RELEASE ORAL EVERY EVENING
Qty: 30 TABLET | Refills: 0 | Status: SHIPPED | OUTPATIENT
Start: 2023-07-21 | End: 2023-08-20

## 2023-07-21 RX ORDER — SPIRONOLACTONE 25 MG/1
25 TABLET ORAL DAILY
Qty: 30 TABLET | Refills: 0 | Status: SHIPPED | OUTPATIENT
Start: 2023-07-22 | End: 2023-08-21

## 2023-07-21 RX ORDER — METOPROLOL SUCCINATE 25 MG/1
25 TABLET, EXTENDED RELEASE ORAL
Qty: 30 TABLET | Refills: 0 | Status: SHIPPED | OUTPATIENT
Start: 2023-07-22 | End: 2023-08-21

## 2023-07-21 RX ORDER — INSULIN GLARGINE 300 U/ML
20 INJECTION, SOLUTION SUBCUTANEOUS NIGHTLY
Qty: 4.5 ML | Refills: 0 | Status: SHIPPED | OUTPATIENT
Start: 2023-07-21 | End: 2023-08-20

## 2023-07-21 RX ORDER — IPRATROPIUM BROMIDE AND ALBUTEROL SULFATE 2.5; .5 MG/3ML; MG/3ML
3 SOLUTION RESPIRATORY (INHALATION) EVERY 6 HOURS PRN
Qty: 360 ML | Refills: 0 | Status: SHIPPED | OUTPATIENT
Start: 2023-07-21 | End: 2023-08-20

## 2023-07-21 RX ORDER — FUROSEMIDE 20 MG/1
20 TABLET ORAL DAILY
Qty: 30 TABLET | Refills: 0 | Status: SHIPPED | OUTPATIENT
Start: 2023-07-22 | End: 2023-08-21

## 2023-07-21 RX ADMIN — POTASSIUM CHLORIDE 20 MEQ: 1.5 POWDER, FOR SOLUTION ORAL at 09:27

## 2023-07-21 RX ADMIN — Medication 10 ML: at 12:04

## 2023-07-21 RX ADMIN — LEVETIRACETAM 500 MG: 500 TABLET, FILM COATED ORAL at 09:28

## 2023-07-21 RX ADMIN — HYDROMORPHONE HYDROCHLORIDE 0.5 MG: 1 INJECTION, SOLUTION INTRAMUSCULAR; INTRAVENOUS; SUBCUTANEOUS at 12:04

## 2023-07-21 RX ADMIN — FAMOTIDINE 20 MG: 20 TABLET ORAL at 09:28

## 2023-07-21 RX ADMIN — HYDROMORPHONE HYDROCHLORIDE 0.5 MG: 1 INJECTION, SOLUTION INTRAMUSCULAR; INTRAVENOUS; SUBCUTANEOUS at 07:31

## 2023-07-21 RX ADMIN — EMPAGLIFLOZIN 10 MG: 10 TABLET, FILM COATED ORAL at 09:28

## 2023-07-21 RX ADMIN — Medication 10 ML: at 09:28

## 2023-07-21 RX ADMIN — SPIRONOLACTONE 25 MG: 25 TABLET ORAL at 09:28

## 2023-07-21 RX ADMIN — CETIRIZINE HYDROCHLORIDE 10 MG: 10 TABLET, FILM COATED ORAL at 09:28

## 2023-07-21 RX ADMIN — HYDROMORPHONE HYDROCHLORIDE 0.5 MG: 1 INJECTION, SOLUTION INTRAMUSCULAR; INTRAVENOUS; SUBCUTANEOUS at 00:42

## 2023-07-21 RX ADMIN — METOPROLOL SUCCINATE 25 MG: 25 TABLET, EXTENDED RELEASE ORAL at 09:28

## 2023-07-21 RX ADMIN — GABAPENTIN 300 MG: 300 CAPSULE ORAL at 07:31

## 2023-07-21 RX ADMIN — ENOXAPARIN SODIUM 40 MG: 100 INJECTION SUBCUTANEOUS at 09:28

## 2023-07-21 RX ADMIN — GABAPENTIN 300 MG: 300 CAPSULE ORAL at 13:10

## 2023-07-21 RX ADMIN — FUROSEMIDE 20 MG: 20 TABLET ORAL at 09:28

## 2023-07-21 NOTE — PLAN OF CARE
Goal Outcome Evaluation:  Plan of Care Reviewed With: patient           Outcome Evaluation: Patient is currently on Cpap 12 sleeping, has worn all night. FIO2 28% with a sat of 100%.

## 2023-07-21 NOTE — PLAN OF CARE
Goal Outcome Evaluation:   Patient on 1L nc when not on v60 unit. Patient wears bipap qhs and prn. Patient calls out whenever she is ready to go to bed.

## 2023-07-21 NOTE — PLAN OF CARE
Goal Outcome Evaluation:              Outcome Evaluation: discharging home with o2 via nc. mother to pick her up. pending dme for o2 needs situated prior to dc home. pulse ox provided by . meds to bed confirmed. no new issues/needs noted at this time.

## 2023-07-21 NOTE — PLAN OF CARE
Goal Outcome Evaluation:  Plan of Care Reviewed With: patient        Progress: no change  Outcome Evaluation: Patient requested PRN dilaudid to be readded. On call physician reordered and medication administered. Patient wore BIPAP overnight

## 2023-07-21 NOTE — DISCHARGE INSTR - APPOINTMENTS
Follow up with Sonia Mosquera APRN On Thursday July 27 th at 12:30 pm  [address] [address]

## 2023-07-22 ENCOUNTER — HOSPITAL ENCOUNTER (EMERGENCY)
Facility: HOSPITAL | Age: 53
Discharge: HOME | End: 2023-07-22
Attending: EMERGENCY MEDICINE | Admitting: EMERGENCY MEDICINE
Payer: MEDICARE

## 2023-07-22 ENCOUNTER — APPOINTMENT (OUTPATIENT)
Dept: GENERAL RADIOLOGY | Facility: HOSPITAL | Age: 53
End: 2023-07-22
Payer: MEDICARE

## 2023-07-22 VITALS
HEIGHT: 62 IN | WEIGHT: 204.15 LBS | OXYGEN SATURATION: 99 % | BODY MASS INDEX: 37.57 KG/M2 | DIASTOLIC BLOOD PRESSURE: 110 MMHG | HEART RATE: 96 BPM | RESPIRATION RATE: 16 BRPM | TEMPERATURE: 98.7 F | SYSTOLIC BLOOD PRESSURE: 135 MMHG

## 2023-07-22 DIAGNOSIS — R53.1 GENERALIZED WEAKNESS: Primary | ICD-10-CM

## 2023-07-22 LAB
ALBUMIN SERPL-MCNC: 4 G/DL (ref 3.5–5.2)
ALBUMIN/GLOB SERPL: 0.9 G/DL
ALP SERPL-CCNC: 108 U/L (ref 39–117)
ALT SERPL W P-5'-P-CCNC: 6 U/L (ref 1–33)
ANION GAP SERPL CALCULATED.3IONS-SCNC: 10 MMOL/L (ref 5–15)
AST SERPL-CCNC: 16 U/L (ref 1–32)
BACTERIA UR QL AUTO: ABNORMAL /HPF
BASOPHILS # BLD AUTO: 0.01 10*3/MM3 (ref 0–0.2)
BASOPHILS NFR BLD AUTO: 0.2 % (ref 0–1.5)
BILIRUB SERPL-MCNC: 0.5 MG/DL (ref 0–1.2)
BILIRUB UR QL STRIP: NEGATIVE
BUN SERPL-MCNC: 22 MG/DL (ref 6–20)
BUN/CREAT SERPL: 28.2 (ref 7–25)
CALCIUM SPEC-SCNC: 9.7 MG/DL (ref 8.6–10.5)
CHLORIDE SERPL-SCNC: 97 MMOL/L (ref 98–107)
CLARITY UR: ABNORMAL
CO2 SERPL-SCNC: 32 MMOL/L (ref 22–29)
COLOR UR: ABNORMAL
CREAT SERPL-MCNC: 0.78 MG/DL (ref 0.57–1)
DEPRECATED RDW RBC AUTO: 54.7 FL (ref 37–54)
EGFRCR SERPLBLD CKD-EPI 2021: 91.5 ML/MIN/1.73
EOSINOPHIL # BLD AUTO: 0.06 10*3/MM3 (ref 0–0.4)
EOSINOPHIL NFR BLD AUTO: 1.1 % (ref 0.3–6.2)
ERYTHROCYTE [DISTWIDTH] IN BLOOD BY AUTOMATED COUNT: 16.5 % (ref 12.3–15.4)
GLOBULIN UR ELPH-MCNC: 4.7 GM/DL
GLUCOSE BLDC GLUCOMTR-MCNC: 114 MG/DL (ref 70–99)
GLUCOSE SERPL-MCNC: 122 MG/DL (ref 65–99)
GLUCOSE UR STRIP-MCNC: ABNORMAL MG/DL
HCT VFR BLD AUTO: 33.8 % (ref 34–46.6)
HGB BLD-MCNC: 10.4 G/DL (ref 12–15.9)
HGB UR QL STRIP.AUTO: NEGATIVE
HOLD SPECIMEN: NORMAL
HOLD SPECIMEN: NORMAL
HYALINE CASTS UR QL AUTO: ABNORMAL /LPF
IMM GRANULOCYTES # BLD AUTO: 0.01 10*3/MM3 (ref 0–0.05)
IMM GRANULOCYTES NFR BLD AUTO: 0.2 % (ref 0–0.5)
KETONES UR QL STRIP: ABNORMAL
LEUKOCYTE ESTERASE UR QL STRIP.AUTO: ABNORMAL
LYMPHOCYTES # BLD AUTO: 1.25 10*3/MM3 (ref 0.7–3.1)
LYMPHOCYTES NFR BLD AUTO: 23.5 % (ref 19.6–45.3)
MAGNESIUM SERPL-MCNC: 2.6 MG/DL (ref 1.6–2.6)
MCH RBC QN AUTO: 28.1 PG (ref 26.6–33)
MCHC RBC AUTO-ENTMCNC: 30.8 G/DL (ref 31.5–35.7)
MCV RBC AUTO: 91.4 FL (ref 79–97)
MONOCYTES # BLD AUTO: 0.5 10*3/MM3 (ref 0.1–0.9)
MONOCYTES NFR BLD AUTO: 9.4 % (ref 5–12)
NEUTROPHILS NFR BLD AUTO: 3.5 10*3/MM3 (ref 1.7–7)
NEUTROPHILS NFR BLD AUTO: 65.6 % (ref 42.7–76)
NITRITE UR QL STRIP: NEGATIVE
NRBC BLD AUTO-RTO: 0 /100 WBC (ref 0–0.2)
PH UR STRIP.AUTO: 7.5 [PH] (ref 5–8)
PLATELET # BLD AUTO: 295 10*3/MM3 (ref 140–450)
PMV BLD AUTO: 11 FL (ref 6–12)
POTASSIUM SERPL-SCNC: 3.8 MMOL/L (ref 3.5–5.2)
PROT SERPL-MCNC: 8.7 G/DL (ref 6–8.5)
PROT UR QL STRIP: ABNORMAL
RBC # BLD AUTO: 3.7 10*6/MM3 (ref 3.77–5.28)
RBC # UR STRIP: ABNORMAL /HPF
REF LAB TEST METHOD: ABNORMAL
SODIUM SERPL-SCNC: 139 MMOL/L (ref 136–145)
SP GR UR STRIP: >=1.03 (ref 1–1.03)
SQUAMOUS #/AREA URNS HPF: ABNORMAL /HPF
TROPONIN T SERPL HS-MCNC: 18 NG/L
UROBILINOGEN UR QL STRIP: ABNORMAL
WBC # UR STRIP: ABNORMAL /HPF
WBC NRBC COR # BLD: 5.33 10*3/MM3 (ref 3.4–10.8)
WHOLE BLOOD HOLD COAG: NORMAL
WHOLE BLOOD HOLD SPECIMEN: NORMAL

## 2023-07-22 PROCEDURE — 82948 REAGENT STRIP/BLOOD GLUCOSE: CPT

## 2023-07-22 PROCEDURE — 84484 ASSAY OF TROPONIN QUANT: CPT | Performed by: EMERGENCY MEDICINE

## 2023-07-22 PROCEDURE — 83735 ASSAY OF MAGNESIUM: CPT | Performed by: EMERGENCY MEDICINE

## 2023-07-22 PROCEDURE — 80053 COMPREHEN METABOLIC PANEL: CPT | Performed by: EMERGENCY MEDICINE

## 2023-07-22 PROCEDURE — 71045 X-RAY EXAM CHEST 1 VIEW: CPT

## 2023-07-22 PROCEDURE — 93005 ELECTROCARDIOGRAM TRACING: CPT

## 2023-07-22 PROCEDURE — 81001 URINALYSIS AUTO W/SCOPE: CPT | Performed by: EMERGENCY MEDICINE

## 2023-07-22 PROCEDURE — 93005 ELECTROCARDIOGRAM TRACING: CPT | Performed by: EMERGENCY MEDICINE

## 2023-07-22 PROCEDURE — 85025 COMPLETE CBC W/AUTO DIFF WBC: CPT | Performed by: EMERGENCY MEDICINE

## 2023-07-22 PROCEDURE — 99285 EMERGENCY DEPT VISIT HI MDM: CPT

## 2023-07-22 RX ORDER — SODIUM CHLORIDE 0.9 % (FLUSH) 0.9 %
10 SYRINGE (ML) INJECTION AS NEEDED
Status: DISCONTINUED | OUTPATIENT
Start: 2023-07-22 | End: 2023-07-22 | Stop reason: HOSPADM

## 2023-07-22 NOTE — ED PROVIDER NOTES
Time: 5:47 PM EDT  Date of encounter:  2023  Independent Historian/Clinical History and Information was obtained by:   Patient    History is limited by: N/A    Chief Complaint: Generalized weakness fatigue and back pain      History of Present Illness:  Patient is a 52 y.o. year old female who presents to the emergency department for evaluation of generalized weakness, fatigue and back pain.  Patient's just discharged home in the last 48 hours from a 1 week hospital stay for decreased activities of daily living.  Now complains of just feeling poor and achy all over but specifically her back.  Never mentions any specific chest pain.  Did not complain of difficulty breathing, fever and also denies abdominal pain.  No vomiting or diarrhea.  Patient is on multiple pain medications and when asked feels like this could cause some of her symptoms today.    HPI    Patient Care Team  Primary Care Provider: Sonia Mosquera APRN    Past Medical History:     Allergies   Allergen Reactions    Tramadol Anaphylaxis    Tramadol Hcl Anaphylaxis    Moxifloxacin Hives    Sulfa Antibiotics Hives     Past Medical History:   Diagnosis Date    Anemia     Arthritis     Diabetes     Essential hypertension 2021    History of pulmonary embolism     Lumbago     Low back pain    Mild left ventricular hypertrophy 2021    Mixed hyperlipidemia 2021    Obstructive sleep apnea     Reflux esophagitis     Seasonal allergies      Past Surgical History:   Procedure Laterality Date    APPENDECTOMY  2010     SECTION      , , ,     COLONOSCOPY       2018    COLONOSCOPY N/A 2022    Procedure: COLONOSCOPY;  Surgeon: Radha James MD;  Location: [facility] ENDOSCOPY;  Service: Gastroenterology;  Laterality: N/A;  COLON POLYP     ENDOSCOPY  2019    ENDOSCOPY N/A 2023    Procedure: ESOPHAGOGASTRODUODENOSCOPY WITH BIPOSIES;  Surgeon: Coy Patrick MD;  Location: [facility] ENDOSCOPY;  Service:  Gastroenterology;  Laterality: N/A;  PRIOR LAPBAND, GASTRITIS    HEMORRHOIDECTOMY N/A 07/25/2022    Procedure: HEMORRHOIDECTOMY;  Surgeon: Remi Reeder MD;  Location: [facility] MAIN OR;  Service: General;  Laterality: N/A;    HERNIA REPAIR      2005, 2006, 2007, 2008    HYSTERECTOMY  2004    LAPAROSCOPIC GASTRIC BANDING      OTHER SURGICAL HISTORY      Metal implants     Family History   Problem Relation Age of Onset    Heart disease Mother     Diabetes Mother         Unspecified type    Arthritis Mother     Heart disease Father     Diabetes Son         Unspecified type    Malig Hyperthermia Neg Hx        Home Medications:  Prior to Admission medications    Medication Sig Start Date End Date Taking? Authorizing Provider   aspirin 81 MG EC tablet Take 1 tablet by mouth Daily.    ProviderSilas MD   cetirizine (zyrTEC) 10 MG tablet Take 1 tablet by mouth Daily.    ProviderSilas MD   empagliflozin (JARDIANCE) 10 MG tablet tablet Take 1 tablet by mouth Daily for 30 days. 7/22/23 8/21/23  Dung Hall MD   folic acid (FOLVITE) 1 MG tablet Take 1 tablet by mouth Daily for 30 days. 7/21/23 8/20/23  Dung Hall MD   furosemide (LASIX) 20 MG tablet Take 1 tablet by mouth Daily for 30 days. 7/22/23 8/21/23  Dung Hall MD   gabapentin (NEURONTIN) 300 MG capsule Take 1 capsule by mouth Every 8 (Eight) Hours for 30 days. 6/30/23 7/30/23  Dung Hall MD   HYDROcodone-acetaminophen (NORCO)  MG per tablet Take 1 tablet by mouth Every 8 (Eight) Hours As Needed for Moderate Pain, Severe Pain or Mild Pain.    Silas Bhatt MD   ipratropium-albuterol (DUO-NEB) 0.5-2.5 mg/3 ml nebulizer Take 3 mL by nebulization Every 6 (Six) Hours As Needed for Shortness of Air for up to 30 days. 7/21/23 8/20/23  Dung Hall MD   levETIRAcetam (Keppra) 500 MG tablet Take 1 tablet by mouth 2 (Two) Times a Day for 30 days. 6/15/23 7/15/23  Dung Hall MD   metoprolol succinate XL (TOPROL-XL) 25 MG 24 hr tablet Take 1  tablet by mouth Daily for 30 days. 7/22/23 8/21/23  Dung Hall MD   montelukast (SINGULAIR) 10 MG tablet Take 1 tablet by mouth Every Night.    Provider, MD Silas   pravastatin (PRAVACHOL) 40 MG tablet Take 1 tablet by mouth Daily.    Provider, MD Silas   QUEtiapine XR (SEROquel XR) 300 MG 24 hr tablet Take 1 tablet by mouth Every Evening for 30 days. 7/21/23 8/20/23  Dung Hall MD   spironolactone (ALDACTONE) 25 MG tablet Take 1 tablet by mouth Daily for 30 days. 7/22/23 8/21/23  Dung Hall MD Toujeo SoloStar 300 UNIT/ML solution pen-injector injection Inject 20 Units under the skin into the appropriate area as directed Every Night for 30 days. 7/21/23 8/20/23  Dung Hall MD        Social History:   Social History     Tobacco Use    Smoking status: Every Day     Packs/day: 1.00     Years: 23.00     Pack years: 23.00     Types: Cigarettes    Smokeless tobacco: Never    Tobacco comments:     Smoked 11-20 years. last 7/24/22 1700   Vaping Use    Vaping Use: Never used   Substance Use Topics    Alcohol use: Yes     Comment: Occasionally drinks, less than 1 drink per day, has been drinking for less than 1 year    Drug use: Never         Review of Systems:  Review of Systems   Constitutional:  Negative for chills and fever.   HENT:  Negative for congestion, rhinorrhea and sore throat.    Eyes:  Negative for photophobia.   Respiratory:  Negative for apnea, cough, chest tightness and shortness of breath.    Cardiovascular:  Negative for chest pain and palpitations.   Gastrointestinal:  Negative for abdominal pain, diarrhea, nausea and vomiting.   Endocrine: Negative.    Genitourinary:  Negative for difficulty urinating and dysuria.   Musculoskeletal:  Positive for back pain. Negative for joint swelling and myalgias.   Skin:  Negative for color change and wound.   Allergic/Immunologic: Negative.    Neurological:  Negative for seizures and headaches.   Psychiatric/Behavioral:  Positive for confusion.   "  All other systems reviewed and are negative.     Physical Exam:  BP (!) 135/110 (BP Location: Right arm, Patient Position: Sitting)   Pulse 96   Temp 98.7 °F (37.1 °C) (Oral)   Resp 16   Ht 157.5 cm (62\")   Wt 92.6 kg (204 lb 2.3 oz)   SpO2 99%   BMI 37.34 kg/m²     Physical Exam  Vitals and nursing note reviewed.   Constitutional:       General: She is awake.      Appearance: Normal appearance. She is well-developed. She is obese.      Comments: Mildly drowsy but responds to verbal stimuli   HENT:      Head: Normocephalic and atraumatic.      Nose: Nose normal.      Mouth/Throat:      Mouth: Mucous membranes are moist.   Eyes:      Extraocular Movements: Extraocular movements intact.      Pupils: Pupils are equal, round, and reactive to light.   Cardiovascular:      Rate and Rhythm: Normal rate and regular rhythm.      Heart sounds: Normal heart sounds.   Pulmonary:      Effort: Pulmonary effort is normal. No respiratory distress.      Breath sounds: Normal breath sounds. No wheezing, rhonchi or rales.   Abdominal:      General: Bowel sounds are normal.      Palpations: Abdomen is soft.      Tenderness: There is no abdominal tenderness. There is no guarding or rebound.      Comments: No rigidity   Musculoskeletal:         General: No tenderness. Normal range of motion.      Cervical back: Normal range of motion and neck supple.   Skin:     General: Skin is warm and dry.      Coloration: Skin is not jaundiced.   Neurological:      General: No focal deficit present.      Mental Status: Mental status is at baseline.      GCS: GCS eye subscore is 4. GCS verbal subscore is 5. GCS motor subscore is 6.      Cranial Nerves: No cranial nerve deficit or facial asymmetry.      Sensory: Sensation is intact. No sensory deficit.      Motor: Motor function is intact. No weakness.      Coordination: Coordination is intact.   Psychiatric:         Attention and Perception: Attention and perception normal.         Mood and " Affect: Mood and affect normal.         Speech: Speech normal.         Behavior: Behavior normal.         Judgment: Judgment normal.                Procedures:  Procedures      Medical Decision Making:      Comorbidities that affect care:    Diabetes, hypertension, pulmonary embolism, SOCO    External Notes reviewed:    Encounter review: Admission 7/13/2023 through 7/21/2023 for dyspnea on exertion, decreased activities of daily living      The following orders were placed and all results were independently analyzed by me:  Orders Placed This Encounter   Procedures    XR Chest 1 View    [place] Draw    Comprehensive Metabolic Panel    Single High Sensitivity Troponin T    Magnesium    Urinalysis With Microscopic If Indicated (No Culture) - Urine, Clean Catch    CBC Auto Differential    Urinalysis, Microscopic Only - Urine, Clean Catch    Undress & Gown    Continuous Pulse Oximetry    Vital Signs    Orthostatic Blood Pressure    POC Glucose Once    POC Glucose Once    ECG 12 Lead ED Triage Standing Order; Weak / Dizzy / AMS    CBC & Differential    Green Top (Gel)    Lavender Top    Gold Top - SST    Light Blue Top       Medications Given in the Emergency Department:  Medications - No data to display       ED Course:    ED Course as of 07/22/23 2350   Sat Jul 22, 2023   1749 I have personally interpreted the EKG today and it shows no evidence of any acute ischemia.  Undetermined rhythm secondary to what appears to be moderate artifact.  The rhythm is not a regular and looks more like normal sinus rhythm than atrial flutter. [RP]      ED Course User Index  [RP] Gregorio Briceno MD       Labs:    Lab Results (last 24 hours)       Procedure Component Value Units Date/Time    POC Glucose Once [[id number]]  (Abnormal) Collected: 07/22/23 1754    Specimen: Blood Updated: 07/22/23 1756     Glucose 114 mg/dL      Comment: Serial Number: [id number]:  [id number]       CBC & Differential [[id number]]  (Abnormal) Collected:  07/22/23 1820    Specimen: Blood Updated: 07/22/23 1832    Narrative:      The following orders were created for panel order CBC & Differential.  Procedure                               Abnormality         Status                     ---------                               -----------         ------                     CBC Auto Differential[[id number]]        Abnormal            Final result                 Please view results for these tests on the individual orders.    Comprehensive Metabolic Panel [[id number]]  (Abnormal) Collected: 07/22/23 1820    Specimen: Blood Updated: 07/22/23 1858     Glucose 122 mg/dL      BUN 22 mg/dL      Creatinine 0.78 mg/dL      Sodium 139 mmol/L      Potassium 3.8 mmol/L      Chloride 97 mmol/L      CO2 32.0 mmol/L      Calcium 9.7 mg/dL      Total Protein 8.7 g/dL      Albumin 4.0 g/dL      ALT (SGPT) 6 U/L      AST (SGOT) 16 U/L      Alkaline Phosphatase 108 U/L      Total Bilirubin 0.5 mg/dL      Globulin 4.7 gm/dL      A/G Ratio 0.9 g/dL      BUN/Creatinine Ratio 28.2     Anion Gap 10.0 mmol/L      eGFR 91.5 mL/min/1.73     Narrative:      GFR Normal >60  Chronic Kidney Disease <60  Kidney Failure <15      Single High Sensitivity Troponin T [[id number]]  (Abnormal) Collected: 07/22/23 1820    Specimen: Blood Updated: 07/22/23 1858     HS Troponin T 18 ng/L     Narrative:      High Sensitive Troponin T Reference Range:  <10.0 ng/L- Negative Female for AMI  <15.0 ng/L- Negative Male for AMI  >=10 - Abnormal Female indicating possible myocardial injury.  >=15 - Abnormal Male indicating possible myocardial injury.   Clinicians would have to utilize clinical acumen, EKG, Troponin, and serial changes to determine if it is an Acute Myocardial Infarction or myocardial injury due to an underlying chronic condition.         Magnesium [[id number]]  (Normal) Collected: 07/22/23 1820    Specimen: Blood Updated: 07/22/23 1901     Magnesium 2.6 mg/dL     CBC Auto Differential [[id number]]   (Abnormal) Collected: 07/22/23 1820    Specimen: Blood Updated: 07/22/23 1832     WBC 5.33 10*3/mm3      RBC 3.70 10*6/mm3      Hemoglobin 10.4 g/dL      Hematocrit 33.8 %      MCV 91.4 fL      MCH 28.1 pg      MCHC 30.8 g/dL      RDW 16.5 %      RDW-SD 54.7 fl      MPV 11.0 fL      Platelets 295 10*3/mm3      Neutrophil % 65.6 %      Lymphocyte % 23.5 %      Monocyte % 9.4 %      Eosinophil % 1.1 %      Basophil % 0.2 %      Immature Grans % 0.2 %      Neutrophils, Absolute 3.50 10*3/mm3      Lymphocytes, Absolute 1.25 10*3/mm3      Monocytes, Absolute 0.50 10*3/mm3      Eosinophils, Absolute 0.06 10*3/mm3      Basophils, Absolute 0.01 10*3/mm3      Immature Grans, Absolute 0.01 10*3/mm3      nRBC 0.0 /100 WBC     Urinalysis With Microscopic If Indicated (No Culture) - Urine, Clean Catch [[id number]]  (Abnormal) Collected: 07/22/23 1943    Specimen: Urine, Clean Catch Updated: 07/22/23 2009     Color, UA Dark Yellow     Appearance, UA Cloudy     pH, UA 7.5     Specific Gravity, UA >=1.030     Glucose, UA >=1000 mg/dL (3+)     Ketones, UA Trace     Bilirubin, UA Negative     Blood, UA Negative     Protein, UA 30 mg/dL (1+)     Leuk Esterase, UA Trace     Nitrite, UA Negative     Urobilinogen, UA 1.0 E.U./dL    Urinalysis, Microscopic Only - Urine, Clean Catch [[id number]]  (Abnormal) Collected: 07/22/23 1943    Specimen: Urine, Clean Catch Updated: 07/22/23 2016     RBC, UA 3-5 /HPF      WBC, UA 6-12 /HPF      Bacteria, UA 2+ /HPF      Squamous Epithelial Cells, UA 21-30 /HPF      Hyaline Casts, UA None Seen /LPF      Methodology Manual Light Microscopy             Imaging:    XR Chest 1 View    Result Date: 7/22/2023  PROCEDURE: XR CHEST 1 VW  COMPARISON: [facility], CR, XR CHEST 1 VW, 7/09/2023, 16:59.  [facility], CT, CT CHEST WO CONTRAST DIAGNOSTIC, 7/14/2023, 17:05.  [facility], CR, XR CHEST 1 VW, 7/13/2023, 20:38.  INDICATIONS: chest pain, fatigue  FINDINGS:  The heart  appears enlarged.  Status post median sternotomy.  No definite pneumothorax or effusion is identified.  There are mild bibasilar opacities which appear chronic.  No definitive new pulmonary infiltrate is seen.        1. Cardiomegaly, as before. 2. No definite acute pulmonary abnormality is seen.       PJ LAZO MD       Electronically Signed and Approved By: PJ LAZO MD on 7/22/2023 at 18:19                Differential Diagnosis and Discussion:    Altered Mental Status: Based on the patient's signs and symptoms, differential diagnosis includes but is not limited to meningitis, stroke, sepsis, subarachnoid hemorrhage, intracranial bleeding, encephalitis, and metabolic encephalopathy.    All labs were reviewed and interpreted by me.  All X-rays impressions were independently interpreted by me.  EKG was interpreted by me.    MDM     Amount and/or Complexity of Data Reviewed  Clinical lab tests: reviewed  Tests in the radiology section of CPT®: reviewed  Tests in the medicine section of CPT®: reviewed             Patient Care Considerations:    CT HEAD: I considered ordering a noncontrast CT of the head, however patient denies headache.  She has no focal deficits.  There is no reported trauma.      Consultants/Shared Management Plan:    None    Social Determinants of Health:    Patient has presented with family members who are responsible, reliable and will ensure follow up care.      Disposition and Care Coordination:    Discharged: I considered escalation of care by admitting this patient for observation, however the patient has improved and is suitable and  stable for discharge.    I have explained the patient´s condition, diagnoses and treatment plan based on the information available to me at this time. I have answered questions and addressed any concerns. The patient has a good  understanding of the patient´s diagnosis, condition, and treatment plan as can be expected at this point. The vital signs have  been stable. The patient´s condition is stable and appropriate for discharge from the emergency department.      The patient will pursue further outpatient evaluation with the primary care physician or other designated or consulting physician as outlined in the discharge instructions. They are agreeable to this plan of care and follow-up instructions have been explained in detail. The patient has received these instructions in written format and have expressed an understanding of the discharge instructions. The patient is aware that any significant change in condition or worsening of symptoms should prompt an immediate return to this or the closest emergency department or call to 911.    Final diagnoses:   Generalized weakness        ED Disposition       ED Disposition   Discharge    Condition   Stable    Comment   --               This medical record created using voice recognition software.             Gregorio Briceno MD  07/22/23 6723

## 2023-07-23 NOTE — DISCHARGE INSTRUCTIONS
Follow-up your primary care provider first thing Monday morning.  Stay well-hydrated.  Please attempt to decrease the amount of pain medications you are taking, especially together.  It may be that your medications are little too strong for you.

## 2023-07-24 PROBLEM — I50.32 CHRONIC HEART FAILURE WITH PRESERVED EJECTION FRACTION (HFPEF): Status: ACTIVE | Noted: 2023-07-14

## 2023-07-24 PROBLEM — I50.32 CHRONIC HEART FAILURE WITH PRESERVED EJECTION FRACTION (HFPEF): Status: ACTIVE | Noted: 2023-01-01

## 2023-07-24 NOTE — DISCHARGE SUMMARY
[facility]         DISCHARGE SUMMARY    Patient Name: Carol Abarca  : 1970  MRN: [id number]    Date of Admission: 2023  Date of Discharge: 2023  Primary Care Physician: Sonia Mosquera APRN    Consults       No orders found from 2023 to 2023.            Presenting Problem:   Dyspnea [R06.00]    Active and Resolved Hospital Problems:  Active Hospital Problems    Diagnosis POA    Cervical spine degeneration [M47.812] Yes    Chronic heart failure with preserved ejection fraction (HFpEF) [I50.32] Yes    Type 2 diabetes mellitus [E11.9] Yes    Seizure disorder [G40.909] Yes    B12 deficiency [E53.8] Yes    Severe anemia [D64.9] Yes    Essential hypertension [I10] Yes    Mild left ventricular hypertrophy [I51.7] Yes      Resolved Hospital Problems    Diagnosis POA    **Dyspnea [R06.00] Yes    Anasarca [R60.1] Yes         Hospital Course     Hospital Course:  Carol Abarca is a 52 y.o. female admitted to hospital for anasarca and shortness of breath.  Patient was very unsteady at home unable to walk and had swollen hands and feet and face.  Patient was unable to take care of herself at home and family was unable to provide enough support.  She was supposed to go to inpatient rehab but insurance denied on her previous admission.    Patient was admitted to hospital for worsening volume overload and CHF started on diuretics.  Cardiologist Dr. Leyva was consulted to adjust her diuretics.    Patient has a very slow and protracted course of recovery in hospital.  This is her third admission in last 2 months.  She does not have enough support at home and she is very weak, difficulty walking and ambulating.  She also was very hypoxic.    Patient continues to complains of t tongue numbness and difficulty with his speech.  She had extensive work-up done on previous admission and again neurologist was consulted, we also had a CT of the neck done for her numbness in the upper chest and  neck area.  CT did showed herniated disc and degenerative changes, neurosurgeon Dr. Ward was consulted he recommended epidural injections.    Patient with aggressive diuresis improved she lost significant amount of weight and volume overload was corrected.  She was qualified for oxygen with 6-minute walk test.  She was discharged to home as again her insurance denied inpatient rehab.        DISCHARGE Follow Up Recommendations for labs and diagnostics:   Discharge to home    Monitor blood sugars  Monitor labs  PT OT    Day of Discharge     Vital Signs:  Reviewed prior to discharge, please see the record       Physical Exam:    Elderly female not in acute distress.  Heart regular.  Lungs clear.  Diminished breath sounds.  Abdomen is obese and soft.  Extremities trace of edema      Pertinent  and/or Most Recent Results     LAB RESULTS:      Lab 07/22/23 1820 07/21/23 0500 07/20/23 0529 07/19/23 0527 07/18/23  0507   WBC 5.33  --   --   --   --    HEMOGLOBIN 10.4* 10.4* 10.1* 9.8* 9.8*   HEMATOCRIT 33.8* 33.4* 32.8* 31.8* 31.7*   PLATELETS 295  --   --   --   --    NEUTROS ABS 3.50  --   --   --   --    IMMATURE GRANS (ABS) 0.01  --   --   --   --    LYMPHS ABS 1.25  --   --   --   --    MONOS ABS 0.50  --   --   --   --    EOS ABS 0.06  --   --   --   --    MCV 91.4  --   --   --   --          Lab 07/22/23 1820 07/20/23 0529 07/19/23 0527 07/18/23  0507   SODIUM 139 138 139 139   POTASSIUM 3.8 3.9 3.9 4.1   CHLORIDE 97* 94* 96* 98   CO2 32.0* 32.9* 34.3* 31.1*   ANION GAP 10.0 11.1 8.7 9.9   BUN 22* 17 14 10   CREATININE 0.78 0.82 0.77 0.58   EGFR 91.5 86.2 92.9 109.0   GLUCOSE 122* 122* 104* 94   CALCIUM 9.7 9.3 9.2 9.3   MAGNESIUM 2.6  --   --   --    PHOSPHORUS  --  6.7* 6.4* 5.8*         Lab 07/22/23 1820 07/20/23 0529 07/19/23  0527 07/18/23  0507   TOTAL PROTEIN 8.7*  --   --   --    ALBUMIN 4.0 3.5 3.4* 3.3*   GLOBULIN 4.7  --   --   --    ALT (SGPT) 6  --   --   --    AST (SGOT) 16  --   --   --     BILIRUBIN 0.5  --   --   --    ALK PHOS 108  --   --   --          Lab 07/22/23  1820   HSTROP T 18*                 Brief Urine Lab Results  (Last result in the past 365 days)        Color   Clarity   Blood   Leuk Est   Nitrite   Protein   CREAT   Urine HCG        07/22/23 1943 Dark Yellow   Cloudy   Negative   Trace   Negative   30 mg/dL (1+)                 Microbiology Results (last 10 days)       ** No results found for the last 240 hours. **            PROCEDURES:    [unfilled]    CT Chest Without Contrast Diagnostic    Result Date: 7/14/2023  Impression:   CT scan of the chest without IV contrast demonstrating subsegmental atelectasis and/or linear fibrosis at the lung bases.  Cardiomegaly.  Small pericardial effusion.  Dilated pulmonary artery consistent with pulmonary hypertension.  Post coronary artery bypass.     JOCELYN HAQUE MD       Electronically Signed and Approved By: JOCELYN HAQUE MD on 7/14/2023 at 18:57             MRI Brain Without Contrast    Result Date: 6/29/2023  Impression:  Stable MRI of the brain.  No acute intracranial process.   AYE PIÑA MD       Electronically Signed and Approved By: AYE PIÑA MD on 6/29/2023 at 21:39             XR Chest 1 View    Result Date: 7/22/2023  Impression:   1. Cardiomegaly, as before. 2. No definite acute pulmonary abnormality is seen.       PJ LAZO MD       Electronically Signed and Approved By: PJ LAZO MD on 7/22/2023 at 18:19             XR Chest 1 View    Result Date: 7/13/2023  Impression:   New bilateral airspace opacities are seen.  The findings may represent infectious multifocal pneumonia.  Pulmonary edema is possible.  Also, small to moderate bilateral pleural effusions are possible.  There is mild-to-moderate cardiomegaly, seen previously.     Please note that portions of this note were completed with a voice recognition program.  PARVEZ PIERCE JR, MD       Electronically Signed and Approved By: PARVEZ PIERCE JR, MD on  7/13/2023 at 21:58              XR Chest 1 View    Result Date: 7/9/2023  Impression:   1. An acute pulmonary process not definitely identified. 2. Suggested cardiomegaly.       SILVA COLLINS MD       Electronically Signed and Approved By: SILVA COLLINS MD on 7/09/2023 at 17:10                      Results for orders placed during the hospital encounter of 05/31/23    Adult Transthoracic Echo Complete W/ Cont if Necessary Per Protocol    Interpretation Summary    Left ventricular ejection fraction appears to be 61 - 65%.    The left ventricular cavity is borderline dilated.    Left ventricular wall thickness is consistent with mild concentric hypertrophy.    Left ventricular diastolic function is consistent with (grade Ia w/high LAP) impaired relaxation.    Left atrial volume is mildly increased.    Estimated right ventricular systolic pressure from tricuspid regurgitation is mildly elevated (35-45 mmHg).      Labs Pending at Discharge:        Discharge Details        Discharge Medications        New Medications        Instructions Start Date   furosemide 20 MG tablet  Commonly known as: LASIX   20 mg, Oral, Daily      ipratropium-albuterol 0.5-2.5 mg/3 ml nebulizer  Commonly known as: DUO-NEB   3 mL, Nebulization, Every 6 Hours PRN      Jardiance 10 MG tablet tablet  Generic drug: empagliflozin   10 mg, Oral, Daily      spironolactone 25 MG tablet  Commonly known as: ALDACTONE   25 mg, Oral, Daily             Changes to Medications        Instructions Start Date   metoprolol succinate XL 25 MG 24 hr tablet  Commonly known as: TOPROL-XL  What changed:   medication strength  when to take this   25 mg, Oral, Every 24 Hours Scheduled      QUEtiapine  MG 24 hr tablet  Commonly known as: SEROquel XR  What changed:   how much to take  when to take this   300 mg, Oral, Every Evening      Darryl SoloStar 300 UNIT/ML solution pen-injector injection  Generic drug: Insulin Glargine (1 Unit Dial)  What changed: how much to  take   20 Units, Subcutaneous, Nightly             Continue These Medications        Instructions Start Date   aspirin 81 MG EC tablet   81 mg, Oral, Daily      cetirizine 10 MG tablet  Commonly known as: zyrTEC   10 mg, Oral, Daily      folic acid 1 MG tablet  Commonly known as: FOLVITE   1 mg, Oral, Daily      gabapentin 300 MG capsule  Commonly known as: NEURONTIN   300 mg, Oral, Every 8 Hours Scheduled      HYDROcodone-acetaminophen  MG per tablet  Commonly known as: NORCO   1 tablet, Oral, Every 8 Hours PRN      levETIRAcetam 500 MG tablet  Commonly known as: Keppra   500 mg, Oral, 2 Times Daily      montelukast 10 MG tablet  Commonly known as: SINGULAIR   10 mg, Oral, Nightly      pravastatin 40 MG tablet  Commonly known as: PRAVACHOL   40 mg, Oral, Daily             Stop These Medications      ALPRAZolam 1 MG tablet  Commonly known as: XANAX     glipizide 5 MG ER tablet  Commonly known as: GLUCOTROL XL     potassium chloride 10 MEQ CR tablet  Commonly known as: K-DUR,KLOR-CON              Allergies   Allergen Reactions    Tramadol Anaphylaxis    Tramadol Hcl Anaphylaxis    Moxifloxacin Hives    Sulfa Antibiotics Hives         Discharge Disposition:    Home-Health Care [facility]    Diet:    Heart healthy [address]    Discharge Activity:     Activity Instructions       Activity as Tolerated                Future Appointments   Date Time Provider Department Center   7/26/2023  1:15 PM Remi Leyva MD [facility]       Additional Instructions for the Follow-ups that You Need to Schedule       Discharge Follow-up with PCP   As directed       Currently Documented PCP:    Sonia Mosquera APRN    PCP Phone Number:    [phone]     Follow Up Details: Next week         Discharge Follow-up with Specified Provider: Dr. Leyva in 1 to 2-week   As directed      To: Dr. Leyva in 1 to 2-week                 Time spent on Discharge including face to face service: 36 minutes minutes.            I have dictated this note  utilizing Dragon Dictation.             Please note that portions of this note were completed with a voice recognition program.             Part of this note may be an electronic transcription/translation of spoken language to printed text         using the Dragon Dictation System.       Electronically signed by Dung Hall MD, 07/24/23, 4:28 PM EDT.

## 2023-07-28 ENCOUNTER — READMISSION MANAGEMENT (OUTPATIENT)
Dept: CALL CENTER | Facility: HOSPITAL | Age: 53
End: 2023-07-28
Payer: MEDICARE

## 2023-07-28 NOTE — OUTREACH NOTE
COPD/PN Week 1 Survey      Flowsheet Row Responses   [facility] facility patient discharged from? [facility]   Does the patient have one of the following disease processes/diagnoses(primary or secondary)? COPD   Week 1 attempt successful? No   Unsuccessful attempts Attempt 1  [All numbers listed were attempted-no answer]            Norma H - Registered Nurse

## 2023-07-29 LAB — QT INTERVAL: 428 MS

## 2023-08-01 ENCOUNTER — READMISSION MANAGEMENT (OUTPATIENT)
Dept: CALL CENTER | Facility: HOSPITAL | Age: 53
End: 2023-08-01
Payer: MEDICARE

## 2023-08-07 ENCOUNTER — APPOINTMENT (OUTPATIENT)
Dept: CT IMAGING | Facility: HOSPITAL | Age: 53
End: 2023-08-07
Payer: MEDICARE

## 2023-08-07 ENCOUNTER — HOSPITAL ENCOUNTER (EMERGENCY)
Facility: HOSPITAL | Age: 53
Discharge: HOME | End: 2023-08-08
Attending: EMERGENCY MEDICINE | Admitting: EMERGENCY MEDICINE
Payer: MEDICARE

## 2023-08-07 VITALS
SYSTOLIC BLOOD PRESSURE: 120 MMHG | RESPIRATION RATE: 23 BRPM | WEIGHT: 197.53 LBS | DIASTOLIC BLOOD PRESSURE: 94 MMHG | HEIGHT: 62 IN | HEART RATE: 85 BPM | BODY MASS INDEX: 36.35 KG/M2 | TEMPERATURE: 98.9 F | OXYGEN SATURATION: 100 %

## 2023-08-07 DIAGNOSIS — R10.84 GENERALIZED ABDOMINAL PAIN: Primary | ICD-10-CM

## 2023-08-07 LAB
ALBUMIN SERPL-MCNC: 3.1 G/DL (ref 3.5–5.2)
ALBUMIN/GLOB SERPL: 0.8 G/DL
ALP SERPL-CCNC: 92 U/L (ref 39–117)
ALT SERPL W P-5'-P-CCNC: <5 U/L (ref 1–33)
AMORPH URATE CRY URNS QL MICRO: ABNORMAL /HPF
ANION GAP SERPL CALCULATED.3IONS-SCNC: 9.7 MMOL/L (ref 5–15)
AST SERPL-CCNC: 9 U/L (ref 1–32)
BACTERIA UR QL AUTO: ABNORMAL /HPF
BASOPHILS # BLD AUTO: 0.01 10*3/MM3 (ref 0–0.2)
BASOPHILS NFR BLD AUTO: 0.1 % (ref 0–1.5)
BILIRUB SERPL-MCNC: 0.2 MG/DL (ref 0–1.2)
BILIRUB UR QL STRIP: NEGATIVE
BUN SERPL-MCNC: 10 MG/DL (ref 6–20)
BUN/CREAT SERPL: 19.2 (ref 7–25)
CALCIUM SPEC-SCNC: 8.8 MG/DL (ref 8.6–10.5)
CHLORIDE SERPL-SCNC: 105 MMOL/L (ref 98–107)
CLARITY UR: ABNORMAL
CO2 SERPL-SCNC: 26.3 MMOL/L (ref 22–29)
COLOR UR: ABNORMAL
CREAT SERPL-MCNC: 0.52 MG/DL (ref 0.57–1)
D-LACTATE SERPL-SCNC: 1.5 MMOL/L (ref 0.5–2)
DEPRECATED RDW RBC AUTO: 47.6 FL (ref 37–54)
EGFRCR SERPLBLD CKD-EPI 2021: 111.9 ML/MIN/1.73
EOSINOPHIL # BLD AUTO: 0.22 10*3/MM3 (ref 0–0.4)
EOSINOPHIL NFR BLD AUTO: 3.1 % (ref 0.3–6.2)
ERYTHROCYTE [DISTWIDTH] IN BLOOD BY AUTOMATED COUNT: 15 % (ref 12.3–15.4)
GLOBULIN UR ELPH-MCNC: 3.7 GM/DL
GLUCOSE SERPL-MCNC: 110 MG/DL (ref 65–99)
GLUCOSE UR STRIP-MCNC: NEGATIVE MG/DL
HCT VFR BLD AUTO: 28.2 % (ref 34–46.6)
HGB BLD-MCNC: 8.9 G/DL (ref 12–15.9)
HGB UR QL STRIP.AUTO: NEGATIVE
HOLD SPECIMEN: NORMAL
HOLD SPECIMEN: NORMAL
HYALINE CASTS UR QL AUTO: ABNORMAL /LPF
IMM GRANULOCYTES # BLD AUTO: 0.02 10*3/MM3 (ref 0–0.05)
IMM GRANULOCYTES NFR BLD AUTO: 0.3 % (ref 0–0.5)
KETONES UR QL STRIP: ABNORMAL
LEUKOCYTE ESTERASE UR QL STRIP.AUTO: ABNORMAL
LIPASE SERPL-CCNC: 17 U/L (ref 13–60)
LYMPHOCYTES # BLD AUTO: 1.5 10*3/MM3 (ref 0.7–3.1)
LYMPHOCYTES NFR BLD AUTO: 21.2 % (ref 19.6–45.3)
MCH RBC QN AUTO: 27.6 PG (ref 26.6–33)
MCHC RBC AUTO-ENTMCNC: 31.6 G/DL (ref 31.5–35.7)
MCV RBC AUTO: 87.3 FL (ref 79–97)
MONOCYTES # BLD AUTO: 0.36 10*3/MM3 (ref 0.1–0.9)
MONOCYTES NFR BLD AUTO: 5.1 % (ref 5–12)
MUCOUS THREADS URNS QL MICRO: ABNORMAL /HPF
NEUTROPHILS NFR BLD AUTO: 4.98 10*3/MM3 (ref 1.7–7)
NEUTROPHILS NFR BLD AUTO: 70.2 % (ref 42.7–76)
NITRITE UR QL STRIP: NEGATIVE
NRBC BLD AUTO-RTO: 0 /100 WBC (ref 0–0.2)
PH UR STRIP.AUTO: 6 [PH] (ref 5–8)
PLATELET # BLD AUTO: 305 10*3/MM3 (ref 140–450)
PMV BLD AUTO: 10.7 FL (ref 6–12)
POTASSIUM SERPL-SCNC: 3.6 MMOL/L (ref 3.5–5.2)
PROT SERPL-MCNC: 6.8 G/DL (ref 6–8.5)
PROT UR QL STRIP: ABNORMAL
RBC # BLD AUTO: 3.23 10*6/MM3 (ref 3.77–5.28)
RBC # UR STRIP: ABNORMAL /HPF
REF LAB TEST METHOD: ABNORMAL
SODIUM SERPL-SCNC: 141 MMOL/L (ref 136–145)
SP GR UR STRIP: >=1.03 (ref 1–1.03)
SQUAMOUS #/AREA URNS HPF: ABNORMAL /HPF
TROPONIN T SERPL HS-MCNC: 10 NG/L
UROBILINOGEN UR QL STRIP: ABNORMAL
WBC # UR STRIP: ABNORMAL /HPF
WBC NRBC COR # BLD: 7.09 10*3/MM3 (ref 3.4–10.8)
WHOLE BLOOD HOLD COAG: NORMAL
WHOLE BLOOD HOLD SPECIMEN: NORMAL

## 2023-08-07 PROCEDURE — 25510000001 IOPAMIDOL PER 1 ML: Performed by: EMERGENCY MEDICINE

## 2023-08-07 PROCEDURE — 85025 COMPLETE CBC W/AUTO DIFF WBC: CPT | Performed by: EMERGENCY MEDICINE

## 2023-08-07 PROCEDURE — 81001 URINALYSIS AUTO W/SCOPE: CPT | Performed by: EMERGENCY MEDICINE

## 2023-08-07 PROCEDURE — 83605 ASSAY OF LACTIC ACID: CPT | Performed by: EMERGENCY MEDICINE

## 2023-08-07 PROCEDURE — 36415 COLL VENOUS BLD VENIPUNCTURE: CPT

## 2023-08-07 PROCEDURE — 99285 EMERGENCY DEPT VISIT HI MDM: CPT

## 2023-08-07 PROCEDURE — 80053 COMPREHEN METABOLIC PANEL: CPT | Performed by: EMERGENCY MEDICINE

## 2023-08-07 PROCEDURE — 93005 ELECTROCARDIOGRAM TRACING: CPT | Performed by: EMERGENCY MEDICINE

## 2023-08-07 PROCEDURE — 84484 ASSAY OF TROPONIN QUANT: CPT | Performed by: EMERGENCY MEDICINE

## 2023-08-07 PROCEDURE — 74177 CT ABD & PELVIS W/CONTRAST: CPT

## 2023-08-07 PROCEDURE — 93005 ELECTROCARDIOGRAM TRACING: CPT

## 2023-08-07 PROCEDURE — 83690 ASSAY OF LIPASE: CPT | Performed by: EMERGENCY MEDICINE

## 2023-08-07 RX ORDER — SODIUM CHLORIDE 0.9 % (FLUSH) 0.9 %
10 SYRINGE (ML) INJECTION AS NEEDED
Status: DISCONTINUED | OUTPATIENT
Start: 2023-08-07 | End: 2023-08-08 | Stop reason: HOSPADM

## 2023-08-07 RX ADMIN — IOPAMIDOL 100 ML: 755 INJECTION, SOLUTION INTRAVENOUS at 22:31

## 2023-08-08 LAB — QT INTERVAL: 410 MS

## 2023-08-08 PROCEDURE — 25010000002 FENTANYL CITRATE (PF) 50 MCG/ML SOLUTION: Performed by: EMERGENCY MEDICINE

## 2023-08-08 PROCEDURE — 96374 THER/PROPH/DIAG INJ IV PUSH: CPT

## 2023-08-08 RX ORDER — FENTANYL CITRATE 50 UG/ML
50 INJECTION, SOLUTION INTRAMUSCULAR; INTRAVENOUS
Status: DISCONTINUED | OUTPATIENT
Start: 2023-08-08 | End: 2023-08-08 | Stop reason: HOSPADM

## 2023-08-08 RX ADMIN — FENTANYL CITRATE 50 MCG: 50 INJECTION, SOLUTION INTRAMUSCULAR; INTRAVENOUS at 00:30

## 2023-08-08 NOTE — ED PROVIDER NOTES
Time: 9:33 PM EDT  Date of encounter:  2023  Independent Historian/Clinical History and Information was obtained by:   Patient and EMS    History is limited by: N/A    Chief Complaint: Abdominal pain      History of Present Illness:  Patient is a 52 y.o. year old female who presents to the emergency department for evaluation of abdominal pain.  Patient reports onset of pain for couple of days.  She denies fever, vomiting, diarrhea    HPI    Patient Care Team  Primary Care Provider: Sonia Mosquera APRN    Past Medical History:     Allergies   Allergen Reactions    Tramadol Anaphylaxis    Tramadol Hcl Anaphylaxis    Moxifloxacin Hives    Sulfa Antibiotics Hives     Past Medical History:   Diagnosis Date    Anemia     Arthritis     Diabetes     Essential hypertension 2021    History of pulmonary embolism     Lumbago     Low back pain    Mild left ventricular hypertrophy 2021    Mixed hyperlipidemia 2021    Obstructive sleep apnea     Reflux esophagitis     Seasonal allergies      Past Surgical History:   Procedure Laterality Date    APPENDECTOMY  2010     SECTION      , , ,     COLONOSCOPY      2019    COLONOSCOPY N/A 2022    Procedure: COLONOSCOPY;  Surgeon: Radha James MD;  Location: [facility] ENDOSCOPY;  Service: Gastroenterology;  Laterality: N/A;  COLON POLYP     ENDOSCOPY  2019    ENDOSCOPY N/A 2023    Procedure: ESOPHAGOGASTRODUODENOSCOPY WITH BIPOSIES;  Surgeon: Coy Patrick MD;  Location: [facility] ENDOSCOPY;  Service: Gastroenterology;  Laterality: N/A;  PRIOR LAPBAND, GASTRITIS    HEMORRHOIDECTOMY N/A 2022    Procedure: HEMORRHOIDECTOMY;  Surgeon: Remi Reeder MD;  Location: [facility] MAIN OR;  Service: General;  Laterality: N/A;    HERNIA REPAIR      , 2006, 2007, 2008    HYSTERECTOMY  2004    LAPAROSCOPIC GASTRIC BANDING      OTHER SURGICAL HISTORY      Metal implants     Family History   Problem Relation Age of Onset     Heart disease Mother     Diabetes Mother         Unspecified type    Arthritis Mother     Heart disease Father     Diabetes Son         Unspecified type    Malig Hyperthermia Neg Hx        Home Medications:  Prior to Admission medications    Medication Sig Start Date End Date Taking? Authorizing Provider   aspirin 81 MG EC tablet Take 1 tablet by mouth Daily.    Silas Bhatt MD   cetirizine (zyrTEC) 10 MG tablet Take 1 tablet by mouth Daily.    Silas Bhatt MD   empagliflozin (JARDIANCE) 10 MG tablet tablet Take 1 tablet by mouth Daily for 30 days. 7/22/23 8/21/23  Dung Hall MD   folic acid (FOLVITE) 1 MG tablet Take 1 tablet by mouth Daily for 30 days. 7/21/23 8/20/23  Dung Hall MD   furosemide (LASIX) 20 MG tablet Take 1 tablet by mouth Daily for 30 days. 7/22/23 8/21/23  Dung Hall MD   gabapentin (NEURONTIN) 300 MG capsule Take 1 capsule by mouth Every 8 (Eight) Hours for 30 days. 6/30/23 7/30/23  Dung Hall MD   HYDROcodone-acetaminophen (NORCO)  MG per tablet Take 1 tablet by mouth Every 8 (Eight) Hours As Needed for Moderate Pain, Severe Pain or Mild Pain.    Silas Bhatt MD   ipratropium-albuterol (DUO-NEB) 0.5-2.5 mg/3 ml nebulizer Take 3 mL by nebulization Every 6 (Six) Hours As Needed for Shortness of Air for up to 30 days. 7/21/23 8/20/23  Dung Hall MD   levETIRAcetam (Keppra) 500 MG tablet Take 1 tablet by mouth 2 (Two) Times a Day for 30 days. 6/15/23 7/15/23  Dung Hall MD   metoprolol succinate XL (TOPROL-XL) 25 MG 24 hr tablet Take 1 tablet by mouth Daily for 30 days. 7/22/23 8/21/23  Dung Hall MD   montelukast (SINGULAIR) 10 MG tablet Take 1 tablet by mouth Every Night.    Silas Bhatt MD   pravastatin (PRAVACHOL) 40 MG tablet Take 1 tablet by mouth Daily.    Silas Bhatt MD   QUEtiapine XR (SEROquel XR) 300 MG 24 hr tablet Take 1 tablet by mouth Every Evening for 30 days. 7/21/23 8/20/23  Dung Hall MD   spironolactone  "(ALDACTONE) 25 MG tablet Take 1 tablet by mouth Daily for 30 days. 7/22/23 8/21/23  Dung Hall MD Toujeo SoloStar 300 UNIT/ML solution pen-injector injection Inject 20 Units under the skin into the appropriate area as directed Every Night for 30 days. 7/21/23 8/20/23  Dung Hall MD        Social History:   Social History     Tobacco Use    Smoking status: Every Day     Packs/day: 1.00     Years: 23.00     Pack years: 23.00     Types: Cigarettes    Smokeless tobacco: Never    Tobacco comments:     Smoked 11-20 years. last 7/24/22 1700   Vaping Use    Vaping Use: Never used   Substance Use Topics    Alcohol use: Yes     Comment: Occasionally drinks, less than 1 drink per day, has been drinking for less than 1 year    Drug use: Never         Review of Systems:  Review of Systems   Constitutional:  Negative for chills and fever.   HENT:  Negative for congestion, rhinorrhea and sore throat.    Eyes:  Negative for pain and visual disturbance.   Respiratory:  Negative for apnea, cough, chest tightness and shortness of breath.    Cardiovascular:  Negative for chest pain and palpitations.   Gastrointestinal:  Positive for abdominal pain. Negative for diarrhea, nausea and vomiting.   Genitourinary:  Negative for difficulty urinating and dysuria.   Musculoskeletal:  Negative for joint swelling and myalgias.   Skin:  Negative for color change.   Neurological:  Negative for seizures and headaches.   Psychiatric/Behavioral: Negative.     All other systems reviewed and are negative.     Physical Exam:  /94   Pulse 85   Temp 98.9 øF (37.2 øC)   Resp 23   Ht 157.5 cm (62\")   Wt 89.6 kg (197 lb 8.5 oz)   SpO2 100%   BMI 36.13 kg/mý     Physical Exam  Vitals and nursing note reviewed.   Constitutional:       General: She is not in acute distress.     Appearance: Normal appearance. She is not toxic-appearing.   HENT:      Head: Normocephalic and atraumatic.      Jaw: There is normal jaw occlusion.   Eyes:      " General: Lids are normal.      Extraocular Movements: Extraocular movements intact.      Conjunctiva/sclera: Conjunctivae normal.      Pupils: Pupils are equal, round, and reactive to light.   Cardiovascular:      Rate and Rhythm: Normal rate and regular rhythm.      Pulses: Normal pulses.      Heart sounds: Normal heart sounds.   Pulmonary:      Effort: Pulmonary effort is normal. No respiratory distress.      Breath sounds: Normal breath sounds. No wheezing or rhonchi.   Abdominal:      General: Abdomen is flat.      Palpations: Abdomen is soft.      Tenderness: There is generalized abdominal tenderness. There is no guarding or rebound.   Musculoskeletal:         General: Normal range of motion.      Cervical back: Normal range of motion and neck supple.      Right lower leg: No edema.      Left lower leg: No edema.   Skin:     General: Skin is warm and dry.   Neurological:      Mental Status: She is alert and oriented to person, place, and time. Mental status is at baseline.   Psychiatric:         Mood and Affect: Mood normal.                Procedures:  Procedures      Medical Decision Making:      Comorbidities that affect care:    Diabetes, Hypertension, Obesity    External Notes reviewed:    Hospital Discharge Summary: After admission to the hospital for anasarca and shortness of breath      The following orders were placed and all results were independently analyzed by me:  Orders Placed This Encounter   Procedures    CT Abdomen Pelvis With Contrast    [place] Draw    Comprehensive Metabolic Panel    Lipase    Single High Sensitivity Troponin T    Urinalysis With Microscopic If Indicated (No Culture) - Urine, Clean Catch    CBC Auto Differential    Lactic Acid, Plasma    Urinalysis, Microscopic Only - Urine, Clean Catch    NPO Diet NPO Type: Strict NPO    Undress & Gown    Continuous Pulse Oximetry    Vital Signs    Oxygen Therapy- Nasal Cannula; Titrate 1-6 LPM Per SpO2; 90 - 95%    ECG 12 Lead ED Triage  Standing Order; Abdominal Pain (Upper)    Insert Peripheral IV    CBC & Differential    Green Top (Gel)    Lavender Top    Gold Top - SST    Light Blue Top       Medications Given in the Emergency Department:  Medications   sodium chloride 0.9 % flush 10 mL (has no administration in time range)   fentaNYL citrate (PF) (SUBLIMAZE) injection 50 mcg (has no administration in time range)   iopamidol (ISOVUE-370) 76 % injection 100 mL (100 mL Intravenous Given 8/7/23 2231)        ED Course:         Labs:    Lab Results (last 24 hours)       Procedure Component Value Units Date/Time    CBC & Differential [[id number]]  (Abnormal) Collected: 08/07/23 2103    Specimen: Blood Updated: 08/07/23 2116    Narrative:      The following orders were created for panel order CBC & Differential.  Procedure                               Abnormality         Status                     ---------                               -----------         ------                     CBC Auto Differential[[id number]]        Abnormal            Final result                 Please view results for these tests on the individual orders.    Comprehensive Metabolic Panel [[id number]]  (Abnormal) Collected: 08/07/23 2103    Specimen: Blood Updated: 08/07/23 2148     Glucose 110 mg/dL      BUN 10 mg/dL      Creatinine 0.52 mg/dL      Sodium 141 mmol/L      Potassium 3.6 mmol/L      Chloride 105 mmol/L      CO2 26.3 mmol/L      Calcium 8.8 mg/dL      Total Protein 6.8 g/dL      Albumin 3.1 g/dL      ALT (SGPT) <5 U/L      AST (SGOT) 9 U/L      Alkaline Phosphatase 92 U/L      Total Bilirubin 0.2 mg/dL      Globulin 3.7 gm/dL      A/G Ratio 0.8 g/dL      BUN/Creatinine Ratio 19.2     Anion Gap 9.7 mmol/L      eGFR 111.9 mL/min/1.73     Narrative:      GFR Normal >60  Chronic Kidney Disease <60  Kidney Failure <15      Lipase [[id number]]  (Normal) Collected: 08/07/23 2103    Specimen: Blood Updated: 08/07/23 2136     Lipase 17 U/L     Single High Sensitivity  Troponin T [[id number]]  (Abnormal) Collected: 08/07/23 2103    Specimen: Blood Updated: 08/07/23 2136     HS Troponin T 10 ng/L     Narrative:      High Sensitive Troponin T Reference Range:  <10.0 ng/L- Negative Female for AMI  <15.0 ng/L- Negative Male for AMI  >=10 - Abnormal Female indicating possible myocardial injury.  >=15 - Abnormal Male indicating possible myocardial injury.   Clinicians would have to utilize clinical acumen, EKG, Troponin, and serial changes to determine if it is an Acute Myocardial Infarction or myocardial injury due to an underlying chronic condition.         CBC Auto Differential [[id number]]  (Abnormal) Collected: 08/07/23 2103    Specimen: Blood Updated: 08/07/23 2116     WBC 7.09 10*3/mm3      RBC 3.23 10*6/mm3      Hemoglobin 8.9 g/dL      Hematocrit 28.2 %      MCV 87.3 fL      MCH 27.6 pg      MCHC 31.6 g/dL      RDW 15.0 %      RDW-SD 47.6 fl      MPV 10.7 fL      Platelets 305 10*3/mm3      Neutrophil % 70.2 %      Lymphocyte % 21.2 %      Monocyte % 5.1 %      Eosinophil % 3.1 %      Basophil % 0.1 %      Immature Grans % 0.3 %      Neutrophils, Absolute 4.98 10*3/mm3      Lymphocytes, Absolute 1.50 10*3/mm3      Monocytes, Absolute 0.36 10*3/mm3      Eosinophils, Absolute 0.22 10*3/mm3      Basophils, Absolute 0.01 10*3/mm3      Immature Grans, Absolute 0.02 10*3/mm3      nRBC 0.0 /100 WBC     Lactic Acid, Plasma [[id number]]  (Normal) Collected: 08/07/23 2104    Specimen: Blood Updated: 08/07/23 2132     Lactate 1.5 mmol/L     Urinalysis With Microscopic If Indicated (No Culture) - Urine, Clean Catch [[id number]]  (Abnormal) Collected: 08/07/23 2106    Specimen: Urine, Clean Catch Updated: 08/07/23 2118     Color, UA Dark Yellow     Appearance, UA Turbid     pH, UA 6.0     Specific Gravity, UA >=1.030     Glucose, UA Negative     Ketones, UA Trace     Bilirubin, UA Negative     Blood, UA Negative     Protein, UA 30 mg/dL (1+)     Leuk Esterase, UA Trace     Nitrite, UA Negative      Urobilinogen, UA 1.0 E.U./dL    Urinalysis, Microscopic Only - Urine, Clean Catch [[id number]]  (Abnormal) Collected: 08/07/23 2106    Specimen: Urine, Clean Catch Updated: 08/07/23 2138     RBC, UA 0-2 /HPF      WBC, UA 3-5 /HPF      Bacteria, UA 3+ /HPF      Squamous Epithelial Cells, UA Too Numerous to Count /HPF      Hyaline Casts, UA None Seen /LPF      Amorphous Crystals, UA Small/1+ /HPF      Mucus, UA Small/1+ /HPF      Methodology Manual Light Microscopy             Imaging:    CT Abdomen Pelvis With Contrast    Result Date: 8/8/2023  PROCEDURE: CT ABDOMEN PELVIS W CONTRAST  COMPARISON: 6/7/2023.  INDICATIONS: Abdominal pain, not otherwise specified  TECHNIQUE: After obtaining the patient's consent, 887 CT images were created with non-ionic intravenous contrast material.  No oral contrast agent was administered for the study.  PROTOCOL:   Standard CT imaging protocol performed.    RADIATION:   Total DLP: 1,129.6 mGy*cm   Automated exposure control was utilized to minimize radiation dose. CONTRAST: 100 mL Isovue 370 I.V.  FINDINGS:  There has been interval resolution of the small right pleural effusion.  The airspace opacities, probably predominantly subsegmental atelectasis, within the imaged lung bases have improved considerably since 6/7/2023.  Again, hepatosplenomegaly is suspected.  There is mild-to-moderate cardiomegaly.  A laparoscopically-placed gastric band is noted.  There has been hysterectomy.  There has also been ventral hernia repair.  The gallbladder is contracted.  No definite gallstones.  No definite acute cholecystitis or pancreatitis.  Please correlate with pertinent lab values.  No hydronephrosis or obstructive uropathy.  No acute colitis or diverticulitis.  The appendix is not clearly identified.  It may be surgically absent.  There is mild nonspecific distension of the urinary bladder.  No urinary bladder wall thickening or urinary bladder calculi.  Otherwise, no no acute findings  are seen.  No significant interval change is identified since 6/7/2023.  Please see the prior CT exam report for further detail regarding additional chronic findings.        No acute findings are appreciated.  Please see above comments for further detail.      Please note that portions of this note were completed with a voice recognition program.  PARVEZ PIERCE JR, MD       Electronically Signed and Approved By: PARVEZ PIERCE JR, MD on 8/08/2023 at 0:11                 Differential Diagnosis and Discussion:    Abdominal Pain: Based on the patient's signs and symptoms, I considered abdominal aortic aneurysm, small bowel obstruction, pancreatitis, acute cholecystitis, acute appendecitis, peptic ulcer disease, gastritis, colitis, endocrine disorders, irritable bowel syndrome and other differential diagnosis an etiology of the patient's abdominal pain.    All labs were reviewed and interpreted by me.  CT scan radiology impression was interpreted by me.    MDM  Number of Diagnoses or Management Options  Generalized abdominal pain  Diagnosis management comments: In summary this is a 52-year-old female who presents to the emergency department for evaluation of abdominal pain.  CT of the abdomen pelvis is unremarkable.  CBC independently reviewed by me and shows no critical abnormalities.  CMP independently reviewed by me and shows no critical abnormalities.  Urinalysis unremarkable.  Patient is otherwise well-appearing in no acute distress.  She is been given pain control.  Very strict return to ER and follow-up instructions have been provided to the patient.               Patient Care Considerations:    PSYCH: I considered ordering anxiolytic and or antipsychotic medications, however patient was able to facilitate the medical screening exam and disposition without further medications.      Consultants/Shared Management Plan:    None    Social Determinants of Health:    Patient is independent, reliable, and has access to  care.       Disposition and Care Coordination:    Discharged: I considered escalation of care by admitting this patient for observation, however the patient has improved and is suitable and  stable for discharge.    I have explained the patient's condition, diagnoses and treatment plan based on the information available to me at this time. I have answered questions and addressed any concerns. The patient has a good  understanding of the patient's diagnosis, condition, and treatment plan as can be expected at this point. The vital signs have been stable. The patient's condition is stable and appropriate for discharge from the emergency department.      The patient will pursue further outpatient evaluation with the primary care physician or other designated or consulting physician as outlined in the discharge instructions. They are agreeable to this plan of care and follow-up instructions have been explained in detail. The patient has received these instructions in written format and have expressed an understanding of the discharge instructions. The patient is aware that any significant change in condition or worsening of symptoms should prompt an immediate return to this or the closest emergency department or call to 911.  I have explained discharge medications and the need for follow up with the patient/caretakers. This was also printed in the discharge instructions. Patient was discharged with the following medications and follow up:      Medication List      No changes were made to your prescriptions during this visit.      Sonia Mosquera, APRN  [address] [address]  [phone]    In 1 week         Final diagnoses:   Generalized abdominal pain        ED Disposition       ED Disposition   Discharge    Condition   Stable    Comment   --               This medical record created using voice recognition software.             Edwardo Braswell MD  08/08/23 0026

## 2023-08-08 NOTE — ED PROVIDER NOTES
Time: 9:07 PM EDT  Date of encounter:  2023  Independent Historian/Clinical History and Information was obtained by:   {Blank multiple:64365}    History is limited by: {Limited History:20743}    Chief Complaint: ***      History of Present Illness:  Patient is a 52 y.o. year old female who presents to the emergency department for evaluation of ***    HPI    Patient Care Team  Primary Care Provider: Sonia Mosquera APRN    Past Medical History:     Allergies   Allergen Reactions    Tramadol Anaphylaxis    Tramadol Hcl Anaphylaxis    Moxifloxacin Hives    Sulfa Antibiotics Hives     Past Medical History:   Diagnosis Date    Anemia     Arthritis     Diabetes     Essential hypertension 2021    History of pulmonary embolism     Lumbago     Low back pain    Mild left ventricular hypertrophy 2021    Mixed hyperlipidemia 2021    Obstructive sleep apnea     Reflux esophagitis     Seasonal allergies      Past Surgical History:   Procedure Laterality Date    APPENDECTOMY  2010     SECTION      , , ,     COLONOSCOPY      2019    COLONOSCOPY N/A 2022    Procedure: COLONOSCOPY;  Surgeon: Radha James MD;  Location: [facility] ENDOSCOPY;  Service: Gastroenterology;  Laterality: N/A;  COLON POLYP     ENDOSCOPY  2019    ENDOSCOPY N/A 2023    Procedure: ESOPHAGOGASTRODUODENOSCOPY WITH BIPOSIES;  Surgeon: Coy Patrick MD;  Location: [facility] ENDOSCOPY;  Service: Gastroenterology;  Laterality: N/A;  PRIOR LAPBAND, GASTRITIS    HEMORRHOIDECTOMY N/A 2022    Procedure: HEMORRHOIDECTOMY;  Surgeon: Remi Reeder MD;  Location: [facility] MAIN OR;  Service: General;  Laterality: N/A;    HERNIA REPAIR      , 2006, 2007, 2008    HYSTERECTOMY  2004    LAPAROSCOPIC GASTRIC BANDING      OTHER SURGICAL HISTORY      Metal implants     Family History   Problem Relation Age of Onset    Heart disease Mother     Diabetes Mother         Unspecified type    Arthritis  Mother     Heart disease Father     Diabetes Son         Unspecified type    Malig Hyperthermia Neg Hx        Home Medications:  Prior to Admission medications    Medication Sig Start Date End Date Taking? Authorizing Provider   aspirin 81 MG EC tablet Take 1 tablet by mouth Daily.    Silas Bhatt MD   cetirizine (zyrTEC) 10 MG tablet Take 1 tablet by mouth Daily.    Silas Bhatt MD   empagliflozin (JARDIANCE) 10 MG tablet tablet Take 1 tablet by mouth Daily for 30 days. 7/22/23 8/21/23  Dung Hall MD   folic acid (FOLVITE) 1 MG tablet Take 1 tablet by mouth Daily for 30 days. 7/21/23 8/20/23  Dung Hall MD   furosemide (LASIX) 20 MG tablet Take 1 tablet by mouth Daily for 30 days. 7/22/23 8/21/23  Dung Hall MD   gabapentin (NEURONTIN) 300 MG capsule Take 1 capsule by mouth Every 8 (Eight) Hours for 30 days. 6/30/23 7/30/23  Dung Hall MD   HYDROcodone-acetaminophen (NORCO)  MG per tablet Take 1 tablet by mouth Every 8 (Eight) Hours As Needed for Moderate Pain, Severe Pain or Mild Pain.    Silas Bhatt MD   ipratropium-albuterol (DUO-NEB) 0.5-2.5 mg/3 ml nebulizer Take 3 mL by nebulization Every 6 (Six) Hours As Needed for Shortness of Air for up to 30 days. 7/21/23 8/20/23  Dung Hall MD   levETIRAcetam (Keppra) 500 MG tablet Take 1 tablet by mouth 2 (Two) Times a Day for 30 days. 6/15/23 7/15/23  Dung Hall MD   metoprolol succinate XL (TOPROL-XL) 25 MG 24 hr tablet Take 1 tablet by mouth Daily for 30 days. 7/22/23 8/21/23  Dung Hall MD   montelukast (SINGULAIR) 10 MG tablet Take 1 tablet by mouth Every Night.    Silas Bhatt MD   pravastatin (PRAVACHOL) 40 MG tablet Take 1 tablet by mouth Daily.    Silas Bhatt MD   QUEtiapine XR (SEROquel XR) 300 MG 24 hr tablet Take 1 tablet by mouth Every Evening for 30 days. 7/21/23 8/20/23  Dung Hall MD   spironolactone (ALDACTONE) 25 MG tablet Take 1 tablet by mouth Daily for 30 days. 7/22/23 8/21/23   "Dung Hall MD Toujeo SoloStar 300 UNIT/ML solution pen-injector injection Inject 20 Units under the skin into the appropriate area as directed Every Night for 30 days. 7/21/23 8/20/23  Dung Hall MD        Social History:   Social History     Tobacco Use    Smoking status: Every Day     Packs/day: 1.00     Years: 23.00     Pack years: 23.00     Types: Cigarettes    Smokeless tobacco: Never    Tobacco comments:     Smoked 11-20 years. last 7/24/22 1700   Vaping Use    Vaping Use: Never used   Substance Use Topics    Alcohol use: Yes     Comment: Occasionally drinks, less than 1 drink per day, has been drinking for less than 1 year    Drug use: Never         Review of Systems:  Review of Systems     Physical Exam:  /94   Pulse 83   Temp 98.9 øF (37.2 øC)   Resp 23   Ht 157.5 cm (62\")   Wt 89.6 kg (197 lb 8.5 oz)   SpO2 100%   BMI 36.13 kg/mý     Physical Exam   ***          Procedures:  Procedures      Medical Decision Making:      Comorbidities that affect care:    {Comorbidities that affect care:90557}    External Notes reviewed:    {External Note review (Optional):71307}      The following orders were placed and all results were independently analyzed by me:  Orders Placed This Encounter   Procedures    [place] Draw    Comprehensive Metabolic Panel    Lipase    Single High Sensitivity Troponin T    Urinalysis With Microscopic If Indicated (No Culture) - Urine, Clean Catch    CBC Auto Differential    Lactic Acid, Plasma    NPO Diet NPO Type: Strict NPO    Undress & Gown    Continuous Pulse Oximetry    Vital Signs    Oxygen Therapy- Nasal Cannula; Titrate 1-6 LPM Per SpO2; 90 - 95%    ECG 12 Lead ED Triage Standing Order; Abdominal Pain (Upper)    Insert Peripheral IV    CBC & Differential    Green Top (Gel)    Lavender Top    Gold Top - SST    Light Blue Top       Medications Given in the Emergency Department:  Medications   sodium chloride 0.9 % flush 10 mL (has no administration in time range) "        ED Course:         Labs:    Lab Results (last 24 hours)       ** No results found for the last 24 hours. **             Imaging:    No Radiology Exams Resulted Within Past 24 Hours      Differential Diagnosis and Discussion:    {Differentials:04592}    {Independent Review of (Optional):66802}    MDM     {Critical Care:23595}    Patient Care Considerations:    {Considerations (Optional):37617}      Consultants/Shared Management Plan:    {Shared Management Plan (Optional):05349}    Social Determinants of Health:    {Social Determinants of Health (Optional):18109}      Disposition and Care Coordination:    {Admission consideration:61856}    {Discharge (Optional):55616}    Final diagnoses:   None        ED Disposition       None            This medical record created using voice recognition software.

## 2023-08-08 NOTE — ED NOTES
Per EMS, called for sudden unresponsiveness after sharp ripping abdominal pain.     Patient answering questions for ED staff.  Patient states sharp pain in upper left abdomen 10/10

## 2023-08-09 ENCOUNTER — READMISSION MANAGEMENT (OUTPATIENT)
Dept: CALL CENTER | Facility: HOSPITAL | Age: 53
End: 2023-08-09
Payer: MEDICARE

## 2023-08-09 NOTE — OUTREACH NOTE
COPD/PN Week 2 Survey      Flowsheet Row Responses   [facility] facility patient discharged from? [facility]   Does the patient have one of the following disease processes/diagnoses(primary or secondary)? COPD   Week 2 attempt successful? No   Unsuccessful attempts Attempt 1            Ace LEAL - Registered Nurse

## 2023-08-14 ENCOUNTER — READMISSION MANAGEMENT (OUTPATIENT)
Dept: CALL CENTER | Facility: HOSPITAL | Age: 53
End: 2023-08-14
Payer: MEDICARE

## 2023-08-14 NOTE — OUTREACH NOTE
COPD/PN Week 3 Survey      Flowsheet Row Responses   [facility] facility patient discharged from? [facility]   Does the patient have one of the following disease processes/diagnoses(primary or secondary)? COPD   Week 3 attempt successful? No   Unsuccessful attempts Attempt 1            Dominique CRAIG - Registered Nurse

## 2023-08-15 ENCOUNTER — READMISSION MANAGEMENT (OUTPATIENT)
Dept: CALL CENTER | Facility: HOSPITAL | Age: 53
End: 2023-08-15
Payer: MEDICARE

## 2023-08-15 NOTE — OUTREACH NOTE
COPD/PN Week 3 Survey      Flowsheet Row Responses   [facility] [place] patient discharged from? [facility]   Does the patient have one of the following disease processes/diagnoses(primary or secondary)? COPD   Week 3 attempt successful? Yes   Call start time 1412   Call end time 1417   Discharge diagnosis dyspnea,  COPD   Meds reviewed with patient/caregiver? Yes   Is the patient having any side effects they believe may be caused by any medication additions or changes? No   Does the patient have all medications ordered at discharge? Yes   Is the patient taking all medications as directed (includes completed medication regime)? Yes   Does the patient have a primary care provider?  Yes   Does the patient have an appointment with their PCP or specialist within 7 days of discharge? Yes   Has the patient kept scheduled appointments due by today? Yes   Home health comments OUT PT therapy   Pulse Ox monitoring Intermittent   Pulse Ox device source Patient   O2 Sat comments O2 sat 91% on RA   O2 Sat: education provided Sat levels, Monitoring frequency, When to seek care   Psychosocial issues? No   Did the patient receive a copy of their discharge instructions? Yes   Nursing interventions Reviewed instructions with patient   What is the patient's perception of their health status since discharge? Same   Nursing Interventions Nurse provided patient education   Are the patient's immunizations up to date?  Yes   If the patient is a current smoker, are they able to teach back resources for cessation? Not a smoker   Is the patient able to teach back COPD zones? Yes   Nursing interventions Education provided on various zones   Patient reports what zone on this call? Green Zone   Green Zone Breathing without shortness of breath, Usual amounts of cough and phlegm/mucous, Usual activity and exercise level, Reports doing well, Slept well last night, Appetite is good   Green Zone interventions: Take daily medications, Use oxygen as  prescribed, Continue regular exercise/diet plan, At all times avoid cigarette smoking, vaping and inhaled irritants   Week 3 call completed? Yes   Graduated Yes   Is the patient interested in additional calls from an ambulatory ? No   Would this patient benefit from a Referral to [facility] Social Work? No   Call end time [phone]            Khushi NOGUERA - Registered Nurse

## 2023-08-27 ENCOUNTER — HOSPITAL ENCOUNTER (INPATIENT)
Facility: HOSPITAL | Age: 53
LOS: 2 days | Discharge: HOME HEALTH | DRG: 101 | End: 2023-08-31
Attending: EMERGENCY MEDICINE | Admitting: INTERNAL MEDICINE
Payer: MEDICARE

## 2023-08-27 ENCOUNTER — APPOINTMENT (OUTPATIENT)
Dept: GENERAL RADIOLOGY | Facility: HOSPITAL | Age: 53
DRG: 101 | End: 2023-08-27
Payer: MEDICARE

## 2023-08-27 ENCOUNTER — APPOINTMENT (OUTPATIENT)
Dept: CT IMAGING | Facility: HOSPITAL | Age: 53
DRG: 101 | End: 2023-08-27
Payer: MEDICARE

## 2023-08-27 DIAGNOSIS — R53.1 WEAKNESS GENERALIZED: ICD-10-CM

## 2023-08-27 DIAGNOSIS — R53.1 WEAKNESS: Primary | ICD-10-CM

## 2023-08-27 DIAGNOSIS — R13.12 OROPHARYNGEAL DYSPHAGIA: ICD-10-CM

## 2023-08-27 DIAGNOSIS — R41.82 ALTERED MENTAL STATUS, UNSPECIFIED ALTERED MENTAL STATUS TYPE: ICD-10-CM

## 2023-08-27 DIAGNOSIS — Z78.9 DECREASED ACTIVITIES OF DAILY LIVING (ADL): ICD-10-CM

## 2023-08-27 DIAGNOSIS — R26.2 DIFFICULTY WALKING: ICD-10-CM

## 2023-08-27 LAB
ALBUMIN SERPL-MCNC: 3.8 G/DL (ref 3.5–5.2)
ALBUMIN/GLOB SERPL: 0.9 G/DL
ALP SERPL-CCNC: 86 U/L (ref 39–117)
ALT SERPL W P-5'-P-CCNC: 5 U/L (ref 1–33)
ANION GAP SERPL CALCULATED.3IONS-SCNC: 9.3 MMOL/L (ref 5–15)
APTT PPP: 26 SECONDS (ref 24.2–34.2)
AST SERPL-CCNC: 17 U/L (ref 1–32)
BACTERIA UR QL AUTO: ABNORMAL /HPF
BASOPHILS # BLD AUTO: 0.03 10*3/MM3 (ref 0–0.2)
BASOPHILS NFR BLD AUTO: 0.6 % (ref 0–1.5)
BILIRUB SERPL-MCNC: 0.4 MG/DL (ref 0–1.2)
BILIRUB UR QL STRIP: NEGATIVE
BUN SERPL-MCNC: 9 MG/DL (ref 6–20)
BUN/CREAT SERPL: 15.3 (ref 7–25)
CALCIUM SPEC-SCNC: 9.3 MG/DL (ref 8.6–10.5)
CHLORIDE SERPL-SCNC: 102 MMOL/L (ref 98–107)
CLARITY UR: CLEAR
CO2 SERPL-SCNC: 26.7 MMOL/L (ref 22–29)
COLOR UR: YELLOW
CREAT SERPL-MCNC: 0.59 MG/DL (ref 0.57–1)
D-LACTATE SERPL-SCNC: 0.8 MMOL/L (ref 0.5–2)
DEPRECATED RDW RBC AUTO: 47 FL (ref 37–54)
EGFRCR SERPLBLD CKD-EPI 2021: 108.6 ML/MIN/1.73
EOSINOPHIL # BLD AUTO: 0.14 10*3/MM3 (ref 0–0.4)
EOSINOPHIL NFR BLD AUTO: 2.6 % (ref 0.3–6.2)
ERYTHROCYTE [DISTWIDTH] IN BLOOD BY AUTOMATED COUNT: 15.4 % (ref 12.3–15.4)
FLUAV AG NPH QL: NEGATIVE
FLUBV AG NPH QL IA: NEGATIVE
GLOBULIN UR ELPH-MCNC: 4.1 GM/DL
GLUCOSE BLDC GLUCOMTR-MCNC: 102 MG/DL (ref 70–99)
GLUCOSE BLDC GLUCOMTR-MCNC: 106 MG/DL (ref 70–99)
GLUCOSE SERPL-MCNC: 99 MG/DL (ref 65–99)
GLUCOSE UR STRIP-MCNC: NEGATIVE MG/DL
HCT VFR BLD AUTO: 32.1 % (ref 34–46.6)
HGB BLD-MCNC: 10.2 G/DL (ref 12–15.9)
HGB UR QL STRIP.AUTO: NEGATIVE
HOLD SPECIMEN: NORMAL
HOLD SPECIMEN: NORMAL
HYALINE CASTS UR QL AUTO: ABNORMAL /LPF
IMM GRANULOCYTES # BLD AUTO: 0.02 10*3/MM3 (ref 0–0.05)
IMM GRANULOCYTES NFR BLD AUTO: 0.4 % (ref 0–0.5)
INR PPP: 1.07 (ref 0.86–1.15)
KETONES UR QL STRIP: ABNORMAL
LEUKOCYTE ESTERASE UR QL STRIP.AUTO: ABNORMAL
LYMPHOCYTES # BLD AUTO: 1.3 10*3/MM3 (ref 0.7–3.1)
LYMPHOCYTES NFR BLD AUTO: 24.3 % (ref 19.6–45.3)
MCH RBC QN AUTO: 27 PG (ref 26.6–33)
MCHC RBC AUTO-ENTMCNC: 31.8 G/DL (ref 31.5–35.7)
MCV RBC AUTO: 84.9 FL (ref 79–97)
MONOCYTES # BLD AUTO: 0.36 10*3/MM3 (ref 0.1–0.9)
MONOCYTES NFR BLD AUTO: 6.7 % (ref 5–12)
NEUTROPHILS NFR BLD AUTO: 3.51 10*3/MM3 (ref 1.7–7)
NEUTROPHILS NFR BLD AUTO: 65.4 % (ref 42.7–76)
NITRITE UR QL STRIP: NEGATIVE
NRBC BLD AUTO-RTO: 0 /100 WBC (ref 0–0.2)
PH UR STRIP.AUTO: 7 [PH] (ref 5–8)
PLATELET # BLD AUTO: 243 10*3/MM3 (ref 140–450)
PMV BLD AUTO: 11.2 FL (ref 6–12)
POTASSIUM SERPL-SCNC: 4 MMOL/L (ref 3.5–5.2)
PROT SERPL-MCNC: 7.9 G/DL (ref 6–8.5)
PROT UR QL STRIP: ABNORMAL
PROTHROMBIN TIME: 14 SECONDS (ref 11.8–14.9)
RBC # BLD AUTO: 3.78 10*6/MM3 (ref 3.77–5.28)
RBC # UR STRIP: ABNORMAL /HPF
REF LAB TEST METHOD: ABNORMAL
SARS-COV-2 RNA RESP QL NAA+PROBE: NOT DETECTED
SODIUM SERPL-SCNC: 138 MMOL/L (ref 136–145)
SP GR UR STRIP: 1.02 (ref 1–1.03)
SQUAMOUS #/AREA URNS HPF: ABNORMAL /HPF
TROPONIN T SERPL HS-MCNC: 9 NG/L
UROBILINOGEN UR QL STRIP: ABNORMAL
WBC # UR STRIP: ABNORMAL /HPF
WBC NRBC COR # BLD: 5.36 10*3/MM3 (ref 3.4–10.8)
WHOLE BLOOD HOLD COAG: NORMAL
WHOLE BLOOD HOLD SPECIMEN: NORMAL

## 2023-08-27 PROCEDURE — 84484 ASSAY OF TROPONIN QUANT: CPT | Performed by: EMERGENCY MEDICINE

## 2023-08-27 PROCEDURE — 83605 ASSAY OF LACTIC ACID: CPT | Performed by: EMERGENCY MEDICINE

## 2023-08-27 PROCEDURE — 87635 SARS-COV-2 COVID-19 AMP PRB: CPT | Performed by: EMERGENCY MEDICINE

## 2023-08-27 PROCEDURE — 87040 BLOOD CULTURE FOR BACTERIA: CPT | Performed by: EMERGENCY MEDICINE

## 2023-08-27 PROCEDURE — 81001 URINALYSIS AUTO W/SCOPE: CPT | Performed by: EMERGENCY MEDICINE

## 2023-08-27 PROCEDURE — 25010000002 KETOROLAC TROMETHAMINE PER 15 MG: Performed by: EMERGENCY MEDICINE

## 2023-08-27 PROCEDURE — G0378 HOSPITAL OBSERVATION PER HR: HCPCS

## 2023-08-27 PROCEDURE — 85730 THROMBOPLASTIN TIME PARTIAL: CPT

## 2023-08-27 PROCEDURE — 70450 CT HEAD/BRAIN W/O DYE: CPT

## 2023-08-27 PROCEDURE — 94799 UNLISTED PULMONARY SVC/PX: CPT

## 2023-08-27 PROCEDURE — 25010000002 ENOXAPARIN PER 10 MG: Performed by: INTERNAL MEDICINE

## 2023-08-27 PROCEDURE — 88312 SPECIAL STAINS GROUP 1: CPT | Performed by: EMERGENCY MEDICINE

## 2023-08-27 PROCEDURE — 71045 X-RAY EXAM CHEST 1 VIEW: CPT

## 2023-08-27 PROCEDURE — 82948 REAGENT STRIP/BLOOD GLUCOSE: CPT

## 2023-08-27 PROCEDURE — 93010 ELECTROCARDIOGRAM REPORT: CPT | Performed by: INTERNAL MEDICINE

## 2023-08-27 PROCEDURE — 93005 ELECTROCARDIOGRAM TRACING: CPT | Performed by: EMERGENCY MEDICINE

## 2023-08-27 PROCEDURE — 80053 COMPREHEN METABOLIC PANEL: CPT | Performed by: EMERGENCY MEDICINE

## 2023-08-27 PROCEDURE — 36415 COLL VENOUS BLD VENIPUNCTURE: CPT

## 2023-08-27 PROCEDURE — P9612 CATHETERIZE FOR URINE SPEC: HCPCS

## 2023-08-27 PROCEDURE — 85610 PROTHROMBIN TIME: CPT

## 2023-08-27 PROCEDURE — 85025 COMPLETE CBC W/AUTO DIFF WBC: CPT

## 2023-08-27 PROCEDURE — 94761 N-INVAS EAR/PLS OXIMETRY MLT: CPT

## 2023-08-27 PROCEDURE — 87150 DNA/RNA AMPLIFIED PROBE: CPT | Performed by: EMERGENCY MEDICINE

## 2023-08-27 PROCEDURE — 99285 EMERGENCY DEPT VISIT HI MDM: CPT

## 2023-08-27 PROCEDURE — 87147 CULTURE TYPE IMMUNOLOGIC: CPT | Performed by: EMERGENCY MEDICINE

## 2023-08-27 PROCEDURE — 87804 INFLUENZA ASSAY W/OPTIC: CPT | Performed by: EMERGENCY MEDICINE

## 2023-08-27 RX ORDER — METOPROLOL SUCCINATE 25 MG/1
25 TABLET, EXTENDED RELEASE ORAL
Status: DISCONTINUED | OUTPATIENT
Start: 2023-08-27 | End: 2023-08-31 | Stop reason: HOSPADM

## 2023-08-27 RX ORDER — FOLIC ACID 1 MG/1
1 TABLET ORAL EVERY 24 HOURS
COMMUNITY

## 2023-08-27 RX ORDER — LEVETIRACETAM 500 MG/1
500 TABLET ORAL 2 TIMES DAILY
Status: DISCONTINUED | OUTPATIENT
Start: 2023-08-27 | End: 2023-08-28

## 2023-08-27 RX ORDER — ACETAMINOPHEN 650 MG/1
650 SUPPOSITORY RECTAL EVERY 4 HOURS PRN
Status: DISCONTINUED | OUTPATIENT
Start: 2023-08-27 | End: 2023-08-31 | Stop reason: HOSPADM

## 2023-08-27 RX ORDER — ACETAMINOPHEN 325 MG/1
650 TABLET ORAL EVERY 4 HOURS PRN
Status: DISCONTINUED | OUTPATIENT
Start: 2023-08-27 | End: 2023-08-31 | Stop reason: HOSPADM

## 2023-08-27 RX ORDER — BISACODYL 10 MG
10 SUPPOSITORY, RECTAL RECTAL DAILY PRN
Status: DISCONTINUED | OUTPATIENT
Start: 2023-08-27 | End: 2023-08-31 | Stop reason: HOSPADM

## 2023-08-27 RX ORDER — INSULIN LISPRO 100 [IU]/ML
2-7 INJECTION, SOLUTION INTRAVENOUS; SUBCUTANEOUS
Status: DISCONTINUED | OUTPATIENT
Start: 2023-08-27 | End: 2023-08-31 | Stop reason: HOSPADM

## 2023-08-27 RX ORDER — ALUMINA, MAGNESIA, AND SIMETHICONE 2400; 2400; 240 MG/30ML; MG/30ML; MG/30ML
7.5 SUSPENSION ORAL EVERY 4 HOURS PRN
Status: DISCONTINUED | OUTPATIENT
Start: 2023-08-27 | End: 2023-08-31 | Stop reason: HOSPADM

## 2023-08-27 RX ORDER — ENOXAPARIN SODIUM 100 MG/ML
40 INJECTION SUBCUTANEOUS DAILY
Status: DISCONTINUED | OUTPATIENT
Start: 2023-08-27 | End: 2023-08-31 | Stop reason: HOSPADM

## 2023-08-27 RX ORDER — DOCUSATE SODIUM 100 MG/1
100 CAPSULE, LIQUID FILLED ORAL 2 TIMES DAILY PRN
Status: DISCONTINUED | OUTPATIENT
Start: 2023-08-27 | End: 2023-08-31 | Stop reason: HOSPADM

## 2023-08-27 RX ORDER — ONDANSETRON 2 MG/ML
4 INJECTION INTRAMUSCULAR; INTRAVENOUS EVERY 6 HOURS PRN
Status: DISCONTINUED | OUTPATIENT
Start: 2023-08-27 | End: 2023-08-31 | Stop reason: HOSPADM

## 2023-08-27 RX ORDER — SODIUM CHLORIDE 0.9 % (FLUSH) 0.9 %
10 SYRINGE (ML) INJECTION EVERY 12 HOURS SCHEDULED
Status: DISCONTINUED | OUTPATIENT
Start: 2023-08-27 | End: 2023-08-31 | Stop reason: HOSPADM

## 2023-08-27 RX ORDER — ASPIRIN 81 MG/1
81 TABLET, CHEWABLE ORAL DAILY
Status: DISCONTINUED | OUTPATIENT
Start: 2023-08-27 | End: 2023-08-31 | Stop reason: HOSPADM

## 2023-08-27 RX ORDER — ZOLPIDEM TARTRATE 10 MG/1
10 TABLET ORAL NIGHTLY PRN
COMMUNITY
Start: 2023-08-23

## 2023-08-27 RX ORDER — KETOROLAC TROMETHAMINE 30 MG/ML
15 INJECTION, SOLUTION INTRAMUSCULAR; INTRAVENOUS ONCE
Status: COMPLETED | OUTPATIENT
Start: 2023-08-27 | End: 2023-08-27

## 2023-08-27 RX ORDER — HYDROCODONE BITARTRATE AND ACETAMINOPHEN 5; 325 MG/1; MG/1
1 TABLET ORAL EVERY 4 HOURS PRN
Status: DISCONTINUED | OUTPATIENT
Start: 2023-08-27 | End: 2023-08-31 | Stop reason: HOSPADM

## 2023-08-27 RX ORDER — ALPRAZOLAM 1 MG/1
1 TABLET ORAL 3 TIMES DAILY PRN
COMMUNITY
Start: 2023-07-26

## 2023-08-27 RX ORDER — ALPRAZOLAM 0.25 MG/1
0.25 TABLET ORAL 3 TIMES DAILY PRN
Status: DISCONTINUED | OUTPATIENT
Start: 2023-08-27 | End: 2023-08-31 | Stop reason: HOSPADM

## 2023-08-27 RX ORDER — ATORVASTATIN CALCIUM 40 MG/1
80 TABLET, FILM COATED ORAL NIGHTLY
Status: DISCONTINUED | OUTPATIENT
Start: 2023-08-27 | End: 2023-08-31 | Stop reason: HOSPADM

## 2023-08-27 RX ORDER — DEXTROSE MONOHYDRATE 25 G/50ML
25 INJECTION, SOLUTION INTRAVENOUS
Status: DISCONTINUED | OUTPATIENT
Start: 2023-08-27 | End: 2023-08-31 | Stop reason: HOSPADM

## 2023-08-27 RX ORDER — SODIUM CHLORIDE 0.9 % (FLUSH) 0.9 %
10 SYRINGE (ML) INJECTION AS NEEDED
Status: DISCONTINUED | OUTPATIENT
Start: 2023-08-27 | End: 2023-08-31 | Stop reason: HOSPADM

## 2023-08-27 RX ORDER — SODIUM CHLORIDE 9 MG/ML
40 INJECTION, SOLUTION INTRAVENOUS AS NEEDED
Status: DISCONTINUED | OUTPATIENT
Start: 2023-08-27 | End: 2023-08-31 | Stop reason: HOSPADM

## 2023-08-27 RX ORDER — LABETALOL HYDROCHLORIDE 5 MG/ML
10 INJECTION, SOLUTION INTRAVENOUS
Status: DISCONTINUED | OUTPATIENT
Start: 2023-08-27 | End: 2023-08-31 | Stop reason: HOSPADM

## 2023-08-27 RX ORDER — ASPIRIN 300 MG/1
300 SUPPOSITORY RECTAL DAILY
Status: DISCONTINUED | OUTPATIENT
Start: 2023-08-27 | End: 2023-08-31 | Stop reason: HOSPADM

## 2023-08-27 RX ORDER — NICOTINE POLACRILEX 4 MG
15 LOZENGE BUCCAL
Status: DISCONTINUED | OUTPATIENT
Start: 2023-08-27 | End: 2023-08-31 | Stop reason: HOSPADM

## 2023-08-27 RX ADMIN — METOPROLOL SUCCINATE 25 MG: 25 TABLET, EXTENDED RELEASE ORAL at 20:58

## 2023-08-27 RX ADMIN — ASPIRIN 81 MG: 81 TABLET, CHEWABLE ORAL at 20:58

## 2023-08-27 RX ADMIN — ATORVASTATIN CALCIUM 80 MG: 40 TABLET, FILM COATED ORAL at 20:58

## 2023-08-27 RX ADMIN — LEVETIRACETAM 500 MG: 500 TABLET, FILM COATED ORAL at 20:58

## 2023-08-27 RX ADMIN — ENOXAPARIN SODIUM 40 MG: 100 INJECTION SUBCUTANEOUS at 20:57

## 2023-08-27 RX ADMIN — QUETIAPINE FUMARATE 300 MG: 200 TABLET, EXTENDED RELEASE ORAL at 20:58

## 2023-08-27 RX ADMIN — Medication 10 ML: at 20:57

## 2023-08-27 RX ADMIN — KETOROLAC TROMETHAMINE 15 MG: 30 INJECTION, SOLUTION INTRAMUSCULAR; INTRAVENOUS at 18:40

## 2023-08-27 NOTE — CONSULTS
[facility]  [facility] Tele[facility]urology Consult [facility]      Patient Name:   Carol Abarca  YOB: 1970  Identification Number:   MRN - [id number]  Date of Service:   08/27/2023 13:44:09    Diagnosis:        R53.1 - Weakness    Impression:       52-year-old female, history of hypertension, diabetes, seizure disorder on Keppra, last normal at 3:00 a.m., awoke today with lethargy, generalized weakness, and overall slowness to respond. NIH Stroke Scale is 11. She is not a thrombolytic candidate as she is out of the treatment window. Etiology is unclear at this time. Possible post-ictal state from seizure overnight versus metabolic encephalopathy. Head CT is negative for acute abnormalities. She will be admitted for further work up. Defer on repeat angiography at this time given negative findings back in June 2023.      PLAN  - metabolic and infection workup   - MRI brain w/o contrast   - TTE w/bubble   - check a1c and LDL   - monitor on tele for afib   - neuro to follow    ---    Our recommendations are outlined below.    Recommendations:          Stroke/Telemetry Floor        Neuro Checks        Bedside Swallow Eval        DVT Prophylaxis        IV Fluids, Normal Saline        Head of Bed 30 Degrees        Euglycemia and Avoid Hyperthermia (PRN Acetaminophen)    Lipid Panel to Be Obtained, if Not Done in the Last 30 Days    Therapies:        Physical Therapy, Occupational Therapy, Speech Therapy Assessment When Applicable    Dysphagia:        Swallow Evaluation, Bedside        NPO Until Swallow Evaluation        ------------------------------------------------------------------------------    Advanced Imaging:  Advanced Imaging Deferred because:    not suggestive of LVO      Metrics:  Last Known Well: 08/27/2023 03:00:00  [facility] Notification Time: 08/27/2023 13:43:23  Arrival Time: 08/27/2023 13:24:00  Stamp Time: 08/27/2023 13:44:09  Initial Response Time: 08/27/2023  13:48:57  Symptoms: weakness.  Initial patient interaction: 08/27/2023 13:50:31  NIHSS Assessment Completed: 08/27/2023 14:05:10  Patient is not a candidate for Thrombolytic.  Thrombolytic Medical Decision: 08/27/2023 14:05:44  Patient was not deemed candidate for Thrombolytic because of following reasons:  Last Well Known Above 4.5 Hours.  Other Diagnosis suspected.    CT head showed no acute hemorrhage or acute core infarct.    Primary Provider Notified of Diagnostic Impression and Management Plan on: 08/27/2023 14:15:49        ------------------------------------------------------------------------------    History of Present Illness:  Patient is a 52 year old Female.    Patient was brought by private transportation with symptoms of weakness.  52-year-old female, history of hypertension, diabetes, seizure disorder on [facility], who was last known well at 3:00 a.m. She woke up at 8:00 a.m. this morning, felt weak all over, and was unable to lift her arms or legs. She was brought to the ER for evaluation. NIH Stroke Scale was 11. Exam is notable for a patient who appears lethargic, is very soft spoken, and is unable to lift her arms or legs without any help. She reports that she had a stroke in June 2023. I reviewed her images from that time; 2 MRIs of the brain, CT angiogram head and neck, CT perfusion and MRA head/neck were all unremarkable for head/neck findings. She does not recall what her symptoms were at that time.        Past Medical History:       Hypertension       Diabetes Mellitus       Seizures    No Anticoagulant use   No Antiplatelet use  Reviewed EMR for current medications    Allergies:   Reviewed    Social History:  Drug Use: No    Family History:  There is no family history of premature cerebrovascular disease pertinent to this consultation    ROS :  14 Points Review of Systems was performed and was negative except mentioned in HPI.    Past Surgical History:  There Is No Surgical History  Contributory To Today’s Visit        Examination:  1A: Level of Consciousness - Arouses to minor stimulation + 1  1B: Ask Month and Age - Both Questions Right + 0  1C: Blink Eyes & Squeeze Hands - Performs Both Tasks + 0  2: Test Horizontal Extraocular Movements - Normal + 0  3: Test Visual Fields - No Visual Loss + 0  4: Test Facial Palsy (Use Grimace if Obtunded) - Normal symmetry + 0  5A: Test Left Arm Motor Drift - Some Effort Against Gravity + 2  5B: Test Right Arm Motor Drift - Some Effort Against Gravity + 2  6A: Test Left Leg Motor Drift - No Effort Against Gravity + 3  6B: Test Right Leg Motor Drift - No Effort Against Gravity + 3  7: Test Limb Ataxia (FNF/Heel-Shin) - No Ataxia + 0  8: Test Sensation - Normal; No sensory loss + 0  9: Test Language/Aphasia - Normal; No aphasia + 0  10: Test Dysarthria - Normal + 0  11: Test Extinction/Inattention - No abnormality + 0    NIHSS Score: 11    Pre-Morbid Modified Angelina Scale:  Unable to assess    Spoke with : er doc  I reviewed the available imaging via Rapid and initiated discussion with the primary provider    Patient/Family was informed the Neurology Consult would occur via TeleHealth consult by way of interactive audio and video telecommunications and consented to receiving care in this manner.      Patient is being evaluated for possible acute neurologic impairment and high probability of imminent or life-threatening deterioration. I spent total of 20 minutes providing care to this patient, including time for face to face visit via telemedicine, review of medical records, imaging studies and discussion of findings with providers, the patient and/or family.      Dr Kelli Maya      [facility]  For Inpatient follow-up with [facility] physician please call [facility] [phone]. This is not an outpatient service. Post hospital discharge, please contact hospital directly.

## 2023-08-27 NOTE — ED PROVIDER NOTES
Time: 2:13 PM EDT  Date of encounter:  2023  Independent Historian/Clinical History and Information was obtained by:   Patient    History is limited by: N/A    Chief Complaint: Generalized weakness and difficulty speaking.      History of Present Illness:  Patient is a 52 y.o. year old female who presents to the emergency department for evaluation of generalized weakness and difficulty speaking.  The patient states that she awakened this morning approximately 8 AM and could not move her legs and was having generalized weakness.  She also had some difficulty speaking.  The patient does have a history of seizures and recently had a large stroke work-up performed.  Currently she just complains of weakness of the lower extremities.  She has had no new bowel or bladder issues and she has had no recent falls.  The patient denies any fever chills cough shortness of breath vomiting or diarrhea.    HPI    Patient Care Team  Primary Care Provider: Sonia Mosquera APRN    Past Medical History:     Allergies   Allergen Reactions    Tramadol Anaphylaxis    Tramadol Hcl Anaphylaxis    Moxifloxacin Hives    Sulfa Antibiotics Hives     Past Medical History:   Diagnosis Date    Anemia     Arthritis     Diabetes     Essential hypertension 2021    History of pulmonary embolism     Lumbago     Low back pain    Mild left ventricular hypertrophy 2021    Mixed hyperlipidemia 2021    Obstructive sleep apnea     Reflux esophagitis     Seasonal allergies      Past Surgical History:   Procedure Laterality Date    APPENDECTOMY  2010     SECTION      1990, 1992, ,     COLONOSCOPY       2018    COLONOSCOPY N/A 2022    Procedure: COLONOSCOPY;  Surgeon: Radha James MD;  Location: [facility] ENDOSCOPY;  Service: Gastroenterology;  Laterality: N/A;  COLON POLYP     ENDOSCOPY  2019    ENDOSCOPY N/A 2023    Procedure: ESOPHAGOGASTRODUODENOSCOPY WITH BIPOSIES;  Surgeon: Coy Patrick,  MD;  Location: [facility] ENDOSCOPY;  Service: Gastroenterology;  Laterality: N/A;  PRIOR LAPBAND, GASTRITIS    HEMORRHOIDECTOMY N/A 07/25/2022    Procedure: HEMORRHOIDECTOMY;  Surgeon: Remi Reeder MD;  Location: [facility] MAIN OR;  Service: General;  Laterality: N/A;    HERNIA REPAIR      2005, 2006, 2007, 2008    HYSTERECTOMY  2004    LAPAROSCOPIC GASTRIC BANDING      OTHER SURGICAL HISTORY      Metal implants     Family History   Problem Relation Age of Onset    Heart disease Mother     Diabetes Mother         Unspecified type    Arthritis Mother     Heart disease Father     Diabetes Son         Unspecified type    Malig Hyperthermia Neg Hx        Home Medications:  Prior to Admission medications    Medication Sig Start Date End Date Taking? Authorizing Provider   aspirin 81 MG EC tablet Take 1 tablet by mouth Daily.    ProviderSilas MD   cetirizine (zyrTEC) 10 MG tablet Take 1 tablet by mouth Daily.    ProviderSilas MD   furosemide (LASIX) 20 MG tablet Take 1 tablet by mouth Daily for 30 days. 7/22/23 8/21/23  Dung Hall MD   gabapentin (NEURONTIN) 300 MG capsule Take 1 capsule by mouth Every 8 (Eight) Hours for 30 days. 6/30/23 7/30/23  Dung Hall MD   HYDROcodone-acetaminophen (NORCO)  MG per tablet Take 1 tablet by mouth Every 8 (Eight) Hours As Needed for Moderate Pain, Severe Pain or Mild Pain.    ProviderSilas MD   ipratropium-albuterol (DUO-NEB) 0.5-2.5 mg/3 ml nebulizer Take 3 mL by nebulization Every 6 (Six) Hours As Needed for Shortness of Air for up to 30 days. 7/21/23 8/20/23  Dung Hall MD   levETIRAcetam (Keppra) 500 MG tablet Take 1 tablet by mouth 2 (Two) Times a Day for 30 days. 6/15/23 7/15/23  Dung Hall MD   metoprolol succinate XL (TOPROL-XL) 25 MG 24 hr tablet Take 1 tablet by mouth Daily for 30 days. 7/22/23 8/21/23  Dung Hall MD   montelukast (SINGULAIR) 10 MG tablet Take 1 tablet by mouth Every Night.    ProviderSilas MD   pravastatin  "(PRAVACHOL) 40 MG tablet Take 1 tablet by mouth Daily.    Provider, MD Silas   QUEtiapine XR (SEROquel XR) 300 MG 24 hr tablet Take 1 tablet by mouth Every Evening for 30 days. 7/21/23 8/20/23  Dung Hall MD   spironolactone (ALDACTONE) 25 MG tablet Take 1 tablet by mouth Daily for 30 days. 7/22/23 8/21/23  Dung Hall MD Toujeo SoloStar 300 UNIT/ML solution pen-injector injection Inject 20 Units under the skin into the appropriate area as directed Every Night for 30 days. 7/21/23 8/20/23  Dung Hall MD        Social History:   Social History     Tobacco Use    Smoking status: Every Day     Packs/day: 1.00     Years: 23.00     Pack years: 23.00     Types: Cigarettes    Smokeless tobacco: Never    Tobacco comments:     Smoked 11-20 years. last 7/24/22 1700   Vaping Use    Vaping Use: Never used   Substance Use Topics    Alcohol use: Not Currently     Comment: Occasionally drinks, less than 1 drink per day, has been drinking for less than 1 year    Drug use: Never         Review of Systems:  Review of Systems   Constitutional:  Negative for chills and fever.   HENT:  Negative for congestion, ear pain and sore throat.    Eyes:  Negative for pain.   Respiratory:  Negative for cough, chest tightness and shortness of breath.    Cardiovascular:  Negative for chest pain.   Gastrointestinal:  Negative for abdominal pain, diarrhea, nausea and vomiting.   Genitourinary:  Negative for flank pain and hematuria.   Musculoskeletal:  Negative for joint swelling.   Skin:  Negative for pallor.   Neurological:  Positive for speech difficulty and weakness. Negative for seizures and headaches.   All other systems reviewed and are negative.     Physical Exam:  /98 (BP Location: Left arm, Patient Position: Lying)   Pulse 69   Temp 98.2 °F (36.8 °C) (Oral)   Resp 18   Ht 157.2 cm (61.89\")   Wt 87.4 kg (192 lb 10.9 oz)   SpO2 96%   BMI 35.37 kg/m²     Physical Exam  Vitals and nursing note reviewed. "   Constitutional:       General: She is not in acute distress.     Appearance: Normal appearance. She is not toxic-appearing.   HENT:      Head: Normocephalic and atraumatic.      Mouth/Throat:      Mouth: Mucous membranes are moist.   Eyes:      General: No scleral icterus.  Cardiovascular:      Rate and Rhythm: Normal rate and regular rhythm.      Pulses: Normal pulses.      Heart sounds: Normal heart sounds.   Pulmonary:      Effort: Pulmonary effort is normal. No respiratory distress.      Breath sounds: Normal breath sounds.   Abdominal:      General: Abdomen is flat.      Palpations: Abdomen is soft.      Tenderness: There is no abdominal tenderness.   Musculoskeletal:         General: Normal range of motion.      Cervical back: Normal range of motion and neck supple.   Skin:     General: Skin is warm and dry.   Neurological:      Mental Status: She is alert and oriented to person, place, and time.      Comments: The patient had a stroke score of 11 for the neurologist however currently does have some mild dysarthria.   Psychiatric:         Mood and Affect: Mood normal.         Behavior: Behavior normal.         Thought Content: Thought content normal.         Judgment: Judgment normal.                Procedures:  Procedures      Medical Decision Making:      Comorbidities that affect care:    Seizures and previous stroke    External Notes reviewed:    Previous Admission Note: For dyspnea and anasarca      The following orders were placed and all results were independently analyzed by me:  Orders Placed This Encounter   Procedures    Blood Culture - Blood,    Blood Culture - Blood,    COVID-19,CEPHEID/PRASHANTH,COR/DEE DEE/PAD/KARLA/MAD IN-HOUSE(OR EMERGENT/ADD-ON),NP SWAB IN TRANSPORT MEDIA 3-4 HR TAT, RT-PCR - Swab, Nasopharynx    Influenza Antigen, Rapid - Swab, Nasopharynx    Blood Culture ID, PCR - Blood,    CT Head Without Contrast Stroke Protocol    XR Chest 1 View    MRI Brain Without Contrast    [place] Draw     Comprehensive Metabolic Panel    Protime-INR    aPTT    Single High Sensitivity Troponin T    CBC Auto Differential    Urinalysis With Culture If Indicated - Urine, Catheter    Lactic Acid, Plasma    Urinalysis, Microscopic Only - Urine, Clean Catch    Hemoglobin A1c    Lipid Panel    Magnesium    Levetiracetam Level (Keppra)    Diet: Cardiac Diets, Diabetic Diets; Healthy Heart (2-3 Na+); Consistent Carbohydrate; Texture: Regular Texture (IDDSI 7); Fluid Consistency: Thin (IDDSI 0)    Initiate Department's Acute Stroke Process (Team D, Code 19, etc)    Perform NIH Stroke Scale    Measure Actual Weight    Head of Bed 30 Degrees or Less    Undress and Gown    Vital Signs    Notify MD for SBP < 80 or > 200    Notify Provider for SBP greater than 140 if hemorrhagic Stroke    No Hypotonic Fluids    Nursing Dysphagia Screening (Complete Prior to Giving anything PO)    RN to Place Order SLP Consult (IF swallow screen failed) - Eval & Treat Choosing Reason of RN Dysphagia Screen Failed    Neuro Checks    Vital Signs    Pulse Oximetry, Continuous    Telemetry - Place Orders & Notify Provider of Results When Patient Experiences Acute Chest Pain, Dysrhythmia or Respiratory Distress    Notify Provider    Nursing Dysphagia Screening (Complete Prior to Giving Anything By Mouth)    RN to Place Order SLP Consult - Eval & Treat Choosing Reason of RN Dysphagia Screen Failed    Nurse to Call MD or Nutrition Services for Diet if Patient Passes Dysphagia Screen    Intake and Output    Neuro Checks    NIHSS Assessment    Order CT Head Without Contrast for Neurological Decline    Provide Stroke Education Material    Saline Lock & Maintain IV Access    Place Sequential Compression Device    Maintain Sequential Compression Device    Activity As Tolerated    Code Status and Medical Interventions:    IP General Consult (Use specialty-specific consult if known)    Inpatient Case Management  Consult    Inpatient Diabetes  Educator Consult    Inpatient Nutrition Consult    Inpatient Neurology Consult General    Inpatient Pulmonology Consult    Dietary Nutrition Supplements Boost Glucose Control (Glucerna Shake); chocolate    OT Consult: Eval & Treat    OT Consult: Eval & Treat    OT Plan of Care Cert / Re-Cert    PT Consult: Eval & Treat As Tolerated    PT Plan of Care Cert / Re-Cert    Oxygen Therapy- Nasal Cannula; Titrate 1-6 LPM Per SpO2; 90 - 95%    Incentive Spirometry    SLP Consult: Eval & Treat Communication Disorder    SLP Plan of Care Cert / Re-Cert    SLP Plan of Care Cert / Re-Cert    POC Glucose Once    POC Glucose Once    POC Glucose 4x Daily Before Meals & at Bedtime    POC Glucose Once    POC Glucose Once    POC Glucose Once    POC Glucose Once    POC Glucose Once    POC Glucose Once    POC Glucose Once    ECG 12 Lead ED Triage Standing Order; Acute Stroke (Onset <12 hrs)    Adult Transthoracic Echo Complete W/ Cont if Necessary Per Protocol (With Agitated Saline)    Type & Screen    EEG    Insert Large Bore Peripheral IV - Right AC Preferred    Insert Peripheral IV    Initiate Observation Status    CBC & Differential    Green Top (Gel)    Lavender Top    Gold Top - SST    Light Blue Top       Medications Given in the Emergency Department:  Medications   sodium chloride 0.9 % flush 10 mL (has no administration in time range)   sodium chloride 0.9 % flush 10 mL (10 mL Intravenous Given 8/29/23 0809)   sodium chloride 0.9 % flush 10 mL (has no administration in time range)   sodium chloride 0.9 % infusion 40 mL (has no administration in time range)   atorvastatin (LIPITOR) tablet 80 mg (80 mg Oral Given 8/28/23 2010)   Enoxaparin Sodium (LOVENOX) syringe 40 mg (40 mg Subcutaneous Given 8/29/23 0807)   labetalol (NORMODYNE,TRANDATE) injection 10 mg (has no administration in time range)   aspirin chewable tablet 81 mg (81 mg Oral Given 8/29/23 0807)     Or   aspirin suppository 300 mg ( Rectal Not Given:  See Alt 8/29/23  0807)   acetaminophen (TYLENOL) tablet 650 mg (has no administration in time range)     Or   acetaminophen (TYLENOL) suppository 650 mg (has no administration in time range)   HYDROcodone-acetaminophen (NORCO) 5-325 MG per tablet 1 tablet (1 tablet Oral Given 8/29/23 0812)   ondansetron (ZOFRAN) injection 4 mg (has no administration in time range)   aluminum-magnesium hydroxide-simethicone (MAALOX MAX) 400-400-40 MG/5ML suspension 7.5 mL (has no administration in time range)   docusate sodium (COLACE) capsule 100 mg (has no administration in time range)   bisacodyl (DULCOLAX) suppository 10 mg (has no administration in time range)   dextrose (GLUTOSE) oral gel 15 g (has no administration in time range)   dextrose (D50W) (25 g/50 mL) IV injection 25 g (has no administration in time range)   glucagon (GLUCAGEN) injection 1 mg (has no administration in time range)   Insulin Lispro (humaLOG) injection 2-7 Units (2 Units Subcutaneous Not Given 8/29/23 1121)   ALPRAZolam (XANAX) tablet 0.25 mg (has no administration in time range)   metoprolol succinate XL (TOPROL-XL) 24 hr tablet 25 mg (25 mg Oral Given 8/29/23 0807)   levETIRAcetam (KEPPRA) tablet 750 mg (750 mg Oral Given 8/29/23 0808)   QUEtiapine (SEROquel) tablet 200 mg (200 mg Oral Given 8/28/23 2302)   ketorolac (TORADOL) injection 15 mg (15 mg Intravenous Given 8/27/23 1840)        ED Course:             EKG: Sinus rhythm with a rate of 78 BPM  No acute ischemia  Labs:      Results for orders placed or performed during the hospital encounter of 08/27/23   Blood Culture - Blood, Arm, Left    Specimen: Arm, Left; Blood   Result Value Ref Range    Blood Culture No growth at 24 hours    Blood Culture - Blood, Arm, Left    Specimen: Arm, Left; Blood   Result Value Ref Range    Blood Culture Staphylococcus, coagulase negative (C)     Isolated from Aerobic and Anaerobic Bottles     Gram Stain Aerobic Bottle Gram positive cocci in clusters (C)     Gram Stain Anaerobic  Bottle Gram positive cocci in clusters (C)    COVID-19,CEPHEID/PRASHANTH,COR/DEE DEE/PAD/KARLA/MAD IN-HOUSE(OR EMERGENT/ADD-ON),NP SWAB IN TRANSPORT MEDIA 3-4 HR TAT, RT-PCR - Swab, Nasopharynx    Specimen: Nasopharynx; Swab   Result Value Ref Range    COVID19 Not Detected Not Detected - Ref. Range   Influenza Antigen, Rapid - Swab, Nasopharynx    Specimen: Nasopharynx; Swab   Result Value Ref Range    Influenza A Ag, EIA Negative Negative    Influenza B Ag, EIA Negative Negative   Blood Culture ID, PCR - Blood, Arm, Left    Specimen: Arm, Left; Blood   Result Value Ref Range    BCID, PCR (A) Negative by BCID PCR. Culture to Follow.     Staph spp, not aureus or lugdunensis. Identification by BCID2 PCR.    BOTTLE TYPE Aerobic Bottle    Comprehensive Metabolic Panel    Specimen: Blood   Result Value Ref Range    Glucose 99 65 - 99 mg/dL    BUN 9 6 - 20 mg/dL    Creatinine 0.59 0.57 - 1.00 mg/dL    Sodium 138 136 - 145 mmol/L    Potassium 4.0 3.5 - 5.2 mmol/L    Chloride 102 98 - 107 mmol/L    CO2 26.7 22.0 - 29.0 mmol/L    Calcium 9.3 8.6 - 10.5 mg/dL    Total Protein 7.9 6.0 - 8.5 g/dL    Albumin 3.8 3.5 - 5.2 g/dL    ALT (SGPT) 5 1 - 33 U/L    AST (SGOT) 17 1 - 32 U/L    Alkaline Phosphatase 86 39 - 117 U/L    Total Bilirubin 0.4 0.0 - 1.2 mg/dL    Globulin 4.1 gm/dL    A/G Ratio 0.9 g/dL    BUN/Creatinine Ratio 15.3 7.0 - 25.0    Anion Gap 9.3 5.0 - 15.0 mmol/L    eGFR 108.6 >60.0 mL/min/1.73   Protime-INR    Specimen: Blood   Result Value Ref Range    Protime 14.0 11.8 - 14.9 Seconds    INR 1.07 0.86 - 1.15   aPTT    Specimen: Blood   Result Value Ref Range    PTT 26.0 24.2 - 34.2 seconds   Single High Sensitivity Troponin T    Specimen: Blood   Result Value Ref Range    HS Troponin T 9 <10 ng/L   CBC Auto Differential    Specimen: Blood   Result Value Ref Range    WBC 5.36 3.40 - 10.80 10*3/mm3    RBC 3.78 3.77 - 5.28 10*6/mm3    Hemoglobin 10.2 (L) 12.0 - 15.9 g/dL    Hematocrit 32.1 (L) 34.0 - 46.6 %    MCV 84.9 79.0 -  97.0 fL    MCH 27.0 26.6 - 33.0 pg    MCHC 31.8 31.5 - 35.7 g/dL    RDW 15.4 12.3 - 15.4 %    RDW-SD 47.0 37.0 - 54.0 fl    MPV 11.2 6.0 - 12.0 fL    Platelets 243 140 - 450 10*3/mm3    Neutrophil % 65.4 42.7 - 76.0 %    Lymphocyte % 24.3 19.6 - 45.3 %    Monocyte % 6.7 5.0 - 12.0 %    Eosinophil % 2.6 0.3 - 6.2 %    Basophil % 0.6 0.0 - 1.5 %    Immature Grans % 0.4 0.0 - 0.5 %    Neutrophils, Absolute 3.51 1.70 - 7.00 10*3/mm3    Lymphocytes, Absolute 1.30 0.70 - 3.10 10*3/mm3    Monocytes, Absolute 0.36 0.10 - 0.90 10*3/mm3    Eosinophils, Absolute 0.14 0.00 - 0.40 10*3/mm3    Basophils, Absolute 0.03 0.00 - 0.20 10*3/mm3    Immature Grans, Absolute 0.02 0.00 - 0.05 10*3/mm3    nRBC 0.0 0.0 - 0.2 /100 WBC   Urinalysis With Culture If Indicated - Urine, Catheter    Specimen: Urine, Catheter   Result Value Ref Range    Color, UA Yellow Yellow, Straw    Appearance, UA Clear Clear    pH, UA 7.0 5.0 - 8.0    Specific Gravity, UA 1.021 1.005 - 1.030    Glucose, UA Negative Negative    Ketones, UA Trace (A) Negative    Bilirubin, UA Negative Negative    Blood, UA Negative Negative    Protein, UA Trace (A) Negative    Leuk Esterase, UA Trace (A) Negative    Nitrite, UA Negative Negative    Urobilinogen, UA 1.0 E.U./dL 0.2 - 1.0 E.U./dL   Lactic Acid, Plasma    Specimen: Blood   Result Value Ref Range    Lactate 0.8 0.5 - 2.0 mmol/L   Urinalysis, Microscopic Only - Urine, Catheter    Specimen: Urine, Catheter   Result Value Ref Range    RBC, UA None Seen None Seen /HPF    WBC, UA 0-2 (A) None Seen /HPF    Bacteria, UA None Seen None Seen /HPF    Squamous Epithelial Cells, UA 3-6 (A) None Seen, 0-2 /HPF    Hyaline Casts, UA 0-2 None Seen /LPF    Methodology Manual Light Microscopy    Hemoglobin A1c    Specimen: Hand, Left; Blood   Result Value Ref Range    Hemoglobin A1C 5.50 4.80 - 5.60 %   Lipid Panel    Specimen: Hand, Left; Blood   Result Value Ref Range    Total Cholesterol 229 (H) 0 - 200 mg/dL    Triglycerides 180  (H) 0 - 150 mg/dL    HDL Cholesterol 42 40 - 60 mg/dL    LDL Cholesterol  154 (H) 0 - 100 mg/dL    VLDL Cholesterol 33 5 - 40 mg/dL    LDL/HDL Ratio 3.60    Magnesium    Specimen: Hand, Left; Blood   Result Value Ref Range    Magnesium 2.3 1.6 - 2.6 mg/dL   POC Glucose Once    Specimen: Blood   Result Value Ref Range    Glucose 102 (H) 70 - 99 mg/dL   POC Glucose Once    Specimen: Blood   Result Value Ref Range    Glucose 106 (H) 70 - 99 mg/dL   POC Glucose Once    Specimen: Blood   Result Value Ref Range    Glucose 98 70 - 99 mg/dL   POC Glucose Once    Specimen: Blood   Result Value Ref Range    Glucose 107 (H) 70 - 99 mg/dL   POC Glucose Once    Specimen: Blood   Result Value Ref Range    Glucose 111 (H) 70 - 99 mg/dL   POC Glucose Once    Specimen: Blood   Result Value Ref Range    Glucose 107 (H) 70 - 99 mg/dL   POC Glucose Once    Specimen: Blood   Result Value Ref Range    Glucose 94 70 - 99 mg/dL   POC Glucose Once    Specimen: Blood   Result Value Ref Range    Glucose 89 70 - 99 mg/dL   ECG 12 Lead ED Triage Standing Order; Acute Stroke (Onset <12 hrs)   Result Value Ref Range    QT Interval 458 ms    QTC Interval 521 ms   Adult Transthoracic Echo Complete W/ Cont if Necessary Per Protocol (With Agitated Saline)   Result Value Ref Range    EF(MOD-bp) 64.7 %    LVIDd 4.5 cm    LVIDs 2.29 cm    IVSd 1.63 cm    LVPWd 1.65 cm    FS 49.0 %    IVS/LVPW 0.99 cm    ESV(cubed) 12.0 ml    LV Sys Vol (BSA corrected) 15.0 cm2    EDV(cubed) 90.5 ml    LV Beatty Vol (BSA corrected) 39.0 cm2    LVOT area 3.1 cm2    LV mass(C)d 315.6 grams    LVOT diam 2.00 cm    EDV(MOD-sp2) 87.0 ml    EDV(MOD-sp4) 72.5 ml    ESV(MOD-sp2) 28.9 ml    ESV(MOD-sp4) 27.8 ml    SV(MOD-sp2) 58.1 ml    SV(MOD-sp4) 44.7 ml    SI(MOD-sp2) 31.3 ml/m2    SI(MOD-sp4) 24.1 ml/m2    EF(MOD-sp2) 66.8 %    EF(MOD-sp4) 61.7 %    MV E max gabriel 92.0 cm/sec    MV A max gabriel 83.2 cm/sec    MV dec time 0.18 msec    MV E/A 1.11     LA ESV Index (BP) 39.6 ml/m2     Med Peak E' Jose 3.9 cm/sec    Lat Peak E' Jose 8.2 cm/sec    Avg E/e' ratio 15.21     SV(LVOT) 63.5 ml    RVIDd 2.8 cm    LA dimension (2D)  3.5 cm    LV V1 max 95.8 cm/sec    LV V1 max PG 3.7 mmHg    LV V1 mean PG 2.00 mmHg    LV V1 VTI 20.2 cm    Ao mean PG 6.0 mmHg    Ao V2 VTI 43.7 cm    DEREJE(I,D) 1.45 cm2    MV mean PG 1.00 mmHg    MV V2 VTI 24.2 cm    MV P1/2t 58.8 msec    MVA(P1/2t) 3.7 cm2    MVA(VTI) 2.6 cm2    MV dec slope 455.5 cm/sec2    TR max jose 243.0 cm/sec    TR max PG 23.6 mmHg    RVSP(TR) 26.6 mmHg    RAP systole 3.0 mmHg    Ao root diam 3.3 cm    Ao pk jose 168.0 cm/sec    Ao max PG 11 mmHg    Ascending aorta 4.2 cm    STJ 3.1 cm    IVRT 103.0 msec    MV max PG 3.00 mmHg    Sinus 3.6 cm   Green Top (Gel)   Result Value Ref Range    Extra Tube Hold for add-ons.    Lavender Top   Result Value Ref Range    Extra Tube hold for add-on    Gold Top - SST   Result Value Ref Range    Extra Tube Hold for add-ons.    Light Blue Top   Result Value Ref Range    Extra Tube Hold for add-ons.          Lab Results (last 24 hours)       Procedure Component Value Units Date/Time    POC Glucose Once [[id number]]  (Abnormal) Collected: 08/28/23 1200    Specimen: Blood Updated: 08/28/23 1201     Glucose 107 mg/dL      Comment: Serial Number: [id number]:  [id number]       POC Glucose Once [[id number]]  (Abnormal) Collected: 08/28/23 1647    Specimen: Blood Updated: 08/28/23 1648     Glucose 111 mg/dL      Comment: Serial Number: [id number]:  [id number]       POC Glucose Once [[id number]]  (Abnormal) Collected: 08/28/23 2049    Specimen: Blood Updated: 08/28/23 2050     Glucose 107 mg/dL      Comment: Serial Number: [id number]:  [id number]       POC Glucose Once [[id number]]  (Normal) Collected: 08/29/23 0738    Specimen: Blood Updated: 08/29/23 0740     Glucose 94 mg/dL      Comment: Serial Number: [id number]:  [id number]       POC Glucose Once [[id number]]  (Normal) Collected: 08/29/23 1120     Specimen: Blood Updated: 23 1121     Glucose 89 mg/dL      Comment: Serial Number: [id number]:  [id number]                Imaging:    Adult Transthoracic Echo Complete W/ Cont if Necessary Per Protocol (With Agitated Saline)    Result Date: 2023  Narrative:   Left ventricular systolic function is normal. Calculated left ventricular EF = 64.7%   Left ventricular wall thickness is consistent with concentric hypertrophy.   Left ventricular diastolic function was indeterminate.   Estimated right ventricular systolic pressure from tricuspid regurgitation is normal (<35 mmHg).   There is a small (<1cm) pericardial effusion.   No obvious wall motion abnormalities     EEG    Result Date: 2023  Narrative: Table formatting from the original result was not included. Images from the original result were not included. [address] [place], [place] [address] [phone] ELECTROENCEPHALOGRAPHIC REPORT Patient: Carol Abarca EEG # :     AKL-V- : 1970  ID:      [id number]    Age: 52 y.o. female    Primary  Physician: Dung Hall MD  Technician: Zoey Holden Ordering  Physician: Darcy Webb MD    Recording Date: 2023 Report  Date: 2023     History:  Temporal lobe epilepsy Medications: Alprazolam, hydrocodone with acetaminophen, Seroquel XR, aspirin, cetirizine, empagliflozin, folic acid, furosemide, Keppra, metoprolol, pravastatin, zolpidem Reading: The EEG was obtained portable in her room during the waking and light stages of sleep as well as on photic stimulation with video monitoring.  We do not know how much sleep she has had the night before the testing. The dominant waking background activity consists of symmetric 20 to 50 µV 8 cps which were prominent on both parieto-occipital regions and were reactive to changes in states.  There were symmetric vertex activities during the light stages of sleep.  There were frequent 50 to 120 µV single sharp discharges noted  over the left frontotemporal region.  There were no discernible responses on photic stimulation at various frequencies.  There was no amplitude asymmetry.  No paroxysmal discharges. Impression: Abnormal EEG because of intermittent low to medium voltage epileptiform discharges noted over the left frontotemporal region. Photic stimulation did not activate any abnormalities. Neuroradiographic imaging may be of some help to rule out pathology in the left brain. Electronically signed by Jerome Rajan Jr., MD, 08/28/23, 7:37 PM EDT. Please note that portions of this note were completed with a voice recognition program.  Part of this note is an electric or electronic transcription/translation of spoken language to printed text using the dragon dictating system.     MRI Brain Without Contrast    Result Date: 8/28/2023  Narrative: PROCEDURE: MRI BRAIN WO CONTRAST  COMPARISON: [facility], MR, MRI BRAIN WO CONTRAST, 6/29/2023, 20:50.  INDICATIONS: ALL OVER WEAKNESS. EVALUATE FOR STROKE.      TECHNIQUE: A variety of imaging planes and parameters were utilized for visualization of suspected pathology.  Images were performed without contrast.  FINDINGS:   There is no MR evidence of acute hemorrhage, infarct, mass, mass effect or midline shift. There is no hydrocephalus. The gray-white matter junctions are preserved.  Minimal amount of T2 signal hyperintensity as previously described is stable.  The craniocervical junction and midline structures are unremarkable. The mastoid air cells are clear.  There is mucosal thickening in the left sphenoid and maxillary sinuses.  The globes and intraconal regions are unremarkable. Flow-related signal in the major intracranial vessels is preserved.             Impression:  No acute intracranial abnormality.  Left sphenoid and maxillary sinus disease with mucosal thickening.  No air-fluid levels are demonstrated.      DEVORA OH DO       Electronically Signed and  Approved By: DEVORA OH DO on 8/28/2023 at 7:54             CT Abdomen Pelvis With Contrast    Result Date: 8/8/2023  Narrative: PROCEDURE: CT ABDOMEN PELVIS W CONTRAST  COMPARISON: 6/7/2023.  INDICATIONS: Abdominal pain, not otherwise specified  TECHNIQUE: After obtaining the patient's consent, 887 CT images were created with non-ionic intravenous contrast material.  No oral contrast agent was administered for the study.  PROTOCOL:   Standard CT imaging protocol performed.    RADIATION:   Total DLP: 1,129.6 mGy*cm   Automated exposure control was utilized to minimize radiation dose. CONTRAST: 100 mL Isovue 370 I.V.  FINDINGS:  There has been interval resolution of the small right pleural effusion.  The airspace opacities, probably predominantly subsegmental atelectasis, within the imaged lung bases have improved considerably since 6/7/2023.  Again, hepatosplenomegaly is suspected.  There is mild-to-moderate cardiomegaly.  A laparoscopically-placed gastric band is noted.  There has been hysterectomy.  There has also been ventral hernia repair.  The gallbladder is contracted.  No definite gallstones.  No definite acute cholecystitis or pancreatitis.  Please correlate with pertinent lab values.  No hydronephrosis or obstructive uropathy.  No acute colitis or diverticulitis.  The appendix is not clearly identified.  It may be surgically absent.  There is mild nonspecific distension of the urinary bladder.  No urinary bladder wall thickening or urinary bladder calculi.  Otherwise, no no acute findings are seen.  No significant interval change is identified since 6/7/2023.  Please see the prior CT exam report for further detail regarding additional chronic findings.      Impression:   No acute findings are appreciated.  Please see above comments for further detail.      Please note that portions of this note were completed with a voice recognition program.  PARVEZ PIERCE JR, MD       Electronically Signed and  Approved By: PARVEZ PIERCE JR, MD on 8/08/2023 at 0:11              XR Chest 1 View    Result Date: 8/27/2023  Narrative: PROCEDURE: XR CHEST 1 VW  COMPARISON: [facility], CT, CT CHEST WO CONTRAST DIAGNOSTIC, 7/14/2023, 17:05.  [facility], CR, XR CHEST 1 VW, 7/09/2023, 16:59.  [facility], CR, XR CHEST 1 VW, 7/13/2023, 20:38.  [facility], CR, XR CHEST 1 VW, 7/22/2023, 17:13.  INDICATIONS: Acute Stroke Protocol (Onset < 12 hrs)  FINDINGS:  No focal or diffuse infiltrate is identified. No pleural effusion or pneumothorax.  Status post median sternotomy.  The heart appears enlarged, as before.       Impression:   Cardiomegaly.  No radiographic findings of acute pulmonary abnormality.       PJ LAZO MD       Electronically Signed and Approved By: PJ LAZO MD on 8/27/2023 at 14:39             CT Head Without Contrast Stroke Protocol    Result Date: 8/27/2023  Narrative: PROCEDURE: CT HEAD WO CONTRAST STROKE PROTOCOL  COMPARISON:  [facility], MR, MRI ANGIOGRAM HEAD WO CONTRAST, 6/07/2023, 19:58.  [facility], MR, MRI ANGIOGRAM NECK WO CONTRAST, 6/07/2023, 19:58.  [facility], CT, CT HEAD WO CONTRAST, 6/06/2023, 10:48.  [facility], CT, CT ANGIOGRAM NECK, 6/12/2023, 9:37.  CT, CT ANGIOGRAM HEAD W AI ANALYSIS OF LVO, 6/12/2023, 9:37.  [facility], CT, CT CEREBRAL PERFUSION W WO CONTRAST, 6/12/2023, 9:34.  [facility], CT, CT HEAD WO CONTRAST, 6/12/2023, 9:11.  [facility], MR, MRI BRAIN WO CONTRAST, 6/07/2023, 19:58.  [facility], MR, MRI BRAIN WO CONTRAST, 6/29/2023, 20:50.  [facility], CT, CT HEAD WO CONTRAST, 6/17/2023, 5:00. INDICATIONS: WEAKNESS IN BOTH LEGS. NUMBNESS IN LEFT SIDE  PROTOCOL:   Standard imaging protocol performed    RADIATION:   DLP: 1081.2mGy*cm   MA and/or KV was adjusted to minimize radiation dose.     TECHNIQUE: CT  images of the head were obtained without contrast material.   FINDINGS:  No midline shift. Ventricles and sulci are normal in size. Basal cisterns are patent. No extra-axial fluid collection. No evidence of acute intracranial hemorrhage, mass lesion, or edema. No definite CT findings of acute infarction.  There is paranasal sinus mucosal thickening, which appears chronic, with partial opacification of the left sphenoid sinus.  The mastoid air cells are clear. No soft tissue or calvarial abnormality is identified.      Impression: 1. No CT findings of acute intracranial abnormality. 2. Probable chronic sinusitis.  This report was communicated by telephone to Francesca at 1405 hours 2023.     PJ LAZO MD       Electronically Signed and Approved By: PJ LAZO MD on 2023 at 14:23                EEG    Result Date: 2023  Table formatting from the original result was not included. Images from the original result were not included. [address] [place] [address] [phone] ELECTROENCEPHALOGRAPHIC REPORT Patient: Carol Abarca EEG # :     KNA-H- : 1970  ID:      [id number]    Age: 52 y.o. female    Primary  Physician: Dung Hall MD  Technician: Zoey Holden Ordering  Physician: Darcy Webb MD    Recording Date: 2023 Report  Date: 2023     History:  Temporal lobe epilepsy Medications: Alprazolam, hydrocodone with acetaminophen, Seroquel XR, aspirin, cetirizine, empagliflozin, folic acid, furosemide, Keppra, metoprolol, pravastatin, zolpidem Reading: The EEG was obtained portable in her room during the waking and light stages of sleep as well as on photic stimulation with video monitoring.  We do not know how much sleep she has had the night before the testing. The dominant waking background activity consists of symmetric 20 to 50 µV 8 cps which were prominent on both parieto-occipital regions and were reactive to changes in states.  There were  symmetric vertex activities during the light stages of sleep.  There were frequent 50 to 120 µV single sharp discharges noted over the left frontotemporal region.  There were no discernible responses on photic stimulation at various frequencies.  There was no amplitude asymmetry.  No paroxysmal discharges. Impression: Abnormal EEG because of intermittent low to medium voltage epileptiform discharges noted over the left frontotemporal region. Photic stimulation did not activate any abnormalities. Neuroradiographic imaging may be of some help to rule out pathology in the left brain. Electronically signed by Jerome Rajan Jr., MD, 08/28/23, 7:37 PM EDT. Please note that portions of this note were completed with a voice recognition program.  Part of this note is an electric or electronic transcription/translation of spoken language to printed text using the dragon dictating system.        Differential Diagnosis and Discussion:    Altered Mental Status: Based on the patient's signs and symptoms, differential diagnosis includes but is not limited to meningitis, stroke, sepsis, subarachnoid hemorrhage, intracranial bleeding, encephalitis, and metabolic encephalopathy.    All labs were reviewed and interpreted by me.  EKG was interpreted by me.    MDM     Amount and/or Complexity of Data Reviewed  Clinical lab tests: reviewed  Tests in the radiology section of CPT®: reviewed  Tests in the medicine section of CPT®: reviewed             Patient Care Considerations:    MRI: I considered ordering an MRI however this can be performed as an inpatient.      Consultants/Shared Management Plan:    Consultant: I have discussed the case with teleneurologist who states the patient should be admitted for further evaluation and treatment    Social Determinants of Health:    Patient is independent, reliable, and has access to care.       Disposition and Care Coordination:    Admit:   Through independent evaluation of the patient's  history, physical, and imperical data, the patient meets criteria for observation/admission to the hospital.           ED Disposition       ED Disposition   Decision to Admit    Condition   --    Comment   Level of Care: Telemetry [5]   Diagnosis: Weakness [590369]   Admitting Physician: KEN SWARTZ [[id number]]   Attending Physician: KEN SWARTZ [[id number]]                 This medical record created using voice recognition software.             Lul Ma,   08/29/23 1129

## 2023-08-28 ENCOUNTER — APPOINTMENT (OUTPATIENT)
Dept: MRI IMAGING | Facility: HOSPITAL | Age: 53
DRG: 101 | End: 2023-08-28
Payer: MEDICARE

## 2023-08-28 ENCOUNTER — APPOINTMENT (OUTPATIENT)
Dept: CARDIOLOGY | Facility: HOSPITAL | Age: 53
DRG: 101 | End: 2023-08-28
Payer: MEDICARE

## 2023-08-28 ENCOUNTER — APPOINTMENT (OUTPATIENT)
Dept: NEUROLOGY | Facility: HOSPITAL | Age: 53
DRG: 101 | End: 2023-08-28
Payer: MEDICARE

## 2023-08-28 PROBLEM — R47.02 DYSPHASIA: Status: ACTIVE | Noted: 2023-01-01

## 2023-08-28 PROBLEM — R47.02 DYSPHASIA: Status: ACTIVE | Noted: 2023-08-28

## 2023-08-28 LAB
ASCENDING AORTA: 4.2 CM
BACTERIA BLD CULT: ABNORMAL
BH CV ECHO MEAS - AO MAX PG: 11 MMHG
BH CV ECHO MEAS - AO MEAN PG: 6 MMHG
BH CV ECHO MEAS - AO ROOT DIAM: 3.3 CM
BH CV ECHO MEAS - AO V2 MAX: 168 CM/SEC
BH CV ECHO MEAS - AO V2 VTI: 43.7 CM
BH CV ECHO MEAS - AVA(I,D): 1.45 CM2
BH CV ECHO MEAS - EDV(CUBED): 90.5 ML
BH CV ECHO MEAS - EDV(MOD-SP2): 87 ML
BH CV ECHO MEAS - EDV(MOD-SP4): 72.5 ML
BH CV ECHO MEAS - EF(MOD-BP): 64.7 %
BH CV ECHO MEAS - EF(MOD-SP2): 66.8 %
BH CV ECHO MEAS - EF(MOD-SP4): 61.7 %
BH CV ECHO MEAS - ESV(CUBED): 12 ML
BH CV ECHO MEAS - ESV(MOD-SP2): 28.9 ML
BH CV ECHO MEAS - ESV(MOD-SP4): 27.8 ML
BH CV ECHO MEAS - FS: 49 %
BH CV ECHO MEAS - IVS/LVPW: 0.99 CM
BH CV ECHO MEAS - IVSD: 1.63 CM
BH CV ECHO MEAS - LA DIMENSION: 3.5 CM
BH CV ECHO MEAS - LAT PEAK E' VEL: 8.2 CM/SEC
BH CV ECHO MEAS - LV DIASTOLIC VOL/BSA (35-75): 39 CM2
BH CV ECHO MEAS - LV MASS(C)D: 315.6 GRAMS
BH CV ECHO MEAS - LV MAX PG: 3.7 MMHG
BH CV ECHO MEAS - LV MEAN PG: 2 MMHG
BH CV ECHO MEAS - LV SYSTOLIC VOL/BSA (12-30): 15 CM2
BH CV ECHO MEAS - LV V1 MAX: 95.8 CM/SEC
BH CV ECHO MEAS - LV V1 VTI: 20.2 CM
BH CV ECHO MEAS - LVIDD: 4.5 CM
BH CV ECHO MEAS - LVIDS: 2.29 CM
BH CV ECHO MEAS - LVOT AREA: 3.1 CM2
BH CV ECHO MEAS - LVOT DIAM: 2 CM
BH CV ECHO MEAS - LVPWD: 1.65 CM
BH CV ECHO MEAS - MED PEAK E' VEL: 3.9 CM/SEC
BH CV ECHO MEAS - MV A MAX VEL: 83.2 CM/SEC
BH CV ECHO MEAS - MV DEC SLOPE: 455.5 CM/SEC2
BH CV ECHO MEAS - MV DEC TIME: 0.18 MSEC
BH CV ECHO MEAS - MV E MAX VEL: 92 CM/SEC
BH CV ECHO MEAS - MV E/A: 1.11
BH CV ECHO MEAS - MV MAX PG: 3 MMHG
BH CV ECHO MEAS - MV MEAN PG: 1 MMHG
BH CV ECHO MEAS - MV P1/2T: 58.8 MSEC
BH CV ECHO MEAS - MV V2 VTI: 24.2 CM
BH CV ECHO MEAS - MVA(P1/2T): 3.7 CM2
BH CV ECHO MEAS - MVA(VTI): 2.6 CM2
BH CV ECHO MEAS - RAP SYSTOLE: 3 MMHG
BH CV ECHO MEAS - RVDD: 2.8 CM
BH CV ECHO MEAS - RVSP: 26.6 MMHG
BH CV ECHO MEAS - SI(MOD-SP2): 31.3 ML/M2
BH CV ECHO MEAS - SI(MOD-SP4): 24.1 ML/M2
BH CV ECHO MEAS - SV(LVOT): 63.5 ML
BH CV ECHO MEAS - SV(MOD-SP2): 58.1 ML
BH CV ECHO MEAS - SV(MOD-SP4): 44.7 ML
BH CV ECHO MEAS - TR MAX PG: 23.6 MMHG
BH CV ECHO MEAS - TR MAX VEL: 243 CM/SEC
BH CV ECHO MEASUREMENTS AVERAGE E/E' RATIO: 15.21
BOTTLE TYPE: ABNORMAL
CHOLEST SERPL-MCNC: 229 MG/DL (ref 0–200)
GLUCOSE BLDC GLUCOMTR-MCNC: 107 MG/DL (ref 70–99)
GLUCOSE BLDC GLUCOMTR-MCNC: 107 MG/DL (ref 70–99)
GLUCOSE BLDC GLUCOMTR-MCNC: 111 MG/DL (ref 70–99)
GLUCOSE BLDC GLUCOMTR-MCNC: 98 MG/DL (ref 70–99)
HBA1C MFR BLD: 5.5 % (ref 4.8–5.6)
HDLC SERPL-MCNC: 42 MG/DL (ref 40–60)
IVRT: 103 MSEC
LDLC SERPL CALC-MCNC: 154 MG/DL (ref 0–100)
LDLC/HDLC SERPL: 3.6 {RATIO}
LEFT ATRIUM VOLUME INDEX: 39.6 ML/M2
MAGNESIUM SERPL-MCNC: 2.3 MG/DL (ref 1.6–2.6)
QT INTERVAL: 458 MS
QTC INTERVAL: 521 MS
SINUS: 3.6 CM
STJ: 3.1 CM
TRIGL SERPL-MCNC: 180 MG/DL (ref 0–150)
VLDLC SERPL-MCNC: 33 MG/DL (ref 5–40)

## 2023-08-28 PROCEDURE — G0378 HOSPITAL OBSERVATION PER HR: HCPCS

## 2023-08-28 PROCEDURE — 80177 DRUG SCRN QUAN LEVETIRACETAM: CPT | Performed by: INTERNAL MEDICINE

## 2023-08-28 PROCEDURE — 70551 MRI BRAIN STEM W/O DYE: CPT

## 2023-08-28 PROCEDURE — 25010000002 ENOXAPARIN PER 10 MG: Performed by: INTERNAL MEDICINE

## 2023-08-28 PROCEDURE — 97161 PT EVAL LOW COMPLEX 20 MIN: CPT

## 2023-08-28 PROCEDURE — 80061 LIPID PANEL: CPT | Performed by: INTERNAL MEDICINE

## 2023-08-28 PROCEDURE — 83036 HEMOGLOBIN GLYCOSYLATED A1C: CPT | Performed by: INTERNAL MEDICINE

## 2023-08-28 PROCEDURE — 97165 OT EVAL LOW COMPLEX 30 MIN: CPT

## 2023-08-28 PROCEDURE — 94799 UNLISTED PULMONARY SVC/PX: CPT

## 2023-08-28 PROCEDURE — 99232 SBSQ HOSP IP/OBS MODERATE 35: CPT | Performed by: INTERNAL MEDICINE

## 2023-08-28 PROCEDURE — 93306 TTE W/DOPPLER COMPLETE: CPT

## 2023-08-28 PROCEDURE — 95816 EEG AWAKE AND DROWSY: CPT

## 2023-08-28 PROCEDURE — 94761 N-INVAS EAR/PLS OXIMETRY MLT: CPT

## 2023-08-28 PROCEDURE — 82948 REAGENT STRIP/BLOOD GLUCOSE: CPT

## 2023-08-28 PROCEDURE — 93306 TTE W/DOPPLER COMPLETE: CPT | Performed by: STUDENT IN AN ORGANIZED HEALTH CARE EDUCATION/TRAINING PROGRAM

## 2023-08-28 PROCEDURE — 92610 EVALUATE SWALLOWING FUNCTION: CPT

## 2023-08-28 PROCEDURE — 83735 ASSAY OF MAGNESIUM: CPT | Performed by: INTERNAL MEDICINE

## 2023-08-28 RX ORDER — QUETIAPINE FUMARATE 200 MG/1
200 TABLET, FILM COATED ORAL NIGHTLY
Status: DISCONTINUED | OUTPATIENT
Start: 2023-08-28 | End: 2023-08-31 | Stop reason: HOSPADM

## 2023-08-28 RX ORDER — LEVETIRACETAM 750 MG/1
750 TABLET ORAL EVERY 12 HOURS SCHEDULED
Status: DISCONTINUED | OUTPATIENT
Start: 2023-08-28 | End: 2023-08-31 | Stop reason: HOSPADM

## 2023-08-28 RX ADMIN — QUETIAPINE FUMARATE 200 MG: 200 TABLET ORAL at 23:02

## 2023-08-28 RX ADMIN — HYDROCODONE BITARTRATE AND ACETAMINOPHEN 1 TABLET: 5; 325 TABLET ORAL at 14:14

## 2023-08-28 RX ADMIN — METOPROLOL SUCCINATE 25 MG: 25 TABLET, EXTENDED RELEASE ORAL at 11:02

## 2023-08-28 RX ADMIN — ASPIRIN 81 MG: 81 TABLET, CHEWABLE ORAL at 11:02

## 2023-08-28 RX ADMIN — LEVETIRACETAM 750 MG: 750 TABLET, FILM COATED ORAL at 11:02

## 2023-08-28 RX ADMIN — ENOXAPARIN SODIUM 40 MG: 100 INJECTION SUBCUTANEOUS at 11:02

## 2023-08-28 RX ADMIN — HYDROCODONE BITARTRATE AND ACETAMINOPHEN 1 TABLET: 5; 325 TABLET ORAL at 20:10

## 2023-08-28 RX ADMIN — ATORVASTATIN CALCIUM 80 MG: 40 TABLET, FILM COATED ORAL at 20:10

## 2023-08-28 RX ADMIN — Medication 10 ML: at 20:10

## 2023-08-28 RX ADMIN — LEVETIRACETAM 750 MG: 750 TABLET, FILM COATED ORAL at 20:10

## 2023-08-28 NOTE — SIGNIFICANT NOTE
08/28/23 1100   Coping/Psychosocial   Observed Emotional State calm;cooperative   Verbalized Emotional State hopefulness   Trust Relationship/Rapport empathic listening provided   Involvement in Care interacting with patient   Additional Documentation Spiritual Care (Group)   Spiritual Care   Use of Spiritual Resources non-Catholic use of spiritual care   Spiritual Care Source  initiative   Spiritual Care Follow-Up follow-up, none required as presently assessed   Response to Spiritual Care receptive of support;engaged in conversation   Spiritual Care Interventions supportive conversation provided   Spiritual Care Visit Type initial   Receptivity to Spiritual Care visit welcomed

## 2023-08-28 NOTE — THERAPY EVALUATION
Acute Care - Physical Therapy Initial Evaluation   Edi     Patient Name: Carol Abarca  : 1970  MRN: [id number]  Today's Date: 2023      Visit Dx:     ICD-10-CM ICD-9-CM   1. Weakness  R53.1 780.79   2. Altered mental status, unspecified altered mental status type  R41.82 780.97   3. Oropharyngeal dysphagia  R13.12 787.22   4. Decreased activities of daily living (ADL)  Z78.9 V49.89   5. Difficulty walking  R26.2 719.7     Patient Active Problem List   Diagnosis    Mixed hyperlipidemia    Essential hypertension    Mild left ventricular hypertrophy    Atypical chest pain    Obstructive sleep apnea    History of pulmonary embolism    Screening for colon cancer    Hemorrhoids    Severe anemia    B12 deficiency    Pleural effusion    Seizure disorder    Type 2 diabetes mellitus    Generalized weakness    Unsteady gait when walking    Chronic heart failure with preserved ejection fraction (HFpEF)    Cervical spine degeneration    Weakness    Dysphasia     Past Medical History:   Diagnosis Date    Anemia     Arthritis     Diabetes     Essential hypertension 2021    History of pulmonary embolism     Lumbago     Low back pain    Mild left ventricular hypertrophy 2021    Mixed hyperlipidemia 2021    Obstructive sleep apnea     Reflux esophagitis     Seasonal allergies      Past Surgical History:   Procedure Laterality Date    APPENDECTOMY  2010     SECTION      , , ,     COLONOSCOPY      2019 2018    COLONOSCOPY N/A 2022    Procedure: COLONOSCOPY;  Surgeon: Radha James MD;  Location: [facility] ENDOSCOPY;  Service: Gastroenterology;  Laterality: N/A;  COLON POLYP     ENDOSCOPY  2019    ENDOSCOPY N/A 2023    Procedure: ESOPHAGOGASTRODUODENOSCOPY WITH BIPOSIES;  Surgeon: Coy Patrick MD;  Location: [facility] ENDOSCOPY;  Service: Gastroenterology;  Laterality: N/A;  PRIOR LAPBAND, GASTRITIS    HEMORRHOIDECTOMY N/A 2022    Procedure:  HEMORRHOIDECTOMY;  Surgeon: Remi Reeder MD;  Location[facility];  Service: General;  Laterality: N/A;    HERNIA REPAIR      2005, 2006, 2007, 2008    HYSTERECTOMY  2004    LAPAROSCOPIC GASTRIC BANDING      OTHER SURGICAL HISTORY      Metal implants     PT Assessment (last 12 hours)       PT Evaluation and Treatment       Row Name 08/28/23 1600          Physical Therapy Time and Intention    Document Type evaluation  -AV     Mode of Treatment individual therapy;physical therapy  -AV       Row Name 08/28/23 1600          General Information    Patient Profile Reviewed yes  -AV     Prior Level of Function --  Reports sons assist with ADLs. Ambulated with rolling walker. 2 L O2 at night. Setup to start outpatient PT prior to admission.  -AV     Existing Precautions/Restrictions fall  -AV       Row Name 08/28/23 1600          Living Environment    Current Living Arrangements home  -AV     Home Accessibility stairs to enter home  -AV     People in Home child(cory), adult  Sons  -AV       Row Name 08/28/23 1600          Home Main Entrance    Number of Stairs, Main Entrance two  -AV       Row Name 08/28/23 1600          Pain    Pretreatment Pain Rating 0/10 - no pain  -AV     Posttreatment Pain Rating 0/10 - no pain  -AV       Row Name 08/28/23 1600          Cognition    Orientation Status (Cognition) oriented x 3  -AV       Row Name 08/28/23 1600          Range of Motion (ROM)    Range of Motion bilateral lower extremities;ROM is WFL  -AV       Row Name 08/28/23 1600          Strength (Manual Muscle Testing)    Strength (Manual Muscle Testing) bilateral lower extremities;strength is WFL  -AV       Row Name 08/28/23 1600          Bed Mobility    Bed Mobility supine-sit-supine  -AV     Supine-Sit-Supine Post (Bed Mobility) standby assist  -AV       Row Name 08/28/23 1600          Transfers    Transfers sit-stand transfer;stand-sit transfer  -AV       Row Name 08/28/23 1600          Sit-Stand Transfer     Sit-Stand [place] (Transfers) contact guard  -AV     Assistive Device (Sit-Stand Transfers) walker, front-wheeled  -AV       Row Name 08/28/23 1600          Stand-Sit Transfer    Stand-Sit [place] (Transfers) contact guard  -AV     Assistive Device (Stand-Sit Transfers) walker, front-wheeled  -AV       Row Name 08/28/23 1600          Gait/Stairs (Locomotion)    Gait/Stairs Locomotion gait/ambulation independence;gait/ambulation assistive device;distance ambulated  -AV     [place] Level (Gait) contact guard  -AV     Assistive Device (Gait) walker, front-wheeled  -AV     Distance in Feet (Gait) 40  -AV     Pattern (Gait) 3-point;step-to  -AV     Deviations/Abnormal Patterns (Gait) gait speed decreased;stride length decreased  -AV       Row Name 08/28/23 1600          Safety Issues, Functional Mobility    Impairments Affecting Function (Mobility) endurance/activity tolerance;balance  -AV       Row Name 08/28/23 1600          Balance    Balance Assessment standing dynamic balance  -AV     Dynamic Standing Balance contact guard  -AV     Position/Device Used, Standing Balance supported;walker, front-wheeled  -AV       Row Name 08/28/23 1600          Plan of Care Review    Plan of Care Reviewed With patient  -AV     Progress no change  -AV     Outcome Evaluation Patient presents with deficits in balance, endurance, transfers, and ambulation. Patient will benefit from skilled PT services to address these mobility deficits and decrease risk of falls.  -AV       Row Name 08/28/23 1600          Therapy Assessment/Plan (PT)    Rehab Potential (PT) good, to achieve stated therapy goals  -AV     Criteria for Skilled Interventions Met (PT) yes;meets criteria  -AV     Therapy Frequency (PT) daily  -AV     Predicted Duration of Therapy Intervention (PT) 10 days  -AV     Problem List (PT) problems related to;balance;mobility  -AV     Activity Limitations Related to Problem List (PT) unable to transfer safely;unable to  ambulate safely  -AV       Row Name 08/28/23 1600          PT Evaluation Complexity    History, PT Evaluation Complexity 1-2 personal factors and/or comorbidities  -AV     Examination of Body Systems (PT Eval Complexity) total of 4 or more elements  -AV     Clinical Presentation (PT Evaluation Complexity) stable  -AV     Clinical Decision Making (PT Evaluation Complexity) low complexity  -AV     Overall Complexity (PT Evaluation Complexity) low complexity  -AV       Row Name 08/28/23 1600          Therapy Plan Review/Discharge Plan (PT)    Therapy Plan Review (PT) evaluation/treatment results reviewed;patient  -AV       Row Name 08/28/23 1600          Physical Therapy Goals    Transfer Goal Selection (PT) transfer, PT goal 1  -AV     Gait Training Goal Selection (PT) gait training, PT goal 1  -AV       Row Name 08/28/23 1600          Transfer Goal 1 (PT)    Activity/Assistive Device (Transfer Goal 1, PT) transfers, all;walker, rolling  -AV     [place] Level/Cues Needed (Transfer Goal 1, PT) modified independence  -AV     Time Frame (Transfer Goal 1, PT) 10 days  -AV       Row Name 08/28/23 1600          Gait Training Goal 1 (PT)    Activity/Assistive Device (Gait Training Goal 1, PT) gait (walking locomotion);assistive device use;walker, rolling  -AV     [place] Level (Gait Training Goal 1, PT) modified independence  -AV     Distance (Gait Training Goal 1, PT) 200  -AV     Time Frame (Gait Training Goal 1, PT) 10 days  -AV               User Key  (r) = Recorded By, (t) = Taken By, (c) = Cosigned By      Initials Name Provider Type    AV Daniel Spear, PT Physical Therapist                    Physical Therapy Education       Title: PT OT SLP Therapies (In Progress)       Topic: Physical Therapy (In Progress)       Point: Mobility training (Done)       Learning Progress Summary             Patient Acceptance, E,TB, VU by AV at 8/28/2023 1614                         Point: Home exercise program (Not  Started)       Learner Progress:  Not documented in this visit.              Point: Body mechanics (Done)       Learning Progress Summary             Patient Acceptance, E,TB, VU by AV at 8/28/2023 1614                         Point: Precautions (Done)       Learning Progress Summary             Patient Acceptance, E,TB, VU by AV at 8/28/2023 1614                                         User Key       Initials Effective Dates Name Provider Type Discipline    AV 06/11/21 -  Daniel Spear, PT Physical Therapist PT                  PT Recommendation and Plan  Anticipated Discharge Disposition (PT): home with outpatient therapy services  Planned Therapy Interventions (PT): balance training, bed mobility training, gait training, home exercise program, neuromuscular re-education, strengthening, transfer training  Therapy Frequency (PT): daily  Plan of Care Reviewed With: patient  Progress: no change  Outcome Evaluation: Patient presents with deficits in balance, endurance, transfers, and ambulation. Patient will benefit from skilled PT services to address these mobility deficits and decrease risk of falls.   Outcome Measures       Row Name 08/28/23 1600             How much help from another person do you currently need...    Turning from your back to your side while in flat bed without using bedrails? 4  -AV      Moving from lying on back to sitting on the side of a flat bed without bedrails? 3  -AV      Moving to and from a bed to a chair (including a wheelchair)? 3  -AV      Standing up from a chair using your arms (e.g., wheelchair, bedside chair)? 3  -AV      Climbing 3-5 steps with a railing? 3  -AV      To walk in hospital room? 3  -AV      AM-PAC 6 Clicks Score (PT) 19  -AV         Functional Assessment    Outcome Measure Options AM-PAC 6 Clicks Basic Mobility (PT)  -AV                User Key  (r) = Recorded By, (t) = Taken By, (c) = Cosigned By      Initials Name Provider Type    AV Daniel Spear, PT  Physical Therapist                     Time Calculation:    PT Charges       Row Name 08/28/23 1613             Time Calculation    PT Received On 08/28/23  -AV      PT Goal Re-Cert Due Date 09/06/23  -AV         Untimed Charges    PT Eval/Re-eval Minutes 35  -AV         Total Minutes    Untimed Charges Total Minutes 35  -AV       Total Minutes 35  -AV                User Key  (r) = Recorded By, (t) = Taken By, (c) = Cosigned By      Initials Name Provider Type    AV Daniel Spaer, PT Physical Therapist                  Therapy Charges for Today       Code Description Service Date Service Provider Modifiers Qty    [id number] HC PT EVAL LOW COMPLEXITY 3 8/28/2023 Daniel Spear, PT GP 1            PT G-Codes  Outcome Measure Options: AM-PAC 6 Clicks Basic Mobility (PT)  AM-PAC 6 Clicks Score (PT): 19    Daniel Spear, PT  8/28/2023

## 2023-08-28 NOTE — PROGRESS NOTES
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 Abarca is a 52-year-old right handed woman who presented to the ED on 8/27/2023 with lethargy, generalized weakness and encephalopathy in the setting of a past medical history of epilepsy on levetiracetam, HTN, and T2DM. NIH Stroke Scale on admission was 11. Thrombolysis was deferred as the patient was out of the treatment window. Her presentation was concerning for postictal state versus metabolic encephalopathy. CT head was negative for acute intracranial abnormality. MRI brain without contrast was negative for an acute stroke. At this juncture, the patient has demonstrated some interval improvement in her exam compared to presentation, however, she is not back to her baseline. Given her history of recent sleep deprivation, and symptoms which began upon awakening, I am concerned that she may have experienced a nocturnal seizure which precipitated a postictal state. Her prior EEG performed in June 2023 demonstrated potentially epileptogenic discharges arising the left temporal head region, which is consistent with focal epilepsy. At this juncture, recommend increasing the patient's levetiracetam from 500 mg twice daily to 750 mg twice daily. The patient was counseled on seizure precautions. She was also counseled on potential side effects that may be associated with levetiracetam as outlined below. As she is not back to her baseline, recommend routine EEG which has been ordered.    Recommendations.  - follow-up repeat EEG as patient not completely back to baseline and remains mildly encephalopathic (ordered)  - increase  levetiracetam 750 mg BID (ordered)  - follow up levetiracetam level (ordered)  - Patient instructed to seek emergency medical attention if she experiences any suicidal thoughts while on levetiracetam  - Patient instructed to follow-up with her doctor if she experiences any mood side effects while on levetiracetam  - Outpatient neurology follow-up within 1 month of discharge    Seizure precautions should be followed including:  - Do not drive until you are seizure free for at least 6 months and cleared by a physician.  - Any activity related to water should be considered a high risk, so people with epilepsy should avoid swimming or taking baths by themselves. Showers are safer than baths and preferred.Swimming alone should be avoided due to the risk of drowning in case of a seizure. Swimming is safer when there is another person that could intervene if a seizure occurs in the water.  - Any activity related to cooking can be dangerous and result in burns. Precautions include, again, not doing it alone but with another person who could intervene. Pan handles should be facing the back of the stove.  - Only use motorized power tools that have safety switches. Machines with safety switches will stop on their own if you have a seizure and let go of the switch.  - Be aware of your surroundings and make sure family and friends are aware of your seizures and know what to do to help if you have a seizure.      ALYSHA Webb MD MPH  Neurology    Our recommendations are outlined below    Diagnostic Studies :  Routine EEG  I ordered    Seizure precautions :  Seizure precautions including no driving for state mandated time frame were discussed with patient with clear understanding    Disposition :  Neurology will follow    Subjective  She reports she still feels numbness on her left side. She denies any tongue bite or bladder incontinence. She reports she sleeps less than she used to, and is only sleeping 3 to 4 hours per  night for the last two weeks.    Imaging  MRI brain (8/28/2023). No acute intracranial abnormality. Left sphenoid and maxillary sinus disease with  mucosal thickening. No air-fluid levels are demonstrated.    EEG (6/2023). Intermittent medium voltage epileptiform discharges noted over the left mid temporal region. Independent medium voltage focal slow activity noted over the frontotemporal regions on both sides, more on the left suggestive of neuronal dysfunction in this regions. These abnormalities have been reported in individuals with vascular insufficiency, migraine, or postictal states.    Labs  . . HDL 42. .       Examination  BP(169/97), Pulse(74), Temp(37.1), Resp(16),  1A: Level of Consciousness - Alert; keenly responsive + 0  1B: Ask Month and Age - Both Questions Right + 0  1C: Blink Eyes & Squeeze Hands - Performs Both Tasks + 0  2: Test Horizontal Extraocular Movements - Normal + 0  3: Test Visual Fields - No Visual Loss + 0  4: Test Facial Palsy (Use Grimace if Obtunded) - Normal symmetry + 0  5A: Test Left Arm Motor Drift - Drift, but doesn't hit bed + 1  5B: Test Right Arm Motor Drift - Drift, but doesn't hit bed + 1  6A: Test Left Leg Motor Drift - Drift, but doesn't hit bed + 1  6B: Test Right Leg Motor Drift - Drift, but doesn't hit bed + 1  7: Test Limb Ataxia (FNF/Heel-Shin) - No Ataxia + 0  8: Test Sensation - Normal; No sensory loss + 0  9: Test Language/Aphasia - Normal; No aphasia + 0  10: Test Dysarthria - Normal + 0  11: Test Extinction/Inattention - No abnormality + 0    NIHSS Score: 4  NIHSS Free Text : Neurologic Exam.  General. Well appearing, in no acute distress. Neck is supple.  Mental status. Alert and awake. Oriented to person, place, time, and situation, but does not answer questions briskly.   Language. Comprehension intact to simple one step axial and appendicular commands. Speech is fluent without paraphasic errors.  Parietal. No finger agnosia.  Cranial  nerves. Extraocular movements are intact without nystagmus. Facial sensation intact to light touch. Face is symmetric at rest and with activation of frontalis, buccinator, and orbicularis oculi muscles. Hearing grossly intact. Trapezius elevates symmetrically. Tongue midline. No dysarthria.  Motor. Normal muscle bulk. No adventitious movements. Antigravity in all four extremities with drift noted with some suggestion of possible giveway weakness. Able to perform fine finger movements bilaterally.  Gait: Deferred. She reports she has been walking with assistance to the bathroom.  Reflexes: Deferred.  Sensory: Intact to light touch throughout. Able to perform finger to nose testing with eyes closed.  Coordination: intact heel to shin testing bilaterally.      Patient/Family was informed the Neurology Consult would happen via TeleHealth consult by way of interactive audio and video telecommunications and consented to receiving care in this manner.    Telehealth Neurology consultation was provided. I spent minutes providing telehealth care. This includes time spent for face to face visit via telemedicine, review of medical records, imaging studies and discussion of findings with providers, the patient and/or family.      Dr Darcy Webb      [facility]  For Inpatient follow-up with [facility] physician please call [facility] [phone]. This is not an outpatient service. Post hospital discharge, please contact hospital directly.

## 2023-08-28 NOTE — CONSULTS
"Nutrition Services    Patient Name: Carol Abarca  YOB: 1970  MRN: [id number]  Admission date: 8/27/2023      CLINICAL NUTRITION ASSESSMENT      Reason for Assessment  MST score 2+     H&P:    Past Medical History:   Diagnosis Date    Anemia     Arthritis     Diabetes     Essential hypertension 06/24/2021    History of pulmonary embolism     Lumbago     Low back pain    Mild left ventricular hypertrophy 06/24/2021    Mixed hyperlipidemia 06/24/2021    Obstructive sleep apnea     Reflux esophagitis     Seasonal allergies         Current Problems:   Active Hospital Problems    Diagnosis     **Weakness         Nutrition/Diet History         Narrative     Patient admitted with weakness.  Reported recent weight loss and decreased appetite.   Patient states she has had decreased intake and weight loss over the past month.      Patient evaluated by speech therapy and started on a diabetic/healthy heart diet, consuming % of meals since admission.    Patient reports poor appetite and often does not feel hungry.  States she feels weaker and runs out of energy quicker.  Patient agreeable to adding ONS to increase kcal/pro intake while admitted.      NFPE performed with no significant findings to note.       Anthropometrics        Current Height, Weight Height: 157.2 cm (61.89\")  Weight: 87.4 kg (192 lb 10.9 oz)   Current BMI Body mass index is 35.37 kg/m².       Weight Hx  Wt Readings from Last 30 Encounters:   08/27/23 2013 87.4 kg (192 lb 10.9 oz)   08/27/23 1337 86.7 kg (191 lb 2.2 oz)   08/07/23 2105 89.6 kg (197 lb 8.5 oz)   07/22/23 1658 92.6 kg (204 lb 2.3 oz)   07/13/23 1717 92.6 kg (204 lb 2.3 oz)   07/09/23 1603 94.3 kg (207 lb 14.3 oz)   06/30/23 0620 90.9 kg (200 lb 6.4 oz)   06/28/23 0640 90.8 kg (200 lb 2.8 oz)   06/27/23 0600 90 kg (198 lb 6.6 oz)   06/23/23 0610 98.1 kg (216 lb 4.3 oz)   06/22/23 0600 102 kg (224 lb 10.4 oz)   06/20/23 0500 102 kg (225 lb 15.5 oz)   06/19/23 0513 102 kg " (225 lb 1.4 oz)   06/18/23 0500 101 kg (223 lb 1.7 oz)   06/02/23 0101 97.8 kg (215 lb 9.8 oz)   12/18/22 1541 97.8 kg (215 lb 11.2 oz)   11/04/22 1123 96.4 kg (212 lb 8.4 oz)   11/03/22 0901 102 kg (225 lb)   08/16/22 1252 102 kg (224 lb 3.2 oz)   07/25/22 1006 103 kg (227 lb 1.2 oz)   07/14/22 1125 103 kg (226 lb)   02/05/22 0612 99.2 kg (218 lb 11.1 oz)   07/03/19 0000 99 kg (218 lb 4 oz)   06/17/19 0000 99.1 kg (218 lb 8 oz)   03/28/19 0000 103 kg (226 lb 4 oz)   01/02/19 0000 103 kg (228 lb)   04/30/18 0000 102 kg (224 lb)   03/13/18 0000 100 kg (221 lb 2 oz)            Wt Change Observation -5.6% x 1 month  -14.3% x 1 year     Estimated/Assessed Needs       Energy Requirements 30 kcal/kg adj BW (59.2 kg)   EST Needs (kcal/day) 1776 kcal        Protein Requirements 1.0 g/kg adj BW   EST Daily Needs (g/day) 59 g       Fluid Requirements 30 ml/kg adj BW    Estimated Needs (mL/day) 1776 ml      Labs/Medications         Pertinent Labs Reviewed.   Results from last 7 days   Lab Units 08/27/23  1410   SODIUM mmol/L 138   POTASSIUM mmol/L 4.0   CHLORIDE mmol/L 102   CO2 mmol/L 26.7   BUN mg/dL 9   CREATININE mg/dL 0.59   CALCIUM mg/dL 9.3   BILIRUBIN mg/dL 0.4   ALK PHOS U/L 86   ALT (SGPT) U/L 5   AST (SGOT) U/L 17   GLUCOSE mg/dL 99     Results from last 7 days   Lab Units 08/28/23  0411 08/27/23  1341   MAGNESIUM mg/dL 2.3  --    HEMOGLOBIN g/dL  --  10.2*   HEMATOCRIT %  --  32.1*   TRIGLYCERIDES mg/dL 180*  --      COVID19   Date Value Ref Range Status   08/27/2023 Not Detected Not Detected - Ref. Range Final     Lab Results   Component Value Date    HGBA1C 5.50 08/28/2023         Pertinent Medications Reviewed.     Current Nutrition Orders & Evaluation of Intake       Oral Nutrition     Current PO Diet Diet: Cardiac Diets, Diabetic Diets; Healthy Heart (2-3 Na+); Consistent Carbohydrate; Texture: Regular Texture (IDDSI 7); Fluid Consistency: Thin (IDDSI 0)   Supplement No active supplement orders        Malnutrition Severity Assessment                Nutrition Diagnosis         Nutrition Dx Problem 1 Unintentional weight loss related to decreased ability to consume sufficient energy as evidenced by decreased appetite., unintended wt change., and patient report.       Nutrition Intervention         Boost Glucose Control TID   +750 kcal, 48 g pro per day      Medical Nutrition Therapy/Nutrition Education          Learner     Readiness Patient  Acceptance     Method     Response Explanation  Verbalizes understanding     Monitor/Evaluation        Monitor Per protocol, PO intake, Supplement intake, Pertinent labs, Weight, POC/GOC       Nutrition Discharge Plan         To be determined       Electronically signed by:  Radha Woo RD  08/28/23 12:53 EDT

## 2023-08-28 NOTE — PLAN OF CARE
Goal Outcome Evaluation:  Plan of Care Reviewed With: patient        Progress: no change  Outcome Evaluation: Patient presents with deficits in balance, endurance, transfers, and ambulation. Patient will benefit from skilled PT services to address these mobility deficits and decrease risk of falls.      Anticipated Discharge Disposition (PT): home with outpatient therapy services

## 2023-08-28 NOTE — PLAN OF CARE
Goal Outcome Evaluation:  Plan of Care Reviewed With: patient        Progress: no change  Outcome Evaluation: Patient has experienced decline in function from baseline status, presenting w/ deficits related to strength, balance, transfers and mobility that impede patient independence with activities of daily living.  Patient would benefit from skilled Occupational Therapy intervention to maxamize patient safety, and promote return to baseline independence.      Anticipated Discharge Disposition (OT): home with home health

## 2023-08-28 NOTE — PLAN OF CARE
Goal Outcome Evaluation:  Plan of Care Reviewed With: patient         ASSESSMENT/ PLAN OF CARE:  Pt presents with limitations, noted below, that impede her ability to swallow safely and maintain nutrition. The skills of a therapist will be required to safely and effectively implement the following treatment plan to restore maximal level of function.    PROBLEMS:  1.  Swallow delay, risk of aspiration                         TREATMENT: Dysphagia therapy to address swallow function through exercises and education of strategies.     FREQUENCY/DURATION: Once daily 5 times per week    REHAB POTENTIAL:  Pt has good rehab potential.  The following limitations may influence improvement/ length of tx: Medical status.    RECOMMENDATIONS:   1.   DIET: Regular solids cut small, thin liquid.    2.  POSITION: Positioning fully upright for all p.o. intake and 30 minutes following.    3.  COMPENSATORY STRATEGIES: Assist patient with set up, alternate small bites and small sips of solids and liquids at a slow rate.

## 2023-08-28 NOTE — THERAPY EVALUATION
Acute Care - Speech Language Pathology   Swallow Initial Evaluation  Edi     Patient Name: Carol Abarca  : 1970  MRN: [id number]  Today's Date: 2023               Admit Date: 2023    Visit Dx:     ICD-10-CM ICD-9-CM   1. Weakness  R53.1 780.79   2. Altered mental status, unspecified altered mental status type  R41.82 780.97   3. Oropharyngeal dysphagia  R13.12 787.22     Patient Active Problem List   Diagnosis    Mixed hyperlipidemia    Essential hypertension    Mild left ventricular hypertrophy    Atypical chest pain    Obstructive sleep apnea    History of pulmonary embolism    Screening for colon cancer    Hemorrhoids    Severe anemia    B12 deficiency    Pleural effusion    Seizure disorder    Type 2 diabetes mellitus    Generalized weakness    Unsteady gait when walking    Chronic heart failure with preserved ejection fraction (HFpEF)    Cervical spine degeneration    Weakness     Past Medical History:   Diagnosis Date    Anemia     Arthritis     Diabetes     Essential hypertension 2021    History of pulmonary embolism     Lumbago     Low back pain    Mild left ventricular hypertrophy 2021    Mixed hyperlipidemia 2021    Obstructive sleep apnea     Reflux esophagitis     Seasonal allergies      Past Surgical History:   Procedure Laterality Date    APPENDECTOMY  2010     SECTION      , , ,     COLONOSCOPY      2019 2018    COLONOSCOPY N/A 2022    Procedure: COLONOSCOPY;  Surgeon: Radha James MD;  Location: [facility] ENDOSCOPY;  Service: Gastroenterology;  Laterality: N/A;  COLON POLYP     ENDOSCOPY  2019    ENDOSCOPY N/A 2023    Procedure: ESOPHAGOGASTRODUODENOSCOPY WITH BIPOSIES;  Surgeon: Coy Patrick MD;  Location: [facility] ENDOSCOPY;  Service: Gastroenterology;  Laterality: N/A;  PRIOR LAPBAND, GASTRITIS    HEMORRHOIDECTOMY N/A 2022    Procedure: HEMORRHOIDECTOMY;  Surgeon: Remi Reeder MD;  Location:  [facility] MAIN OR;  Service: General;  Laterality: N/A;    HERNIA REPAIR      2005, 2006, 2007, 2008    HYSTERECTOMY  2004    LAPAROSCOPIC GASTRIC BANDING      OTHER SURGICAL HISTORY      Metal implants           Inpatient Speech Pathology Dysphagia Evaluation        PAIN SCALE: None indicated.    PRECAUTIONS/CONTRAINDICATIONS: Standard    SUSPECTED ABUSE/NEGLECT/EXPLOITATION: None indicated.    SOCIAL/PSYCHOLOGICAL NEEDS/BARRIERS: None indicated.    PAST SOCIAL HISTORY: 52-year-old female lives at home with family    PRIOR FUNCTION: Not fully determined    PATIENT GOALS/EXPECTATIONS: Patient did not verbalize    HISTORY: 52-year-old female the above diagnosis referred for speech therapy evaluation due to possible stroke.  No previous speech pathology services are reported.    CURRENT DIET LEVEL: Regular, thin    OBJECTIVE:    TEST ADMINISTERED: Clinical dysphagia evaluation    COGNITION/SAFETY AWARENESS: Patient followed basic directions and responded to questions though with delay noted    BEHAVIORAL OBSERVATIONS: Alert and cooperative    ORAL MOTOR EXAM: Slowed movement, adequate range.    VOICE QUALITY: Very soft    REFLEX EXAM: Deferred    POSTURE: Sitting upright in bed, assisted    FEEDING/SWALLOWING FUNCTION: Assessed with nectar liquid, thin liquids, puréed solids, crunchy solid.    CLINICAL OBSERVATIONS: Nectar liquid by cup with delay, vocal quality remaining clear to cervical auscultation.  Thin liquid by cup and by straw with delay, vocal quality remaining clear to cervical auscultation.  Purée solid with swallow completed with delay, laryngeal elevation noted to palpation.  Crunchy solid with adequate chewing followed by swallow completed clearing the oral cavity.    DYSPHAGIA CRITERIA: Swallow delay, risk of aspiration    FUNCTIONAL ASSESSMENT INSTRUMENT: Patient currently scored a level 6 of 7 on Functional Communication Measures for swallowing indicating a 1-19% limitation in function.    ASSESSMENT/  PLAN OF CARE:  Pt presents with limitations, noted below, that impede her ability to swallow safely and maintain nutrition. The skills of a therapist will be required to safely and effectively implement the following treatment plan to restore maximal level of function.    PROBLEMS:  1.  Swallow delay, risk of aspiration                       LTG 1: 30 days: Patient will tolerate least restrictive diet independently.                       STG 1a: 14 days:Patient will tolerate diet regular solids and thin liquid utilizing appropriate positioning and strategies with minimal assistance.                        STG 1b: 14 days:  Patient/family education.                       TREATMENT: Dysphagia therapy to address swallow function through exercises and education of strategies.     FREQUENCY/DURATION: Once daily 5 times per week    REHAB POTENTIAL:  Pt has good rehab potential.  The following limitations may influence improvement/ length of tx: Medical status.    RECOMMENDATIONS:   1.   DIET: Regular solids cut small, thin liquid.    2.  POSITION: Positioning fully upright for all p.o. intake and 30 minutes following.    3.  COMPENSATORY STRATEGIES: Assist patient with set up, alternate small bites and small sips of solids and liquids at a slow rate.    Pt/responsible party agrees with plan of care and has been informed of all alternatives, risks and benefits.                              Anticipated Discharge Disposition (SLP): home with assist (08/28/23 0925)                                                                  EDUCATION  The patient has been educated in the following areas:   Modified Diet Instruction.              Time Calculation:    Time Calculation- SLP       Row Name 08/28/23 0925             Time Calculation- SLP    SLP Start Time 0800  -TB      SLP Stop Time 0900  -TB      SLP Time Calculation (min) 60 min  -TB      SLP Received On 08/28/23  -TB         Untimed Charges    SLP Eval/Re-eval  ST Eval  Oral Pharyng Swallow - 81528  -TB      33281-HM Eval Oral Pharyng Swallow Minutes 60  -TB         Total Minutes    Untimed Charges Total Minutes 60  -TB       Total Minutes 60  -TB                User Key  (r) = Recorded By, (t) = Taken By, (c) = Cosigned By      Initials Name Provider Type    Ling Reina SLP Speech and Language Pathologist                    Therapy Charges for Today       Code Description Service Date Service Provider Modifiers Qty    [id number]  ST EVAL ORAL PHARYNG SWALLOW 4 8/28/2023 Ling Perez SLP GN 1                 MARCE Vieira  8/28/2023

## 2023-08-28 NOTE — CASE MANAGEMENT/SOCIAL WORK
Discharge Planning Assessment   [place]     Patient Name: Carol Abarca  MRN: [id number]  Today's Date: 8/28/2023    Admit Date: 8/27/2023    Plan: Patient admitted with AMS. Patient is able to answer a few questions for discharge planning. Patient does struggle with word finding. Patient confirmed PCP and Pharm. Patient reports she lives with her sons and they assist her with her ADLS. PT/OT consulted and pending notes. CM will follow. CM attempted to reach mother, vm left.   Discharge Needs Assessment       Row Name 08/28/23 1129       Living Environment    People in Home child(cory), adult    Current Living Arrangements home    Potentially Unsafe Housing Conditions none    Primary Care Provided by self;child(cory)    Family Caregiver if Needed child(cory), adult;parent(s)    Quality of Family Relationships helpful;involved;supportive       Resource/Environmental Concerns    Resource/Environmental Concerns reliable transportation    Transportation Concerns no car       Transition Planning    Patient/Family Anticipates Transition to home with family    Patient/Family Anticipated Services at Transition none    Transportation Anticipated family or friend will provide       Discharge Needs Assessment    Readmission Within the Last 30 Days no previous admission in last 30 days    Equipment Currently Used at Home oxygen;pulse ox;walker, rolling    Concerns to be Addressed discharge planning    Anticipated Changes Related to Illness none    Equipment Needed After Discharge none                   Discharge Plan       Row Name 08/28/23 1131       Plan    Plan Patient admitted with AMS. Patient is able to answer a few questions for discharge planning. Patient does struggle with word finding. Patient confirmed PCP and Pharm. Patient reports she lives with her sons and they assist her with her ADLS. PT/OT consulted and pending notes. CM will follow. CM attempted to reach mother, vm left.                  Continued Care and  Services - Admitted Since 8/27/2023    Coordination has not been started for this encounter.          Demographic Summary       Row Name 08/28/23 1128       General Information    Admission Type inpatient    Arrived From home;emergency department    Referral Source admission list    Reason for Consult discharge planning    Preferred Language English       Contact Information    Permission Granted to Share Info With family/designee    Contact Information Obtained for                    Functional Status       Row Name 08/28/23 1129       Functional Status    Usual Activity Tolerance fair    Current Activity Tolerance fair       Assessment of Health Literacy    How often do you have someone help you read hospital materials? Occasionally    How often do you have problems learning about your medical condition because of difficulty understanding written information? Never    How often do you have a problem understanding what is told to you about your medical condition? Never    How confident are you filling out medical forms by yourself? Quite a bit    Health Literacy Fair       Functional Status, IADL    Medications assistive person    Meal Preparation assistive person    Housekeeping assistive person    Laundry assistive person    Shopping completely dependent       Mental Status    General Appearance WDL WDL       Mental Status Summary    Recent Changes in Mental Status/Cognitive Functioning no changes                   Psychosocial    No documentation.                  Abuse/Neglect    No documentation.                  Legal    No documentation.                  Substance Abuse    No documentation.                  Patient Forms    No documentation.                     Bridgette Nunes RN

## 2023-08-28 NOTE — PLAN OF CARE
Goal Outcome Evaluation:      Pt ambulated in room with 1x assist. Alert and oriented throughout shift. Medicated for pain with PRN pain medications, effective per pt. No concerns at this time.  Ely Mccray RN

## 2023-08-28 NOTE — THERAPY EVALUATION
Patient Name: Carol Abarca  : 1970    MRN: [id number]                              Today's Date: 2023       Admit Date: 2023    Visit Dx:     ICD-10-CM ICD-9-CM   1. Weakness  R53.1 780.79   2. Altered mental status, unspecified altered mental status type  R41.82 780.97   3. Oropharyngeal dysphagia  R13.12 787.22   4. Decreased activities of daily living (ADL)  Z78.9 V49.89     Patient Active Problem List   Diagnosis    Mixed hyperlipidemia    Essential hypertension    Mild left ventricular hypertrophy    Atypical chest pain    Obstructive sleep apnea    History of pulmonary embolism    Screening for colon cancer    Hemorrhoids    Severe anemia    B12 deficiency    Pleural effusion    Seizure disorder    Type 2 diabetes mellitus    Generalized weakness    Unsteady gait when walking    Chronic heart failure with preserved ejection fraction (HFpEF)    Cervical spine degeneration    Weakness    Dysphasia     Past Medical History:   Diagnosis Date    Anemia     Arthritis     Diabetes     Essential hypertension 2021    History of pulmonary embolism     Lumbago     Low back pain    Mild left ventricular hypertrophy 2021    Mixed hyperlipidemia 2021    Obstructive sleep apnea     Reflux esophagitis     Seasonal allergies      Past Surgical History:   Procedure Laterality Date    APPENDECTOMY  2010     SECTION      , , ,     COLONOSCOPY      2019    COLONOSCOPY N/A 2022    Procedure: COLONOSCOPY;  Surgeon: Radha James MD;  Location: [facility] ENDOSCOPY;  Service: Gastroenterology;  Laterality: N/A;  COLON POLYP     ENDOSCOPY  2019    ENDOSCOPY N/A 2023    Procedure: ESOPHAGOGASTRODUODENOSCOPY WITH BIPOSIES;  Surgeon: Coy Patrick MD;  Location: [facility] ENDOSCOPY;  Service: Gastroenterology;  Laterality: N/A;  PRIOR LAPBAND, GASTRITIS    HEMORRHOIDECTOMY N/A 2022    Procedure: HEMORRHOIDECTOMY;  Surgeon: Remi Reeder  MD;  Location: [facility] MAIN OR;  Service: General;  Laterality: N/A;    HERNIA REPAIR      2005, 2006, 2007, 2008    HYSTERECTOMY  2004    LAPAROSCOPIC GASTRIC BANDING      OTHER SURGICAL HISTORY      Metal implants      General Information       Row Name 08/28/23 1531          OT Time and Intention    Document Type evaluation  -ES     Mode of Treatment individual therapy;occupational therapy  -ES       Row Name 08/28/23 1531          General Information    Patient Profile Reviewed yes  -ES     Prior Level of Function independent:;ADL's;all household mobility;community mobility  Patient reports independent with ADLs at baseline, sons assist with IADLs (cooking, cleaning). Rolling walker for functional mobility. Step over tub, standing shower completion.[place] in stance. Home O2 use at night. Denies recent falls.  -ES     Existing Precautions/Restrictions fall  -ES     Barriers to Rehab none identified  -ES       Row Name 08/28/23 1531          Occupational Profile    Reason for Services/Referral (Occupational Profile) Patient is 52 yr old female admitted to [facility] on 8/27/2023 with reports of generalized weakness. OT evaluation and treatment ordered d/t recent decline in ADLs/transfer ability and discharge planning recommendations. No previous OT services for current condition.  -ES       Row Name 08/28/23 1531          Living Environment    People in Home child(cory), adult  x2 sons  -ES       Row Name 08/28/23 1531          Cognition    Orientation Status (Cognition) oriented x 3  patient pleasant and cooperative, agreeable to therapy evaluation  -ES       Row Name 08/28/23 1531          Safety Issues, Functional Mobility    Impairments Affecting Function (Mobility) balance;strength;endurance/activity tolerance  -ES               User Key  (r) = Recorded By, (t) = Taken By, (c) = Cosigned By      Initials Name Provider Type    ES Brenda Terrell, OTR/L, CSRS Occupational Therapist                      Mobility/ADL's       Row Name 08/28/23 1537          Bed Mobility    Bed Mobility supine-sit;sit-supine  -ES     Supine-Sit [place] (Bed Mobility) standby assist  -ES     Sit-Supine [place] (Bed Mobility) standby assist  -ES       Row Name 08/28/23 1537          Transfers    Transfers sit-stand transfer;stand-sit transfer  -ES       Row Name 08/28/23 1537          Sit-Stand Transfer    Sit-Stand [place] (Transfers) contact guard;1 person assist  -ES     Assistive Device (Sit-Stand Transfers) walker, front-wheeled  -ES       Row Name 08/28/23 1537          Stand-Sit Transfer    Stand-Sit [place] (Transfers) contact guard;1 person assist  -ES     Assistive Device (Stand-Sit Transfers) walker, front-wheeled  -ES       Row Name 08/28/23 1537          Functional Mobility    Functional Mobility- Ind. Level contact guard assist;1 person  -ES     Functional Mobility- Device walker, front-wheeled  -ES     Functional Mobility- Comment Patient performed functional mobility to/from bathroom, CGA with use of rolling walker  -ES       Row Name 08/28/23 1537          Activities of Daily Living    BADL Assessment/Intervention bathing;upper body dressing;lower body dressing;grooming;feeding;toileting  -ES       Row Name 08/28/23 1537          Bathing Assessment/Intervention    [place] Level (Bathing) bathing skills;minimum assist (75% patient effort)  -       Row Name 08/28/23 1537          Upper Body Dressing Assessment/Training    [place] Level (Upper Body Dressing) upper body dressing skills;set up  -ES       Row Name 08/28/23 1537          Lower Body Dressing Assessment/Training    [place] Level (Lower Body Dressing) lower body dressing skills;moderate assist (50% patient effort)  -ES       Row Name 08/28/23 1537          Grooming Assessment/Training    [place] Level (Grooming) grooming skills;set up  -ES       Row Name 08/28/23 1537          Self-Feeding Assessment/Training     [place] Level (Feeding) feeding skills;set up  -ES       Row Name 08/28/23 1537          Toileting Assessment/Training    [place] Level (Toileting) toileting skills;minimum assist (75% patient effort)  -ES               User Key  (r) = Recorded By, (t) = Taken By, (c) = Cosigned By      Initials Name Provider Type    ES Brenda Terrell, OTR/L, CSRS Occupational Therapist                   Obj/Interventions       Row Name 08/28/23 1540          Sensory Assessment (Somatosensory)    Sensory Assessment (Somatosensory) sensation intact  -ES       Row Name 08/28/23 1540          Vision Assessment/Intervention    Visual Impairment/Limitations WFL  -ES       Row Name 08/28/23 1540          Range of Motion Comprehensive    General Range of Motion no range of motion deficits identified  -ES       Row Name 08/28/23 1540          Strength Comprehensive (MMT)    General Manual Muscle Testing (MMT) Assessment upper extremity strength deficits identified  -ES     Comment, General Manual Muscle Testing (MMT) Assessment BUEs assessed 3-/5 with generalized weakness noted at time of assessment  -ES       Row Name 08/28/23 1540          Motor Skills    Motor Skills functional endurance  -ES     Functional Endurance fair  -ES       Row Name 08/28/23 1540          Balance    Balance Assessment sitting dynamic balance;standing dynamic balance  -ES     Dynamic Sitting Balance standby assist  -ES     Position, Sitting Balance unsupported;sitting edge of bed  -ES     Dynamic Standing Balance minimal assist;1-person assist  -ES     Position/Device Used, Standing Balance supported;walker, front-wheeled  -ES               User Key  (r) = Recorded By, (t) = Taken By, (c) = Cosigned By      Initials Name Provider Type    ES Brenda Terrell, OTR/L, CSRS Occupational Therapist                   Goals/Plan       Row Name 08/28/23 1541          Transfer Goal 1 (OT)    Activity/Assistive Device (Transfer Goal 1, OT) transfers, all  -ES      [place] Level/Cues Needed (Transfer Goal 1, OT) modified independence  -ES     Time Frame (Transfer Goal 1, OT) long term goal (LTG);10 days  -ES       Row Name 08/28/23 1541          Bathing Goal 1 (OT)    Activity/Device (Bathing Goal 1, OT) bathing skills, all  -ES     [place] Level/Cues Needed (Bathing Goal 1, OT) modified independence  -ES     Time Frame (Bathing Goal 1, OT) long term goal (LTG);10 days  -ES       Row Name 08/28/23 1541          Dressing Goal 1 (OT)    Activity/Device (Dressing Goal 1, OT) dressing skills, all  -ES     [place]/Cues Needed (Dressing Goal 1, OT) modified independence  -ES     Time Frame (Dressing Goal 1, OT) long term goal (LTG);10 days  -ES       Row Name 08/28/23 1541          Toileting Goal 1 (OT)    Activity/Device (Toileting Goal 1, OT) toileting skills, all  -ES     [place] Level/Cues Needed (Toileting Goal 1, OT) modified independence  -ES     Time Frame (Toileting Goal 1, OT) long term goal (LTG);10 days  -ES       Row Name 08/28/23 1541          Grooming Goal 1 (OT)    Activity/Device (Grooming Goal 1, OT) grooming skills, all  -ES     [place] (Grooming Goal 1, OT) modified independence  -ES     Time Frame (Grooming Goal 1, OT) long term goal (LTG);10 days  -ES       Row Name 08/28/23 1541          Strength Goal 1 (OT)    Strength Goal 1 (OT) Pt will increase BUE strength 4/5 muscle grade for increased UE strength required for ADL transfers and task completion  -ES     Time Frame (Strength Goal 1, OT) long term goal (LTG);10 days  -ES       Row Name 08/28/23 1541          Problem Specific Goal 1 (OT)    Problem Specific Goal 1 (OT) Patient will demonstrate fair plus activity tolerance in preperation for independent ADL routine completion at time of discharge  -ES     Time Frame (Problem Specific Goal 1, OT) long term goal (LTG);10 days  -ES       Row Name 08/28/23 1541          Therapy Assessment/Plan (OT)    Planned Therapy Interventions (OT)  activity tolerance training;BADL retraining;functional balance retraining;occupation/activity based interventions;patient/caregiver education/training;strengthening exercise;transfer/mobility retraining  -ES               User Key  (r) = Recorded By, (t) = Taken By, (c) = Cosigned By      Initials Name Provider Type    Brenda Carlos OTR/L, GRACY Occupational Therapist                   Clinical Impression       Row Name 08/28/23 1540          Plan of Care Review    Plan of Care Reviewed With patient  -ES     Progress no change  -ES     Outcome Evaluation Patient has experienced decline in function from baseline status, presenting w/ deficits related to strength, balance, transfers and mobility that impede patient independence with activities of daily living.  Patient would benefit from skilled Occupational Therapy intervention to maxamize patient safety, and promote return to baseline independence.  -ES       Row Name 08/28/23 6380          Therapy Assessment/Plan (OT)    Rehab Potential (OT) good, to achieve stated therapy goals  -ES     Criteria for Skilled Therapeutic Interventions Met (OT) yes;meets criteria;skilled treatment is necessary  -ES     Therapy Frequency (OT) 5 times/wk  -ES       Row Name 08/28/23 8710          Therapy Plan Review/Discharge Plan (OT)    Anticipated Discharge Disposition (OT) home with home health  -ES       Row Name 08/28/23 154          Vital Signs    O2 Delivery Pre Treatment room air  -ES     O2 Delivery Intra Treatment room air  -ES     O2 Delivery Post Treatment room air  -ES               User Key  (r) = Recorded By, (t) = Taken By, (c) = Cosigned By      Initials Name Provider Type    Brenda Carlos, OTR/L, GRACY Occupational Therapist                   Outcome Measures    No documentation.                     OT Recommendation and Plan  Planned Therapy Interventions (OT): activity tolerance training, BADL retraining, functional balance retraining, occupation/activity  based interventions, patient/caregiver education/training, strengthening exercise, transfer/mobility retraining  Therapy Frequency (OT): 5 times/wk  Plan of Care Review  Plan of Care Reviewed With: patient  Progress: no change  Outcome Evaluation: Patient has experienced decline in function from baseline status, presenting w/ deficits related to strength, balance, transfers and mobility that impede patient independence with activities of daily living.  Patient would benefit from skilled Occupational Therapy intervention to maxamize patient safety, and promote return to baseline independence.     Time Calculation:   Evaluation Complexity (OT)  Review Occupational Profile/Medical/Therapy History Complexity: brief/low complexity  Assessment, Occupational Performance/Identification of Deficit Complexity: 3-5 performance deficits  Clinical Decision Making Complexity (OT): problem focused assessment/low complexity  Overall Complexity of Evaluation (OT): low complexity     Time Calculation- OT       Row Name 08/28/23 1543             Time Calculation- OT    OT Received On 08/28/23  -ES      OT Goal Re-Cert Due Date 09/06/23  -ES         Untimed Charges    OT Eval/Re-eval Minutes 32  -ES         Total Minutes    Untimed Charges Total Minutes 32  -ES       Total Minutes 32  -ES                User Key  (r) = Recorded By, (t) = Taken By, (c) = Cosigned By      Initials Name Provider Type    ES Brenda Terrell, OTR/L, CSRS Occupational Therapist                  Therapy Charges for Today       Code Description Service Date Service Provider Modifiers Qty    [id number] HC OT EVAL LOW COMPLEXITY 3 8/28/2023 Brenda Terrell, OTR/L, CSRS GO 1                 Brenda Terrell OTR/L, CSRS  8/28/2023

## 2023-08-28 NOTE — PLAN OF CARE
Goal Outcome Evaluation:  Plan of Care Reviewed With: patient        Progress: improving  Outcome Evaluation: A&Ox4. VSS. Patient came to floor for generalized weakness. Patient having mobility deficits from  baseline. PT and OT consulted. No current signs or symptoms of distress. Resting with eyes closed and visible respirations.

## 2023-08-28 NOTE — H&P
[facility]   HISTORY AND PHYSICAL    Patient Name: Carol Abarca  : 1970  MRN: [id number]  Primary Care Physician:  Sonia Mosquera APRN  Date of admission: 2023    Subjective   Subjective     Chief Complaint:     Patient admitted with generalized weakness and speech trouble      HPI:    Carol Abarca is a 52 y.o. female with recurrent admission to hospital for trouble talking and speech.  Patient felt extremely weak and was unable to talk and was brought into hospital by family also reported some swelling of legs.  No chest pain or no shortness of breath.  Work-up in the ER was essentially negative.  Neurologist was consulted.  Initial work-up negative by neurology.  When I saw patient today she is awake alert but slow to talk.        Review of Systems:    Difficulty talking.  No seizure activity.  Feeling weak.  Tired and fatigued.  No nausea vomiting  No shortness of breath    Personal History     Past Medical History:   Diagnosis Date    Anemia     Arthritis     Diabetes     Essential hypertension 2021    History of pulmonary embolism     Lumbago     Low back pain    Mild left ventricular hypertrophy 2021    Mixed hyperlipidemia 2021    Obstructive sleep apnea     Reflux esophagitis     Seasonal allergies        Past Surgical History:   Procedure Laterality Date    APPENDECTOMY  2010     SECTION      , , ,     COLONOSCOPY      2019 2018    COLONOSCOPY N/A 2022    Procedure: COLONOSCOPY;  Surgeon: Radha James MD;  Location: [facility] ENDOSCOPY;  Service: Gastroenterology;  Laterality: N/A;  COLON POLYP     ENDOSCOPY  2019    ENDOSCOPY N/A 2023    Procedure: ESOPHAGOGASTRODUODENOSCOPY WITH BIPOSIES;  Surgeon: Coy Patrick MD;  Location: [facility] ENDOSCOPY;  Service: Gastroenterology;  Laterality: N/A;  PRIOR LAPBAND, GASTRITIS    HEMORRHOIDECTOMY N/A 2022    Procedure: HEMORRHOIDECTOMY;  Surgeon: Remi Reeder MD;   Location: [facility] MAIN OR;  Service: General;  Laterality: N/A;    HERNIA REPAIR      2005, 2006, 2007, 2008    HYSTERECTOMY  2004    LAPAROSCOPIC GASTRIC BANDING      OTHER SURGICAL HISTORY      Metal implants       Family History: family history includes Arthritis in her mother; Diabetes in her mother and son; Heart disease in her father and mother. Otherwise pertinent FHx was reviewed and not pertinent to current issue.    Social History:  reports that she has been smoking cigarettes. She has a 23.00 pack-year smoking history. She has never used smokeless tobacco. She reports that she does not currently use alcohol. She reports that she does not use drugs.    Home Medications:  ALPRAZolam, HYDROcodone-acetaminophen, QUEtiapine XR, aspirin, cetirizine, empagliflozin, folic acid, furosemide, levETIRAcetam, metoprolol succinate XL, pravastatin, and zolpidem      Allergies:  Allergies   Allergen Reactions    Tramadol Anaphylaxis    Tramadol Hcl Anaphylaxis    Moxifloxacin Hives    Sulfa Antibiotics Hives       Objective   Objective     Vitals:   Temp:  [98.2 °F (36.8 °C)-99.5 °F (37.5 °C)] 98.6 °F (37 °C)  Heart Rate:  [67-87] 86  Resp:  [16-18] 16  BP: (129-170)/() 135/97  Flow (L/min):  [2] 2    Physical Exam    Middle-aged morbidly obese female not in acute distress.  Tired looking.   HEENT with pupils reactive tongue lips slightly swollen.  Neck no adenopathy.  Heart regular.  Lungs clear.  Abdomen soft.    Neurologically awake alert and oriented but slow to talk and difficulty talking  Extremities trace of edema      I have personally reviewed the results from the time of this admission to 8/28/2023 14:54 EDT and agree with these findings:  [x]  Laboratory  []  Microbiology  [x]  Radiology  [x]  EKG/Telemetry   []  Cardiology/Vascular   []  Pathology  []  Old records  []  Other:    CBC:    WBC   Date Value Ref Range Status   08/27/2023 5.36 3.40 - 10.80 10*3/mm3 Final     RBC   Date Value Ref Range Status    08/27/2023 3.78 3.77 - 5.28 10*6/mm3 Final     Hemoglobin   Date Value Ref Range Status   08/27/2023 10.2 (L) 12.0 - 15.9 g/dL Final     Hematocrit   Date Value Ref Range Status   08/27/2023 32.1 (L) 34.0 - 46.6 % Final     MCV   Date Value Ref Range Status   08/27/2023 84.9 79.0 - 97.0 fL Final     MCH   Date Value Ref Range Status   08/27/2023 27.0 26.6 - 33.0 pg Final     MCHC   Date Value Ref Range Status   08/27/2023 31.8 31.5 - 35.7 g/dL Final     RDW   Date Value Ref Range Status   08/27/2023 15.4 12.3 - 15.4 % Final     RDW-SD   Date Value Ref Range Status   08/27/2023 47.0 37.0 - 54.0 fl Final     MPV   Date Value Ref Range Status   08/27/2023 11.2 6.0 - 12.0 fL Final     Platelets   Date Value Ref Range Status   08/27/2023 243 140 - 450 10*3/mm3 Final     Neutrophil %   Date Value Ref Range Status   08/27/2023 65.4 42.7 - 76.0 % Final     Lymphocyte %   Date Value Ref Range Status   08/27/2023 24.3 19.6 - 45.3 % Final     Monocyte %   Date Value Ref Range Status   08/27/2023 6.7 5.0 - 12.0 % Final     Eosinophil %   Date Value Ref Range Status   08/27/2023 2.6 0.3 - 6.2 % Final     Basophil %   Date Value Ref Range Status   08/27/2023 0.6 0.0 - 1.5 % Final     Immature Grans %   Date Value Ref Range Status   08/27/2023 0.4 0.0 - 0.5 % Final     Neutrophils, Absolute   Date Value Ref Range Status   08/27/2023 3.51 1.70 - 7.00 10*3/mm3 Final     Lymphocytes, Absolute   Date Value Ref Range Status   08/27/2023 1.30 0.70 - 3.10 10*3/mm3 Final     Monocytes, Absolute   Date Value Ref Range Status   08/27/2023 0.36 0.10 - 0.90 10*3/mm3 Final     Eosinophils, Absolute   Date Value Ref Range Status   08/27/2023 0.14 0.00 - 0.40 10*3/mm3 Final     Basophils, Absolute   Date Value Ref Range Status   08/27/2023 0.03 0.00 - 0.20 10*3/mm3 Final     Immature Grans, Absolute   Date Value Ref Range Status   08/27/2023 0.02 0.00 - 0.05 10*3/mm3 Final     nRBC   Date Value Ref Range Status   08/27/2023 0.0 0.0 - 0.2 /100  WBC Final        BMP:    Lab Results   Component Value Date    GLUCOSE 99 08/27/2023    BUN 9 08/27/2023    CREATININE 0.59 08/27/2023    EGFRIFAFRI 133 02/05/2022    BCR 15.3 08/27/2023    K 4.0 08/27/2023    CO2 26.7 08/27/2023    CALCIUM 9.3 08/27/2023    PROTENTOTREF 6.7 06/11/2023    ALBUMIN 3.8 08/27/2023    LABIL2 0.9 06/11/2023    AST 17 08/27/2023    ALT 5 08/27/2023        MRI Brain Without Contrast    Result Date: 8/28/2023   No acute intracranial abnormality.  Left sphenoid and maxillary sinus disease with mucosal thickening.  No air-fluid levels are demonstrated.      DEVORA OH DO       Electronically Signed and Approved By: DEVORA OH DO on 8/28/2023 at 7:54             XR Chest 1 View    Result Date: 8/27/2023    Cardiomegaly.  No radiographic findings of acute pulmonary abnormality.       PJ LAZO MD       Electronically Signed and Approved By: PJ LAZO MD on 8/27/2023 at 14:39             CT Head Without Contrast Stroke Protocol    Result Date: 8/27/2023  1. No CT findings of acute intracranial abnormality. 2. Probable chronic sinusitis.  This report was communicated by telephone to Francesca at 1405 hours 8/27/2023.     PJ LAZO MD       Electronically Signed and Approved By: PJ LAZO MD on 8/27/2023 at 14:23                     Assessment & Plan   Assessment / Plan       Current Diagnosis:  Active Hospital Problems    Diagnosis     **Dysphasia     Weakness     Unsteady gait when walking     Type 2 diabetes mellitus     B12 deficiency     Obstructive sleep apnea      Plan:   Admit to hospital,  Neuro consult, reviewed  Work-up negative  Order EEG  Consult Dr. Rajan who sees patient  Resume essential meds  PT OT  Further management available course      DVT prophylaxis:  Medical and mechanical DVT prophylaxis orders are present.    GI Prophylaxis:       Pepcid    CODE STATUS:    Code Status (Patient has no pulse and is not breathing): CPR (Attempt to  Resuscitate)  Medical Interventions (Patient has pulse or is breathing): Full Support    Admission Status:  I believe this patient meets observation status.nutes             I have dictated this note utilizing Dragon Dictation.             Please note that portions of this note were completed with a voice recognition program.             Part of this note may be an electronic transcription/translation of spoken language to printed text         using the Dragon Dictation System.       Electronically signed by Dung Hall MD, 08/28/23, 9:14 AM EDT.    Total time spent with in evaluation and management: 31 minutes

## 2023-08-29 PROBLEM — R56.9 SEIZURES: Status: ACTIVE | Noted: 2023-08-29

## 2023-08-29 PROBLEM — R56.9 SEIZURES: Status: ACTIVE | Noted: 2023-01-01

## 2023-08-29 LAB
BACTERIA SPEC AEROBE CULT: ABNORMAL
CRP SERPL-MCNC: 0.66 MG/DL (ref 0–0.5)
ERYTHROCYTE [SEDIMENTATION RATE] IN BLOOD: 96 MM/HR (ref 0–30)
GLUCOSE BLDC GLUCOMTR-MCNC: 89 MG/DL (ref 70–99)
GLUCOSE BLDC GLUCOMTR-MCNC: 90 MG/DL (ref 70–99)
GLUCOSE BLDC GLUCOMTR-MCNC: 91 MG/DL (ref 70–99)
GLUCOSE BLDC GLUCOMTR-MCNC: 94 MG/DL (ref 70–99)
GRAM STN SPEC: ABNORMAL
GRAM STN SPEC: ABNORMAL
ISOLATED FROM: ABNORMAL

## 2023-08-29 PROCEDURE — 97116 GAIT TRAINING THERAPY: CPT

## 2023-08-29 PROCEDURE — 86003 ALLG SPEC IGE CRUDE XTRC EA: CPT | Performed by: INTERNAL MEDICINE

## 2023-08-29 PROCEDURE — 85652 RBC SED RATE AUTOMATED: CPT | Performed by: INTERNAL MEDICINE

## 2023-08-29 PROCEDURE — 25010000002 ENOXAPARIN PER 10 MG: Performed by: INTERNAL MEDICINE

## 2023-08-29 PROCEDURE — 99223 1ST HOSP IP/OBS HIGH 75: CPT | Performed by: INTERNAL MEDICINE

## 2023-08-29 PROCEDURE — 86161 COMPLEMENT/FUNCTION ACTIVITY: CPT | Performed by: INTERNAL MEDICINE

## 2023-08-29 PROCEDURE — 82948 REAGENT STRIP/BLOOD GLUCOSE: CPT

## 2023-08-29 PROCEDURE — 86038 ANTINUCLEAR ANTIBODIES: CPT | Performed by: INTERNAL MEDICINE

## 2023-08-29 PROCEDURE — 86140 C-REACTIVE PROTEIN: CPT | Performed by: INTERNAL MEDICINE

## 2023-08-29 PROCEDURE — 92526 ORAL FUNCTION THERAPY: CPT

## 2023-08-29 PROCEDURE — 82785 ASSAY OF IGE: CPT | Performed by: INTERNAL MEDICINE

## 2023-08-29 PROCEDURE — 86160 COMPLEMENT ANTIGEN: CPT | Performed by: INTERNAL MEDICINE

## 2023-08-29 PROCEDURE — 99232 SBSQ HOSP IP/OBS MODERATE 35: CPT | Performed by: INTERNAL MEDICINE

## 2023-08-29 PROCEDURE — 94799 UNLISTED PULMONARY SVC/PX: CPT

## 2023-08-29 PROCEDURE — 86008 ALLG SPEC IGE RECOMB EA: CPT | Performed by: INTERNAL MEDICINE

## 2023-08-29 RX ORDER — AMLODIPINE BESYLATE 5 MG/1
5 TABLET ORAL
Status: DISCONTINUED | OUTPATIENT
Start: 2023-08-29 | End: 2023-08-30

## 2023-08-29 RX ADMIN — HYDROCODONE BITARTRATE AND ACETAMINOPHEN 1 TABLET: 5; 325 TABLET ORAL at 08:12

## 2023-08-29 RX ADMIN — AMLODIPINE BESYLATE 5 MG: 5 TABLET ORAL at 16:09

## 2023-08-29 RX ADMIN — ENOXAPARIN SODIUM 40 MG: 100 INJECTION SUBCUTANEOUS at 08:07

## 2023-08-29 RX ADMIN — HYDROCODONE BITARTRATE AND ACETAMINOPHEN 1 TABLET: 5; 325 TABLET ORAL at 16:09

## 2023-08-29 RX ADMIN — QUETIAPINE FUMARATE 200 MG: 200 TABLET ORAL at 21:19

## 2023-08-29 RX ADMIN — ATORVASTATIN CALCIUM 80 MG: 40 TABLET, FILM COATED ORAL at 21:17

## 2023-08-29 RX ADMIN — ASPIRIN 81 MG: 81 TABLET, CHEWABLE ORAL at 08:07

## 2023-08-29 RX ADMIN — LEVETIRACETAM 750 MG: 750 TABLET, FILM COATED ORAL at 21:17

## 2023-08-29 RX ADMIN — METOPROLOL SUCCINATE 25 MG: 25 TABLET, EXTENDED RELEASE ORAL at 08:07

## 2023-08-29 RX ADMIN — Medication 10 ML: at 08:09

## 2023-08-29 RX ADMIN — Medication 10 ML: at 21:17

## 2023-08-29 RX ADMIN — LEVETIRACETAM 750 MG: 750 TABLET, FILM COATED ORAL at 08:08

## 2023-08-29 NOTE — THERAPY TREATMENT NOTE
Acute Care - Physical Therapy Treatment Note   Edi     Patient Name: Carol Abarca  : 1970  MRN: [id number]  Today's Date: 2023      Visit Dx:     ICD-10-CM ICD-9-CM   1. Weakness  R53.1 780.79   2. Altered mental status, unspecified altered mental status type  R41.82 780.97   3. Oropharyngeal dysphagia  R13.12 787.22   4. Decreased activities of daily living (ADL)  Z78.9 V49.89   5. Difficulty walking  R26.2 719.7   6. Weakness generalized  R53.1 780.79     Patient Active Problem List   Diagnosis    Mixed hyperlipidemia    Essential hypertension    Mild left ventricular hypertrophy    Atypical chest pain    Obstructive sleep apnea    History of pulmonary embolism    Screening for colon cancer    Hemorrhoids    Severe anemia    B12 deficiency    Pleural effusion    Seizure disorder    Type 2 diabetes mellitus    Generalized weakness    Unsteady gait when walking    Chronic heart failure with preserved ejection fraction (HFpEF)    Cervical spine degeneration    Weakness    Dysphasia    Seizures     Past Medical History:   Diagnosis Date    Anemia     Arthritis     Diabetes     Essential hypertension 2021    History of pulmonary embolism     Lumbago     Low back pain    Mild left ventricular hypertrophy 2021    Mixed hyperlipidemia 2021    Obstructive sleep apnea     Reflux esophagitis     Seasonal allergies      Past Surgical History:   Procedure Laterality Date    APPENDECTOMY  2010     SECTION      , , ,     COLONOSCOPY      2019    COLONOSCOPY N/A 2022    Procedure: COLONOSCOPY;  Surgeon: Radha James MD;  Location: [facility] ENDOSCOPY;  Service: Gastroenterology;  Laterality: N/A;  COLON POLYP     ENDOSCOPY  2019    ENDOSCOPY N/A 2023    Procedure: ESOPHAGOGASTRODUODENOSCOPY WITH BIPOSIES;  Surgeon: Coy Patrick MD;  Location: [facility] ENDOSCOPY;  Service: Gastroenterology;  Laterality: N/A;  PRIOR LAPBAND, GASTRITIS     HEMORRHOIDECTOMY N/A 07/25/2022    Procedure: HEMORRHOIDECTOMY;  Surgeon: Remi Reeder MD;  Location: [facility] MAIN OR;  Service: General;  Laterality: N/A;    HERNIA REPAIR      2005, 2006, 2007, 2008    HYSTERECTOMY  2004    LAPAROSCOPIC GASTRIC BANDING      OTHER SURGICAL HISTORY      Metal implants     PT Assessment (last 12 hours)       PT Evaluation and Treatment       Row Name 08/29/23 1500          Physical Therapy Time and Intention    Subjective Information complains of;weakness;fatigue  -     Document Type therapy note (daily note)  -     Mode of Treatment physical therapy;individual therapy  -     Patient Effort adequate  -       Row Name 08/29/23 1500          Pain    Additional Documentation Pain Scale: FACES Pre/Post-Treatment (Group)  -       Row Name 08/29/23 1500          Pain Scale: FACES Pre/Post-Treatment    Pain: FACES Scale, Pretreatment 0-->no hurt  -     Posttreatment Pain Rating 0-->no hurt  -       Row Name 08/29/23 1500          Bed Mobility    Bed Mobility scooting/bridging;supine-sit;sit-supine  -     Scooting/Bridging Witten (Bed Mobility) contact guard  -     Supine-Sit [place]itten (Bed Mobility) contact guard  -     Sit-Supine [place] (Bed Mobility) contact guard  -     Assistive Device (Bed Mobility) bed rails  -     Comment, (Bed Mobility) Pt maintains sitting with SBA.  -       Row Name 08/29/23 1500          Transfers    Transfers sit-stand transfer;stand-sit transfer  -       Row Name 08/29/23 1500          Sit-Stand Transfer    Sit-Stand Witten (Transfers) contact guard  -     Assistive Device (Sit-Stand Transfers) walker, front-wheeled  -       Row Name 08/29/23 1500          Stand-Sit Transfer    Stand-Sit [place]itten (Transfers) contact guard  -     Assistive Device (Stand-Sit Transfers) walker, front-wheeled  -       Row Name 08/29/23 1500          Gait/Stairs (Locomotion)    Gait/Stairs Locomotion gait/ambulation  independence;gait/ambulation assistive device;distance ambulated;gait pattern;gait deviations  -     [place] Level (Gait) contact guard  -RH     Assistive Device (Gait) walker, front-wheeled  -RH     Distance in Feet (Gait) 60  -RH     Pattern (Gait) 3-point;step-through  -RH     Deviations/Abnormal Patterns (Gait) gait speed decreased;stride length decreased  -     Gait Assessment/Intervention Pt amb with RW and CGA with 3 point step-through gait and decreased gait speed and stride length.  -       Row Name 08/29/23 1500          Balance    Dynamic Standing Balance contact guard  -RH     Position/Device Used, Standing Balance walker, front-wheeled  -RH       Row Name 08/29/23 1500          Vital Signs    O2 Delivery Intra Treatment room air  -       Row Name 08/29/23 1500          Progress Summary (PT)    Progress Toward Functional Goals (PT) progress toward functional goals is fair  -RH               User Key  (r) = Recorded By, (t) = Taken By, (c) = Cosigned By      Initials Name Provider Type     Osmany Avila PTA Physical Therapist Assistant                    Physical Therapy Education       Title: PT OT SLP Therapies (In Progress)       Topic: Physical Therapy (In Progress)       Point: Mobility training (Done)       Learning Progress Summary             Patient Acceptance, E,TB, VU by  at 8/28/2023 1614                         Point: Home exercise program (Not Started)       Learner Progress:  Not documented in this visit.              Point: Body mechanics (Done)       Learning Progress Summary             Patient Acceptance, E,TB, VU by AV at 8/28/2023 1614                         Point: Precautions (Done)       Learning Progress Summary             Patient Acceptance, E,TB, VU by AV at 8/28/2023 1614                                         User Key       Initials Effective Dates Name Provider Type [facility] 06/11/21 -  Daniel Spear, PT Physical Therapist PT                  PT  Recommendation and Plan     Progress Summary (PT)  Progress Toward Functional Goals (PT): progress toward functional goals is fair   Outcome Measures       Row Name 08/29/23 1500 08/28/23 1600          How much help from another person do you currently need...    Turning from your back to your side while in flat bed without using bedrails? 4  -RH 4  -AV     Moving from lying on back to sitting on the side of a flat bed without bedrails? 3  -RH 3  -AV     Moving to and from a bed to a chair (including a wheelchair)? 3  -RH 3  -AV     Standing up from a chair using your arms (e.g., wheelchair, bedside chair)? 3  -RH 3  -AV     Climbing 3-5 steps with a railing? 3  -RH 3  -AV     To walk in hospital room? 4  -RH 3  -AV     AM-PAC 6 Clicks Score (PT) 20  -RH 19  -AV        Functional Assessment    Outcome Measure Options -- AM-PAC 6 Clicks Basic Mobility (PT)  -AV               User Key  (r) = Recorded By, (t) = Taken By, (c) = Cosigned By      Initials Name Provider Type     Osmany Avila PTA Physical Therapist Assistant    AV Daniel Spear, PT Physical Therapist                     Time Calculation:    PT Charges       Row Name 08/29/23 1505             Time Calculation    PT Received On 08/29/23  -RH         Timed Charges    03270 - Gait Training Minutes  5  -RH      09661 - PT Therapeutic Activity Minutes 4  -RH         Total Minutes    Timed Charges Total Minutes 9  -RH       Total Minutes 9  -RH                User Key  (r) = Recorded By, (t) = Taken By, (c) = Cosigned By      Initials Name Provider Type     Osmany Avila PTA Physical Therapist Assistant                  Therapy Charges for Today       Code Description Service Date Service Provider Modifiers Qty    20693426805 HC GAIT TRAINING EA 15 MIN 8/29/2023 Osmany Avila PTA GP 1            PT G-Codes  Outcome Measure Options: AM-PAC 6 Clicks Basic Mobility (PT)  AM-PAC 6 Clicks Score (PT): 20    Osmany Avila PTA  8/29/2023

## 2023-08-29 NOTE — PROGRESS NOTES
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 Abarca is a 52-year-old right handed woman who presented to the ED on 8/27/2023 with lethargy, generalized weakness and encephalopathy in the setting of a past medical history of epilepsy on levetiracetam, HTN, and T2DM. NIH Stroke Scale on admission was 11. Thrombolysis was deferred as the patient was out of the treatment window. Her presentation was concerning for postictal state versus metabolic encephalopathy. CT head was negative for acute intracranial abnormality. MRI brain without contrast was negative for an acute stroke. At this juncture, the patient has demonstrated some interval improvement in her exam compared to presentation, however, she is not back to her baseline and continues to be encephalopathic. Given her history of recent sleep deprivation, and symptoms which began upon awakening, her initial presentation likely reflected a nocturnal seizure which precipitated a postictal state. EEGs performed in June 2023 and during this hospitalization, demonstrated potentially epileptogenic discharges arising the left temporal head region, which is consistent with focal epilepsy. The patient's levetiracetam was increased from 500 mg twice daily to 750 mg twice daily. The patient was counseled on seizure precautions. She was also counseled on potential side effects that may be associated with levetiracetam as outlined below. At this time, the patient is exam is concerning for continued encephalopathy. If her mental status does not improve, or waxes and wanes, recommend transfer for continuous EEG as the patient may be  having electrographic seizures which are contributing to her altered mental status.    Recommendations.  - Given that the patient's mental status has not improved, recommend transfer for continuous EEG monitoring to exclude electrographic seizures as an explanation for continued encephalopathy  - increase levetiracetam 750 mg BID (ordered)  - follow up levetiracetam level (ordered)  - Patient instructed to seek emergency medical attention if she experiences any suicidal thoughts while on levetiracetam  - Patient instructed to follow-up with her doctor if she experiences any mood side effects while on levetiracetam  - Outpatient neurology follow-up within 1 month of discharge    Seizure precautions should be followed including:  - Do not drive until you are seizure free for at least 6 months and cleared by a physician.  - Any activity related to water should be considered a high risk, so people with epilepsy should avoid swimming or taking baths by themselves. Showers are safer than baths and preferred.Swimming alone should be avoided due to the risk of drowning in case of a seizure. Swimming is safer when there is another person that could intervene if a seizure occurs in the water.  - Any activity related to cooking can be dangerous and result in burns. Precautions include, again, not doing it alone but with another person who could intervene. Pan handles should be facing the back of the stove.  - Only use motorized power tools that have safety switches. Machines with safety switches will stop on their own if you have a seizure and let go of the switch.  - Be aware of your surroundings and make sure family and friends are aware of your seizures and know what to do to help if you have a seizure.      ALYSHA Webb MD MPH  Neurology    Our recommendations are outlined below    Diagnostic Studies :  Continuous EEG Monitoring  Please order    Seizure precautions :  Seizure precautions including no driving for  state mandated time frame were discussed with patient with clear understanding    DVT Prophylaxis :  Choice of Primary Team    Disposition :  Neurology will follow    Subjective  She reports she is feeling numb between her neck and abdomen. She initially reports she is back to her baseline, but subsequently reports she is about a 6/10.    Imaging  MRI brain (8/28/2023). No acute intracranial abnormality. Left sphenoid and maxillary sinus disease with  mucosal thickening. No air-fluid levels are demonstrated.    EEG (6/2023). Intermittent medium voltage epileptiform discharges noted over the left mid temporal region. Independent medium voltage focal slow activity noted over the frontotemporal regions on both sides, more on the left suggestive of neuronal dysfunction in this regions. These abnormalities have been reported in individuals with vascular insufficiency, migraine, or postictal states.    EEG (8/29/2023). Abnormal EEG because of intermittent low to medium voltage epileptiform discharges noted over the left frontotemporal region.    Labs  . . HDL 42. .       Examination  BP(130/94), Pulse(87), Temp(36.7), Resp(16),  1A: Level of Consciousness - Alert; keenly responsive + 0  1B: Ask Month and Age - Both Questions Right + 0  1C: Blink Eyes & Squeeze Hands - Performs Both Tasks + 0  2: Test Horizontal Extraocular Movements - Normal + 0  3: Test Visual Fields - No Visual Loss + 0  4: Test Facial Palsy (Use Grimace if Obtunded) - Normal symmetry + 0  5A: Test Left Arm Motor Drift - No Drift for 10 Seconds + 0  5B: Test Right Arm Motor Drift - No Drift for 10 Seconds + 0  6A: Test Left Leg Motor Drift - Drift, but doesn't hit bed + 1  6B: Test Right Leg Motor Drift - No Drift for 5 Seconds + 0  7: Test Limb Ataxia (FNF/Heel-Shin) - No Ataxia + 0  8: Test Sensation - Mild-Moderate Loss: Less Sharp/More Dull + 1  9: Test Language/Aphasia - Normal; No aphasia + 0  10: Test Dysarthria - Normal +  0  11: Test Extinction/Inattention - No abnormality + 0    NIHSS Score: 2  NIHSS Free Text : Neurologic Exam.  General. Well appearing, in no acute distress. Neck is supple.  Mental status. Alert and awake. Oriented to person, place, time, and situation, but responses are very sluggish.  Language. Comprehension intact to simple one step axial and appendicular commands. Speech is fluent without paraphasic errors.  Parietal. No finger agnosia.  Cranial nerves. Extraocular movements are intact without nystagmus. Facial sensation intact to light touch. Face is symmetric at rest and with activation of frontalis, buccinator, and orbicularis oculi muscles. Hearing grossly intact. Trapezius elevates symmetrically. Tongue midline. No dysarthria.  Motor. Normal muscle bulk. No adventitious movements. Antigravity in all four extremities without drift. She reports she cannot lift up her LLE, but when asked to walk she is able to move it over the side of the bed. When getting back into the bed, she is able to lift her LLE without assistance. Able to perform fine finger movements bilaterally.  Gait: Patient is able to ambulate with a walker. She is able to walk backwards and lift up the walker, and pivot to turn around withotu difficulty.  Reflexes: Deferred.  Sensory: She reports diminished sensation in the LLE. Able to perform finger to nose testing with eyes closed.  Coordination: intact finger to nose testing.              Patient/Family was informed the Neurology Consult would happen via TeleHealth consult by way of interactive audio and video telecommunications and consented to receiving care in this manner.    Telehealth Neurology consultation was provided. I spent minutes providing telehealth care. This includes time spent for face to face visit via telemedicine, review of medical records, imaging studies and discussion of findings with providers, the patient and/or family.      Dr Taveras  Tracey      [facility]  For Inpatient follow-up with [facility] physician please call [facility] [phone]. This is not an outpatient service. Post hospital discharge, please contact hospital directly.

## 2023-08-29 NOTE — CONSULTS
[facility]   Consult Note    Patient Name: Carol Abarca  : 1970  MRN: [id number]  Primary Care Physician:  Sonia Mosquera APRN  Referring Physician: No ref. provider found  Date of admission: 2023    Inpatient Pulmonology Consult  Consult performed by: Robert Bethea DO  Consult ordered by: Dung Hall MD      Subjective   Subjective     Reason for Consult/ Chief Complaint: Reason for consultation angioedema    History of Present Illness  Carol Abarca is a 52 y.o. female pleasant female who has 2 presentations in the last several months with angioedema  Denies use of an ACE inhibitor  Denies use of an ARB  Denies associated urticaria or nausea or vomiting  Has had no allergies  Is not associated with any foods  Has no shortness of breath when these episodes occur  Only develops lip and tongue swelling    Review of Systems   Constitutional:  Positive for activity change.   HENT:  Positive for drooling, facial swelling, trouble swallowing and voice change.         Facial swelling   Eyes: Negative.    Respiratory: Negative.     Cardiovascular: Negative.    Gastrointestinal: Negative.    Endocrine: Negative.    Genitourinary: Negative.    Musculoskeletal: Negative.    Skin: Negative.    Allergic/Immunologic: Negative.    Neurological: Negative.    Hematological: Negative.    Psychiatric/Behavioral: Negative.        Personal History     Past Medical History:   Diagnosis Date    Anemia     Arthritis     Diabetes     Essential hypertension 2021    History of pulmonary embolism     Lumbago     Low back pain    Mild left ventricular hypertrophy 2021    Mixed hyperlipidemia 2021    Obstructive sleep apnea     Reflux esophagitis     Seasonal allergies        Past Surgical History:   Procedure Laterality Date    APPENDECTOMY  2010     SECTION      1990, 1992, 1998,     COLONOSCOPY      2019    COLONOSCOPY N/A 2022    Procedure: COLONOSCOPY;  Surgeon:  Radha James MD;  Location: [facility] ENDOSCOPY;  Service: Gastroenterology;  Laterality: N/A;  COLON POLYP     ENDOSCOPY  2019    ENDOSCOPY N/A 06/08/2023    Procedure: ESOPHAGOGASTRODUODENOSCOPY WITH BIPOSIES;  Surgeon: Coy Patrick MD;  Location: [facility] ENDOSCOPY;  Service: Gastroenterology;  Laterality: N/A;  PRIOR LAPBAND, GASTRITIS    HEMORRHOIDECTOMY N/A 07/25/2022    Procedure: HEMORRHOIDECTOMY;  Surgeon: Remi Reeder MD;  Location: [facility] MAIN OR;  Service: General;  Laterality: N/A;    HERNIA REPAIR      2005, 2006, 2007, 2008    HYSTERECTOMY  2004    LAPAROSCOPIC GASTRIC BANDING      OTHER SURGICAL HISTORY      Metal implants       Family History: family history includes Arthritis in her mother; Diabetes in her mother and son; Heart disease in her father and mother. Otherwise pertinent FHx was reviewed and not pertinent to current issue.    Social History:  reports that she has been smoking cigarettes. She has a 23.00 pack-year smoking history. She has never used smokeless tobacco. She reports that she does not currently use alcohol. She reports that she does not use drugs.    Home Medications:   ALPRAZolam, HYDROcodone-acetaminophen, QUEtiapine XR, aspirin, cetirizine, empagliflozin, folic acid, furosemide, levETIRAcetam, metoprolol succinate XL, pravastatin, and zolpidem    Allergies:  Allergies   Allergen Reactions    Tramadol Anaphylaxis    Tramadol Hcl Anaphylaxis    Moxifloxacin Hives    Sulfa Antibiotics Hives       Objective    Objective     Vitals:  Temp:  [97.9 °F (36.6 °C)-98.8 °F (37.1 °C)] 98.2 °F (36.8 °C)  Heart Rate:  [69-87] 69  Resp:  [16-18] 18  BP: (130-172)/() 138/98    Physical Exam  Pleasant female resting comfortably in bed in no acute distress  She is alert and oriented she is able to answer questions appropriately during the interview no focal deficits  Patient does have some swelling about her face with some mild swelling of her upper lip  Her  tongue is no longer swollen  Her speech is not affected  Her lungs are clear no wheezes rales or rhonchi  She has no rashes  Result Review    Result Review:  I have personally reviewed the results from the time of this admission to 8/29/2023 13:27 EDT and agree with these findings:  []  Laboratory  []  Microbiology  []  Radiology  []  EKG/Telemetry   []  Cardiology/Vascular   []  Pathology  []  Old records  []  Other:    Most notable findings include: Chest imaging unremarkable    Assessment & Plan   Assessment / Plan     Brief Patient Summary:  Carol Abarca is a 52 y.o. female who presents with confusion and facial swelling    Active Hospital Problems:  Active Hospital Problems    Diagnosis     **Dysphasia     Seizures     Weakness     Unsteady gait when walking     Type 2 diabetes mellitus     B12 deficiency     Obstructive sleep apnea    Angioedema    Plan:   No evidence of airway compromise at this time as the patient's episodes of angioedema are not clearly related to any inciting factor    Patient states that she has some swelling about her face and her tongue denies having any GI symptoms, her symptoms would be most consistent with bradykinin induced angioedema as there is no associated urticaria or GI type symptoms    At this time we will send C1 esterase serum and functional    We will send IgE level    Would avoid NSAIDs including aspirin if possible as these medications have been implicated in causing angioedema    Avoid ACE inhibitor and ARB    Patient has no concerning findings at this time for airway compromise and no shortness of breath and her episodes seem to be improving    We will also obtain connective tissue disease panel as well as alpha gal panel    Cont PAP therapy for her SOCO    I personally reviewed all imaging, laboratory data, and I spoke with respiratory therapy, and nursing regarding the patient's care, have also spoken with the patient's primary admitting physician regarding her plan  of care.    Electronically signed by Robert Bethea DO, 08/29/23, 1:27 PM EDT.

## 2023-08-29 NOTE — PROGRESS NOTES
[facility]     Progress Note    Patient Name: Carol Abarca  : 1970  MRN: [id number]  Primary Care Physician:  Sonia Mosquera APRN  Date of admission: 2023      Subjective   Brief summary.  Patient admitted with generalized weakness, confusion and weakness and difficulty speaking      HPI:  Still feels very weak, difficulty talking at times, tongue numb, neck lump upper chest numb.  No chest pain.  Blood pressure fluctuating.  No seizure activity noted.  Decreased sleep would like to have her Seroquel in the dose she takes at home    Review of Systems     No chest pain.  Denies difficulty swallowing.  No shortness of breath  Tongue swollen and thick feeling.  None      Objective     Vitals:   Temp:  [97.9 °F (36.6 °C)-98.6 °F (37 °C)] 98.2 °F (36.8 °C)  Heart Rate:  [69-87] 72  Resp:  [16-18] 18  BP: (130-172)/() 141/105    Physical Exam :     Middle-aged female looks older than stated age not in acute distress.  Slight facial swelling with lip swelling noted.  Tongue midline.  Heart regular.  Lungs clear.  Neuro awake alert and oriented but slow to respond.  Extremities trace of edema      Result Review:  I have personally reviewed the results from the time of this admission to 2023 16:49 EDT and agree with these findings:  [x]  Laboratory  []  Microbiology  []  Radiology  []  EKG/Telemetry   []  Cardiology/Vascular   []  Pathology  []  Old records  []  Other:           Assessment / Plan       Active Hospital Problems:  Active Hospital Problems    Diagnosis     **Dysphasia     Seizures     Weakness     Unsteady gait when walking     Type 2 diabetes mellitus     B12 deficiency     Obstructive sleep apnea        Plan:   Telemetry neurology diagnosed patient with recurrent seizures and increased dose of Keppra.  Overall patient slightly better.  Will consult intensivist for opinion on tongue swelling and possible angioedema.  Restart CPAP.  Add Norvasc for blood pressure.  PT and OT        DVT prophylaxis:  Medical and mechanical DVT prophylaxis orders are present.    CODE STATUS:   Code Status (Patient has no pulse and is not breathing): CPR (Attempt to Resuscitate)  Medical Interventions (Patient has pulse or is breathing): Full Support            Electronically signed by Dung Hall MD, 08/29/23, 4:49 PM EDT.

## 2023-08-29 NOTE — THERAPY TREATMENT NOTE
Acute Care - Speech Language Pathology   Swallow Treatment Note  Edi     Patient Name: Carol Abarca  : 1970  MRN: [id number]  Today's Date: 2023               Admit Date: 2023    Visit Dx:     ICD-10-CM ICD-9-CM   1. Weakness  R53.1 780.79   2. Altered mental status, unspecified altered mental status type  R41.82 780.97   3. Oropharyngeal dysphagia  R13.12 787.22   4. Decreased activities of daily living (ADL)  Z78.9 V49.89   5. Difficulty walking  R26.2 719.7     Patient Active Problem List   Diagnosis    Mixed hyperlipidemia    Essential hypertension    Mild left ventricular hypertrophy    Atypical chest pain    Obstructive sleep apnea    History of pulmonary embolism    Screening for colon cancer    Hemorrhoids    Severe anemia    B12 deficiency    Pleural effusion    Seizure disorder    Type 2 diabetes mellitus    Generalized weakness    Unsteady gait when walking    Chronic heart failure with preserved ejection fraction (HFpEF)    Cervical spine degeneration    Weakness    Dysphasia     Past Medical History:   Diagnosis Date    Anemia     Arthritis     Diabetes     Essential hypertension 2021    History of pulmonary embolism     Lumbago     Low back pain    Mild left ventricular hypertrophy 2021    Mixed hyperlipidemia 2021    Obstructive sleep apnea     Reflux esophagitis     Seasonal allergies      Past Surgical History:   Procedure Laterality Date    APPENDECTOMY  2010     SECTION      , , ,     COLONOSCOPY      2019    COLONOSCOPY N/A 2022    Procedure: COLONOSCOPY;  Surgeon: Radha James MD;  Location: [facility] ENDOSCOPY;  Service: Gastroenterology;  Laterality: N/A;  COLON POLYP     ENDOSCOPY  2019    ENDOSCOPY N/A 2023    Procedure: ESOPHAGOGASTRODUODENOSCOPY WITH BIPOSIES;  Surgeon: Coy Patrick MD;  Location: [facility] ENDOSCOPY;  Service: Gastroenterology;  Laterality: N/A;  PRIOR LAPBAND, GASTRITIS     HEMORRHOIDECTOMY N/A 07/25/2022    Procedure: HEMORRHOIDECTOMY;  Surgeon: Remi Reeder MD;  Location: [facility] MAIN OR;  Service: General;  Laterality: N/A;    HERNIA REPAIR      2005, 2006, 2007, 2008    HYSTERECTOMY  2004    LAPAROSCOPIC GASTRIC BANDING      OTHER SURGICAL HISTORY      Metal implants       SPEECH PATHOLOGY DYSPHAGIA TREATMENT    Subjective/Behavioral Observations: Alert and cooperative, sitting up in bed.  Vocal quality very soft.      Day/time of Treatment: 8/29/2023      Current Diet: Regular, thin      Current Strategies:Assist patient with set up, alternate small bites and small sips of solids and liquids at a slow rate.       Treatment received: Dysphagia therapy to address swallow function through exercises and education of strategies.      Results of treatment: Patient feeding self a.m. meal.  Minimal cueing for bite-size.  Patient stating poor appetite, p.o. intake less than 25%.  No overt clinical signs or symptoms of aspiration noted.  Patient required encouragement to increase p.o. intake.      Progress toward goals: Adequate      Barriers to Achieving goals: N/A      Plan of care:/changes in plan: Continue per current plan.                                                                                                Plan of Care Reviewed With: patient          EDUCATION  The patient has been educated in the following areas:   Modified Diet Instruction.              Time Calculation:    Time Calculation- SLP       Row Name 08/29/23 1014             Time Calculation- SLP    SLP Stop Time 0930  -TB      SLP Received On 08/29/23  -TB         Untimed Charges    95010-RI Treatment Swallow Minutes 40  -TB         Total Minutes    Untimed Charges Total Minutes 40  -TB       Total Minutes 40  -TB                User Key  (r) = Recorded By, (t) = Taken By, (c) = Cosigned By      Initials Name Provider Type    Ling Reina SLP Speech and Language Pathologist                    Therapy  Charges for Today       Code Description Service Date Service Provider Modifiers Qty    [id number]  ST EVAL ORAL PHARYNG SWALLOW 4 8/28/2023 Ling Perez, MARCE GN 1    [id number] HC ST TREATMENT SWALLOW 3 8/29/2023 Ling Perez, MARCE GN 1                 MARCE Vieira  8/29/2023

## 2023-08-29 NOTE — CONSULTS
Consult received per Stroke Protocol. Patient with a noted DM diagnosis, with a current HbA1c of 5.5%, and an estimated average glucose of 111 mg/dL. Patient's home regimen consists of Jardiance (10mg daily) .

## 2023-08-30 LAB
ANA SER QL: NEGATIVE
GLUCOSE BLDC GLUCOMTR-MCNC: 114 MG/DL (ref 70–99)
GLUCOSE BLDC GLUCOMTR-MCNC: 118 MG/DL (ref 70–99)
GLUCOSE BLDC GLUCOMTR-MCNC: 90 MG/DL (ref 70–99)
GLUCOSE BLDC GLUCOMTR-MCNC: 99 MG/DL (ref 70–99)
LEVETIRACETAM SERPL-MCNC: 7.5 UG/ML (ref 10–40)

## 2023-08-30 PROCEDURE — 99232 SBSQ HOSP IP/OBS MODERATE 35: CPT | Performed by: INTERNAL MEDICINE

## 2023-08-30 PROCEDURE — 82948 REAGENT STRIP/BLOOD GLUCOSE: CPT

## 2023-08-30 PROCEDURE — 92526 ORAL FUNCTION THERAPY: CPT

## 2023-08-30 PROCEDURE — 97116 GAIT TRAINING THERAPY: CPT

## 2023-08-30 PROCEDURE — 25010000002 ENOXAPARIN PER 10 MG: Performed by: INTERNAL MEDICINE

## 2023-08-30 RX ORDER — AMLODIPINE BESYLATE 5 MG/1
5 TABLET ORAL ONCE
Status: COMPLETED | OUTPATIENT
Start: 2023-08-30 | End: 2023-08-30

## 2023-08-30 RX ORDER — HYDROCODONE BITARTRATE AND ACETAMINOPHEN 7.5; 325 MG/1; MG/1
1 TABLET ORAL 3 TIMES DAILY
Status: DISCONTINUED | OUTPATIENT
Start: 2023-08-30 | End: 2023-08-31 | Stop reason: HOSPADM

## 2023-08-30 RX ORDER — AMLODIPINE BESYLATE 5 MG/1
10 TABLET ORAL
Status: DISCONTINUED | OUTPATIENT
Start: 2023-08-31 | End: 2023-08-31 | Stop reason: HOSPADM

## 2023-08-30 RX ADMIN — Medication 10 ML: at 21:01

## 2023-08-30 RX ADMIN — ASPIRIN 81 MG: 81 TABLET, CHEWABLE ORAL at 08:32

## 2023-08-30 RX ADMIN — ENOXAPARIN SODIUM 40 MG: 100 INJECTION SUBCUTANEOUS at 08:32

## 2023-08-30 RX ADMIN — EMPAGLIFLOZIN 10 MG: 10 TABLET, FILM COATED ORAL at 12:08

## 2023-08-30 RX ADMIN — HYDROCODONE BITARTRATE AND ACETAMINOPHEN 1 TABLET: 7.5; 325 TABLET ORAL at 21:01

## 2023-08-30 RX ADMIN — AMLODIPINE BESYLATE 5 MG: 5 TABLET ORAL at 16:25

## 2023-08-30 RX ADMIN — LEVETIRACETAM 750 MG: 750 TABLET, FILM COATED ORAL at 08:32

## 2023-08-30 RX ADMIN — LEVETIRACETAM 750 MG: 750 TABLET, FILM COATED ORAL at 21:01

## 2023-08-30 RX ADMIN — HYDROCODONE BITARTRATE AND ACETAMINOPHEN 1 TABLET: 5; 325 TABLET ORAL at 09:38

## 2023-08-30 RX ADMIN — METOPROLOL SUCCINATE 25 MG: 25 TABLET, EXTENDED RELEASE ORAL at 08:32

## 2023-08-30 RX ADMIN — QUETIAPINE FUMARATE 200 MG: 200 TABLET ORAL at 21:01

## 2023-08-30 RX ADMIN — ATORVASTATIN CALCIUM 80 MG: 40 TABLET, FILM COATED ORAL at 21:01

## 2023-08-30 RX ADMIN — HYDROCODONE BITARTRATE AND ACETAMINOPHEN 1 TABLET: 7.5; 325 TABLET ORAL at 16:25

## 2023-08-30 RX ADMIN — Medication 10 ML: at 08:32

## 2023-08-30 RX ADMIN — AMLODIPINE BESYLATE 5 MG: 5 TABLET ORAL at 08:32

## 2023-08-30 NOTE — PROGRESS NOTES
[facility]     Progress Note    Patient Name: Carol Abarca  : 1970  MRN: [id number]  Primary Care Physician:  Sonia Mosquera APRN  Date of admission: 2023      Subjective   Brief summary.  Patient admitted with generalized weakness, confusion and weakness and difficulty speaking      HPI:  She feels somewhat stronger today, complains of increasing pain usually takes 10 mg of Norco would like to get back to 10.  Mostly sleepy during the daytime.  Still having difficulty with speech and talking, lip swelling      Review of Systems     Facial swelling  Numbness in the tongue  No chest pain  No palpitation      Objective     Vitals:   Temp:  [97.9 °F (36.6 °C)-98.8 °F (37.1 °C)] 98.2 °F (36.8 °C)  Heart Rate:  [69-87] 69  Resp:  [18-21] 21  BP: (141-163)/() 153/98    Physical Exam :     Middle-aged female looks older than stated age not in acute distress.  Slight facial swelling with lip swelling noted.  Tongue midline.  Heart regular.  Lungs clear.  Neuro awake alert and oriented but slow to respond.  Extremities trace of edema      Result Review:  I have personally reviewed the results from the time of this admission to 2023 14:33 EDT and agree with these findings:  [x]  Laboratory  []  Microbiology  []  Radiology  []  EKG/Telemetry   []  Cardiology/Vascular   []  Pathology  []  Old records  []  Other:           Assessment / Plan       Active Hospital Problems:  Active Hospital Problems    Diagnosis     **Dysphasia     Seizures     Weakness     Unsteady gait when walking     Type 2 diabetes mellitus     B12 deficiency     Obstructive sleep apnea        Plan:   Stable and improving.  Pressure high will increase blood pressure meds.  Increased pain pills.  Appreciate input from pulmonary.  Discussed with Dr. Bethea.  Monitor labs    DVT prophylaxis:  Medical and mechanical DVT prophylaxis orders are present.    CODE STATUS:   Code Status (Patient has no pulse and is not breathing): CPR  (Attempt to Resuscitate)  Medical Interventions (Patient has pulse or is breathing): Full Support          Electronically signed by Dung Hall MD, 08/30/23, 2:35 PM EDT.

## 2023-08-30 NOTE — SIGNIFICANT NOTE
08/30/23 1230   Coping/Psychosocial   Observed Emotional State calm;cooperative;happy   Verbalized Emotional State hopefulness   Trust Relationship/Rapport empathic listening provided   Involvement in Care interacting with patient   Additional Documentation Spiritual Care (Group)   Spiritual Care   Use of Spiritual Resources non-Sabianism use of spiritual care   Spiritual Care Source  initiative   Spiritual Care Follow-Up follow-up, none required as presently assessed   Response to Spiritual Care engaged in conversation   Spiritual Care Interventions supportive conversation provided   Spiritual Care Visit Type initial   Receptivity to Spiritual Care visit welcomed

## 2023-08-30 NOTE — CONSULTS
"Nutrition Services    Patient Name: Carol Abarca  YOB: 1970  MRN: [id number]  Admission date: 8/27/2023      CLINICAL NUTRITION ASSESSMENT      Reason for Assessment  Follow-up protocol     H&P:    Past Medical History:   Diagnosis Date    Anemia     Arthritis     Diabetes     Essential hypertension 06/24/2021    History of pulmonary embolism     Lumbago     Low back pain    Mild left ventricular hypertrophy 06/24/2021    Mixed hyperlipidemia 06/24/2021    Obstructive sleep apnea     Reflux esophagitis     Seasonal allergies         Current Problems:   Active Hospital Problems    Diagnosis     **Dysphasia     Seizures     Weakness     Unsteady gait when walking     Type 2 diabetes mellitus     B12 deficiency     Obstructive sleep apnea         Nutrition/Diet History         Narrative     Patient admitted with weakness.  Reported recent weight loss and decreased appetite.   Patient states she has had decreased intake and weight loss over the past month.      Patient evaluated by speech therapy and started on a diabetic/healthy heart diet.  Variable intake throughout admission.  SLP reached out to RD.  Patient does not like Boost Glucose Control.     Upon, visit patient agreeable to Boost Plus in place of Boost Glucose Control.  Will update oral nutrition supplement orders and follow with patient for supplement acceptance.      Anthropometrics        Current Height, Weight Height: 157.2 cm (61.89\")  Weight: 87.4 kg (192 lb 10.9 oz)   Current BMI Body mass index is 35.37 kg/m².       Weight Hx  Wt Readings from Last 30 Encounters:   08/27/23 2013 87.4 kg (192 lb 10.9 oz)   08/27/23 1337 86.7 kg (191 lb 2.2 oz)   08/07/23 2105 89.6 kg (197 lb 8.5 oz)   07/22/23 1658 92.6 kg (204 lb 2.3 oz)   07/13/23 1717 92.6 kg (204 lb 2.3 oz)   07/09/23 1603 94.3 kg (207 lb 14.3 oz)   06/30/23 0620 90.9 kg (200 lb 6.4 oz)   06/28/23 0640 90.8 kg (200 lb 2.8 oz)   06/27/23 0600 90 kg (198 lb 6.6 oz)   06/23/23 0610 " 98.1 kg (216 lb 4.3 oz)   06/22/23 0600 102 kg (224 lb 10.4 oz)   06/20/23 0500 102 kg (225 lb 15.5 oz)   06/19/23 0513 102 kg (225 lb 1.4 oz)   06/18/23 0500 101 kg (223 lb 1.7 oz)   06/02/23 0101 97.8 kg (215 lb 9.8 oz)   12/18/22 1541 97.8 kg (215 lb 11.2 oz)   11/04/22 1123 96.4 kg (212 lb 8.4 oz)   11/03/22 0901 102 kg (225 lb)   08/16/22 1252 102 kg (224 lb 3.2 oz)   07/25/22 1006 103 kg (227 lb 1.2 oz)   07/14/22 1125 103 kg (226 lb)   02/05/22 0612 99.2 kg (218 lb 11.1 oz)   07/03/19 0000 99 kg (218 lb 4 oz)   06/17/19 0000 99.1 kg (218 lb 8 oz)   03/28/19 0000 103 kg (226 lb 4 oz)   01/02/19 0000 103 kg (228 lb)   04/30/18 0000 102 kg (224 lb)   03/13/18 0000 100 kg (221 lb 2 oz)            Wt Change Observation -5.6% x 1 month  -14.3% x 1 year     Estimated/Assessed Needs       Energy Requirements 30 kcal/kg adj BW (59.2 kg)   EST Needs (kcal/day) 1776 kcal        Protein Requirements 1.0 g/kg adj BW   EST Daily Needs (g/day) 59 g       Fluid Requirements 30 ml/kg adj BW    Estimated Needs (mL/day) 1776 ml      Labs/Medications         Pertinent Labs Reviewed.   Results from last 7 days   Lab Units 08/27/23  1410   SODIUM mmol/L 138   POTASSIUM mmol/L 4.0   CHLORIDE mmol/L 102   CO2 mmol/L 26.7   BUN mg/dL 9   CREATININE mg/dL 0.59   CALCIUM mg/dL 9.3   BILIRUBIN mg/dL 0.4   ALK PHOS U/L 86   ALT (SGPT) U/L 5   AST (SGOT) U/L 17   GLUCOSE mg/dL 99       Results from last 7 days   Lab Units 08/28/23  0411 08/27/23  1341   MAGNESIUM mg/dL 2.3  --    HEMOGLOBIN g/dL  --  10.2*   HEMATOCRIT %  --  32.1*   TRIGLYCERIDES mg/dL 180*  --        COVID19   Date Value Ref Range Status   08/27/2023 Not Detected Not Detected - Ref. Range Final     Lab Results   Component Value Date    HGBA1C 5.50 08/28/2023         Pertinent Medications Reviewed.     Current Nutrition Orders & Evaluation of Intake       Oral Nutrition     Current PO Diet Diet: Cardiac Diets, Diabetic Diets; Healthy Heart (2-3 Na+); Consistent  Carbohydrate; Texture: Regular Texture (IDDSI 7); Fluid Consistency: Thin (IDDSI 0)   Supplement Orders Placed This Encounter      Dietary Nutrition Supplements Boost Glucose Control (Glucerna Shake); chocolate       Malnutrition Severity Assessment                Nutrition Diagnosis         Nutrition Dx Problem 1 Unintentional weight loss related to decreased ability to consume sufficient energy as evidenced by decreased appetite., unintended wt change., and patient report.       Nutrition Intervention         Boost Plus TID   +1080 kcal, 42 g pro per day      Medical Nutrition Therapy/Nutrition Education          Learner     Readiness Patient  Acceptance     Method     Response Explanation  Verbalizes understanding     Monitor/Evaluation        Monitor Per protocol, PO intake, Supplement intake, Pertinent labs, Weight, POC/GOC       Nutrition Discharge Plan         To be determined       Electronically signed by:  Radha Woo RD  08/30/23 13:59 EDT

## 2023-08-30 NOTE — THERAPY TREATMENT NOTE
Acute Care - Physical Therapy Treatment Note   Edi     Patient Name: Carol Abarca  : 1970  MRN: [id number]  Today's Date: 2023      Visit Dx:     ICD-10-CM ICD-9-CM   1. Weakness  R53.1 780.79   2. Altered mental status, unspecified altered mental status type  R41.82 780.97   3. Oropharyngeal dysphagia  R13.12 787.22   4. Decreased activities of daily living (ADL)  Z78.9 V49.89   5. Difficulty walking  R26.2 719.7   6. Weakness generalized  R53.1 780.79     Patient Active Problem List   Diagnosis    Mixed hyperlipidemia    Essential hypertension    Mild left ventricular hypertrophy    Atypical chest pain    Obstructive sleep apnea    History of pulmonary embolism    Screening for colon cancer    Hemorrhoids    Severe anemia    B12 deficiency    Pleural effusion    Seizure disorder    Type 2 diabetes mellitus    Generalized weakness    Unsteady gait when walking    Chronic heart failure with preserved ejection fraction (HFpEF)    Cervical spine degeneration    Weakness    Dysphasia    Seizures     Past Medical History:   Diagnosis Date    Anemia     Arthritis     Diabetes     Essential hypertension 2021    History of pulmonary embolism     Lumbago     Low back pain    Mild left ventricular hypertrophy 2021    Mixed hyperlipidemia 2021    Obstructive sleep apnea     Reflux esophagitis     Seasonal allergies      Past Surgical History:   Procedure Laterality Date    APPENDECTOMY  2010     SECTION      , , ,     COLONOSCOPY      2019    COLONOSCOPY N/A 2022    Procedure: COLONOSCOPY;  Surgeon: Radha James MD;  Location: [facility] ENDOSCOPY;  Service: Gastroenterology;  Laterality: N/A;  COLON POLYP     ENDOSCOPY  2019    ENDOSCOPY N/A 2023    Procedure: ESOPHAGOGASTRODUODENOSCOPY WITH BIPOSIES;  Surgeon: Coy Patrick MD;  Location: [facility] ENDOSCOPY;  Service: Gastroenterology;  Laterality: N/A;  PRIOR LAPBAND, GASTRITIS     HEMORRHOIDECTOMY N/A 07/25/2022    Procedure: HEMORRHOIDECTOMY;  Surgeon: Remi Reeder MD;  Location: [facility] MAIN OR;  Service: General;  Laterality: N/A;    HERNIA REPAIR      2005, 2006, 2007, 2008    HYSTERECTOMY  2004    LAPAROSCOPIC GASTRIC BANDING      OTHER SURGICAL HISTORY      Metal implants     PT Assessment (last 12 hours)       PT Evaluation and Treatment       Row Name 08/30/23 1400          Physical Therapy Time and Intention    Subjective Information no complaints  -RH     Document Type therapy note (daily note)  -     Mode of Treatment physical therapy;individual therapy  -     Patient Effort good  -       Row Name 08/30/23 1400          Pain Scale: FACES Pre/Post-Treatment    Pain: FACES Scale, Pretreatment 0-->no hurt  -     Posttreatment Pain Rating 0-->no hurt  -       Row Name 08/30/23 1400          Bed Mobility    Bed Mobility supine-sit;sit-supine;scooting/bridging  -     Scooting/Bridging Drew (Bed Mobility) standby assist  -     Supine-Sit Drew (Bed Mobility) standby assist  -     Sit-Supine Drew (Bed Mobility) standby assist  -     Assistive Device (Bed Mobility) bed rails  -       Row Name 08/30/23 1400          Transfers    Transfers sit-stand transfer;stand-sit transfer  -       Row Name 08/30/23 1400          Sit-Stand Transfer    Sit-Stand Drew (Transfers) contact guard  -     Assistive Device (Sit-Stand Transfers) walker, front-wheeled  -       Row Name 08/30/23 1400          Stand-Sit Transfer    Stand-Sit Drew (Transfers) contact guard  -     Assistive Device (Stand-Sit Transfers) walker, front-wheeled  -       Row Name 08/30/23 1400          Gait/Stairs (Locomotion)    Gait/Stairs Locomotion gait/ambulation independence;gait/ambulation assistive device;distance ambulated;gait pattern;gait deviations  -     Drew Level (Gait) contact guard  -     Assistive Device (Gait) walker, front-wheeled  -      Distance in Feet (Gait) 100  -RH     Pattern (Gait) 3-point;step-through  -RH     Deviations/Abnormal Patterns (Gait) gait speed decreased;stride length decreased  -     Gait Assessment/Intervention Pt amb with RW and CGA with 3 point step-through gait pattern with decreased gait speed and stride length.  -       Row Name 08/30/23 1400          Balance    Balance Assessment standing dynamic balance  -RH     Dynamic Standing Balance contact guard  -RH     Position/Device Used, Standing Balance walker, front-wheeled  -       Row Name 08/30/23 1400          Progress Summary (PT)    Progress Toward Functional Goals (PT) progress toward functional goals is good  -RH               User Key  (r) = Recorded By, (t) = Taken By, (c) = Cosigned By      Initials Name Provider Type    RH Osmany Avila, PTA Physical Therapist Assistant                    Physical Therapy Education       Title: PT OT SLP Therapies (In Progress)       Topic: Physical Therapy (In Progress)       Point: Mobility training (Done)       Learning Progress Summary             Patient Acceptance, E,TB, VU by AV at 8/28/2023 1614                         Point: Home exercise program (Not Started)       Learner Progress:  Not documented in this visit.              Point: Body mechanics (Done)       Learning Progress Summary             Patient Acceptance, E,TB, VU by AV at 8/28/2023 1614                         Point: Precautions (Done)       Learning Progress Summary             Patient Acceptance, E,TB, VU by AV at 8/28/2023 1614                                         User Key       Initials Effective Dates Name Provider Type Discipline     06/11/21 -  Daniel Spear, PT Physical Therapist PT                  PT Recommendation and Plan     Progress Summary (PT)  Progress Toward Functional Goals (PT): progress toward functional goals is good   Outcome Measures       Row Name 08/30/23 1400 08/29/23 1500 08/28/23 1600       How much help from  another person do you currently need...    Turning from your back to your side while in flat bed without using bedrails? 4  -RH 4  -RH 4  -AV    Moving from lying on back to sitting on the side of a flat bed without bedrails? 3  -RH 3  -RH 3  -AV    Moving to and from a bed to a chair (including a wheelchair)? 3  -RH 3  -RH 3  -AV    Standing up from a chair using your arms (e.g., wheelchair, bedside chair)? 3  -RH 3  -RH 3  -AV    Climbing 3-5 steps with a railing? 3  -RH 3  -RH 3  -AV    To walk in hospital room? 4  -RH 4  -RH 3  -AV    AM-PAC 6 Clicks Score (PT) 20  -RH 20  -RH 19  -AV       Functional Assessment    Outcome Measure Options -- -- AM-PAC 6 Clicks Basic Mobility (PT)  -AV              User Key  (r) = Recorded By, (t) = Taken By, (c) = Cosigned By      Initials Name Provider Type     Osmany Avila PTA Physical Therapist Assistant    AV Daniel Spear, PT Physical Therapist                     Time Calculation:    PT Charges       Row Name 08/30/23 1423             Time Calculation    PT Received On 08/30/23  -RH         Timed Charges    57377 - Gait Training Minutes  6  -RH      39208 - PT Therapeutic Activity Minutes 4  -RH         Total Minutes    Timed Charges Total Minutes 10  -RH       Total Minutes 10  -RH                User Key  (r) = Recorded By, (t) = Taken By, (c) = Cosigned By      Initials Name Provider Type     Osmany Avila PTA Physical Therapist Assistant                  Therapy Charges for Today       Code Description Service Date Service Provider Modifiers Qty    02851383038 HC GAIT TRAINING EA 15 MIN 8/29/2023 Osmany Avila PTA GP 1    66144476574 HC GAIT TRAINING EA 15 MIN 8/30/2023 Osmany Avila PTA GP 1            PT G-Codes  Outcome Measure Options: AM-PAC 6 Clicks Basic Mobility (PT)  AM-PAC 6 Clicks Score (PT): 20    Osmany Avila PTA  8/30/2023

## 2023-08-30 NOTE — PLAN OF CARE
Goal Outcome Evaluation:         Plan of care reviewed with the pt. Pt has no complaints at this time. Pt remains A&Ox4, blood pressures elevated today, treated per MAR and MD notified.

## 2023-08-30 NOTE — THERAPY TREATMENT NOTE
Acute Care - Speech Language Pathology   Swallow Treatment Note  Edi     Patient Name: Carol Abarca  : 1970  MRN: [id number]  Today's Date: 2023               Admit Date: 2023    Visit Dx:     ICD-10-CM ICD-9-CM   1. Weakness  R53.1 780.79   2. Altered mental status, unspecified altered mental status type  R41.82 780.97   3. Oropharyngeal dysphagia  R13.12 787.22   4. Decreased activities of daily living (ADL)  Z78.9 V49.89   5. Difficulty walking  R26.2 719.7   6. Weakness generalized  R53.1 780.79     Patient Active Problem List   Diagnosis    Mixed hyperlipidemia    Essential hypertension    Mild left ventricular hypertrophy    Atypical chest pain    Obstructive sleep apnea    History of pulmonary embolism    Screening for colon cancer    Hemorrhoids    Severe anemia    B12 deficiency    Pleural effusion    Seizure disorder    Type 2 diabetes mellitus    Generalized weakness    Unsteady gait when walking    Chronic heart failure with preserved ejection fraction (HFpEF)    Cervical spine degeneration    Weakness    Dysphasia    Seizures     Past Medical History:   Diagnosis Date    Anemia     Arthritis     Diabetes     Essential hypertension 2021    History of pulmonary embolism     Lumbago     Low back pain    Mild left ventricular hypertrophy 2021    Mixed hyperlipidemia 2021    Obstructive sleep apnea     Reflux esophagitis     Seasonal allergies      Past Surgical History:   Procedure Laterality Date    APPENDECTOMY  2010     SECTION      1990, 1992, 1998,     COLONOSCOPY      2019    COLONOSCOPY N/A 2022    Procedure: COLONOSCOPY;  Surgeon: Radha James MD;  Location: [facility] ENDOSCOPY;  Service: Gastroenterology;  Laterality: N/A;  COLON POLYP     ENDOSCOPY  2019    ENDOSCOPY N/A 2023    Procedure: ESOPHAGOGASTRODUODENOSCOPY WITH BIPOSIES;  Surgeon: Coy Patrick MD;  Location: [facility] ENDOSCOPY;  Service:  Gastroenterology;  Laterality: N/A;  PRIOR LAPBAND, GASTRITIS    HEMORRHOIDECTOMY N/A 07/25/2022    Procedure: HEMORRHOIDECTOMY;  Surgeon: Remi Reeder MD;  Location: [facility] OR;  Service: General;  Laterality: N/A;    HERNIA REPAIR      2005, 2006, 2007, 2008    HYSTERECTOMY  2004    LAPAROSCOPIC GASTRIC BANDING      OTHER SURGICAL HISTORY      Metal implants       SPEECH PATHOLOGY DYSPHAGIA TREATMENT     Subjective/Behavioral Observations: Alert and cooperative, sitting up in bed.  Vocal quality soft though improving.        Day/time of Treatment: 8/30/2023        Current Diet: Regular, thin        Current Strategies:Assist patient with set up, alternate small bites and small sips of solids and liquids at a slow rate.         Treatment received: Dysphagia therapy to address swallow function through exercises and education of strategies.        Results of treatment: Patient feeding self a.m. meal.   Patient stating poor appetite, p.o. intake less than 25%.  No overt clinical signs or symptoms of aspiration noted.  Patient required encouragement to increase p.o. intake.  Patient to trial alternate flavored boost, patient not liking flavors.  Patient stating does not understand need for glucose control, that her sugar has been okay recently.        Progress toward goals: Adequate        Barriers to Achieving goals: N/A        Plan of care:/changes in plan: Continue per current plan.  Notified dietitian of patient concerns.                                                                                    Plan of Care Reviewed With: patient          EDUCATION  The patient has been educated in the following areas:   Modified Diet Instruction.              Time Calculation:    Time Calculation- SLP       Row Name 08/30/23 0932             Time Calculation- SLP    SLP Received On 08/30/23  -TB         Untimed Charges    91974-II Treatment Swallow Minutes 40  -TB         Total Minutes    Untimed Charges Total  Minutes 40  -TB       Total Minutes 40  -TB                User Key  (r) = Recorded By, (t) = Taken By, (c) = Cosigned By      Initials Name Provider Type    Ling Reina SLP Speech and Language Pathologist                    Therapy Charges for Today       Code Description Service Date Service Provider Modifiers Qty    67091834417 HC ST TREATMENT SWALLOW 3 8/29/2023 Ling Perez SLP GN 1    05797008153 HC ST TREATMENT SWALLOW 3 8/30/2023 Ling Perez SLP GN 1                 MARCE Vieira  8/30/2023

## 2023-08-30 NOTE — PROGRESS NOTES
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 Abarca is a 52-year-old right handed woman who presented to the ED on 8/27/2023 with lethargy, generalized weakness and encephalopathy in the setting of a past medical history of epilepsy on levetiracetam, HTN, and T2DM. NIH Stroke Scale on admission was 11. Thrombolysis was deferred as the patient was out of the treatment window. Her presentation was concerning for postictal state versus metabolic encephalopathy. CT head was negative for acute intracranial abnormality. MRI brain without contrast was negative for an acute stroke. At this juncture, the patient has demonstrated some interval improvement in her exam compared to presentation, however, she is not back to her baseline and continues to be encephalopathic. Given her history of recent sleep deprivation, and symptoms which began upon awakening, her initial presentation likely reflected a nocturnal seizure which precipitated a postictal state. EEGs performed in June 2023 and during this hospitalization, demonstrated potentially epileptogenic discharges arising the left temporal head region, which is consistent with focal epilepsy. The patient's levetiracetam level on 500 mg twice daily was noted to be subtherapeutic when checked on 8/28/2023. The dosage was subsequently increased from 500 mg twice daily to 750 mg twice daily. The patient was counseled on seizure precautions. She was also counseled on potential side effects that may be associated with levetiracetam as outlined below. At this time, the patient's exam is improving, and she is very near her baseline. If her  mental status does not continue to improve, or were to wax and wane, would recommend transfer for continuous EEG.    Recommendations.  - consider transfer for continuous EEG if mental status does not continue to improve or waxes and wanes  - continue levetiracetam 750 mg BID (ordered)  - Patient instructed to seek emergency medical attention if she experiences any suicidal thoughts while on levetiracetam  - Patient instructed to follow-up with her doctor if she experiences any mood side effects while on levetiracetam  - return precautions: seek emergency medical attention for any seizure activity lasting longer than 3 minutes or for back to back seizures without return to baseline  - Outpatient neurology follow-up within 1 month of discharge    Seizure precautions should be followed including:  - Do not drive until you are seizure free for at least 6 months and cleared by a physician.  - Any activity related to water should be considered a high risk, so people with epilepsy should avoid swimming or taking baths by themselves. Showers are safer than baths and preferred.Swimming alone should be avoided due to the risk of drowning in case of a seizure. Swimming is safer when there is another person that could intervene if a seizure occurs in the water.  - Any activity related to cooking can be dangerous and result in burns. Precautions include, again, not doing it alone but with another person who could intervene. Pan handles should be facing the back of the stove.  - Only use motorized power tools that have safety switches. Machines with safety switches will stop on their own if you have a seizure and let go of the switch.  - Be aware of your surroundings and make sure family and friends are aware of your seizures and know what to do to help if you have a seizure.      ALYSHA Webb MD MPH  Neurology    Our recommendations are outlined below    Disposition :  Neurology will sign off. Reconsult if  Needed.    Subjective  She reports she is not quite back to herself yet; she reports she is 8/10 back to herself, which is improved from yesterday when she reproted she was 6/10. She reports she feels stronger than yesterday.    Imaging  MRI brain (8/28/2023). No acute intracranial abnormality. Left sphenoid and maxillary sinus disease with  mucosal thickening. No air-fluid levels are demonstrated.    EEG (6/2023). Intermittent medium voltage epileptiform discharges noted over the left mid temporal region. Independent medium voltage focal slow activity noted over the frontotemporal regions on both sides, more on the left suggestive of neuronal dysfunction in this regions. These abnormalities have been reported in individuals with vascular insufficiency, migraine, or postictal states.    EEG (8/29/2023). Abnormal EEG because of intermittent low to medium voltage epileptiform discharges noted over the left frontotemporal region.    Labs  . . HDL 42. .       Examination  1A: Level of Consciousness - Alert; keenly responsive + 0  1B: Ask Month and Age - Both Questions Right + 0  1C: Blink Eyes & Squeeze Hands - Performs Both Tasks + 0  2: Test Horizontal Extraocular Movements - Normal + 0  3: Test Visual Fields - No Visual Loss + 0  4: Test Facial Palsy (Use Grimace if Obtunded) - Normal symmetry + 0  5A: Test Left Arm Motor Drift - No Drift for 10 Seconds + 0  5B: Test Right Arm Motor Drift - No Drift for 10 Seconds + 0  6A: Test Left Leg Motor Drift - No Drift for 5 Seconds + 0  6B: Test Right Leg Motor Drift - No Drift for 5 Seconds + 0  7: Test Limb Ataxia (FNF/Heel-Shin) - No Ataxia + 0  8: Test Sensation - Normal; No sensory loss + 0  9: Test Language/Aphasia - Normal; No aphasia + 0  10: Test Dysarthria - Normal + 0  11: Test Extinction/Inattention - No abnormality + 0    NIHSS Score: 0  NIHSS Free Text : Neurologic Exam.  General. Well appearing, in no acute distress. Neck is supple.  Mental  status. Alert and awake. Oriented to person, place, time, and situation, she is responding more briskly today.  Language. Comprehension intact to simple one step axial and appendicular commands. Speech is fluent without paraphasic errors.  Parietal. No finger agnosia.  Cranial nerves. Extraocular movements are intact without nystagmus. Facial sensation intact to light touch. Face is symmetric at rest and with activation of frontalis, buccinator, and orbicularis oculi muscles. Hearing grossly intact. Trapezius elevates symmetrically. Tongue midline. No dysarthria.  Motor. Normal muscle bulk. No adventitious movements. Antigravity in all four extremities without drift.  Gait: Deferred.  Reflexes: Deferred.  Sensory: Able to perform finger to nose testing with eyes closed.  Coordination: intact finger to nose testing.              Patient/Family was informed the Neurology Consult would happen via TeleHealth consult by way of interactive audio and video telecommunications and consented to receiving care in this manner.    Telehealth Neurology consultation was provided. I spent minutes providing telehealth care. This includes time spent for face to face visit via telemedicine, review of medical records, imaging studies and discussion of findings with providers, the patient and/or family.      Dr Darcy Webb      [facility]  For Inpatient follow-up with [facility] physician please call [facility] [phone]. This is not an outpatient service. Post hospital discharge, please contact hospital directly.

## 2023-08-31 ENCOUNTER — READMISSION MANAGEMENT (OUTPATIENT)
Dept: CALL CENTER | Facility: HOSPITAL | Age: 53
End: 2023-08-31
Payer: MEDICARE

## 2023-08-31 VITALS
HEIGHT: 62 IN | TEMPERATURE: 98.4 F | OXYGEN SATURATION: 95 % | SYSTOLIC BLOOD PRESSURE: 125 MMHG | HEART RATE: 83 BPM | DIASTOLIC BLOOD PRESSURE: 87 MMHG | BODY MASS INDEX: 35.46 KG/M2 | RESPIRATION RATE: 18 BRPM | WEIGHT: 192.68 LBS

## 2023-08-31 PROBLEM — R47.02 DYSPHASIA: Status: RESOLVED | Noted: 2023-08-28 | Resolved: 2023-08-31

## 2023-08-31 PROBLEM — R47.02 DYSPHASIA: Status: RESOLVED | Noted: 2023-01-01 | Resolved: 2023-01-01

## 2023-08-31 LAB
ALBUMIN SERPL-MCNC: 3.8 G/DL (ref 3.5–5.2)
ALBUMIN/GLOB SERPL: 0.9 G/DL
ALP SERPL-CCNC: 75 U/L (ref 39–117)
ALT SERPL W P-5'-P-CCNC: <5 U/L (ref 1–33)
ANION GAP SERPL CALCULATED.3IONS-SCNC: 11.8 MMOL/L (ref 5–15)
AST SERPL-CCNC: 12 U/L (ref 1–32)
BASOPHILS # BLD AUTO: 0.02 10*3/MM3 (ref 0–0.2)
BASOPHILS NFR BLD AUTO: 0.4 % (ref 0–1.5)
BILIRUB SERPL-MCNC: 0.2 MG/DL (ref 0–1.2)
BUN SERPL-MCNC: 10 MG/DL (ref 6–20)
BUN/CREAT SERPL: 19.2 (ref 7–25)
CALCIUM SPEC-SCNC: 9 MG/DL (ref 8.6–10.5)
CHLORIDE SERPL-SCNC: 104 MMOL/L (ref 98–107)
CO2 SERPL-SCNC: 23.2 MMOL/L (ref 22–29)
CREAT SERPL-MCNC: 0.52 MG/DL (ref 0.57–1)
DEPRECATED RDW RBC AUTO: 45.8 FL (ref 37–54)
EGFRCR SERPLBLD CKD-EPI 2021: 111.9 ML/MIN/1.73
EOSINOPHIL # BLD AUTO: 0.18 10*3/MM3 (ref 0–0.4)
EOSINOPHIL NFR BLD AUTO: 4 % (ref 0.3–6.2)
ERYTHROCYTE [DISTWIDTH] IN BLOOD BY AUTOMATED COUNT: 14.7 % (ref 12.3–15.4)
GLOBULIN UR ELPH-MCNC: 4.1 GM/DL
GLUCOSE BLDC GLUCOMTR-MCNC: 102 MG/DL (ref 70–99)
GLUCOSE SERPL-MCNC: 91 MG/DL (ref 65–99)
HCT VFR BLD AUTO: 32.6 % (ref 34–46.6)
HGB BLD-MCNC: 9.8 G/DL (ref 12–15.9)
IMM GRANULOCYTES # BLD AUTO: 0.01 10*3/MM3 (ref 0–0.05)
IMM GRANULOCYTES NFR BLD AUTO: 0.2 % (ref 0–0.5)
LYMPHOCYTES # BLD AUTO: 1.42 10*3/MM3 (ref 0.7–3.1)
LYMPHOCYTES NFR BLD AUTO: 31.7 % (ref 19.6–45.3)
MCH RBC QN AUTO: 25.7 PG (ref 26.6–33)
MCHC RBC AUTO-ENTMCNC: 30.1 G/DL (ref 31.5–35.7)
MCV RBC AUTO: 85.6 FL (ref 79–97)
MONOCYTES # BLD AUTO: 0.4 10*3/MM3 (ref 0.1–0.9)
MONOCYTES NFR BLD AUTO: 8.9 % (ref 5–12)
NEUTROPHILS NFR BLD AUTO: 2.45 10*3/MM3 (ref 1.7–7)
NEUTROPHILS NFR BLD AUTO: 54.8 % (ref 42.7–76)
NRBC BLD AUTO-RTO: 0 /100 WBC (ref 0–0.2)
PLATELET # BLD AUTO: 308 10*3/MM3 (ref 140–450)
PMV BLD AUTO: 10.8 FL (ref 6–12)
POTASSIUM SERPL-SCNC: 3.4 MMOL/L (ref 3.5–5.2)
PROT SERPL-MCNC: 7.9 G/DL (ref 6–8.5)
RBC # BLD AUTO: 3.81 10*6/MM3 (ref 3.77–5.28)
SODIUM SERPL-SCNC: 139 MMOL/L (ref 136–145)
WBC NRBC COR # BLD: 4.48 10*3/MM3 (ref 3.4–10.8)

## 2023-08-31 PROCEDURE — 80053 COMPREHEN METABOLIC PANEL: CPT | Performed by: INTERNAL MEDICINE

## 2023-08-31 PROCEDURE — 25010000002 ENOXAPARIN PER 10 MG: Performed by: INTERNAL MEDICINE

## 2023-08-31 PROCEDURE — 92526 ORAL FUNCTION THERAPY: CPT

## 2023-08-31 PROCEDURE — 82948 REAGENT STRIP/BLOOD GLUCOSE: CPT

## 2023-08-31 PROCEDURE — 85025 COMPLETE CBC W/AUTO DIFF WBC: CPT | Performed by: INTERNAL MEDICINE

## 2023-08-31 RX ORDER — AMLODIPINE BESYLATE 10 MG/1
5 TABLET ORAL
Qty: 15 TABLET | Refills: 0 | Status: CANCELLED | OUTPATIENT
Start: 2023-09-01 | End: 2023-10-01

## 2023-08-31 RX ORDER — LEVETIRACETAM 750 MG/1
750 TABLET ORAL EVERY 12 HOURS SCHEDULED
Qty: 60 TABLET | Refills: 0 | Status: CANCELLED | OUTPATIENT
Start: 2023-08-31 | End: 2023-09-30

## 2023-08-31 RX ORDER — AMLODIPINE BESYLATE 10 MG/1
5 TABLET ORAL DAILY
Qty: 15 TABLET | Refills: 0 | Status: SHIPPED | OUTPATIENT
Start: 2023-08-31 | End: 2023-09-30

## 2023-08-31 RX ORDER — LEVETIRACETAM 750 MG/1
750 TABLET ORAL EVERY 12 HOURS SCHEDULED
Qty: 60 TABLET | Refills: 0 | Status: SHIPPED | OUTPATIENT
Start: 2023-08-31 | End: 2023-09-30

## 2023-08-31 RX ADMIN — ASPIRIN 81 MG: 81 TABLET, CHEWABLE ORAL at 08:22

## 2023-08-31 RX ADMIN — EMPAGLIFLOZIN 10 MG: 10 TABLET, FILM COATED ORAL at 08:22

## 2023-08-31 RX ADMIN — LEVETIRACETAM 750 MG: 750 TABLET, FILM COATED ORAL at 08:22

## 2023-08-31 RX ADMIN — AMLODIPINE BESYLATE 10 MG: 5 TABLET ORAL at 08:22

## 2023-08-31 RX ADMIN — METOPROLOL SUCCINATE 25 MG: 25 TABLET, EXTENDED RELEASE ORAL at 08:22

## 2023-08-31 RX ADMIN — ENOXAPARIN SODIUM 40 MG: 100 INJECTION SUBCUTANEOUS at 08:22

## 2023-08-31 RX ADMIN — HYDROCODONE BITARTRATE AND ACETAMINOPHEN 1 TABLET: 7.5; 325 TABLET ORAL at 08:22

## 2023-08-31 RX ADMIN — Medication 10 ML: at 08:23

## 2023-08-31 NOTE — THERAPY TREATMENT NOTE
Acute Care - Speech Language Pathology   Swallow Treatment Note  Edi     Patient Name: Carol Abarca  : 1970  MRN: [id number]  Today's Date: 2023               Admit Date: 2023    Visit Dx:     ICD-10-CM ICD-9-CM   1. Weakness  R53.1 780.79   2. Altered mental status, unspecified altered mental status type  R41.82 780.97   3. Oropharyngeal dysphagia  R13.12 787.22   4. Decreased activities of daily living (ADL)  Z78.9 V49.89   5. Difficulty walking  R26.2 719.7   6. Weakness generalized  R53.1 780.79     Patient Active Problem List   Diagnosis    Mixed hyperlipidemia    Essential hypertension    Mild left ventricular hypertrophy    Atypical chest pain    Obstructive sleep apnea    History of pulmonary embolism    Screening for colon cancer    Hemorrhoids    Severe anemia    B12 deficiency    Pleural effusion    Seizure disorder    Type 2 diabetes mellitus    Generalized weakness    Unsteady gait when walking    Chronic heart failure with preserved ejection fraction (HFpEF)    Cervical spine degeneration    Weakness    Seizures     Past Medical History:   Diagnosis Date    Anemia     Arthritis     Diabetes     Essential hypertension 2021    History of pulmonary embolism     Lumbago     Low back pain    Mild left ventricular hypertrophy 2021    Mixed hyperlipidemia 2021    Obstructive sleep apnea     Reflux esophagitis     Seasonal allergies      Past Surgical History:   Procedure Laterality Date    APPENDECTOMY  2010     SECTION      1990, 1992, ,     COLONOSCOPY      2019    COLONOSCOPY N/A 2022    Procedure: COLONOSCOPY;  Surgeon: Radha James MD;  Location: [facility] ENDOSCOPY;  Service: Gastroenterology;  Laterality: N/A;  COLON POLYP     ENDOSCOPY  2019    ENDOSCOPY N/A 2023    Procedure: ESOPHAGOGASTRODUODENOSCOPY WITH BIPOSIES;  Surgeon: Coy Patrick MD;  Location: [facility] ENDOSCOPY;  Service: Gastroenterology;   Laterality: N/A;  PRIOR LAPBAND, GASTRITIS    HEMORRHOIDECTOMY N/A 07/25/2022    Procedure: HEMORRHOIDECTOMY;  Surgeon: Remi Reeder MD;  Location: [facility] OR;  Service: General;  Laterality: N/A;    HERNIA REPAIR      2005, 2006, 2007, 2008    HYSTERECTOMY  2004    LAPAROSCOPIC GASTRIC BANDING      OTHER SURGICAL HISTORY      Metal implants       SPEECH PATHOLOGY DYSPHAGIA TREATMENT     Subjective/Behavioral Observations: Alert and cooperative, sitting up in bed.  Vocal quality is soft but adequate.   Day/time of Treatment: 8/31/2023        Current Diet: Regular, thin        Current Strategies:Assist patient with set up, alternate small bites and small sips of solids and liquids at a slow rate.         Treatment received: Dysphagia therapy to address swallow function through exercises and education of strategies.        Results of treatment: Patient feeding self a.m. meal.   P.o. intake 50%.  Patient states appetite improving.  No overt clinical signs or symptoms of aspiration noted.          Progress toward goals: Good        Barriers to Achieving goals: N/A        Plan of care:/changes in plan: Discontinue direct speech pathology services at this time.  Patient demonstrating swallow functional for nutritional needs.                                                                                    Plan of Care Reviewed With: patient          EDUCATION  The patient has been educated in the following areas:   Modified Diet Instruction.              Time Calculation:    Time Calculation- SLP       Row Name 08/31/23 0943             Time Calculation- SLP    SLP Stop Time 0845  -TB      SLP Received On 08/31/23  -TB         Untimed Charges    58625-SZ Treatment Swallow Minutes 40  -TB         Total Minutes    Untimed Charges Total Minutes 40  -TB       Total Minutes 40  -TB                User Key  (r) = Recorded By, (t) = Taken By, (c) = Cosigned By      Initials Name Provider Type    Ling Reina SLP  Speech and Language Pathologist                    Therapy Charges for Today       Code Description Service Date Service Provider Modifiers Qty    48575249642 HC ST TREATMENT SWALLOW 3 8/30/2023 Ling Perez, MARCE GN 1    [id number] HC ST TREATMENT SWALLOW 3 8/31/2023 Ling Perez, MARCE GN 1                 MARCE Vieira  8/31/2023

## 2023-08-31 NOTE — DISCHARGE SUMMARY
[facility]         DISCHARGE SUMMARY    Patient Name: Carol Abarca  : 1970  MRN: [id number]    Date of Admission: 2023  Date of Discharge:   Primary Care Physician: Sonia Mosquera APRN    Consults       Date and Time Order Name Status Description    2023  3:07 PM Inpatient Pulmonology Consult Completed             Presenting Problem:   Weakness [R53.1]  Altered mental status, unspecified altered mental status type [R41.82]  Seizures [R56.9]    Active and Resolved Hospital Problems:  Active Hospital Problems    Diagnosis POA    Seizures [R56.9] Yes    Weakness [R53.1] Yes    Unsteady gait when walking [R26.81] Yes    Type 2 diabetes mellitus [E11.9] Yes    B12 deficiency [E53.8] Yes    Obstructive sleep apnea [G47.33] Yes      Resolved Hospital Problems    Diagnosis POA    **Dysphasia [R47.02] Yes         Hospital Course     Hospital Course:  Carol Abarca is a 52 y.o. female admitted to hospital for weakness and generalized fatigue and altered mental status, patient was admitted with a possible stroke, seen by neurologist/telemetry neurology and diagnosed with seizures.  Patient was admitted to PCU, stroke work-up was negative.  Patient has issues with swallowing and tongue swelling and numbness of the tongue and lip swelling.  She was seen by intensivist Dr. Bethea and work-up was initiated for tickborne illnesses as well as C1 esterase deficiency, lab are pending now.  Patient was given her home meds and Keppra was increased.  She is feeling somewhat better.  Able to eat and tolerate food.  Speech has improved.  Also patient blood pressure has been fluctuating and she was started on Norvasc.  Blood pressure has improved after addition of Norvasc.    Patient has similar presentations and admissions in the recent past in last 2 -3 months.  Currently she wants to go home.  She has been denied to rehab admission several times in the past by her insurance.        DISCHARGE  Follow Up Recommendations for labs and diagnostics:   Discharge to home with outpatient follow-up      Day of Discharge     Vital Signs:  Temp:  [98.2 °F (36.8 °C)-98.4 °F (36.9 °C)] 98.4 °F (36.9 °C)  Heart Rate:  [71-83] 83  Resp:  [18] 18  BP: (125-156)/() 125/87    Physical Exam:    Middle-age obese female not in acute distress.  Facial swelling and lip swelling improving.  Neck supple heart regular.  Lungs clear.  Abdomen soft and extremities free of edema      Pertinent  and/or Most Recent Results     LAB RESULTS:      Lab 08/31/23  0408 08/29/23  1357 08/27/23  1550 08/27/23  1341   WBC 4.48  --   --  5.36   HEMOGLOBIN 9.8*  --   --  10.2*   HEMATOCRIT 32.6*  --   --  32.1*   PLATELETS 308  --   --  243   NEUTROS ABS 2.45  --   --  3.51   IMMATURE GRANS (ABS) 0.01  --   --  0.02   LYMPHS ABS 1.42  --   --  1.30   MONOS ABS 0.40  --   --  0.36   EOS ABS 0.18  --   --  0.14   MCV 85.6  --   --  84.9   SED RATE  --  96*  --   --    CRP  --  0.66*  --   --    LACTATE  --   --  0.8  --    PROTIME  --   --   --  14.0   APTT  --   --   --  26.0         Lab 08/31/23  0408 08/28/23  0411 08/27/23  1410   SODIUM 139  --  138   POTASSIUM 3.4*  --  4.0   CHLORIDE 104  --  102   CO2 23.2  --  26.7   ANION GAP 11.8  --  9.3   BUN 10  --  9   CREATININE 0.52*  --  0.59   EGFR 111.9  --  108.6   GLUCOSE 91  --  99   CALCIUM 9.0  --  9.3   MAGNESIUM  --  2.3  --    HEMOGLOBIN A1C  --  5.50  --          Lab 08/31/23  0408 08/27/23  1410   TOTAL PROTEIN 7.9 7.9   ALBUMIN 3.8 3.8   GLOBULIN 4.1 4.1   ALT (SGPT) <5 5   AST (SGOT) 12 17   BILIRUBIN 0.2 0.4   ALK PHOS 75 86         Lab 08/27/23  1410 08/27/23  1341   HSTROP T 9  --    PROTIME  --  14.0   INR  --  1.07         Lab 08/28/23  0411   CHOLESTEROL 229*   LDL CHOL 154*   HDL CHOL 42   TRIGLYCERIDES 180*             Brief Urine Lab Results  (Last result in the past 365 days)        Color   Clarity   Blood   Leuk Est   Nitrite   Protein   CREAT   Urine HCG         08/27/23 1546 Yellow   Clear   Negative   Trace   Negative   Trace                 Microbiology Results (last 10 days)       Procedure Component Value - Date/Time    Blood Culture - Blood, Arm, Left [[id number]]  (Abnormal) Collected: 08/27/23 1411    Lab Status: Final result Specimen: Blood from Arm, Left Updated: 08/29/23 0653     Blood Culture Staphylococcus, coagulase negative     Isolated from Aerobic and Anaerobic Bottles     Gram Stain Aerobic Bottle Gram positive cocci in clusters      Anaerobic Bottle Gram positive cocci in clusters    Narrative:      Probable contaminant requires clinical correlation, susceptibility not performed unless requested by physician.      Blood Culture ID, PCR - Blood, Arm, Left [[id number]]  (Abnormal) Collected: 08/27/23 1411    Lab Status: Final result Specimen: Blood from Arm, Left Updated: 08/28/23 0951     BCID, PCR Staph spp, not aureus or lugdunensis. Identification by BCID2 PCR.     BOTTLE TYPE Aerobic Bottle    Blood Culture - Blood, Arm, Left [[id number]]  (Normal) Collected: 08/27/23 1410    Lab Status: Preliminary result Specimen: Blood from Arm, Left Updated: 08/31/23 1415     Blood Culture No growth at 4 days    COVID-19,CEPHEID/PRASHANTH,COR/DEE DEE/PAD/KARLA/MAD IN-HOUSE(OR EMERGENT/ADD-ON),NP SWAB IN TRANSPORT MEDIA 3-4 HR TAT, RT-PCR - Swab, Nasopharynx [[id number]]  (Normal) Collected: 08/27/23 1410    Lab Status: Final result Specimen: Swab from Nasopharynx Updated: 08/27/23 1502     COVID19 Not Detected    Narrative:      Fact sheet for providers: https://www.fda.gov/media/633197/download     Fact sheet for patients: https://www.fda.gov/media/486002/download  Fact sheet for providers: https://www.fda.gov/media/160901/download     Fact sheet for patients: https://www.fda.gov/media/369482/download    Influenza Antigen, Rapid - Swab, Nasopharynx [[id number]]  (Normal) Collected: 08/27/23 1410    Lab Status: Final result Specimen: Swab from Nasopharynx Updated: 08/27/23 2741      Influenza A Ag, EIA Negative     Influenza B Ag, EIA Negative            PROCEDURES:    MRI Brain Without Contrast    Result Date: 8/28/2023  Impression:  No acute intracranial abnormality.  Left sphenoid and maxillary sinus disease with mucosal thickening.  No air-fluid levels are demonstrated.      DEVORA OH DO       Electronically Signed and Approved By: DEVORA OH DO on 8/28/2023 at 7:54             CT Abdomen Pelvis With Contrast    Result Date: 8/8/2023  Impression:   No acute findings are appreciated.  Please see above comments for further detail.      Please note that portions of this note were completed with a voice recognition program.  PARVEZ PIERCE JR, MD       Electronically Signed and Approved By: PARVEZ PIERCE JR, MD on 8/08/2023 at 0:11              XR Chest 1 View    Result Date: 8/27/2023  Impression:   Cardiomegaly.  No radiographic findings of acute pulmonary abnormality.       PJ LAZO MD       Electronically Signed and Approved By: PJ LAZO MD on 8/27/2023 at 14:39             CT Head Without Contrast Stroke Protocol    Result Date: 8/27/2023  Impression: 1. No CT findings of acute intracranial abnormality. 2. Probable chronic sinusitis.  This report was communicated by telephone to [facility] at 1405 hours 8/27/2023.     PJ LAZO MD       Electronically Signed and Approved By: PJ LAZO MD on 8/27/2023 at 14:23                      Results for orders placed during the hospital encounter of 08/27/23    Adult Transthoracic Echo Complete W/ Cont if Necessary Per Protocol (With Agitated Saline)    Interpretation Summary    Left ventricular systolic function is normal. Calculated left ventricular EF = 64.7%    Left ventricular wall thickness is consistent with concentric hypertrophy.    Left ventricular diastolic function was indeterminate.    Estimated right ventricular systolic pressure from tricuspid regurgitation is normal (<35 mmHg).    There is a small (<1cm)  pericardial effusion.    No obvious wall motion abnormalities      Labs Pending at Discharge:  Pending Labs       Order Current Status    Alpha-Gal IgE Panel In process    C1 Esterase Inhibitor, Functional In process    C1 Esterase Inhibitor, Serum In process    Blood Culture - Blood, Arm, Left Preliminary result              Discharge Details        Discharge Medications        New Medications        Instructions Start Date   amLODIPine 10 MG tablet  Commonly known as: NORVASC   5 mg, Oral, Daily             Changes to Medications        Instructions Start Date   levETIRAcetam 500 MG tablet  Commonly known as: Keppra  What changed: Another medication with the same name was added. Make sure you understand how and when to take each.   500 mg, Oral, 2 Times Daily      levETIRAcetam 750 MG tablet  Commonly known as: KEPPRA  What changed: You were already taking a medication with the same name, and this prescription was added. Make sure you understand how and when to take each.   750 mg, Oral, Every 12 Hours Scheduled             Continue These Medications        Instructions Start Date   ALPRAZolam 1 MG tablet  Commonly known as: XANAX   1 mg, Oral, 3 Times Daily PRN, for anxiety      aspirin 81 MG EC tablet   81 mg, Oral, Daily      cetirizine 10 MG tablet  Commonly known as: zyrTEC   10 mg, Oral, Daily      empagliflozin 10 MG tablet tablet  Commonly known as: JARDIANCE   10 mg, Oral, Daily      folic acid 1 MG tablet  Commonly known as: FOLVITE   1 mg, Oral, Every 24 Hours      furosemide 20 MG tablet  Commonly known as: LASIX   20 mg, Oral, Daily      HYDROcodone-acetaminophen 7.5-325 MG per tablet  Commonly known as: NORCO   1 tablet, Oral, Every 8 Hours PRN      metoprolol succinate XL 25 MG 24 hr tablet  Commonly known as: TOPROL-XL   25 mg, Oral, Every 24 Hours Scheduled      pravastatin 40 MG tablet  Commonly known as: PRAVACHOL   40 mg, Oral, Daily      QUEtiapine  MG 24 hr tablet  Commonly known as:  SEROquel XR   300 mg, Oral, Every Evening      zolpidem 10 MG tablet  Commonly known as: AMBIEN   10 mg, Oral, Nightly PRN               Allergies   Allergen Reactions    Tramadol Anaphylaxis    Tramadol Hcl Anaphylaxis    Moxifloxacin Hives    Sulfa Antibiotics Hives         Discharge Disposition:    Home-Health Care Svc    Diet:    Heart healthy diabetic    Discharge Activity:     Activity Instructions       Activity as Tolerated      Activity as Tolerated              Future Appointments   Date Time Provider Department Center   9/12/2023  2:30 PM Staci Dunne APRN [facility]       Additional Instructions for the Follow-ups that You Need to Schedule       Discharge Follow-up with PCP   As directed       Currently Documented PCP:    Sonia Mosquera APRN    PCP Phone Number:    [phone]     Follow Up Details: Next week        Discharge Follow-up with PCP   As directed       Currently Documented PCP:    Sonia Mosquera APRN    PCP Phone Number:    [phone]     Follow Up Details: NEXT WEEK        Discharge Follow-up with Specified Provider: Dr Koroma as recomended   As directed      To: Dr Koroma as recomended        Discharge Follow-up with Specified Provider: Dr. Rajan and Dr. Bethea as recommended   As directed      To: Dr. Rajan and Dr. Bethea as recommended                Time spent on Discharge including face to face service: 33 minutes.            I have dictated this note utilizing Dragon Dictation.             Please note that portions of this note were completed with a voice recognition program.             Part of this note may be an electronic transcription/translation of spoken language to printed text         using the Dragon Dictation System.       Electronically signed by Dung Hall MD, 08/31/23, 5:55 PM EDT.

## 2023-08-31 NOTE — CONSULTS
[facility]   Consult Note      Patient Name: Carol Abarca  : 1970  MRN: [id number]  Primary Care Physician:  Sonia Mosquera APRN  Date of admission: 2023    Subjective   Subjective     This 50 years old woman was seen upon the request of Dr. NERY Hall for evaluation    Chief Complaint:   Numbness of the lower jaw    HPI:  She dated the onset of her illness sometime on Monday, 2023 when she had persistent numbness of the lower jaw extending down to her chest and abdomen.  There is no associated headache dizziness nausea or vomiting.  There was no chest pains or abdominal pains.  There were no shaking or jerking movements noted.  This and felt that she was having a stroke.  Because of this, she was taken to the emergency room and has had an MRI of the brain which showed no acute intracranial abnormalities.  There were left sphenoid and maxillary sinuses with mucosal thickening.  She then had an EEG done which showed intermittent low voltage epileptiform discharges noted over the left frontotemporal region.  She has been seen and evaluated by Dr. Darcy De La Garza the telemetry neurologist who recommended continuous EEG monitoring.    Review of Systems  There is no headache, dizziness, nausea or vomiting.  No chest pains or abdominal pain.  There were no shaking or jerking movements noted.  No associated loss of consciousness.      Past Medical History:   Diagnosis Date    Anemia     Arthritis     Diabetes     Essential hypertension 2021    History of pulmonary embolism     Lumbago     Low back pain    Mild left ventricular hypertrophy 2021    Mixed hyperlipidemia 2021    Obstructive sleep apnea     Reflux esophagitis     Seasonal allergies        Past Surgical History:   Procedure Laterality Date    APPENDECTOMY  2010     SECTION      1990, 1992, ,     COLONOSCOPY      2019    COLONOSCOPY N/A 2022    Procedure: COLONOSCOPY;  Surgeon: Jacob  Radha Harris MD;  Location: [facility] ENDOSCOPY;  Service: Gastroenterology;  Laterality: N/A;  COLON POLYP     ENDOSCOPY  2019    ENDOSCOPY N/A 06/08/2023    Procedure: ESOPHAGOGASTRODUODENOSCOPY WITH BIPOSIES;  Surgeon: Coy Patrick MD;  Location: [facility] ENDOSCOPY;  Service: Gastroenterology;  Laterality: N/A;  PRIOR LAPBAND, GASTRITIS    HEMORRHOIDECTOMY N/A 07/25/2022    Procedure: HEMORRHOIDECTOMY;  Surgeon: Remi Reeder MD;  Location: [facility] MAIN OR;  Service: General;  Laterality: N/A;    HERNIA REPAIR      2005, 2006, 2007, 2008    HYSTERECTOMY  2004    LAPAROSCOPIC GASTRIC BANDING      OTHER SURGICAL HISTORY      Metal implants       Family History: family history includes Arthritis in her mother; Diabetes in her mother and son; Heart disease in her father and mother. Otherwise pertinent FHx was reviewed and not pertinent to current issue.    Social History:  reports that she has been smoking cigarettes. She has a 23.00 pack-year smoking history. She has never used smokeless tobacco. She reports that she does not currently use alcohol. She reports that she does not use drugs.    Psychosocial History: No reported psychiatric difficulties.    Home Medications:  ALPRAZolam, HYDROcodone-acetaminophen, QUEtiapine XR, aspirin, cetirizine, empagliflozin, folic acid, furosemide, levETIRAcetam, metoprolol succinate XL, pravastatin, and zolpidem      Allergies:  Allergies   Allergen Reactions    Tramadol Anaphylaxis    Tramadol Hcl Anaphylaxis    Moxifloxacin Hives    Sulfa Antibiotics Hives       Vitals:   Temp:  [98.2 °F (36.8 °C)-98.8 °F (37.1 °C)] 98.4 °F (36.9 °C)  Heart Rate:  [69-87] 80  Resp:  [18-21] 18  BP: (144-162)/() 156/103  Physical Exam   She was awake and alert was not in any form of distress was fairly developed and fairly nourished.    Her heart was regular but heart rate was 60/min. There were no murmurs.  The lungs were clear.    The carotid pulses were 1+ and equal.  There  were no bruits on either side.    Neurological Examination:  Her responses were coherent and relevant.  She was aware of what was going on around her.  She has an insight to her condition.  She was able to understand and follow verbal commands.    I did not look into the fundus on either side because of the COVID-19 pandemic social distancing.    The pupils were 3 to 4 mm. They were round and equal reactive to light directly and consensually.  There was no extraocular muscle weakness.    No facial asymmetry.  No facial weakness.  Was able to hear normal conversational speech.    The strength of the sternomastoid and trapezius muscles was normal and symmetrical.  The uvula and the tongue were in the midline.    The strength in both upper and lower extremities was normal and equal.    Felt pinprick equal in both sides of the forehead, face, lower jaw, both upper and lower extremities, and both sides of her trunk.    The deep tendon reflexes were hypoactive to absent on both sides.    Did finger-to-nose test fairly well equal on both sides.      Result Review    Result Review:  I have personally reviewed the results from the time of this admission to 8/30/2023 21:37 EDT and agree with these findings:  []  Laboratory  []  Microbiology  [x]  Radiology  []  EKG/Telemetry   []  Cardiology/Vascular   []  Pathology  []  Old records  []  Other:  Most notable findings include:   August 30, 2023  I reviewed the digital images of the MRI of her brain that was done on August 28, 2023.  This study showed no acute changes.  There were minimal subcortical and ventricular white matter changes consistent with old microvascular ischemia.    I reviewed the digital images of the CAT scan of her brain that was done on 8/27/2023.  Study was unremarkable.    I reviewed the digital images of the CT angiogram of the neck and cerebral vessels done on June 12, 2023.  This study was unremarkable.  There were no hemodynamically significant  stenosis or narrowing of the carotid and vertebral arterial system in the neck and there are branches in the brain.    We have the results of the cerebral perfusion study done on June 12, 2023 the study showed no significant perfusion abnormalities.    Impression:    I am not exactly sure about the exact etiology of the symptoms that she presented on admission.  The best that I can come up with is transient ischemic attack  The epileptiform discharges in the left frontotemporal region will not be able to explain the numbness that she was having in her lower jaw which apparently extends down to her chest and abdomen.        Plan:   We will continue to observe clinically for development of more symptoms.  Meanwhile, continue with the anticonvulsant therapy, the Aspirin as well a the rest of the medicine that she is taking.    Thank you very much for the me see this patient.  I will follow the patient with you.              Please note that portions of this note were completed with a voice recognition program.  Part of this note is an electric or electronic transcription/translation of spoken language to printed text using the dragon dictating system.    Electronically signed by Jerome Rajan Jr., MD, 08/30/23, 9:37 PM EDT.

## 2023-09-01 LAB
ALPHA-GAL IGE QN: <0.1 KU/L
BACTERIA SPEC AEROBE CULT: NORMAL
BEEF IGE QN: 0.34 KU/L
C1INH ACT/NOR SERPL: 89 %MEAN NORMAL
C1INH SERPL-MCNC: 31 MG/DL (ref 21–39)
CONV CLASS DESCRIPTION: ABNORMAL
IGE SERPL-ACNC: 391 IU/ML (ref 6–495)
LAMB IGE QN: 0.36 KU/L
PORK IGE QN: 0.21 KU/L

## 2023-09-01 NOTE — OUTREACH NOTE
Prep Survey      Flowsheet Row Responses   [facility] facility patient discharged from? [facility]   Is LACE score < 7 ? No   Eligibility Readm Mgmt   Discharge diagnosis Dysphasia-Altered mental status   Does the patient have one of the following disease processes/diagnoses(primary or secondary)? Other   Does the patient have Home health ordered? No   Is there a DME ordered? No   Prep survey completed? Yes            SUZI LEAL - Registered Nurse

## 2023-09-05 ENCOUNTER — READMISSION MANAGEMENT (OUTPATIENT)
Dept: CALL CENTER | Facility: HOSPITAL | Age: 53
End: 2023-09-05
Payer: MEDICARE

## 2023-09-05 NOTE — OUTREACH NOTE
Medical Week 1 Survey      Flowsheet Row Responses   [facility] patient discharged from? [facility]   Does the patient have one of the following disease processes/diagnoses(primary or secondary)? Other   Week 1 attempt successful? No   Unsuccessful attempts Attempt 1            Savanna Aguirre Registered Nurse

## 2023-09-12 ENCOUNTER — READMISSION MANAGEMENT (OUTPATIENT)
Dept: CALL CENTER | Facility: HOSPITAL | Age: 53
End: 2023-09-12
Payer: MEDICARE

## 2023-09-12 NOTE — INTERVAL H&P NOTE
H&P reviewed.  The patient was examined and there are no changes to the H&P  Undermining Type: Entire Wound

## 2023-09-12 NOTE — OUTREACH NOTE
Medical Week 2 Survey      Flowsheet Row Responses   [facility] patient discharged from? [facility]   Does the patient have one of the following disease processes/diagnoses(primary or secondary)? Other   Week 2 attempt successful? Yes   Call start time 1300   Discharge diagnosis Dysphasia-Altered mental status   Call end time 1300   Meds reviewed with patient/caregiver? Yes   Is the patient having any side effects they believe may be caused by any medication additions or changes? No   Does the patient have all medications ordered at discharge? Yes   Is the patient taking all medications as directed (includes completed medication regime)? Yes   Does the patient have a primary care provider?  Yes   Does the patient have an appointment with their PCP within 7 days of discharge? Yes   Has the patient kept scheduled appointments due by today? Yes   Has home health visited the patient within 72 hours of discharge? N/A   Psychosocial issues? No   What is the patient's perception of their health status since discharge? Improving   Week 2 Call Completed? Yes   Graduated Yes   Call end time 1300            Yusra BOSS - Registered Nurse

## 2023-09-26 ENCOUNTER — APPOINTMENT (OUTPATIENT)
Dept: GENERAL RADIOLOGY | Facility: HOSPITAL | Age: 53
End: 2023-09-26
Payer: MEDICARE

## 2023-09-26 ENCOUNTER — HOSPITAL ENCOUNTER (EMERGENCY)
Facility: HOSPITAL | Age: 53
Discharge: HOME | End: 2023-09-27
Attending: EMERGENCY MEDICINE
Payer: MEDICARE

## 2023-09-26 DIAGNOSIS — R07.9 CHEST PAIN, UNSPECIFIED TYPE: Primary | ICD-10-CM

## 2023-09-26 LAB
ALBUMIN SERPL-MCNC: 3.6 G/DL (ref 3.5–5.2)
ALBUMIN/GLOB SERPL: 0.9 G/DL
ALP SERPL-CCNC: 78 U/L (ref 39–117)
ALT SERPL W P-5'-P-CCNC: <5 U/L (ref 1–33)
ANION GAP SERPL CALCULATED.3IONS-SCNC: 8.7 MMOL/L (ref 5–15)
AST SERPL-CCNC: 8 U/L (ref 1–32)
BASOPHILS # BLD AUTO: 0.03 10*3/MM3 (ref 0–0.2)
BASOPHILS NFR BLD AUTO: 0.6 % (ref 0–1.5)
BILIRUB SERPL-MCNC: 0.2 MG/DL (ref 0–1.2)
BUN SERPL-MCNC: 10 MG/DL (ref 6–20)
BUN/CREAT SERPL: 15.4 (ref 7–25)
CALCIUM SPEC-SCNC: 9 MG/DL (ref 8.6–10.5)
CHLORIDE SERPL-SCNC: 106 MMOL/L (ref 98–107)
CO2 SERPL-SCNC: 26.3 MMOL/L (ref 22–29)
CREAT SERPL-MCNC: 0.65 MG/DL (ref 0.57–1)
DEPRECATED RDW RBC AUTO: 44.5 FL (ref 37–54)
EGFRCR SERPLBLD CKD-EPI 2021: 106.1 ML/MIN/1.73
EOSINOPHIL # BLD AUTO: 0.13 10*3/MM3 (ref 0–0.4)
EOSINOPHIL NFR BLD AUTO: 2.7 % (ref 0.3–6.2)
ERYTHROCYTE [DISTWIDTH] IN BLOOD BY AUTOMATED COUNT: 14.7 % (ref 12.3–15.4)
GEN 5 2HR TROPONIN T REFLEX: 9 NG/L
GLOBULIN UR ELPH-MCNC: 3.8 GM/DL
GLUCOSE SERPL-MCNC: 117 MG/DL (ref 65–99)
HCT VFR BLD AUTO: 32.1 % (ref 34–46.6)
HGB BLD-MCNC: 9.7 G/DL (ref 12–15.9)
HOLD SPECIMEN: NORMAL
HOLD SPECIMEN: NORMAL
IMM GRANULOCYTES # BLD AUTO: 0.01 10*3/MM3 (ref 0–0.05)
IMM GRANULOCYTES NFR BLD AUTO: 0.2 % (ref 0–0.5)
LIPASE SERPL-CCNC: 17 U/L (ref 13–60)
LYMPHOCYTES # BLD AUTO: 1.09 10*3/MM3 (ref 0.7–3.1)
LYMPHOCYTES NFR BLD AUTO: 23 % (ref 19.6–45.3)
MAGNESIUM SERPL-MCNC: 2.1 MG/DL (ref 1.6–2.6)
MCH RBC QN AUTO: 25.2 PG (ref 26.6–33)
MCHC RBC AUTO-ENTMCNC: 30.2 G/DL (ref 31.5–35.7)
MCV RBC AUTO: 83.4 FL (ref 79–97)
MONOCYTES # BLD AUTO: 0.3 10*3/MM3 (ref 0.1–0.9)
MONOCYTES NFR BLD AUTO: 6.3 % (ref 5–12)
NEUTROPHILS NFR BLD AUTO: 3.17 10*3/MM3 (ref 1.7–7)
NEUTROPHILS NFR BLD AUTO: 67.2 % (ref 42.7–76)
NRBC BLD AUTO-RTO: 0 /100 WBC (ref 0–0.2)
NT-PROBNP SERPL-MCNC: 161.1 PG/ML (ref 0–900)
PLATELET # BLD AUTO: 258 10*3/MM3 (ref 140–450)
PMV BLD AUTO: 10.5 FL (ref 6–12)
POTASSIUM SERPL-SCNC: 3.5 MMOL/L (ref 3.5–5.2)
PROT SERPL-MCNC: 7.4 G/DL (ref 6–8.5)
RBC # BLD AUTO: 3.85 10*6/MM3 (ref 3.77–5.28)
SODIUM SERPL-SCNC: 141 MMOL/L (ref 136–145)
TROPONIN T DELTA: -3 NG/L
TROPONIN T SERPL HS-MCNC: 12 NG/L
WBC NRBC COR # BLD: 4.73 10*3/MM3 (ref 3.4–10.8)
WHOLE BLOOD HOLD COAG: NORMAL
WHOLE BLOOD HOLD SPECIMEN: NORMAL

## 2023-09-26 PROCEDURE — 36415 COLL VENOUS BLD VENIPUNCTURE: CPT

## 2023-09-26 PROCEDURE — 94640 AIRWAY INHALATION TREATMENT: CPT

## 2023-09-26 PROCEDURE — 83735 ASSAY OF MAGNESIUM: CPT

## 2023-09-26 PROCEDURE — 93005 ELECTROCARDIOGRAM TRACING: CPT | Performed by: EMERGENCY MEDICINE

## 2023-09-26 PROCEDURE — 94799 UNLISTED PULMONARY SVC/PX: CPT

## 2023-09-26 PROCEDURE — 93010 ELECTROCARDIOGRAM REPORT: CPT | Performed by: INTERNAL MEDICINE

## 2023-09-26 PROCEDURE — 99285 EMERGENCY DEPT VISIT HI MDM: CPT

## 2023-09-26 PROCEDURE — 83690 ASSAY OF LIPASE: CPT

## 2023-09-26 PROCEDURE — 84484 ASSAY OF TROPONIN QUANT: CPT | Performed by: EMERGENCY MEDICINE

## 2023-09-26 PROCEDURE — 85025 COMPLETE CBC W/AUTO DIFF WBC: CPT

## 2023-09-26 PROCEDURE — 83880 ASSAY OF NATRIURETIC PEPTIDE: CPT

## 2023-09-26 PROCEDURE — 80053 COMPREHEN METABOLIC PANEL: CPT

## 2023-09-26 PROCEDURE — 84484 ASSAY OF TROPONIN QUANT: CPT

## 2023-09-26 PROCEDURE — 71045 X-RAY EXAM CHEST 1 VIEW: CPT

## 2023-09-26 PROCEDURE — 93005 ELECTROCARDIOGRAM TRACING: CPT

## 2023-09-26 RX ORDER — ASPIRIN 81 MG/1
324 TABLET, CHEWABLE ORAL ONCE
Status: COMPLETED | OUTPATIENT
Start: 2023-09-26 | End: 2023-09-26

## 2023-09-26 RX ORDER — SODIUM CHLORIDE 0.9 % (FLUSH) 0.9 %
10 SYRINGE (ML) INJECTION AS NEEDED
Status: DISCONTINUED | OUTPATIENT
Start: 2023-09-26 | End: 2023-09-27 | Stop reason: HOSPADM

## 2023-09-26 RX ORDER — IPRATROPIUM BROMIDE AND ALBUTEROL SULFATE 2.5; .5 MG/3ML; MG/3ML
3 SOLUTION RESPIRATORY (INHALATION) ONCE
Status: COMPLETED | OUTPATIENT
Start: 2023-09-26 | End: 2023-09-26

## 2023-09-26 RX ADMIN — ASPIRIN 324 MG: 81 TABLET, CHEWABLE ORAL at 19:52

## 2023-09-26 RX ADMIN — IPRATROPIUM BROMIDE AND ALBUTEROL SULFATE 3 ML: .5; 3 SOLUTION RESPIRATORY (INHALATION) at 23:19

## 2023-09-27 ENCOUNTER — APPOINTMENT (OUTPATIENT)
Dept: CT IMAGING | Facility: HOSPITAL | Age: 53
End: 2023-09-27
Payer: MEDICARE

## 2023-09-27 VITALS
RESPIRATION RATE: 18 BRPM | HEART RATE: 72 BPM | SYSTOLIC BLOOD PRESSURE: 144 MMHG | OXYGEN SATURATION: 100 % | TEMPERATURE: 97.9 F | HEIGHT: 62 IN | BODY MASS INDEX: 36.15 KG/M2 | DIASTOLIC BLOOD PRESSURE: 105 MMHG | WEIGHT: 196.43 LBS

## 2023-09-27 PROCEDURE — 96374 THER/PROPH/DIAG INJ IV PUSH: CPT

## 2023-09-27 PROCEDURE — 25010000002 ONDANSETRON PER 1 MG: Performed by: EMERGENCY MEDICINE

## 2023-09-27 PROCEDURE — 25510000001 IOPAMIDOL PER 1 ML: Performed by: EMERGENCY MEDICINE

## 2023-09-27 PROCEDURE — 25010000002 HYDROMORPHONE 1 MG/ML SOLUTION: Performed by: EMERGENCY MEDICINE

## 2023-09-27 PROCEDURE — 71260 CT THORAX DX C+: CPT

## 2023-09-27 PROCEDURE — 96375 TX/PRO/DX INJ NEW DRUG ADDON: CPT

## 2023-09-27 RX ORDER — ONDANSETRON 2 MG/ML
4 INJECTION INTRAMUSCULAR; INTRAVENOUS ONCE
Status: COMPLETED | OUTPATIENT
Start: 2023-09-27 | End: 2023-09-27

## 2023-09-27 RX ADMIN — ONDANSETRON 4 MG: 2 INJECTION INTRAMUSCULAR; INTRAVENOUS at 00:38

## 2023-09-27 RX ADMIN — IOPAMIDOL 100 ML: 755 INJECTION, SOLUTION INTRAVENOUS at 00:38

## 2023-09-27 RX ADMIN — HYDROMORPHONE HYDROCHLORIDE 1 MG: 1 INJECTION, SOLUTION INTRAMUSCULAR; INTRAVENOUS; SUBCUTANEOUS at 00:38

## 2023-09-27 NOTE — ED NOTES
Pt called out asking for the nurse. Nurse went into room. Pt states that she feels like someone is sitting on her chest. Nurse notified provided. Provider placed new orders at this time.

## 2023-09-28 LAB
QT INTERVAL: 400 MS
QT INTERVAL: 439 MS
QTC INTERVAL: 489 MS
QTC INTERVAL: 517 MS

## 2023-10-02 NOTE — ED PROVIDER NOTES
Time: 9:55 PM EDT  Date of encounter:  2023  Independent Historian/Clinical History and Information was obtained by:   Patient    History is limited by: N/A    Chief Complaint: Chest pain      History of Present Illness:  Patient is a 52 y.o. year old female who presents to the emergency department for evaluation of chest pain.  Patient reports feeling left-sided chest pain that radiates to her left arm.  The pain started yesterday and has been persistent.  She says it feels like a tightness or pressure.    HPI    Patient Care Team  Primary Care Provider: Sonia Mosquera APRN    Past Medical History:     Allergies   Allergen Reactions    Tramadol Anaphylaxis    Tramadol Hcl Anaphylaxis    Moxifloxacin Hives    Sulfa Antibiotics Hives     Past Medical History:   Diagnosis Date    Anemia     Arthritis     Diabetes     Essential hypertension 2021    History of pulmonary embolism     Lumbago     Low back pain    Mild left ventricular hypertrophy 2021    Mixed hyperlipidemia 2021    Obstructive sleep apnea     Reflux esophagitis     Seasonal allergies      Past Surgical History:   Procedure Laterality Date    APPENDECTOMY  2010     SECTION      , , ,     COLONOSCOPY       2018    COLONOSCOPY N/A 2022    Procedure: COLONOSCOPY;  Surgeon: Radha James MD;  Location: [facility] ENDOSCOPY;  Service: Gastroenterology;  Laterality: N/A;  COLON POLYP     ENDOSCOPY  2019    ENDOSCOPY N/A 2023    Procedure: ESOPHAGOGASTRODUODENOSCOPY WITH BIPOSIES;  Surgeon: Coy Patrick MD;  Location: [facility] ENDOSCOPY;  Service: Gastroenterology;  Laterality: N/A;  PRIOR LAPBAND, GASTRITIS    HEMORRHOIDECTOMY N/A 2022    Procedure: HEMORRHOIDECTOMY;  Surgeon: Remi Reeder MD;  Location: [facility] MAIN OR;  Service: General;  Laterality: N/A;    HERNIA REPAIR      2005, 2006, 2007, 2008    HYSTERECTOMY  2004    LAPAROSCOPIC GASTRIC BANDING      OTHER SURGICAL  HISTORY      Metal implants     Family History   Problem Relation Age of Onset    Heart disease Mother     Diabetes Mother         Unspecified type    Arthritis Mother     Heart disease Father     Diabetes Son         Unspecified type    Malig Hyperthermia Neg Hx        Home Medications:  Prior to Admission medications    Medication Sig Start Date End Date Taking? Authorizing Provider   ALPRAZolam (XANAX) 1 MG tablet Take 1 tablet by mouth 3 (Three) Times a Day As Needed. for anxiety 7/26/23   Silas Bhatt MD   amLODIPine (NORVASC) 10 MG tablet Take 0.5 tablets by mouth Daily for 30 days. 8/31/23 9/30/23  Dung Hall MD   aspirin 81 MG EC tablet Take 1 tablet by mouth Daily.    Silas Bhatt MD   cetirizine (zyrTEC) 10 MG tablet Take 1 tablet by mouth Daily.    Silas Bhatt MD   empagliflozin (JARDIANCE) 10 MG tablet tablet Take 1 tablet by mouth Daily.    Silas Bhatt MD   folic acid (FOLVITE) 1 MG tablet Take 1 tablet by mouth Daily.    iSlas Bhatt MD   furosemide (LASIX) 20 MG tablet Take 1 tablet by mouth Daily for 30 days. 7/22/23 8/27/23  Dung Hall MD   HYDROcodone-acetaminophen (NORCO) 7.5-325 MG per tablet Take 1 tablet by mouth Every 8 (Eight) Hours As Needed for Moderate Pain, Severe Pain or Mild Pain.    Silas Bhatt MD   levETIRAcetam (Keppra) 500 MG tablet Take 1 tablet by mouth 2 (Two) Times a Day for 30 days. 6/15/23 8/27/23  Dung Hall MD   levETIRAcetam (KEPPRA) 750 MG tablet Take 1 tablet by mouth Every 12 (Twelve) Hours for 30 days. 8/31/23 9/30/23  Dung Hall MD   metoprolol succinate XL (TOPROL-XL) 25 MG 24 hr tablet Take 1 tablet by mouth Daily for 30 days. 7/22/23 8/27/23  Dung Hall MD   pravastatin (PRAVACHOL) 40 MG tablet Take 1 tablet by mouth Daily.    Silas Bhatt MD   QUEtiapine XR (SEROquel XR) 300 MG 24 hr tablet Take 1 tablet by mouth Every Evening for 30 days. 7/21/23 8/27/23  Dung Hall MD   zolpidem  "(AMBIEN) 10 MG tablet Take 1 tablet by mouth At Night As Needed. 8/23/23   Provider, MD Silas        Social History:   Social History     Tobacco Use    Smoking status: Every Day     Packs/day: 1.00     Years: 23.00     Pack years: 23.00     Types: Cigarettes    Smokeless tobacco: Never    Tobacco comments:     Smoked 11-20 years. last 7/24/22 1700   Vaping Use    Vaping Use: Never used   Substance Use Topics    Alcohol use: Not Currently     Comment: Occasionally drinks, less than 1 drink per day, has been drinking for less than 1 year    Drug use: Never         Review of Systems:  Review of Systems   Respiratory:  Positive for shortness of breath.    Cardiovascular:  Positive for chest pain.      Physical Exam:  BP (!) 144/105   Pulse 72   Temp 97.9 °F (36.6 °C) (Oral)   Resp 18   Ht 157.2 cm (61.89\")   Wt 89.1 kg (196 lb 6.9 oz)   SpO2 100%   BMI 36.06 kg/m²     Physical Exam  Vitals and nursing note reviewed.   Constitutional:       General: She is not in acute distress.     Appearance: She is well-developed.   HENT:      Head: Normocephalic and atraumatic.   Cardiovascular:      Rate and Rhythm: Normal rate and regular rhythm.      Heart sounds: Normal heart sounds.   Pulmonary:      Effort: Pulmonary effort is normal. No respiratory distress.      Breath sounds: Normal breath sounds.   Abdominal:      General: Abdomen is flat.      Palpations: Abdomen is soft.      Tenderness: There is no abdominal tenderness.   Musculoskeletal:         General: Normal range of motion.      Cervical back: Normal range of motion.   Skin:     General: Skin is warm and dry.   Neurological:      Mental Status: She is alert and oriented to person, place, and time. Mental status is at baseline.   Psychiatric:         Mood and Affect: Mood normal.                Procedures:  Procedures      Medical Decision Making:      Comorbidities that affect care:    Anemia, diabetes, hypertension    External Notes " reviewed:    Hospital Discharge Summary: Patient discharged 8/31/2023 after admission for seizures, weakness and altered mental status      The following orders were placed and all results were independently analyzed by me:  Orders Placed This Encounter   Procedures    XR Chest 1 View    CT Chest With Contrast Diagnostic    [place] Draw    High Sensitivity Troponin T    Comprehensive Metabolic Panel    Lipase    BNP    Magnesium    CBC Auto Differential    High Sensitivity Troponin T 2Hr    Undress & Gown    Continuous Pulse Oximetry    ECG 12 Lead ED Triage Standing Order; Chest Pain    CBC & Differential    Green Top (Gel)    Lavender Top    Gold Top - SST    Light Blue Top       Medications Given in the Emergency Department:  Medications   aspirin chewable tablet 324 mg (324 mg Oral Given 9/26/23 1952)   ipratropium-albuterol (DUO-NEB) nebulizer solution 3 mL (3 mL Nebulization Given 9/26/23 2319)   HYDROmorphone (DILAUDID) injection 1 mg (1 mg Intravenous Given 9/27/23 0038)   ondansetron (ZOFRAN) injection 4 mg (4 mg Intravenous Given 9/27/23 0038)   iopamidol (ISOVUE-370) 76 % injection 100 mL (100 mL Intravenous Given 9/27/23 0038)        ED Course:    ED Course as of 10/01/23 2155   Tue Sep 26, 2023   2308 XR Chest 1 View [NL]      ED Course User Index  [NL] Ant Gonzales DO       Labs:    Lab Results (last 24 hours)       ** No results found for the last 24 hours. **             Imaging:    No Radiology Exams Resulted Within Past 24 Hours      Differential Diagnosis and Discussion:    Chest Pain:  Based on the patient's signs and symptoms, I considered aortic dissection, myocardial infaction, pulmonary embolism, cardiac tamponade, pericarditis, pneumothorax, musculoskeletal chest pain and other differential diagnosis as an etiology of the patient's chest pain.     All labs were reviewed and interpreted by me.  All X-rays impressions were independently interpreted by me.  EKG was interpreted by  me.    MDM  Patient's cardiac work-up is negative for any acute findings.  Patient's pain is improved and she is stable for discharge with outpatient follow-up.        Patient Care Considerations:          Consultants/Shared Management Plan:    None    Social Determinants of Health:    Patient is independent, reliable, and has access to care.       Disposition and Care Coordination:    Discharged: The patient is suitable and stable for discharge with no need for consideration of observation or admission.        Final diagnoses:   Chest pain, unspecified type        ED Disposition       ED Disposition   Discharge    Condition   Stable    Comment   --               This medical record created using voice recognition software.             Ant Gonzales DO  10/01/23 4270

## 2023-10-06 ENCOUNTER — APPOINTMENT (OUTPATIENT)
Dept: GENERAL RADIOLOGY | Facility: HOSPITAL | Age: 53
End: 2023-10-06
Payer: MEDICARE

## 2023-10-06 ENCOUNTER — HOSPITAL ENCOUNTER (EMERGENCY)
Facility: HOSPITAL | Age: 53
Discharge: HOME | End: 2023-10-06
Attending: EMERGENCY MEDICINE
Payer: MEDICARE

## 2023-10-06 VITALS
BODY MASS INDEX: 36.07 KG/M2 | OXYGEN SATURATION: 90 % | HEART RATE: 73 BPM | RESPIRATION RATE: 16 BRPM | WEIGHT: 196 LBS | TEMPERATURE: 98.5 F | HEIGHT: 62 IN | DIASTOLIC BLOOD PRESSURE: 90 MMHG | SYSTOLIC BLOOD PRESSURE: 124 MMHG

## 2023-10-06 DIAGNOSIS — M79.10 MYALGIA: Primary | ICD-10-CM

## 2023-10-06 LAB
ALBUMIN SERPL-MCNC: 3.4 G/DL (ref 3.5–5.2)
ALBUMIN/GLOB SERPL: 0.9 G/DL
ALP SERPL-CCNC: 74 U/L (ref 39–117)
ALT SERPL W P-5'-P-CCNC: <5 U/L (ref 1–33)
ANION GAP SERPL CALCULATED.3IONS-SCNC: 9.9 MMOL/L (ref 5–15)
AST SERPL-CCNC: 10 U/L (ref 1–32)
BASOPHILS # BLD AUTO: 0.02 10*3/MM3 (ref 0–0.2)
BASOPHILS NFR BLD AUTO: 0.4 % (ref 0–1.5)
BILIRUB SERPL-MCNC: 0.2 MG/DL (ref 0–1.2)
BUN SERPL-MCNC: 8 MG/DL (ref 6–20)
BUN/CREAT SERPL: 13.1 (ref 7–25)
CALCIUM SPEC-SCNC: 8.7 MG/DL (ref 8.6–10.5)
CHLORIDE SERPL-SCNC: 105 MMOL/L (ref 98–107)
CO2 SERPL-SCNC: 25.1 MMOL/L (ref 22–29)
CREAT SERPL-MCNC: 0.61 MG/DL (ref 0.57–1)
DEPRECATED RDW RBC AUTO: 45.1 FL (ref 37–54)
EGFRCR SERPLBLD CKD-EPI 2021: 107.7 ML/MIN/1.73
EOSINOPHIL # BLD AUTO: 0.27 10*3/MM3 (ref 0–0.4)
EOSINOPHIL NFR BLD AUTO: 5.9 % (ref 0.3–6.2)
ERYTHROCYTE [DISTWIDTH] IN BLOOD BY AUTOMATED COUNT: 15 % (ref 12.3–15.4)
GEN 5 2HR TROPONIN T REFLEX: 12 NG/L
GLOBULIN UR ELPH-MCNC: 3.9 GM/DL
GLUCOSE SERPL-MCNC: 89 MG/DL (ref 65–99)
HCT VFR BLD AUTO: 31.6 % (ref 34–46.6)
HGB BLD-MCNC: 9.4 G/DL (ref 12–15.9)
HOLD SPECIMEN: NORMAL
HOLD SPECIMEN: NORMAL
IMM GRANULOCYTES # BLD AUTO: 0.01 10*3/MM3 (ref 0–0.05)
IMM GRANULOCYTES NFR BLD AUTO: 0.2 % (ref 0–0.5)
LIPASE SERPL-CCNC: 19 U/L (ref 13–60)
LYMPHOCYTES # BLD AUTO: 1.57 10*3/MM3 (ref 0.7–3.1)
LYMPHOCYTES NFR BLD AUTO: 34.4 % (ref 19.6–45.3)
MAGNESIUM SERPL-MCNC: 2.2 MG/DL (ref 1.6–2.6)
MCH RBC QN AUTO: 24.7 PG (ref 26.6–33)
MCHC RBC AUTO-ENTMCNC: 29.7 G/DL (ref 31.5–35.7)
MCV RBC AUTO: 83.2 FL (ref 79–97)
MONOCYTES # BLD AUTO: 0.33 10*3/MM3 (ref 0.1–0.9)
MONOCYTES NFR BLD AUTO: 7.2 % (ref 5–12)
NEUTROPHILS NFR BLD AUTO: 2.37 10*3/MM3 (ref 1.7–7)
NEUTROPHILS NFR BLD AUTO: 51.9 % (ref 42.7–76)
NRBC BLD AUTO-RTO: 0 /100 WBC (ref 0–0.2)
NT-PROBNP SERPL-MCNC: 116.2 PG/ML (ref 0–900)
PLATELET # BLD AUTO: 265 10*3/MM3 (ref 140–450)
PMV BLD AUTO: 10.1 FL (ref 6–12)
POTASSIUM SERPL-SCNC: 3.6 MMOL/L (ref 3.5–5.2)
PROT SERPL-MCNC: 7.3 G/DL (ref 6–8.5)
QT INTERVAL: 455 MS
QTC INTERVAL: 535 MS
RBC # BLD AUTO: 3.8 10*6/MM3 (ref 3.77–5.28)
SODIUM SERPL-SCNC: 140 MMOL/L (ref 136–145)
TROPONIN T DELTA: 2 NG/L
TROPONIN T SERPL HS-MCNC: 10 NG/L
WBC NRBC COR # BLD: 4.57 10*3/MM3 (ref 3.4–10.8)
WHOLE BLOOD HOLD COAG: NORMAL
WHOLE BLOOD HOLD SPECIMEN: NORMAL

## 2023-10-06 PROCEDURE — 80053 COMPREHEN METABOLIC PANEL: CPT

## 2023-10-06 PROCEDURE — 83880 ASSAY OF NATRIURETIC PEPTIDE: CPT

## 2023-10-06 PROCEDURE — 83735 ASSAY OF MAGNESIUM: CPT

## 2023-10-06 PROCEDURE — 83690 ASSAY OF LIPASE: CPT

## 2023-10-06 PROCEDURE — 93005 ELECTROCARDIOGRAM TRACING: CPT | Performed by: EMERGENCY MEDICINE

## 2023-10-06 PROCEDURE — 25810000003 SODIUM CHLORIDE 0.9 % SOLUTION: Performed by: EMERGENCY MEDICINE

## 2023-10-06 PROCEDURE — 71045 X-RAY EXAM CHEST 1 VIEW: CPT

## 2023-10-06 PROCEDURE — 96374 THER/PROPH/DIAG INJ IV PUSH: CPT

## 2023-10-06 PROCEDURE — 85025 COMPLETE CBC W/AUTO DIFF WBC: CPT

## 2023-10-06 PROCEDURE — 25010000002 HYDROMORPHONE 1 MG/ML SOLUTION: Performed by: EMERGENCY MEDICINE

## 2023-10-06 PROCEDURE — 84484 ASSAY OF TROPONIN QUANT: CPT

## 2023-10-06 PROCEDURE — 36415 COLL VENOUS BLD VENIPUNCTURE: CPT

## 2023-10-06 PROCEDURE — 93005 ELECTROCARDIOGRAM TRACING: CPT

## 2023-10-06 PROCEDURE — 84484 ASSAY OF TROPONIN QUANT: CPT | Performed by: EMERGENCY MEDICINE

## 2023-10-06 PROCEDURE — 99284 EMERGENCY DEPT VISIT MOD MDM: CPT

## 2023-10-06 RX ORDER — SODIUM CHLORIDE 0.9 % (FLUSH) 0.9 %
10 SYRINGE (ML) INJECTION AS NEEDED
Status: DISCONTINUED | OUTPATIENT
Start: 2023-10-06 | End: 2023-10-06 | Stop reason: HOSPADM

## 2023-10-06 RX ORDER — ASPIRIN 81 MG/1
324 TABLET, CHEWABLE ORAL ONCE
Status: DISCONTINUED | OUTPATIENT
Start: 2023-10-06 | End: 2023-10-06 | Stop reason: HOSPADM

## 2023-10-06 RX ADMIN — SODIUM CHLORIDE 1000 ML: 9 INJECTION, SOLUTION INTRAVENOUS at 16:20

## 2023-10-06 RX ADMIN — HYDROMORPHONE HYDROCHLORIDE 0.5 MG: 1 INJECTION, SOLUTION INTRAMUSCULAR; INTRAVENOUS; SUBCUTANEOUS at 16:20

## 2023-10-06 NOTE — ED PROVIDER NOTES
"Time: 6:32 PM EDT  Date of encounter:  10/6/2023  Independent Historian/Clinical History and Information was obtained by:   Patient  Chief Complaint: Body pain    History is limited by: N/A    History of Present Illness:  Patient is a 52 y.o. year old female who presents to the emergency department for evaluation of entire body pain.  Patient reports it feels like somebody is sitting on her entire body.  Patient is that she has a \"prickly\" sensation in her chest.  Patient states that just her entire body hurts and her Norco that she takes is not helping.  Patient does admit to some mild shortness of air.  She denies any nausea or vomiting.  She denies any fevers or chills.  Patient is that she feels that she may be dehydrated and is requesting some IV fluids.    HPI    Patient Care Team  Primary Care Provider: Sonia Mosquera APRN    Past Medical History:     Allergies   Allergen Reactions    Tramadol Anaphylaxis    Tramadol Hcl Anaphylaxis    Moxifloxacin Hives    Sulfa Antibiotics Hives     Past Medical History:   Diagnosis Date    Anemia     Arthritis     Diabetes     Essential hypertension 2021    History of pulmonary embolism     Lumbago     Low back pain    Mild left ventricular hypertrophy 2021    Mixed hyperlipidemia 2021    Obstructive sleep apnea     Reflux esophagitis     Seasonal allergies      Past Surgical History:   Procedure Laterality Date    APPENDECTOMY  2010     SECTION      1990, 1992, ,     COLONOSCOPY       2018    COLONOSCOPY N/A 2022    Procedure: COLONOSCOPY;  Surgeon: Radha James MD;  Location: [facility] ENDOSCOPY;  Service: Gastroenterology;  Laterality: N/A;  COLON POLYP     ENDOSCOPY  2019    ENDOSCOPY N/A 2023    Procedure: ESOPHAGOGASTRODUODENOSCOPY WITH BIPOSIES;  Surgeon: Coy Patrick MD;  Location: [facility] ENDOSCOPY;  Service: Gastroenterology;  Laterality: N/A;  PRIOR LAPBAND, GASTRITIS    HEMORRHOIDECTOMY N/A " 07/25/2022    Procedure: HEMORRHOIDECTOMY;  Surgeon: Remi Reeder MD;  Location: [facility] MAIN OR;  Service: General;  Laterality: N/A;    HERNIA REPAIR      2005, 2006, 2007, 2008    HYSTERECTOMY  2004    LAPAROSCOPIC GASTRIC BANDING      OTHER SURGICAL HISTORY      Metal implants     Family History   Problem Relation Age of Onset    Heart disease Mother     Diabetes Mother         Unspecified type    Arthritis Mother     Heart disease Father     Diabetes Son         Unspecified type    Malig Hyperthermia Neg Hx        Home Medications:  Prior to Admission medications    Medication Sig Start Date End Date Taking? Authorizing Provider   ALPRAZolam (XANAX) 1 MG tablet Take 1 tablet by mouth 3 (Three) Times a Day As Needed. for anxiety 7/26/23   Silas Bhatt MD   aspirin 81 MG EC tablet Take 1 tablet by mouth Daily.    Silas Bhatt MD   cetirizine (zyrTEC) 10 MG tablet Take 1 tablet by mouth Daily.    Silas Bhatt MD   empagliflozin (JARDIANCE) 10 MG tablet tablet Take 1 tablet by mouth Daily.    Silas Bhatt MD   folic acid (FOLVITE) 1 MG tablet Take 1 tablet by mouth Daily.    Silas Bhatt MD   furosemide (LASIX) 20 MG tablet Take 1 tablet by mouth Daily for 30 days. 7/22/23 8/27/23  Dung Hall MD   HYDROcodone-acetaminophen (NORCO) 7.5-325 MG per tablet Take 1 tablet by mouth Every 8 (Eight) Hours As Needed for Moderate Pain, Severe Pain or Mild Pain.    Silas Bhatt MD   levETIRAcetam (Keppra) 500 MG tablet Take 1 tablet by mouth 2 (Two) Times a Day for 30 days. 6/15/23 8/27/23  Dung Hall MD   levETIRAcetam (KEPPRA) 750 MG tablet Take 1 tablet by mouth Every 12 (Twelve) Hours for 30 days. 8/31/23 9/30/23  Dung Hall MD   metoprolol succinate XL (TOPROL-XL) 25 MG 24 hr tablet Take 1 tablet by mouth Daily for 30 days. 7/22/23 8/27/23  Dung Hall MD   pravastatin (PRAVACHOL) 40 MG tablet Take 1 tablet by mouth Daily.    Silas Bhatt MD  "  QUEtiapine XR (SEROquel XR) 300 MG 24 hr tablet Take 1 tablet by mouth Every Evening for 30 days. 7/21/23 8/27/23  Dung Hall MD   zolpidem (AMBIEN) 10 MG tablet Take 1 tablet by mouth At Night As Needed. 8/23/23   Provider, MD Silas        Social History:   Social History     Tobacco Use    Smoking status: Every Day     Packs/day: 1.00     Years: 23.00     Pack years: 23.00     Types: Cigarettes    Smokeless tobacco: Never    Tobacco comments:     Smoked 11-20 years. last 7/24/22 1700   Vaping Use    Vaping Use: Never used   Substance Use Topics    Alcohol use: Not Currently     Comment: Occasionally drinks, less than 1 drink per day, has been drinking for less than 1 year    Drug use: Never         Review of Systems:  Review of Systems   Constitutional:  Negative for chills and fever.        Whole body pains   HENT:  Negative for congestion, ear pain and sore throat.    Eyes:  Negative for pain.   Respiratory:  Negative for cough, chest tightness and shortness of breath.    Cardiovascular:  Negative for chest pain.   Gastrointestinal:  Negative for abdominal pain, diarrhea, nausea and vomiting.   Genitourinary:  Negative for flank pain and hematuria.   Musculoskeletal:  Positive for myalgias. Negative for joint swelling.   Skin:  Negative for pallor.   Neurological:  Negative for seizures and headaches.   All other systems reviewed and are negative.       Physical Exam:  /90   Pulse 73   Temp 98.5 °F (36.9 °C)   Resp 16   Ht 157.5 cm (62\")   Wt 88.9 kg (196 lb)   SpO2 90%   BMI 35.85 kg/m²     Physical Exam      Vital signs were reviewed under triage note.  General appearance - Patient appears well-developed and well-nourished.  Patient is in no acute distress.  Head - Normocephalic, atraumatic.  Pupils - Equal, round, reactive to light.  Extraocular muscles are intact.  Conjunctiva is clear.  Nasal - Normal inspection.  No evidence of trauma or epistaxis.  Tympanic membranes - Gray, intact " without erythema or retractions.  Oral mucosa - Pink and moist without lesions or erythema.  Uvula is midline.  Chest wall - Atraumatic.  Chest wall is nontender.  There are no vesicular rashes noted.  Neck - Supple.  Trachea was midline.  There is no palpable lymphadenopathy or thyromegaly.  There are no meningeal signs  Lungs - Clear to auscultation and percussion bilaterally.  Heart - Regular rate and rhythm without any murmurs, clicks, or gallops.  Abdomen - Soft.  Bowel sounds are present.  There is no palpable tenderness.  There is no rebound, guarding, or rigidity.  There are no palpable masses.  There are no pulsatile masses.  Back - Spine is straight and midline.  There is no CVA tenderness.  Extremities - Intact x4 with full range of motion.  There is no palpable edema.  Pulses are intact x4 and equal.  Neurologic - Patient is awake, alert, and oriented x3.  Cranial nerves II through XII are grossly intact.  Motor and sensory functions grossly intact.  Cerebellar function was normal.  Integument - There are no rashes.  There are no petechia or purpura lesions noted.  There are no vesicular lesions noted.      Procedures:  Procedures      Medical Decision Making:      Comorbidities that affect care:    Anemia, arthritis, diabetes, hyperlipidemia, hypertension, pulmonary embolism, sleep apnea    External Notes reviewed:    Hospital Discharge Summary: Hospital discharge summary from 8/31/2023 was reviewed by me.      The following orders were placed and all results were independently analyzed by me:  Orders Placed This Encounter   Procedures    XR Chest 1 View    [place] Draw    High Sensitivity Troponin T    Comprehensive Metabolic Panel    Lipase    BNP    Magnesium    CBC Auto Differential    High Sensitivity Troponin T 2Hr    NPO Diet NPO Type: Strict NPO    Undress & Gown    Continuous Pulse Oximetry    Oxygen Therapy- Nasal Cannula; Titrate 1-6 LPM Per SpO2; 90 - 95%    ECG 12 Lead ED Triage Standing  Order; Chest Pain    Insert Peripheral IV    CBC & Differential    Green Top (Gel)    Lavender Top    Gold Top - SST    Light Blue Top       Medications Given in the Emergency Department:  Medications   sodium chloride 0.9 % flush 10 mL (has no administration in time range)   aspirin chewable tablet 324 mg (324 mg Oral Not Given 10/6/23 1426)   HYDROmorphone (DILAUDID) injection 0.5 mg (0.5 mg Intravenous Given 10/6/23 1620)   sodium chloride 0.9 % bolus 1,000 mL (0 mL Intravenous Stopped 10/6/23 1729)        ED Course:    ED Course as of 10/08/23 1029   Sun Oct 08, 2023   1024 EKG performed at 1350 was interpreted by me to show a normal sinus rhythm with a ventricular of 83 bpm.  The [place] interval was 185 ms.  P waves were suggestive of left atrial enlargement.  QRS interval is normal.  Axis is at -14 degrees.  There is no acute ischemic ST or T wave change identified.  QT corrected is prolonged at 535 ms. [TB]      ED Course User Index  [TB] Suresh Gabriel DO   The patient was seen and evaluated in the ED by me.  The above history and physical examination was performed as documented.  Diagnostic data was obtained.  Results reviewed.  Discussed with the patient.  There is no signs of an acute emergency medical condition.  From a cardiac work-up the patient has a normal troponin with a delta of only 2.  Patient safe for discharge home with outpatient treatment follow-up.        Labs:    Lab Results (last 24 hours)       Procedure Component Value Units Date/Time    High Sensitivity Troponin T [[id number]]  (Abnormal) Collected: 10/06/23 1353    Specimen: Blood from Arm, Right Updated: 10/06/23 1425     HS Troponin T 10 ng/L     Narrative:      High Sensitive Troponin T Reference Range:  <10.0 ng/L- Negative Female for AMI  <15.0 ng/L- Negative Male for AMI  >=10 - Abnormal Female indicating possible myocardial injury.  >=15 - Abnormal Male indicating possible myocardial injury.   Clinicians would have to utilize clinical  acumen, EKG, Troponin, and serial changes to determine if it is an Acute Myocardial Infarction or myocardial injury due to an underlying chronic condition.         CBC & Differential [[id number]]  (Abnormal) Collected: 10/06/23 1353    Specimen: Blood from Arm, Right Updated: 10/06/23 1406    Narrative:      The following orders were created for panel order CBC & Differential.  Procedure                               Abnormality         Status                     ---------                               -----------         ------                     CBC Auto Differential[[id number]]        Abnormal            Final result                 Please view results for these tests on the individual orders.    Comprehensive Metabolic Panel [[id number]]  (Abnormal) Collected: 10/06/23 1353    Specimen: Blood from Arm, Right Updated: 10/06/23 1436     Glucose 89 mg/dL      BUN 8 mg/dL      Creatinine 0.61 mg/dL      Sodium 140 mmol/L      Potassium 3.6 mmol/L      Chloride 105 mmol/L      CO2 25.1 mmol/L      Calcium 8.7 mg/dL      Total Protein 7.3 g/dL      Albumin 3.4 g/dL      ALT (SGPT) <5 U/L      AST (SGOT) 10 U/L      Alkaline Phosphatase 74 U/L      Total Bilirubin 0.2 mg/dL      Globulin 3.9 gm/dL      A/G Ratio 0.9 g/dL      BUN/Creatinine Ratio 13.1     Anion Gap 9.9 mmol/L      eGFR 107.7 mL/min/1.73     Narrative:      GFR Normal >60  Chronic Kidney Disease <60  Kidney Failure <15      Lipase [[id number]]  (Normal) Collected: 10/06/23 1353    Specimen: Blood from Arm, Right Updated: 10/06/23 1425     Lipase 19 U/L     BNP [[id number]]  (Normal) Collected: 10/06/23 1353    Specimen: Blood from Arm, Right Updated: 10/06/23 1423     proBNP 116.2 pg/mL     Narrative:      This assay is used as an aid in the diagnosis of individuals suspected of having heart failure. It can be used as an aid in the diagnosis of acute decompensated heart failure (ADHF) in patients presenting with signs and symptoms of ADHF to the emergency  department (ED). In addition, NT-proBNP of <300 pg/mL indicates ADHF is not likely.    Age Range Result Interpretation  NT-proBNP Concentration (pg/mL:      <50             Positive            >450                   Gray                 300-450                    Negative             <300    50-75           Positive            >900                  Gray                300-900                  Negative            <300      >75             Positive            >1800                  Gray                300-1800                  Negative            <300    Magnesium [[id number]]  (Normal) Collected: 10/06/23 1353    Specimen: Blood from Arm, Right Updated: 10/06/23 1426     Magnesium 2.2 mg/dL     CBC Auto Differential [[id number]]  (Abnormal) Collected: 10/06/23 1353    Specimen: Blood from Arm, Right Updated: 10/06/23 1406     WBC 4.57 10*3/mm3      RBC 3.80 10*6/mm3      Hemoglobin 9.4 g/dL      Hematocrit 31.6 %      MCV 83.2 fL      MCH 24.7 pg      MCHC 29.7 g/dL      RDW 15.0 %      RDW-SD 45.1 fl      MPV 10.1 fL      Platelets 265 10*3/mm3      Neutrophil % 51.9 %      Lymphocyte % 34.4 %      Monocyte % 7.2 %      Eosinophil % 5.9 %      Basophil % 0.4 %      Immature Grans % 0.2 %      Neutrophils, Absolute 2.37 10*3/mm3      Lymphocytes, Absolute 1.57 10*3/mm3      Monocytes, Absolute 0.33 10*3/mm3      Eosinophils, Absolute 0.27 10*3/mm3      Basophils, Absolute 0.02 10*3/mm3      Immature Grans, Absolute 0.01 10*3/mm3      nRBC 0.0 /100 WBC     High Sensitivity Troponin T 2Hr [[id number]]  (Abnormal) Collected: 10/06/23 1626    Specimen: Blood Updated: 10/06/23 1650     HS Troponin T 12 ng/L      Troponin T Delta 2 ng/L     Narrative:      High Sensitive Troponin T Reference Range:  <10.0 ng/L- Negative Female for AMI  <15.0 ng/L- Negative Male for AMI  >=10 - Abnormal Female indicating possible myocardial injury.  >=15 - Abnormal Male indicating possible myocardial injury.   Clinicians would have to utilize  clinical acumen, EKG, Troponin, and serial changes to determine if it is an Acute Myocardial Infarction or myocardial injury due to an underlying chronic condition.                  Imaging:    XR Chest 1 View    Result Date: 10/6/2023  PROCEDURE: XR CHEST 1 VW  COMPARISON: [facility], CR, XR CHEST 1 VW, 9/26/2023, 20:06.  INDICATIONS: Chest Pain Triage Protocol/chest pain  FINDINGS:  Status post median sternotomy.  Cardiac and mediastinal contours are unchanged.  Lungs are clear bilaterally.  Regional skeleton is unremarkable.        1. No acute cardiopulmonary disease.       JORY VILLALOBOS MD       Electronically Signed and Approved By: JORY VILLALOBOS MD on 10/06/2023 at 15:18                Differential Diagnosis and Discussion:    Chest Pain:  Based on the patient's signs and symptoms, I considered aortic dissection, myocardial infaction, pulmonary embolism, cardiac tamponade, pericarditis, pneumothorax, musculoskeletal chest pain and other differential diagnosis as an etiology of the patient's chest pain.   Weakness: Based on the patient's history, signs, and symptoms, the diffential diagnosis includes but is not limited to meningitis, stroke, sepsis, subarachnoid hemorrhage, intracranial bleeding, encephalitis, acute uti, dehydration, MS, myasthenia gravis, [place], migraine variant, neuromuscular disorders vertigo, electrolyte imbalance, and metabolic disorders.    All labs were reviewed and interpreted by me.  All X-rays impressions were independently interpreted by me.  EKG was interpreted by me.    MDM     Amount and/or Complexity of Data Reviewed  Clinical lab tests: reviewed  Tests in the radiology section of CPT®: reviewed  Tests in the medicine section of CPT®: reviewed             Patient Care Considerations:    ANTIBIOTICS: I considered prescribing antibiotics as an outpatient however there is no evidence of acute bacterial infection identified.      Consultants/Shared Management  Plan:    None    Social Determinants of Health:    Patient is independent, reliable, and has access to care.       Disposition and Care Coordination:    Discharged: I considered escalation of care by admitting this patient for observation, however the patient has improved and is suitable and  stable for discharge.    I have explained the patient´s condition, diagnoses and treatment plan based on the information available to me at this time. I have answered questions and addressed any concerns. The patient has a good  understanding of the patient´s diagnosis, condition, and treatment plan as can be expected at this point. The vital signs have been stable. The patient´s condition is stable and appropriate for discharge from the emergency department.      The patient will pursue further outpatient evaluation with the primary care physician or other designated or consulting physician as outlined in the discharge instructions. They are agreeable to this plan of care and follow-up instructions have been explained in detail. The patient has received these instructions in written format and have expressed an understanding of the discharge instructions. The patient is aware that any significant change in condition or worsening of symptoms should prompt an immediate return to this or the closest emergency department or call to 911.    Final diagnoses:   Myalgia        ED Disposition       ED Disposition   Discharge    Condition   Stable    Comment   --               This medical record created using voice recognition software.             Suresh Gabriel DO  10/08/23 1029

## 2023-10-06 NOTE — DISCHARGE INSTRUCTIONS
Continue on your current home medications as prescribed including your pain medication.  Follow-up with your primary care provider in 1 to 2 weeks.  Return to the ER for any other concerns issues that may arise.

## 2023-10-12 ENCOUNTER — HOSPITAL ENCOUNTER (OUTPATIENT)
Facility: HOSPITAL | Age: 53
Setting detail: OBSERVATION
LOS: 1 days | Discharge: HOME | End: 2023-10-14
Attending: EMERGENCY MEDICINE | Admitting: INTERNAL MEDICINE
Payer: MEDICARE

## 2023-10-12 ENCOUNTER — APPOINTMENT (OUTPATIENT)
Dept: GENERAL RADIOLOGY | Facility: HOSPITAL | Age: 53
End: 2023-10-12
Payer: MEDICARE

## 2023-10-12 DIAGNOSIS — R07.9 CHEST PAIN, UNSPECIFIED TYPE: Primary | ICD-10-CM

## 2023-10-12 DIAGNOSIS — R26.2 DIFFICULTY WALKING: ICD-10-CM

## 2023-10-12 DIAGNOSIS — I10 SEVERE UNCONTROLLED HYPERTENSION: ICD-10-CM

## 2023-10-12 LAB
ALBUMIN SERPL-MCNC: 3.6 G/DL (ref 3.5–5.2)
ALBUMIN SERPL-MCNC: 3.6 G/DL (ref 3.5–5.2)
ALBUMIN/GLOB SERPL: 0.8 G/DL
ALBUMIN/GLOB SERPL: 0.9 G/DL
ALP SERPL-CCNC: 89 U/L (ref 39–117)
ALP SERPL-CCNC: 90 U/L (ref 39–117)
ALT SERPL W P-5'-P-CCNC: <5 U/L (ref 1–33)
ALT SERPL W P-5'-P-CCNC: <5 U/L (ref 1–33)
ANION GAP SERPL CALCULATED.3IONS-SCNC: 10.4 MMOL/L (ref 5–15)
ANION GAP SERPL CALCULATED.3IONS-SCNC: 7.3 MMOL/L (ref 5–15)
AST SERPL-CCNC: 10 U/L (ref 1–32)
AST SERPL-CCNC: 10 U/L (ref 1–32)
BASOPHILS # BLD AUTO: 0.02 10*3/MM3 (ref 0–0.2)
BASOPHILS NFR BLD AUTO: 0.5 % (ref 0–1.5)
BILIRUB SERPL-MCNC: 0.4 MG/DL (ref 0–1.2)
BILIRUB SERPL-MCNC: 0.4 MG/DL (ref 0–1.2)
BUN SERPL-MCNC: 10 MG/DL (ref 6–20)
BUN SERPL-MCNC: 10 MG/DL (ref 6–20)
BUN/CREAT SERPL: 17.2 (ref 7–25)
BUN/CREAT SERPL: 18.2 (ref 7–25)
CALCIUM SPEC-SCNC: 9.1 MG/DL (ref 8.6–10.5)
CALCIUM SPEC-SCNC: 9.2 MG/DL (ref 8.6–10.5)
CHLORIDE SERPL-SCNC: 104 MMOL/L (ref 98–107)
CHLORIDE SERPL-SCNC: 104 MMOL/L (ref 98–107)
CO2 SERPL-SCNC: 24.6 MMOL/L (ref 22–29)
CO2 SERPL-SCNC: 26.7 MMOL/L (ref 22–29)
CREAT SERPL-MCNC: 0.55 MG/DL (ref 0.57–1)
CREAT SERPL-MCNC: 0.58 MG/DL (ref 0.57–1)
DEPRECATED RDW RBC AUTO: 45.7 FL (ref 37–54)
DEPRECATED RDW RBC AUTO: 45.8 FL (ref 37–54)
EGFRCR SERPLBLD CKD-EPI 2021: 108.4 ML/MIN/1.73
EGFRCR SERPLBLD CKD-EPI 2021: 109.8 ML/MIN/1.73
EOSINOPHIL # BLD AUTO: 0.23 10*3/MM3 (ref 0–0.4)
EOSINOPHIL NFR BLD AUTO: 5.3 % (ref 0.3–6.2)
ERYTHROCYTE [DISTWIDTH] IN BLOOD BY AUTOMATED COUNT: 15.3 % (ref 12.3–15.4)
ERYTHROCYTE [DISTWIDTH] IN BLOOD BY AUTOMATED COUNT: 15.5 % (ref 12.3–15.4)
GLOBULIN UR ELPH-MCNC: 4.1 GM/DL
GLOBULIN UR ELPH-MCNC: 4.5 GM/DL
GLUCOSE BLDC GLUCOMTR-MCNC: 144 MG/DL (ref 70–99)
GLUCOSE SERPL-MCNC: 112 MG/DL (ref 65–99)
GLUCOSE SERPL-MCNC: 79 MG/DL (ref 65–99)
HCT VFR BLD AUTO: 33.9 % (ref 34–46.6)
HCT VFR BLD AUTO: 34 % (ref 34–46.6)
HGB BLD-MCNC: 10.1 G/DL (ref 12–15.9)
HGB BLD-MCNC: 10.3 G/DL (ref 12–15.9)
HOLD SPECIMEN: NORMAL
HOLD SPECIMEN: NORMAL
IMM GRANULOCYTES # BLD AUTO: 0.01 10*3/MM3 (ref 0–0.05)
IMM GRANULOCYTES NFR BLD AUTO: 0.2 % (ref 0–0.5)
LIPASE SERPL-CCNC: 15 U/L (ref 13–60)
LYMPHOCYTES # BLD AUTO: 1.2 10*3/MM3 (ref 0.7–3.1)
LYMPHOCYTES NFR BLD AUTO: 27.8 % (ref 19.6–45.3)
MAGNESIUM SERPL-MCNC: 1.9 MG/DL (ref 1.6–2.6)
MCH RBC QN AUTO: 24.5 PG (ref 26.6–33)
MCH RBC QN AUTO: 24.8 PG (ref 26.6–33)
MCHC RBC AUTO-ENTMCNC: 29.8 G/DL (ref 31.5–35.7)
MCHC RBC AUTO-ENTMCNC: 30.3 G/DL (ref 31.5–35.7)
MCV RBC AUTO: 81.7 FL (ref 79–97)
MCV RBC AUTO: 82.1 FL (ref 79–97)
MONOCYTES # BLD AUTO: 0.31 10*3/MM3 (ref 0.1–0.9)
MONOCYTES NFR BLD AUTO: 7.2 % (ref 5–12)
NEUTROPHILS NFR BLD AUTO: 2.55 10*3/MM3 (ref 1.7–7)
NEUTROPHILS NFR BLD AUTO: 59 % (ref 42.7–76)
NRBC BLD AUTO-RTO: 0 /100 WBC (ref 0–0.2)
NT-PROBNP SERPL-MCNC: 115.4 PG/ML (ref 0–900)
PLATELET # BLD AUTO: 275 10*3/MM3 (ref 140–450)
PLATELET # BLD AUTO: 281 10*3/MM3 (ref 140–450)
PMV BLD AUTO: 10.9 FL (ref 6–12)
PMV BLD AUTO: 11 FL (ref 6–12)
POTASSIUM SERPL-SCNC: 3.8 MMOL/L (ref 3.5–5.2)
POTASSIUM SERPL-SCNC: 3.8 MMOL/L (ref 3.5–5.2)
PROT SERPL-MCNC: 7.7 G/DL (ref 6–8.5)
PROT SERPL-MCNC: 8.1 G/DL (ref 6–8.5)
QT INTERVAL: 455 MS
QTC INTERVAL: 535 MS
RBC # BLD AUTO: 4.13 10*6/MM3 (ref 3.77–5.28)
RBC # BLD AUTO: 4.16 10*6/MM3 (ref 3.77–5.28)
SODIUM SERPL-SCNC: 138 MMOL/L (ref 136–145)
SODIUM SERPL-SCNC: 139 MMOL/L (ref 136–145)
T4 FREE SERPL-MCNC: 0.82 NG/DL (ref 0.93–1.7)
TROPONIN T SERPL HS-MCNC: 12 NG/L
WBC NRBC COR # BLD: 4.3 10*3/MM3 (ref 3.4–10.8)
WBC NRBC COR # BLD: 4.32 10*3/MM3 (ref 3.4–10.8)
WHOLE BLOOD HOLD COAG: NORMAL
WHOLE BLOOD HOLD SPECIMEN: NORMAL

## 2023-10-12 PROCEDURE — 93010 ELECTROCARDIOGRAM REPORT: CPT | Performed by: INTERNAL MEDICINE

## 2023-10-12 PROCEDURE — 80053 COMPREHEN METABOLIC PANEL: CPT | Performed by: INTERNAL MEDICINE

## 2023-10-12 PROCEDURE — 71045 X-RAY EXAM CHEST 1 VIEW: CPT

## 2023-10-12 PROCEDURE — G0378 HOSPITAL OBSERVATION PER HR: HCPCS

## 2023-10-12 PROCEDURE — 96374 THER/PROPH/DIAG INJ IV PUSH: CPT

## 2023-10-12 PROCEDURE — 82948 REAGENT STRIP/BLOOD GLUCOSE: CPT

## 2023-10-12 PROCEDURE — 83880 ASSAY OF NATRIURETIC PEPTIDE: CPT

## 2023-10-12 PROCEDURE — 25010000002 MORPHINE PER 10 MG: Performed by: EMERGENCY MEDICINE

## 2023-10-12 PROCEDURE — 99284 EMERGENCY DEPT VISIT MOD MDM: CPT

## 2023-10-12 PROCEDURE — 83735 ASSAY OF MAGNESIUM: CPT

## 2023-10-12 PROCEDURE — 85027 COMPLETE CBC AUTOMATED: CPT | Performed by: INTERNAL MEDICINE

## 2023-10-12 PROCEDURE — 84439 ASSAY OF FREE THYROXINE: CPT | Performed by: INTERNAL MEDICINE

## 2023-10-12 PROCEDURE — 80053 COMPREHEN METABOLIC PANEL: CPT

## 2023-10-12 PROCEDURE — 83690 ASSAY OF LIPASE: CPT

## 2023-10-12 PROCEDURE — 85025 COMPLETE CBC W/AUTO DIFF WBC: CPT

## 2023-10-12 PROCEDURE — 25010000002 ONDANSETRON PER 1 MG: Performed by: EMERGENCY MEDICINE

## 2023-10-12 PROCEDURE — 93005 ELECTROCARDIOGRAM TRACING: CPT

## 2023-10-12 PROCEDURE — 96375 TX/PRO/DX INJ NEW DRUG ADDON: CPT

## 2023-10-12 PROCEDURE — 84484 ASSAY OF TROPONIN QUANT: CPT

## 2023-10-12 RX ORDER — ASPIRIN 81 MG/1
324 TABLET, CHEWABLE ORAL ONCE
Status: COMPLETED | OUTPATIENT
Start: 2023-10-12 | End: 2023-10-12

## 2023-10-12 RX ORDER — ONDANSETRON 2 MG/ML
4 INJECTION INTRAMUSCULAR; INTRAVENOUS ONCE
Status: COMPLETED | OUTPATIENT
Start: 2023-10-12 | End: 2023-10-12

## 2023-10-12 RX ORDER — INSULIN LISPRO 100 [IU]/ML
2-7 INJECTION, SOLUTION INTRAVENOUS; SUBCUTANEOUS
Status: DISCONTINUED | OUTPATIENT
Start: 2023-10-12 | End: 2023-10-14 | Stop reason: HOSPADM

## 2023-10-12 RX ORDER — DEXTROSE MONOHYDRATE 25 G/50ML
25 INJECTION, SOLUTION INTRAVENOUS
Status: DISCONTINUED | OUTPATIENT
Start: 2023-10-12 | End: 2023-10-14 | Stop reason: HOSPADM

## 2023-10-12 RX ORDER — AMOXICILLIN 250 MG
2 CAPSULE ORAL 2 TIMES DAILY
Status: DISCONTINUED | OUTPATIENT
Start: 2023-10-12 | End: 2023-10-14 | Stop reason: HOSPADM

## 2023-10-12 RX ORDER — ASPIRIN 81 MG/1
81 TABLET ORAL DAILY
Status: DISCONTINUED | OUTPATIENT
Start: 2023-10-12 | End: 2023-10-14 | Stop reason: HOSPADM

## 2023-10-12 RX ORDER — BISACODYL 10 MG
10 SUPPOSITORY, RECTAL RECTAL DAILY PRN
Status: DISCONTINUED | OUTPATIENT
Start: 2023-10-12 | End: 2023-10-14 | Stop reason: HOSPADM

## 2023-10-12 RX ORDER — NITROGLYCERIN 0.4 MG/1
0.4 TABLET SUBLINGUAL
Status: DISCONTINUED | OUTPATIENT
Start: 2023-10-12 | End: 2023-10-14 | Stop reason: HOSPADM

## 2023-10-12 RX ORDER — BISACODYL 10 MG
10 SUPPOSITORY, RECTAL RECTAL DAILY PRN
Status: DISCONTINUED | OUTPATIENT
Start: 2023-10-12 | End: 2023-10-12 | Stop reason: SDUPTHER

## 2023-10-12 RX ORDER — ONDANSETRON 2 MG/ML
4 INJECTION INTRAMUSCULAR; INTRAVENOUS EVERY 6 HOURS PRN
Status: DISCONTINUED | OUTPATIENT
Start: 2023-10-12 | End: 2023-10-12 | Stop reason: SDUPTHER

## 2023-10-12 RX ORDER — METOPROLOL SUCCINATE 25 MG/1
25 TABLET, EXTENDED RELEASE ORAL
Status: DISCONTINUED | OUTPATIENT
Start: 2023-10-12 | End: 2023-10-14 | Stop reason: HOSPADM

## 2023-10-12 RX ORDER — ACETAMINOPHEN 325 MG/1
650 TABLET ORAL EVERY 4 HOURS PRN
Status: DISCONTINUED | OUTPATIENT
Start: 2023-10-12 | End: 2023-10-12 | Stop reason: SDUPTHER

## 2023-10-12 RX ORDER — NITROGLYCERIN 0.4 MG/1
0.4 TABLET SUBLINGUAL
Status: DISCONTINUED | OUTPATIENT
Start: 2023-10-12 | End: 2023-10-12 | Stop reason: SDUPTHER

## 2023-10-12 RX ORDER — POLYETHYLENE GLYCOL 3350 17 G/17G
17 POWDER, FOR SOLUTION ORAL DAILY PRN
Status: DISCONTINUED | OUTPATIENT
Start: 2023-10-12 | End: 2023-10-14 | Stop reason: HOSPADM

## 2023-10-12 RX ORDER — BISACODYL 5 MG/1
5 TABLET, DELAYED RELEASE ORAL DAILY PRN
Status: DISCONTINUED | OUTPATIENT
Start: 2023-10-12 | End: 2023-10-12 | Stop reason: SDUPTHER

## 2023-10-12 RX ORDER — SODIUM CHLORIDE 0.9 % (FLUSH) 0.9 %
10 SYRINGE (ML) INJECTION AS NEEDED
Status: DISCONTINUED | OUTPATIENT
Start: 2023-10-12 | End: 2023-10-14 | Stop reason: HOSPADM

## 2023-10-12 RX ORDER — LEVETIRACETAM 750 MG/1
750 TABLET ORAL EVERY 12 HOURS SCHEDULED
Status: DISCONTINUED | OUTPATIENT
Start: 2023-10-12 | End: 2023-10-12

## 2023-10-12 RX ORDER — PRAVASTATIN SODIUM 20 MG
40 TABLET ORAL DAILY
Status: DISCONTINUED | OUTPATIENT
Start: 2023-10-12 | End: 2023-10-14 | Stop reason: HOSPADM

## 2023-10-12 RX ORDER — SODIUM CHLORIDE 9 MG/ML
40 INJECTION, SOLUTION INTRAVENOUS AS NEEDED
Status: DISCONTINUED | OUTPATIENT
Start: 2023-10-12 | End: 2023-10-12 | Stop reason: SDUPTHER

## 2023-10-12 RX ORDER — BISACODYL 5 MG/1
5 TABLET, DELAYED RELEASE ORAL DAILY PRN
Status: DISCONTINUED | OUTPATIENT
Start: 2023-10-12 | End: 2023-10-14 | Stop reason: HOSPADM

## 2023-10-12 RX ORDER — ALUMINA, MAGNESIA, AND SIMETHICONE 2400; 2400; 240 MG/30ML; MG/30ML; MG/30ML
15 SUSPENSION ORAL EVERY 6 HOURS PRN
Status: DISCONTINUED | OUTPATIENT
Start: 2023-10-12 | End: 2023-10-14 | Stop reason: HOSPADM

## 2023-10-12 RX ORDER — SODIUM CHLORIDE 0.9 % (FLUSH) 0.9 %
10 SYRINGE (ML) INJECTION EVERY 12 HOURS SCHEDULED
Status: DISCONTINUED | OUTPATIENT
Start: 2023-10-12 | End: 2023-10-14 | Stop reason: HOSPADM

## 2023-10-12 RX ORDER — FUROSEMIDE 20 MG/1
20 TABLET ORAL DAILY
Status: DISCONTINUED | OUTPATIENT
Start: 2023-10-12 | End: 2023-10-14 | Stop reason: HOSPADM

## 2023-10-12 RX ORDER — ONDANSETRON 2 MG/ML
4 INJECTION INTRAMUSCULAR; INTRAVENOUS EVERY 6 HOURS PRN
Status: DISCONTINUED | OUTPATIENT
Start: 2023-10-12 | End: 2023-10-14 | Stop reason: HOSPADM

## 2023-10-12 RX ORDER — ALUMINA, MAGNESIA, AND SIMETHICONE 2400; 2400; 240 MG/30ML; MG/30ML; MG/30ML
15 SUSPENSION ORAL EVERY 6 HOURS PRN
Status: DISCONTINUED | OUTPATIENT
Start: 2023-10-12 | End: 2023-10-12 | Stop reason: SDUPTHER

## 2023-10-12 RX ORDER — LEVETIRACETAM 500 MG/1
500 TABLET ORAL 2 TIMES DAILY
Status: DISCONTINUED | OUTPATIENT
Start: 2023-10-12 | End: 2023-10-14 | Stop reason: HOSPADM

## 2023-10-12 RX ORDER — HYDROCODONE BITARTRATE AND ACETAMINOPHEN 5; 325 MG/1; MG/1
1 TABLET ORAL EVERY 6 HOURS PRN
Status: DISCONTINUED | OUTPATIENT
Start: 2023-10-12 | End: 2023-10-14 | Stop reason: HOSPADM

## 2023-10-12 RX ORDER — AMLODIPINE BESYLATE 5 MG/1
1 TABLET ORAL DAILY
COMMUNITY
Start: 2023-09-21

## 2023-10-12 RX ORDER — IBUPROFEN 600 MG/1
1 TABLET ORAL
Status: DISCONTINUED | OUTPATIENT
Start: 2023-10-12 | End: 2023-10-14 | Stop reason: HOSPADM

## 2023-10-12 RX ORDER — GLIPIZIDE 5 MG/1
5 TABLET, FILM COATED, EXTENDED RELEASE ORAL DAILY
COMMUNITY

## 2023-10-12 RX ORDER — NITROGLYCERIN 0.4 MG/1
TABLET SUBLINGUAL
Status: COMPLETED
Start: 2023-10-12 | End: 2023-10-12

## 2023-10-12 RX ORDER — CETIRIZINE HYDROCHLORIDE 10 MG/1
10 TABLET ORAL DAILY
Status: DISCONTINUED | OUTPATIENT
Start: 2023-10-12 | End: 2023-10-14 | Stop reason: HOSPADM

## 2023-10-12 RX ORDER — SODIUM CHLORIDE 9 MG/ML
40 INJECTION, SOLUTION INTRAVENOUS AS NEEDED
Status: DISCONTINUED | OUTPATIENT
Start: 2023-10-12 | End: 2023-10-14 | Stop reason: HOSPADM

## 2023-10-12 RX ORDER — FOLIC ACID 1 MG/1
1 TABLET ORAL EVERY 24 HOURS
Status: DISCONTINUED | OUTPATIENT
Start: 2023-10-12 | End: 2023-10-14 | Stop reason: HOSPADM

## 2023-10-12 RX ORDER — AMOXICILLIN 250 MG
2 CAPSULE ORAL 2 TIMES DAILY
Status: DISCONTINUED | OUTPATIENT
Start: 2023-10-12 | End: 2023-10-12 | Stop reason: SDUPTHER

## 2023-10-12 RX ORDER — NICOTINE POLACRILEX 4 MG
15 LOZENGE BUCCAL
Status: DISCONTINUED | OUTPATIENT
Start: 2023-10-12 | End: 2023-10-14 | Stop reason: HOSPADM

## 2023-10-12 RX ORDER — ZOLPIDEM TARTRATE 5 MG/1
10 TABLET ORAL NIGHTLY PRN
Status: DISCONTINUED | OUTPATIENT
Start: 2023-10-12 | End: 2023-10-14 | Stop reason: HOSPADM

## 2023-10-12 RX ORDER — ALPRAZOLAM 1 MG/1
1 TABLET ORAL 3 TIMES DAILY PRN
Status: DISCONTINUED | OUTPATIENT
Start: 2023-10-12 | End: 2023-10-14 | Stop reason: HOSPADM

## 2023-10-12 RX ORDER — POLYETHYLENE GLYCOL 3350 17 G/17G
17 POWDER, FOR SOLUTION ORAL DAILY PRN
Status: DISCONTINUED | OUTPATIENT
Start: 2023-10-12 | End: 2023-10-12 | Stop reason: SDUPTHER

## 2023-10-12 RX ORDER — ACETAMINOPHEN 325 MG/1
650 TABLET ORAL EVERY 4 HOURS PRN
Status: DISCONTINUED | OUTPATIENT
Start: 2023-10-12 | End: 2023-10-14 | Stop reason: HOSPADM

## 2023-10-12 RX ADMIN — CETIRIZINE HYDROCHLORIDE 10 MG: 10 TABLET, FILM COATED ORAL at 20:35

## 2023-10-12 RX ADMIN — ASPIRIN 81 MG: 81 TABLET, COATED ORAL at 20:36

## 2023-10-12 RX ADMIN — PRAVASTATIN SODIUM 40 MG: 20 TABLET ORAL at 20:36

## 2023-10-12 RX ADMIN — NITROGLYCERIN 0.4 MG: 0.4 TABLET, ORALLY DISINTEGRATING SUBLINGUAL at 14:18

## 2023-10-12 RX ADMIN — NITROGLYCERIN 0.4 MG: 0.4 TABLET, ORALLY DISINTEGRATING SUBLINGUAL at 14:23

## 2023-10-12 RX ADMIN — ONDANSETRON 4 MG: 2 INJECTION INTRAMUSCULAR; INTRAVENOUS at 16:30

## 2023-10-12 RX ADMIN — NITROGLYCERIN 0.4 MG: 0.4 TABLET, ORALLY DISINTEGRATING SUBLINGUAL at 14:30

## 2023-10-12 RX ADMIN — QUETIAPINE FUMARATE 300 MG: 200 TABLET, EXTENDED RELEASE ORAL at 20:36

## 2023-10-12 RX ADMIN — FUROSEMIDE 20 MG: 20 TABLET ORAL at 20:36

## 2023-10-12 RX ADMIN — LEVETIRACETAM 500 MG: 500 TABLET, FILM COATED ORAL at 20:35

## 2023-10-12 RX ADMIN — MORPHINE SULFATE 4 MG: 4 INJECTION, SOLUTION INTRAMUSCULAR; INTRAVENOUS at 16:30

## 2023-10-12 RX ADMIN — HYDROCODONE BITARTRATE AND ACETAMINOPHEN 1 TABLET: 5; 325 TABLET ORAL at 20:36

## 2023-10-12 RX ADMIN — ASPIRIN 81 MG CHEWABLE TABLET 324 MG: 81 TABLET CHEWABLE at 14:16

## 2023-10-12 RX ADMIN — Medication 10 ML: at 20:37

## 2023-10-12 RX ADMIN — METOPROLOL SUCCINATE 25 MG: 25 TABLET, EXTENDED RELEASE ORAL at 20:36

## 2023-10-12 RX ADMIN — Medication 10 ML: at 20:38

## 2023-10-12 NOTE — H&P
[facility]   Consult Note    Patient Name: Carol Abarca  : 1970  MRN: [id number]  Primary Care Physician:  Sonia Mosquera APRN  Date of admission: 10/12/2023    Subjective   Subjective     Reason for Consult: Chest pain    HPI: Patient is 53 years old -American female patient of Dr. Hall being admitted because of chest pain she has been admitted many times she does have a very severe arthritis has had extensive work-up in the past she has had a work-up for cardiac done but she has not had any proper cardiac fracking in the past has been seen by Dr. Leyva she does have an aneurysm and history of hypertension she does smoke does not drink alcohol    Carol Abarca is a 53 y.o. female being admitted with chest pain with some slight abnormalities in the EKG but there is not much change since about a month or so    Review of Systems   All systems were reviewed and negative except for: Reviewed    Personal History     Past Medical History:   Diagnosis Date    Anemia     Arthritis     Diabetes     Essential hypertension 2021    History of pulmonary embolism     Lumbago     Low back pain    Mild left ventricular hypertrophy 2021    Mixed hyperlipidemia 2021    Obstructive sleep apnea     Reflux esophagitis     Seasonal allergies        Past Surgical History:   Procedure Laterality Date    APPENDECTOMY  2010     SECTION      , , ,     COLONOSCOPY      2019    COLONOSCOPY N/A 2022    Procedure: COLONOSCOPY;  Surgeon: Radha James MD;  Location: [facility] ENDOSCOPY;  Service: Gastroenterology;  Laterality: N/A;  COLON POLYP     ENDOSCOPY  2019    ENDOSCOPY N/A 2023    Procedure: ESOPHAGOGASTRODUODENOSCOPY WITH BIPOSIES;  Surgeon: Coy Patrick MD;  Location: [facility] ENDOSCOPY;  Service: Gastroenterology;  Laterality: N/A;  PRIOR LAPBAND, GASTRITIS    HEMORRHOIDECTOMY N/A 2022    Procedure: HEMORRHOIDECTOMY;  Surgeon: Lilli  Remi AQUINO MD;  [facility]: [facility] OR;  Service: General;  Laterality: N/A;    HERNIA REPAIR      2005, 2006, 2007, 2008    HYSTERECTOMY  2004    LAPAROSCOPIC GASTRIC BANDING      OTHER SURGICAL HISTORY      Metal implants       Family History: family history includes Arthritis in her mother; Diabetes in her mother and son; Heart disease in her father and mother. Otherwise pertinent FHx was reviewed and not pertinent to current issue.    Social History:  reports that she has been smoking cigarettes. She has a 23.00 pack-year smoking history. She has never used smokeless tobacco. She reports that she does not currently use alcohol. She reports that she does not use drugs.    Home Medications:  ALPRAZolam, HYDROcodone-acetaminophen, QUEtiapine XR, aspirin, cetirizine, empagliflozin, folic acid, furosemide, levETIRAcetam, metoprolol succinate XL, pravastatin, and zolpidem      Allergies:  Allergies   Allergen Reactions    Tramadol Anaphylaxis    Tramadol Hcl Anaphylaxis    Moxifloxacin Hives    Sulfa Antibiotics Hives       Objective   Objective     Vitals:   Temp:  [98.7 øF (37.1 øC)] 98.7 øF (37.1 øC)  Heart Rate:  [70-89] 70  Resp:  [18] 18  BP: (131-173)/() 169/111  Physical Exam    Constitutional: Awake, alert   Eyes: PERRLA, sclerae anicteric, no conjunctival injection   HENT: NCAT, mucous membranes moist   Neck: Supple, no thyromegaly, no lymphadenopathy, trachea midline   Respiratory: Clear to auscultation bilaterally, nonlabored respirations    Cardiovascular: RRR, no murmurs, rubs, or gallops, palpable pedal pulses bilaterally   Gastrointestinal: Positive bowel sounds, soft, nontender, nondistended   Musculoskeletal: No bilateral ankle edema, no clubbing or cyanosis to extremities   Psychiatric: Appropriate affect, cooperative   Neurologic: Oriented x 3, strength symmetric in all extremities, Cranial Nerves grossly intact to confrontation, speech clear   Skin: No rashes   Noted definite findings  Result  Review    Result Review:  I have personally reviewed the results from the time of this admission to 10/12/2023 18:12 EDT and agree with these findings:  [x]  Laboratory  []  Microbiology  [x]  Radiology  [x]  EKG/Telemetry   []  Cardiology/Vascular   []  Pathology  [x]  Old records  []  Other:  Most notable findings include: Has been reviewed    Assessment & Plan   Assessment / Plan     Brief Patient Summary:  Carol Abarca is a 53 y.o. female who has chest pain we will admit him to the hospital.  Cardiology consultation    Active Hospital Problems:  Active Hospital Problems    Diagnosis     **Chest pain        Plan:   She will be on telemetry cardiac consultation will be done        Electronically signed by Edward Manuel MD, 10/12/23, 6:12 PM EDT.    Part of this note may be an electronic transcription/translation of spoken language to printed text using the Dragon Dictation System.

## 2023-10-12 NOTE — PAYOR COMM NOTE
"Carol Abarca (53 y.o. Female)     CERTIFIED IN TOTAL: [id number]  10/12/23-11/10/23  CPT: [id number]  PATIENT INFORMATION  Name:  Carol Abarca  MRN#:     [id number]  :  1970         ADMISSION INFORMATION  CLASS: Observation     DOS:  10/12/2023    CURRENT ATTENDING PROVIDER INFORMATION  Name/NPI: Edward Manuel MD [id number]  Phone:  Phone: [phone]      RENDERING FACILITY  Name:  [facility]   NPI:  [id number]  TID:  [id number]  Address:      [address] [place], [place] [address] [id number]  Phone  [phone]      CASE MANAGEMENT CONTACT INFORMATION  Phone:      [phone]  Fax:           [phone]          Date of Birth   1970    Social Security Number       Address   [address] [address]    Home Phone   [phone]    MRN   [id number]       Yarsani   None    Marital Status   Single                            Admission Date   10/12/23    Admission Type   Emergency    Admitting Provider   Edward Manuel MD    Attending Provider   Edward Manuel MD    Department, Room/Bed   [facility] EMERGENCY ROOM,        Discharge Date       Discharge Disposition       Discharge Destination                                 Attending Provider: Edward Manuel MD    Allergies: Tramadol, Tramadol Hcl, Moxifloxacin, Sulfa Antibiotics    Isolation: None   Infection: None   Code Status: Prior    Ht: 157.5 cm (62\")   Wt: 88.6 kg (195 lb 5.2 oz)    Admission Cmt: None   Principal Problem: Chest pain [R07.9]                   Active Insurance as of 10/12/2023       Primary Coverage       Payor Plan Insurance Group Employer/Plan Group    HUMANA MEDICARE REPLACEMENT HUMANA MEDICARE REPLACEMENT [id number]       Payor Plan Address Payor Plan Phone Number Payor Plan Fax Number Effective Dates    [address] [address] [phone]  2019 - None Entered    [facility] [id number]         Subscriber Name Subscriber Birth Date Member ID       CAROL ABARCA 1970 " [id number]               Secondary Coverage       Payor Plan Insurance Group Employer/Plan Group    [id number]       Payor Plan Address Payor Plan Phone Number Payor Plan Fax Number Effective Dates    [address] [address]   1/1/2021 - None Entered    [facility] [phone]         Subscriber Name Subscriber Birth Date Member ID       TIM COTTON 1970 [id number]                                   Emergency Department Notes        Tasneem Wilson RN at 10/12/23 1737          Called telemetry for monitor. Will call for transport once received.    Electronically signed by Tasneem Wilson RN at 10/12/23 1737       Tasneem Wilson RN at 10/12/23 5372          Report given to Radha EGAN on accepting unit. Patient does not have inpatient order for monitor. Waiting for inpatient orders, so patient can be transferred on telemetry.    Electronically signed by Tasneem Wilson RN at 10/12/23 7902       Walter Khan MD at 10/12/23 1356          Time: 1:51 PM EDT  Date of encounter:  10/12/2023  Independent Historian/Clinical History and Information was obtained by:   Patient    History is limited by: N/A    Chief Complaint: Chest pain, shortness of air since yesterday        History of Present Illness:  Patient is a 53 y.o. year old diabetic female with history of hypertension and hyperlipidemia as well as esophagitis, history of PE, who presents to the emergency department for evaluation of some left-sided chest pain and dyspnea since last night.    This patient is a diabetic 53-year-old female with hypertension and chest pressure radiating to the left side of her chest as well as shortness of breath.    Its been going on since yesterday.  It has been constant.    She also states she feels numbness in both hands and legs and feet bilaterally.    She denies any recent anxiety or hyperventilation.              HPI    Patient Care Team  Primary Care Provider:  hyperlipidemia and  tobacco abuse who presents to the ED Sonia Peters APRN    Past Medical History:     Allergies   Allergen Reactions    Tramadol Anaphylaxis    Tramadol Hcl Anaphylaxis    Moxifloxacin Hives    Sulfa Antibiotics Hives     Past Medical History:   Diagnosis Date    Anemia     Arthritis     Diabetes     Essential hypertension 2021    History of pulmonary embolism     Lumbago     Low back pain    Mild left ventricular hypertrophy 2021    Mixed hyperlipidemia 2021    Obstructive sleep apnea     Reflux esophagitis     Seasonal allergies      Past Surgical History:   Procedure Laterality Date    APPENDECTOMY  2010     SECTION      , , ,     COLONOSCOPY      2019    COLONOSCOPY N/A 2022    Procedure: COLONOSCOPY;  Surgeon: Radha James MD;  Location: [facility] ENDOSCOPY;  Service: Gastroenterology;  Laterality: N/A;  COLON POLYP     ENDOSCOPY      ENDOSCOPY N/A 2023    Procedure: ESOPHAGOGASTRODUODENOSCOPY WITH BIPOSIES;  Surgeon: Coy Patrick MD;  Location: [facility] ENDOSCOPY;  Service: Gastroenterology;  Laterality: N/A;  PRIOR LAPBAND, GASTRITIS    HEMORRHOIDECTOMY N/A 2022    Procedure: HEMORRHOIDECTOMY;  Surgeon: Remi Reeder MD;  Location: [facility] MAIN OR;  Service: General;  Laterality: N/A;    HERNIA REPAIR      , 2006, 2007, 2008    HYSTERECTOMY  2004    LAPAROSCOPIC GASTRIC BANDING      OTHER SURGICAL HISTORY      Metal implants     Family History   Problem Relation Age of Onset    Heart disease Mother     Diabetes Mother         Unspecified type    Arthritis Mother     Heart disease Father     Diabetes Son         Unspecified type    Malig Hyperthermia Neg Hx        Home Medications:  Prior to Admission medications    Medication Sig Start Date End Date Taking? Authorizing Provider   ALPRAZolam (XANAX) 1 MG tablet Take 1 tablet by mouth 3 (Three) Times a Day As Needed. for anxiety 23   Provider, MD Silas    aspirin 81 MG EC tablet Take 1 tablet by mouth Daily.    Silas Bhatt MD   cetirizine (zyrTEC) 10 MG tablet Take 1 tablet by mouth Daily.    Silas Bhatt MD   empagliflozin (JARDIANCE) 10 MG tablet tablet Take 1 tablet by mouth Daily.    Silas Bhatt MD   folic acid (FOLVITE) 1 MG tablet Take 1 tablet by mouth Daily.    Silas Bhatt MD   furosemide (LASIX) 20 MG tablet Take 1 tablet by mouth Daily for 30 days. 7/22/23 8/27/23  Dung Hall MD   HYDROcodone-acetaminophen (NORCO) 7.5-325 MG per tablet Take 1 tablet by mouth Every 8 (Eight) Hours As Needed for Moderate Pain, Severe Pain or Mild Pain.    Silas Bhatt MD   levETIRAcetam (Keppra) 500 MG tablet Take 1 tablet by mouth 2 (Two) Times a Day for 30 days. 6/15/23 8/27/23  Dung Hall MD   levETIRAcetam (KEPPRA) 750 MG tablet Take 1 tablet by mouth Every 12 (Twelve) Hours for 30 days. 8/31/23 9/30/23  Dung Hall MD   metoprolol succinate XL (TOPROL-XL) 25 MG 24 hr tablet Take 1 tablet by mouth Daily for 30 days. 7/22/23 8/27/23  Dung Hall MD   pravastatin (PRAVACHOL) 40 MG tablet Take 1 tablet by mouth Daily.    Silas Bhatt MD   QUEtiapine XR (SEROquel XR) 300 MG 24 hr tablet Take 1 tablet by mouth Every Evening for 30 days. 7/21/23 8/27/23  Dung Hall MD   zolpidem (AMBIEN) 10 MG tablet Take 1 tablet by mouth At Night As Needed. 8/23/23   Silas Bhatt MD        Social History:   Social History     Tobacco Use    Smoking status: Every Day     Packs/day: 1.00     Years: 23.00     Additional pack years: 0.00     Total pack years: 23.00     Types: Cigarettes    Smokeless tobacco: Never    Tobacco comments:     Smoked 11-20 years. last 7/24/22 1700   Vaping Use    Vaping Use: Never used   Substance Use Topics    Alcohol use: Not Currently     Comment: Occasionally drinks, less than 1 drink per day, has been drinking for less than 1 year    Drug use: Never         Review of Systems:  Review of  "Systems   I performed a 10 point review of systems which was all negative, except for the positives found in the HPI above.    Physical Exam:  /94   Pulse 71   Temp 98.7 øF (37.1 øC) (Oral)   Resp 18   Ht 157.5 cm (62\")   Wt 88.6 kg (195 lb 5.2 oz)   SpO2 94%   BMI 35.73 kg/mý     Physical Exam   General: Awake alert and in appears mildly anxious or in mild distress    HEENT: Head normocephalic atraumatic, eyes PERRLA EOMI, nose normal, oropharynx normal.    Neck: Supple full range of motion, no meningismus, no lymphadenopathy    Heart: Regular rate and rhythm, no murmurs or rubs, 2+ radial pulses bilaterally    Lungs: Clear to auscultation bilaterally without wheezes or crackles, no respiratory distress    Abdomen: Soft, nontender, nondistended, no rebound or guarding    Skin: Warm, dry, no rash    Musculoskeletal: Normal range of motion, minimal bilateral lower extremity edema but no calf tenderness bilaterally    Neurologic: Oriented x3, no motor deficits, she can feel light touch sensation but states it does not feel normal over the past 2 days in both hands and both legs      Psychiatric: Mood appears mildly anxious but not hyperventilating, no psychosis          Procedures:  Procedures      Medical Decision Making:      Comorbidities that affect care:    Diabetes, Hypertension, Smoking    External Notes reviewed:    Previous Labs: I reviewed her previous lab work including previous troponins which were all negative over the past few years.      The following orders were placed and all results were independently analyzed by me:  Orders Placed This Encounter   Procedures    XR Chest 1 View    [place] Draw    High Sensitivity Troponin T    Comprehensive Metabolic Panel    Lipase    BNP    Magnesium    CBC Auto Differential    High Sensitivity Troponin T 2Hr    NPO Diet NPO Type: Strict NPO    Undress & Gown    Continuous Pulse Oximetry    Internal Medicine (on-call MD unless specified)    Cardiology " (on-call MD unless specified)    Oxygen Therapy- Nasal Cannula; Titrate 1-6 LPM Per SpO2; 90 - 95%    ECG 12 Lead ED Triage Standing Order; Chest Pain    ECG 12 Lead ED Triage Standing Order; Chest Pain    Insert Peripheral IV    Inpatient Admission    CBC & Differential    Green Top (Gel)    Lavender Top    Gold Top - SST    Light Blue Top       Medications Given in the Emergency Department:  Medications   sodium chloride 0.9 % flush 10 mL (has no administration in time range)   nitroglycerin (NITROSTAT) SL tablet 0.4 mg (0.4 mg Sublingual Given 10/12/23 1430)   morphine injection 4 mg (has no administration in time range)   ondansetron (ZOFRAN) injection 4 mg (has no administration in time range)   aspirin chewable tablet 324 mg (324 mg Oral Given 10/12/23 1416)        ED Course:    ED Course as of 10/12/23 1603   Thu Oct 12, 2023   1351 EKG: I interpreted her twelve-lead EKG as normal sinus rhythm at 84 bpm, normal P waves, QRS showing inferior Q waves, normal ST segments, there are some T wave inversions throughout the anterolateral leads noted.    These T wave abnormalities look minimally changed from old EKG back on September 26, 2023. [VS]      ED Course User Index  [VS] Walter Khan MD       Labs:    Lab Results (last 24 hours)       Procedure Component Value Units Date/Time    CBC & Differential [[id number]]  (Abnormal) Collected: 10/12/23 1311    Specimen: Blood Updated: 10/12/23 1318    Narrative:      The following orders were created for panel order CBC & Differential.  Procedure                               Abnormality         Status                     ---------                               -----------         ------                     CBC Auto Differential[[id number]]        Abnormal            Final result                 Please view results for these tests on the individual orders.    Lipase [[id number]]  (Normal) Collected: 10/12/23 1311    Specimen: Blood Updated: 10/12/23 1404     Lipase 15  U/L     CBC Auto Differential [[id number]]  (Abnormal) Collected: 10/12/23 1311    Specimen: Blood Updated: 10/12/23 1318     WBC 4.32 10*3/mm3      RBC 4.16 10*6/mm3      Hemoglobin 10.3 g/dL      Hematocrit 34.0 %      MCV 81.7 fL      MCH 24.8 pg      MCHC 30.3 g/dL      RDW 15.5 %      RDW-SD 45.8 fl      MPV 10.9 fL      Platelets 275 10*3/mm3      Neutrophil % 59.0 %      Lymphocyte % 27.8 %      Monocyte % 7.2 %      Eosinophil % 5.3 %      Basophil % 0.5 %      Immature Grans % 0.2 %      Neutrophils, Absolute 2.55 10*3/mm3      Lymphocytes, Absolute 1.20 10*3/mm3      Monocytes, Absolute 0.31 10*3/mm3      Eosinophils, Absolute 0.23 10*3/mm3      Basophils, Absolute 0.02 10*3/mm3      Immature Grans, Absolute 0.01 10*3/mm3      nRBC 0.0 /100 WBC     High Sensitivity Troponin T [[id number]]  (Abnormal) Collected: 10/12/23 1428    Specimen: Blood Updated: 10/12/23 1459     HS Troponin T 12 ng/L     Narrative:      High Sensitive Troponin T Reference Range:  <10.0 ng/L- Negative Female for AMI  <15.0 ng/L- Negative Male for AMI  >=10 - Abnormal Female indicating possible myocardial injury.  >=15 - Abnormal Male indicating possible myocardial injury.   Clinicians would have to utilize clinical acumen, EKG, Troponin, and serial changes to determine if it is an Acute Myocardial Infarction or myocardial injury due to an underlying chronic condition.         Comprehensive Metabolic Panel [[id number]]  (Abnormal) Collected: 10/12/23 1428    Specimen: Blood Updated: 10/12/23 1511     Glucose 79 mg/dL      BUN 10 mg/dL      Creatinine 0.55 mg/dL      Sodium 139 mmol/L      Potassium 3.8 mmol/L      Chloride 104 mmol/L      CO2 24.6 mmol/L      Calcium 9.2 mg/dL      Total Protein 7.7 g/dL      Albumin 3.6 g/dL      ALT (SGPT) <5 U/L      AST (SGOT) 10 U/L      Alkaline Phosphatase 89 U/L      Total Bilirubin 0.4 mg/dL      Globulin 4.1 gm/dL      A/G Ratio 0.9 g/dL      BUN/Creatinine Ratio 18.2     Anion Gap 10.4 mmol/L       eGFR 109.8 mL/min/1.73     Narrative:      GFR Normal >60  Chronic Kidney Disease <60  Kidney Failure <15      BNP [[id number]]  (Normal) Collected: 10/12/23 1428    Specimen: Blood Updated: 10/12/23 1457     proBNP 115.4 pg/mL     Narrative:      This assay is used as an aid in the diagnosis of individuals suspected of having heart failure. It can be used as an aid in the diagnosis of acute decompensated heart failure (ADHF) in patients presenting with signs and symptoms of ADHF to the emergency department (ED). In addition, NT-proBNP of <300 pg/mL indicates ADHF is not likely.    Age Range Result Interpretation  NT-proBNP Concentration (pg/mL:      <50             Positive            >450                   Gray                 300-450                    Negative             <300    50-75           Positive            >900                  Gray                300-900                  Negative            <300      >75             Positive            >1800                  Gray                300-1800                  Negative            <300    Magnesium [[id number]]  (Normal) Collected: 10/12/23 1428    Specimen: Blood Updated: 10/12/23 1503     Magnesium 1.9 mg/dL              Imaging:    XR Chest 1 View    Result Date: 10/12/2023  PROCEDURE: XR CHEST 1 VW  COMPARISON: [facility], CR, XR CHEST 1 VW, 10/06/2023, 15:10.  INDICATIONS: CHEST PAIN  FINDINGS:   Prior median sternotomy.  Stable cardiomegaly.  The pulmonary vasculature is within normal limits.  There are no active appearing infiltrates.  No evidence of pneumothorax or large pleural effusion.  IMPRESSION: Cardiomegaly.  No active pulmonary disease.   EDNA OVIEDO MD       Electronically Signed and Approved By: EDNA OVIEDO MD on 10/12/2023 at 13:08                Differential Diagnosis and Discussion:    Chest Pain:  Based on the patient's signs and symptoms, I considered aortic dissection, myocardial infaction, pulmonary embolism,  cardiac tamponade, pericarditis, pneumothorax, musculoskeletal chest pain and other differential diagnosis as an etiology of the patient's chest pain.     All labs were reviewed and interpreted by me.  All X-rays impressions were independently interpreted by me.  EKG was interpreted by me.    MDM     Amount and/or Complexity of Data Reviewed  Clinical lab tests: reviewed  Tests in the radiology section of CPTr: reviewed  Tests in the medicine section of CPTr: reviewed               This patient is a 53-year-old female with history of diabetes and hypertension and hyperlipidemia and tobacco abuse and family history of heart disease, presenting with chest pain and dyspnea since yesterday.    She arrives with elevated blood pressure, currently measuring 173/121.    I am giving her aspirin and nitroglycerin for her chest pain, and getting EKG and heart enzymes.    Her initial EKG shows some T wave inversions throughout the anterolateral leads that could represent ischemia but look only minimally changed at most compared to EKG from last month.    Initial troponin is negative here.    I will treat her pain and her elevated blood pressure with some nitroglycerin here and plan to contact her PCP regarding possible need for hospital admission for further work-up.      She had minimal relief with sublingual nitroglycerin x3 in the ED, so I will give her a dose of IV morphine for continued chest pain and consult cardiology.      I have consulted her cardiologist and on-call physician to admit her for further work-up.          Patient Care Considerations:          Consultants/Shared Management Plan:    Consultant: I have discussed the case with the admitting hospitalist, who agrees to consult on the patient.  PCP: I have discussed the case with patient's on-call physician, who agrees to accept the patient for admission.    Social Determinants of Health:    Patient is independent, reliable, and has access to care.        Disposition and Care Coordination:    Admit:   Through independent evaluation of the patient's history, physical, and imperical data, the patient meets criteria for observation/admission to the hospital.        Final diagnoses:   Chest pain, unspecified type   Severe uncontrolled hypertension        ED Disposition       ED Disposition   Decision to Admit    Condition   --    Comment   Level of Care: Progressive Care [20]   Diagnosis: Chest pain [014763]   Admitting Physician: GRACIELA FUENTES [[id number]]   Attending Physician: GRACIELA FUENTES [[id number]]   Isolate for COVID?: No [0]   Certification: I Certify That Inpatient Hospital Services Are Medically Necessary For Greater Than 2 Midnights                 This medical record created using voice recognition software.             Walter Khan MD  10/12/23 1603      Electronically signed by Walter Khan MD at 10/12/23 1603       ECG/EMG Results (last 24 hours)       Procedure Component Value Units Date/Time    ECG 12 Lead ED Triage Standing Order; Chest Pain [[id number]] Collected: 10/12/23 1245     Updated: 10/12/23 1246     QT Interval 409 ms      QTC Interval 485 ms     Narrative:      HEART RATE= 84  bpm  RR Interval= 712  ms  ME Interval= 183  ms  P Horizontal Axis= -23  deg  P Front Axis= 19  deg  QRSD Interval= 95  ms  QT Interval= 409  ms  QTcB= 485  ms  QRS Axis= -30  deg  T Wave Axis= -24  deg  - ABNORMAL ECG -  Sinus rhythm  Probable left atrial enlargement  Inferior infarct, old  Probable anterior infarct, age indeterminate  Electronically Signed By:   Date and Time of Study: 2023-10-12 12:45:25

## 2023-10-12 NOTE — Clinical Note
Level of Care: Telemetry [5]   Diagnosis: Chest pain [218538]   Admitting Physician: GRACIELA FUENTES [[id number]]   Attending Physician: GRACIELA FUENTES [[id number]]

## 2023-10-12 NOTE — ED PROVIDER NOTES
Time: 1:51 PM EDT  Date of encounter:  10/12/2023  Independent Historian/Clinical History and Information was obtained by:   Patient    History is limited by: N/A    Chief Complaint: Chest pain, shortness of air since yesterday        History of Present Illness:  Patient is a 53 y.o. year old diabetic female with history of hypertension and hyperlipidemia as well as esophagitis, history of PE, who presents to the emergency department for evaluation of some left-sided chest pain and dyspnea since last night.    This patient is a diabetic 53-year-old female with hypertension and chest pressure radiating to the left side of her chest as well as shortness of breath.    Its been going on since yesterday.  It has been constant.    She also states she feels numbness in both hands and legs and feet bilaterally.    She denies any recent anxiety or hyperventilation.              HPI    Patient Care Team  Primary Care Provider:  hyperlipidemia and tobacco abuse who presents to the ED Sonia Peters APRN    Past Medical History:     Allergies   Allergen Reactions    Tramadol Anaphylaxis    Tramadol Hcl Anaphylaxis    Moxifloxacin Hives    Sulfa Antibiotics Hives     Past Medical History:   Diagnosis Date    Anemia     Arthritis     Diabetes     Essential hypertension 2021    History of pulmonary embolism     Lumbago     Low back pain    Mild left ventricular hypertrophy 2021    Mixed hyperlipidemia 2021    Obstructive sleep apnea     Reflux esophagitis     Seasonal allergies      Past Surgical History:   Procedure Laterality Date    APPENDECTOMY  2010     SECTION      , , ,     COLONOSCOPY      2019 2018    COLONOSCOPY N/A 2022    Procedure: COLONOSCOPY;  Surgeon: Radha James MD;  Location: [facility] ENDOSCOPY;  Service: Gastroenterology;  Laterality: N/A;  COLON POLYP     ENDOSCOPY  2019    ENDOSCOPY N/A 2023    Procedure: ESOPHAGOGASTRODUODENOSCOPY WITH  BIPOSIES;  Surgeon: Coy Patrick MD;  Location: [facility] ENDOSCOPY;  Service: Gastroenterology;  Laterality: N/A;  PRIOR LAPBAND, GASTRITIS    HEMORRHOIDECTOMY N/A 07/25/2022    Procedure: HEMORRHOIDECTOMY;  Surgeon: Remi Reeder MD;  Location: [facility] MAIN OR;  Service: General;  Laterality: N/A;    HERNIA REPAIR      2005, 2006, 2007, 2008    HYSTERECTOMY  2004    LAPAROSCOPIC GASTRIC BANDING      OTHER SURGICAL HISTORY      Metal implants     Family History   Problem Relation Age of Onset    Heart disease Mother     Diabetes Mother         Unspecified type    Arthritis Mother     Heart disease Father     Diabetes Son         Unspecified type    Malig Hyperthermia Neg Hx        Home Medications:  Prior to Admission medications    Medication Sig Start Date End Date Taking? Authorizing Provider   ALPRAZolam (XANAX) 1 MG tablet Take 1 tablet by mouth 3 (Three) Times a Day As Needed. for anxiety 7/26/23   Silas Bhatt MD   aspirin 81 MG EC tablet Take 1 tablet by mouth Daily.    Silas Bhatt MD   cetirizine (zyrTEC) 10 MG tablet Take 1 tablet by mouth Daily.    Silas Bhatt MD   empagliflozin (JARDIANCE) 10 MG tablet tablet Take 1 tablet by mouth Daily.    Silas Bhatt MD   folic acid (FOLVITE) 1 MG tablet Take 1 tablet by mouth Daily.    Silas Bhatt MD   furosemide (LASIX) 20 MG tablet Take 1 tablet by mouth Daily for 30 days. 7/22/23 8/27/23  Dung Hall MD   HYDROcodone-acetaminophen (NORCO) 7.5-325 MG per tablet Take 1 tablet by mouth Every 8 (Eight) Hours As Needed for Moderate Pain, Severe Pain or Mild Pain.    Silas Bhatt MD   levETIRAcetam (Keppra) 500 MG tablet Take 1 tablet by mouth 2 (Two) Times a Day for 30 days. 6/15/23 8/27/23  Dung Hall MD   levETIRAcetam (KEPPRA) 750 MG tablet Take 1 tablet by mouth Every 12 (Twelve) Hours for 30 days. 8/31/23 9/30/23  Dung Hall MD   metoprolol succinate XL (TOPROL-XL) 25 MG 24 hr tablet Take 1  "tablet by mouth Daily for 30 days. 7/22/23 8/27/23  Dung Hall MD   pravastatin (PRAVACHOL) 40 MG tablet Take 1 tablet by mouth Daily.    ProviderSilas MD   QUEtiapine XR (SEROquel XR) 300 MG 24 hr tablet Take 1 tablet by mouth Every Evening for 30 days. 7/21/23 8/27/23  Dung Hall MD   zolpidem (AMBIEN) 10 MG tablet Take 1 tablet by mouth At Night As Needed. 8/23/23   Provider, MD Silas        Social History:   Social History     Tobacco Use    Smoking status: Every Day     Packs/day: 1.00     Years: 23.00     Additional pack years: 0.00     Total pack years: 23.00     Types: Cigarettes    Smokeless tobacco: Never    Tobacco comments:     Smoked 11-20 years. last 7/24/22 1700   Vaping Use    Vaping Use: Never used   Substance Use Topics    Alcohol use: Not Currently     Comment: Occasionally drinks, less than 1 drink per day, has been drinking for less than 1 year    Drug use: Never         Review of Systems:  Review of Systems   I performed a 10 point review of systems which was all negative, except for the positives found in the HPI above.    Physical Exam:  /94   Pulse 71   Temp 98.7 øF (37.1 øC) (Oral)   Resp 18   Ht 157.5 cm (62\")   Wt 88.6 kg (195 lb 5.2 oz)   SpO2 94%   BMI 35.73 kg/mý     Physical Exam   General: Awake alert and in appears mildly anxious or in mild distress    HEENT: Head normocephalic atraumatic, eyes PERRLA EOMI, nose normal, oropharynx normal.    Neck: Supple full range of motion, no meningismus, no lymphadenopathy    Heart: Regular rate and rhythm, no murmurs or rubs, 2+ radial pulses bilaterally    Lungs: Clear to auscultation bilaterally without wheezes or crackles, no respiratory distress    Abdomen: Soft, nontender, nondistended, no rebound or guarding    Skin: Warm, dry, no rash    Musculoskeletal: Normal range of motion, minimal bilateral lower extremity edema but no calf tenderness bilaterally    Neurologic: Oriented x3, no motor deficits, she can " feel light touch sensation but states it does not feel normal over the past 2 days in both hands and both legs      Psychiatric: Mood appears mildly anxious but not hyperventilating, no psychosis          Procedures:  Procedures      Medical Decision Making:      Comorbidities that affect care:    Diabetes, Hypertension, Smoking    External Notes reviewed:    Previous Labs: I reviewed her previous lab work including previous troponins which were all negative over the past few years.      The following orders were placed and all results were independently analyzed by me:  Orders Placed This Encounter   Procedures    XR Chest 1 View    [place] Draw    High Sensitivity Troponin T    Comprehensive Metabolic Panel    Lipase    BNP    Magnesium    CBC Auto Differential    High Sensitivity Troponin T 2Hr    NPO Diet NPO Type: Strict NPO    Undress & Gown    Continuous Pulse Oximetry    Internal Medicine (on-call MD unless specified)    Cardiology (on-call MD unless specified)    Oxygen Therapy- Nasal Cannula; Titrate 1-6 LPM Per SpO2; 90 - 95%    ECG 12 Lead ED Triage Standing Order; Chest Pain    ECG 12 Lead ED Triage Standing Order; Chest Pain    Insert Peripheral IV    Inpatient Admission    CBC & Differential    Green Top (Gel)    Lavender Top    Gold Top - SST    Light Blue Top       Medications Given in the Emergency Department:  Medications   sodium chloride 0.9 % flush 10 mL (has no administration in time range)   nitroglycerin (NITROSTAT) SL tablet 0.4 mg (0.4 mg Sublingual Given 10/12/23 1430)   morphine injection 4 mg (has no administration in time range)   ondansetron (ZOFRAN) injection 4 mg (has no administration in time range)   aspirin chewable tablet 324 mg (324 mg Oral Given 10/12/23 1416)        ED Course:    ED Course as of 10/12/23 1603   Thu Oct 12, 2023   1351 EKG: I interpreted her twelve-lead EKG as normal sinus rhythm at 84 bpm, normal P waves, QRS showing inferior Q waves, normal ST segments,  there are some T wave inversions throughout the anterolateral leads noted.    These T wave abnormalities look minimally changed from old EKG back on September 26, 2023. [VS]      ED Course User Index  [VS] Walter Khan MD       Labs:    Lab Results (last 24 hours)       Procedure Component Value Units Date/Time    CBC & Differential [[id number]]  (Abnormal) Collected: 10/12/23 1311    Specimen: Blood Updated: 10/12/23 1318    Narrative:      The following orders were created for panel order CBC & Differential.  Procedure                               Abnormality         Status                     ---------                               -----------         ------                     CBC Auto Differential[[id number]]        Abnormal            Final result                 Please view results for these tests on the individual orders.    Lipase [[id number]]  (Normal) Collected: 10/12/23 1311    Specimen: Blood Updated: 10/12/23 1404     Lipase 15 U/L     CBC Auto Differential [[id number]]  (Abnormal) Collected: 10/12/23 1311    Specimen: Blood Updated: 10/12/23 1318     WBC 4.32 10*3/mm3      RBC 4.16 10*6/mm3      Hemoglobin 10.3 g/dL      Hematocrit 34.0 %      MCV 81.7 fL      MCH 24.8 pg      MCHC 30.3 g/dL      RDW 15.5 %      RDW-SD 45.8 fl      MPV 10.9 fL      Platelets 275 10*3/mm3      Neutrophil % 59.0 %      Lymphocyte % 27.8 %      Monocyte % 7.2 %      Eosinophil % 5.3 %      Basophil % 0.5 %      Immature Grans % 0.2 %      Neutrophils, Absolute 2.55 10*3/mm3      Lymphocytes, Absolute 1.20 10*3/mm3      Monocytes, Absolute 0.31 10*3/mm3      Eosinophils, Absolute 0.23 10*3/mm3      Basophils, Absolute 0.02 10*3/mm3      Immature Grans, Absolute 0.01 10*3/mm3      nRBC 0.0 /100 WBC     High Sensitivity Troponin T [[id number]]  (Abnormal) Collected: 10/12/23 1428    Specimen: Blood Updated: 10/12/23 1459     HS Troponin T 12 ng/L     Narrative:      High Sensitive Troponin T Reference Range:  <10.0 ng/L-  Negative Female for AMI  <15.0 ng/L- Negative Male for AMI  >=10 - Abnormal Female indicating possible myocardial injury.  >=15 - Abnormal Male indicating possible myocardial injury.   Clinicians would have to utilize clinical acumen, EKG, Troponin, and serial changes to determine if it is an Acute Myocardial Infarction or myocardial injury due to an underlying chronic condition.         Comprehensive Metabolic Panel [[id number]]  (Abnormal) Collected: 10/12/23 1428    Specimen: Blood Updated: 10/12/23 1511     Glucose 79 mg/dL      BUN 10 mg/dL      Creatinine 0.55 mg/dL      Sodium 139 mmol/L      Potassium 3.8 mmol/L      Chloride 104 mmol/L      CO2 24.6 mmol/L      Calcium 9.2 mg/dL      Total Protein 7.7 g/dL      Albumin 3.6 g/dL      ALT (SGPT) <5 U/L      AST (SGOT) 10 U/L      Alkaline Phosphatase 89 U/L      Total Bilirubin 0.4 mg/dL      Globulin 4.1 gm/dL      A/G Ratio 0.9 g/dL      BUN/Creatinine Ratio 18.2     Anion Gap 10.4 mmol/L      eGFR 109.8 mL/min/1.73     Narrative:      GFR Normal >60  Chronic Kidney Disease <60  Kidney Failure <15      BNP [[id number]]  (Normal) Collected: 10/12/23 1428    Specimen: Blood Updated: 10/12/23 1457     proBNP 115.4 pg/mL     Narrative:      This assay is used as an aid in the diagnosis of individuals suspected of having heart failure. It can be used as an aid in the diagnosis of acute decompensated heart failure (ADHF) in patients presenting with signs and symptoms of ADHF to the emergency department (ED). In addition, NT-proBNP of <300 pg/mL indicates ADHF is not likely.    Age Range Result Interpretation  NT-proBNP Concentration (pg/mL:      <50             Positive            >450                   Gray                 300-450                    Negative             <300    50-75           Positive            >900                  Gray                300-900                  Negative            <300      >75             Positive            >1800                   Gomez                300-1800                  Negative            <300    Magnesium [[id number]]  (Normal) Collected: 10/12/23 1428    Specimen: Blood Updated: 10/12/23 1503     Magnesium 1.9 mg/dL              Imaging:    XR Chest 1 View    Result Date: 10/12/2023  PROCEDURE: XR CHEST 1 VW  COMPARISON: [facility], CR, XR CHEST 1 VW, 10/06/2023, 15:10.  INDICATIONS: CHEST PAIN  FINDINGS:   Prior median sternotomy.  Stable cardiomegaly.  The pulmonary vasculature is within normal limits.  There are no active appearing infiltrates.  No evidence of pneumothorax or large pleural effusion.  IMPRESSION: Cardiomegaly.  No active pulmonary disease.   EDNA OVIEDO MD       Electronically Signed and Approved By: EDNA OVIEDO MD on 10/12/2023 at 13:08                Differential Diagnosis and Discussion:    Chest Pain:  Based on the patient's signs and symptoms, I considered aortic dissection, myocardial infaction, pulmonary embolism, cardiac tamponade, pericarditis, pneumothorax, musculoskeletal chest pain and other differential diagnosis as an etiology of the patient's chest pain.     All labs were reviewed and interpreted by me.  All X-rays impressions were independently interpreted by me.  EKG was interpreted by me.    MDM     Amount and/or Complexity of Data Reviewed  Clinical lab tests: reviewed  Tests in the radiology section of CP[facility]: reviewed  Tests in the medicine section of [facility]: reviewed               This patient is a 53-year-old female with history of diabetes and hypertension and hyperlipidemia and tobacco abuse and family history of heart disease, presenting with chest pain and dyspnea since yesterday.    She arrives with elevated blood pressure, currently measuring 173/121.    I am giving her aspirin and nitroglycerin for her chest pain, and getting EKG and heart enzymes.    Her initial EKG shows some T wave inversions throughout the anterolateral leads that could represent ischemia but  look only minimally changed at most compared to EKG from last month.    Initial troponin is negative here.    I will treat her pain and her elevated blood pressure with some nitroglycerin here and plan to contact her PCP regarding possible need for hospital admission for further work-up.      She had minimal relief with sublingual nitroglycerin x3 in the ED, so I will give her a dose of IV morphine for continued chest pain and consult cardiology.      I have consulted her cardiologist and on-call physician to admit her for further work-up.          Patient Care Considerations:          Consultants/Shared Management Plan:    Consultant: I have discussed the case with the admitting hospitalist, who agrees to consult on the patient.  PCP: I have discussed the case with patient's on-call physician, who agrees to accept the patient for admission.    Social Determinants of Health:    Patient is independent, reliable, and has access to care.       Disposition and Care Coordination:    Admit:   Through independent evaluation of the patient's history, physical, and imperical data, the patient meets criteria for observation/admission to the hospital.        Final diagnoses:   Chest pain, unspecified type   Severe uncontrolled hypertension        ED Disposition       ED Disposition   Decision to Admit    Condition   --    Comment   Level of Care: Progressive Care [20]   Diagnosis: Chest pain [369158]   Admitting Physician: GRACIELA FUENTES [[id number]]   Attending Physician: GRACIELA FUENTES [[id number]]   Isolate for COVID?: No [0]   Certification: I Certify That Inpatient Hospital Services Are Medically Necessary For Greater Than 2 Midnights                 This medical record created using voice recognition software.             Walter Khan MD  10/12/23 2557

## 2023-10-12 NOTE — ED NOTES
Report given to Radha EGAN on accepting unit. Patient does not have inpatient order for monitor. Waiting for inpatient orders, so patient can be transferred on telemetry.

## 2023-10-13 ENCOUNTER — APPOINTMENT (OUTPATIENT)
Dept: NUCLEAR MEDICINE | Facility: HOSPITAL | Age: 53
End: 2023-10-13
Payer: MEDICARE

## 2023-10-13 LAB
ALBUMIN SERPL-MCNC: 3.5 G/DL (ref 3.5–5.2)
ALBUMIN/GLOB SERPL: 0.9 G/DL
ALP SERPL-CCNC: 83 U/L (ref 39–117)
ALT SERPL W P-5'-P-CCNC: <5 U/L (ref 1–33)
ANION GAP SERPL CALCULATED.3IONS-SCNC: 8.1 MMOL/L (ref 5–15)
AST SERPL-CCNC: 10 U/L (ref 1–32)
BASOPHILS # BLD AUTO: 0.02 10*3/MM3 (ref 0–0.2)
BASOPHILS NFR BLD AUTO: 0.4 % (ref 0–1.5)
BH CV IMMEDIATE POST TECH DATA BLOOD PRESSURE: NORMAL MMHG
BH CV IMMEDIATE POST TECH DATA HEART RATE: 94 BPM
BH CV IMMEDIATE POST TECH DATA OXYGEN SATS: 95 %
BH CV REST NUCLEAR ISOTOPE DOSE: 8.7 MCI
BH CV SIX MINUTE RECOVERY TECH DATA BLOOD PRESSURE: NORMAL
BH CV SIX MINUTE RECOVERY TECH DATA HEART RATE: 91 BPM
BH CV SIX MINUTE RECOVERY TECH DATA OXYGEN SATURATION: 93 %
BH CV STRESS BP STAGE 1: NORMAL
BH CV STRESS COMMENTS STAGE 1: NORMAL
BH CV STRESS DOSE REGADENOSON STAGE 1: 0.4
BH CV STRESS DURATION MIN STAGE 1: 0
BH CV STRESS DURATION SEC STAGE 1: 10
BH CV STRESS HR STAGE 1: 85
BH CV STRESS NUCLEAR ISOTOPE DOSE: 33.5 MCI
BH CV STRESS O2 STAGE 1: 94
BH CV STRESS PROTOCOL 1: NORMAL
BH CV STRESS RECOVERY BP: NORMAL MMHG
BH CV STRESS RECOVERY HR: 85 BPM
BH CV STRESS RECOVERY O2: 98 %
BH CV STRESS STAGE 1: 1
BH CV THREE MINUTE POST TECH DATA BLOOD PRESSURE: NORMAL MMHG
BH CV THREE MINUTE POST TECH DATA HEART RATE: 96 BPM
BH CV THREE MINUTE POST TECH DATA OXYGEN SATURATION: 98 %
BILIRUB SERPL-MCNC: 0.2 MG/DL (ref 0–1.2)
BUN SERPL-MCNC: 10 MG/DL (ref 6–20)
BUN/CREAT SERPL: 16.9 (ref 7–25)
CALCIUM SPEC-SCNC: 8.8 MG/DL (ref 8.6–10.5)
CHLORIDE SERPL-SCNC: 103 MMOL/L (ref 98–107)
CO2 SERPL-SCNC: 26.9 MMOL/L (ref 22–29)
CREAT SERPL-MCNC: 0.59 MG/DL (ref 0.57–1)
DEPRECATED RDW RBC AUTO: 45.5 FL (ref 37–54)
EGFRCR SERPLBLD CKD-EPI 2021: 107.9 ML/MIN/1.73
EOSINOPHIL # BLD AUTO: 0.33 10*3/MM3 (ref 0–0.4)
EOSINOPHIL NFR BLD AUTO: 6.5 % (ref 0.3–6.2)
ERYTHROCYTE [DISTWIDTH] IN BLOOD BY AUTOMATED COUNT: 15.2 % (ref 12.3–15.4)
FERRITIN SERPL-MCNC: 20.38 NG/ML (ref 13–150)
FOLATE SERPL-MCNC: 9.6 NG/ML (ref 4.78–24.2)
GLOBULIN UR ELPH-MCNC: 3.9 GM/DL
GLUCOSE BLDC GLUCOMTR-MCNC: 113 MG/DL (ref 70–99)
GLUCOSE BLDC GLUCOMTR-MCNC: 174 MG/DL (ref 70–99)
GLUCOSE SERPL-MCNC: 106 MG/DL (ref 65–99)
HCT VFR BLD AUTO: 32.8 % (ref 34–46.6)
HCYS SERPL-MCNC: 8.7 UMOL/L (ref 0–15)
HGB BLD-MCNC: 9.7 G/DL (ref 12–15.9)
IMM GRANULOCYTES # BLD AUTO: 0.01 10*3/MM3 (ref 0–0.05)
IMM GRANULOCYTES NFR BLD AUTO: 0.2 % (ref 0–0.5)
INR PPP: 1.15 (ref 0.86–1.15)
IRON 24H UR-MRATE: 33 MCG/DL (ref 37–145)
IRON SATN MFR SERPL: 10 % (ref 20–50)
LV EF NUC BP: 58 %
LYMPHOCYTES # BLD AUTO: 1.54 10*3/MM3 (ref 0.7–3.1)
LYMPHOCYTES NFR BLD AUTO: 30.5 % (ref 19.6–45.3)
MAXIMAL PREDICTED HEART RATE: 167 BPM
MCH RBC QN AUTO: 24.3 PG (ref 26.6–33)
MCHC RBC AUTO-ENTMCNC: 29.6 G/DL (ref 31.5–35.7)
MCV RBC AUTO: 82 FL (ref 79–97)
MONOCYTES # BLD AUTO: 0.32 10*3/MM3 (ref 0.1–0.9)
MONOCYTES NFR BLD AUTO: 6.3 % (ref 5–12)
NEUTROPHILS NFR BLD AUTO: 2.83 10*3/MM3 (ref 1.7–7)
NEUTROPHILS NFR BLD AUTO: 56.1 % (ref 42.7–76)
NRBC BLD AUTO-RTO: 0 /100 WBC (ref 0–0.2)
PERCENT MAX PREDICTED HR: 57.49 %
PLATELET # BLD AUTO: 271 10*3/MM3 (ref 140–450)
PMV BLD AUTO: 10.3 FL (ref 6–12)
POTASSIUM SERPL-SCNC: 3.6 MMOL/L (ref 3.5–5.2)
PROT SERPL-MCNC: 7.4 G/DL (ref 6–8.5)
PROTHROMBIN TIME: 14.8 SECONDS (ref 11.8–14.9)
QT INTERVAL: 409 MS
QTC INTERVAL: 485 MS
RBC # BLD AUTO: 4 10*6/MM3 (ref 3.77–5.28)
RETICS # AUTO: 0.06 10*6/MM3 (ref 0.02–0.13)
RETICS/RBC NFR AUTO: 1.6 % (ref 0.7–1.9)
SODIUM SERPL-SCNC: 138 MMOL/L (ref 136–145)
STRESS BASELINE BP: NORMAL MMHG
STRESS BASELINE HR: 70 BPM
STRESS O2 SAT REST: 95 %
STRESS PERCENT HR: 68 %
STRESS POST O2 SAT PEAK: 93 %
STRESS POST PEAK BP: NORMAL MMHG
STRESS POST PEAK HR: 96 BPM
STRESS TARGET HR: 142 BPM
TIBC SERPL-MCNC: 337 MCG/DL (ref 298–536)
TRANSFERRIN SERPL-MCNC: 226 MG/DL (ref 200–360)
TSH SERPL DL<=0.05 MIU/L-ACNC: 1.29 UIU/ML (ref 0.27–4.2)
VIT B12 BLD-MCNC: 294 PG/ML (ref 211–946)
WBC NRBC COR # BLD: 5.05 10*3/MM3 (ref 3.4–10.8)

## 2023-10-13 PROCEDURE — 84466 ASSAY OF TRANSFERRIN: CPT | Performed by: INTERNAL MEDICINE

## 2023-10-13 PROCEDURE — 78452 HT MUSCLE IMAGE SPECT MULT: CPT

## 2023-10-13 PROCEDURE — 86334 IMMUNOFIX E-PHORESIS SERUM: CPT | Performed by: INTERNAL MEDICINE

## 2023-10-13 PROCEDURE — 85045 AUTOMATED RETICULOCYTE COUNT: CPT | Performed by: INTERNAL MEDICINE

## 2023-10-13 PROCEDURE — 83090 ASSAY OF HOMOCYSTEINE: CPT | Performed by: INTERNAL MEDICINE

## 2023-10-13 PROCEDURE — 82607 VITAMIN B-12: CPT | Performed by: INTERNAL MEDICINE

## 2023-10-13 PROCEDURE — 36415 COLL VENOUS BLD VENIPUNCTURE: CPT | Performed by: INTERNAL MEDICINE

## 2023-10-13 PROCEDURE — 85025 COMPLETE CBC W/AUTO DIFF WBC: CPT | Performed by: INTERNAL MEDICINE

## 2023-10-13 PROCEDURE — 93018 CV STRESS TEST I&R ONLY: CPT | Performed by: INTERNAL MEDICINE

## 2023-10-13 PROCEDURE — 82784 ASSAY IGA/IGD/IGG/IGM EACH: CPT | Performed by: INTERNAL MEDICINE

## 2023-10-13 PROCEDURE — G0378 HOSPITAL OBSERVATION PER HR: HCPCS

## 2023-10-13 PROCEDURE — 82948 REAGENT STRIP/BLOOD GLUCOSE: CPT

## 2023-10-13 PROCEDURE — 93017 CV STRESS TEST TRACING ONLY: CPT

## 2023-10-13 PROCEDURE — 93016 CV STRESS TEST SUPVJ ONLY: CPT | Performed by: NURSE PRACTITIONER

## 2023-10-13 PROCEDURE — 85610 PROTHROMBIN TIME: CPT | Performed by: INTERNAL MEDICINE

## 2023-10-13 PROCEDURE — 25010000002 REGADENOSON 0.4 MG/5ML SOLUTION: Performed by: INTERNAL MEDICINE

## 2023-10-13 PROCEDURE — 82728 ASSAY OF FERRITIN: CPT | Performed by: INTERNAL MEDICINE

## 2023-10-13 PROCEDURE — A9502 TC99M TETROFOSMIN: HCPCS | Performed by: INTERNAL MEDICINE

## 2023-10-13 PROCEDURE — 84443 ASSAY THYROID STIM HORMONE: CPT | Performed by: INTERNAL MEDICINE

## 2023-10-13 PROCEDURE — 80053 COMPREHEN METABOLIC PANEL: CPT | Performed by: INTERNAL MEDICINE

## 2023-10-13 PROCEDURE — 83540 ASSAY OF IRON: CPT | Performed by: INTERNAL MEDICINE

## 2023-10-13 PROCEDURE — 82746 ASSAY OF FOLIC ACID SERUM: CPT | Performed by: INTERNAL MEDICINE

## 2023-10-13 PROCEDURE — 99222 1ST HOSP IP/OBS MODERATE 55: CPT | Performed by: INTERNAL MEDICINE

## 2023-10-13 PROCEDURE — 0 TECHNETIUM TETROFOSMIN KIT: Performed by: INTERNAL MEDICINE

## 2023-10-13 PROCEDURE — 83521 IG LIGHT CHAINS FREE EACH: CPT | Performed by: INTERNAL MEDICINE

## 2023-10-13 PROCEDURE — 78452 HT MUSCLE IMAGE SPECT MULT: CPT | Performed by: INTERNAL MEDICINE

## 2023-10-13 RX ORDER — LOSARTAN POTASSIUM 25 MG/1
25 TABLET ORAL
Status: DISCONTINUED | OUTPATIENT
Start: 2023-10-13 | End: 2023-10-14 | Stop reason: HOSPADM

## 2023-10-13 RX ORDER — REGADENOSON 0.08 MG/ML
0.4 INJECTION, SOLUTION INTRAVENOUS
Status: COMPLETED | OUTPATIENT
Start: 2023-10-13 | End: 2023-10-13

## 2023-10-13 RX ADMIN — PRAVASTATIN SODIUM 40 MG: 20 TABLET ORAL at 11:43

## 2023-10-13 RX ADMIN — LEVETIRACETAM 500 MG: 500 TABLET, FILM COATED ORAL at 21:47

## 2023-10-13 RX ADMIN — LOSARTAN POTASSIUM 25 MG: 25 TABLET, FILM COATED ORAL at 11:44

## 2023-10-13 RX ADMIN — ALPRAZOLAM 1 MG: 1 TABLET ORAL at 17:37

## 2023-10-13 RX ADMIN — QUETIAPINE FUMARATE 300 MG: 200 TABLET, EXTENDED RELEASE ORAL at 21:47

## 2023-10-13 RX ADMIN — LEVETIRACETAM 500 MG: 500 TABLET, FILM COATED ORAL at 11:44

## 2023-10-13 RX ADMIN — Medication 10 ML: at 11:45

## 2023-10-13 RX ADMIN — METOPROLOL SUCCINATE 25 MG: 25 TABLET, EXTENDED RELEASE ORAL at 11:44

## 2023-10-13 RX ADMIN — TETROFOSMIN 1 DOSE: 1.38 INJECTION, POWDER, LYOPHILIZED, FOR SOLUTION INTRAVENOUS at 09:23

## 2023-10-13 RX ADMIN — CETIRIZINE HYDROCHLORIDE 10 MG: 10 TABLET, FILM COATED ORAL at 11:43

## 2023-10-13 RX ADMIN — REGADENOSON 0.4 MG: 0.08 INJECTION, SOLUTION INTRAVENOUS at 09:23

## 2023-10-13 RX ADMIN — HYDROCODONE BITARTRATE AND ACETAMINOPHEN 1 TABLET: 5; 325 TABLET ORAL at 17:37

## 2023-10-13 RX ADMIN — FUROSEMIDE 20 MG: 20 TABLET ORAL at 11:43

## 2023-10-13 RX ADMIN — EMPAGLIFLOZIN 10 MG: 10 TABLET, FILM COATED ORAL at 11:43

## 2023-10-13 RX ADMIN — TETROFOSMIN 1 DOSE: 1.38 INJECTION, POWDER, LYOPHILIZED, FOR SOLUTION INTRAVENOUS at 08:33

## 2023-10-13 RX ADMIN — HYDROCODONE BITARTRATE AND ACETAMINOPHEN 1 TABLET: 5; 325 TABLET ORAL at 11:42

## 2023-10-13 RX ADMIN — HYDROCODONE BITARTRATE AND ACETAMINOPHEN 1 TABLET: 5; 325 TABLET ORAL at 23:40

## 2023-10-13 RX ADMIN — Medication 10 ML: at 21:49

## 2023-10-13 RX ADMIN — ASPIRIN 81 MG: 81 TABLET, COATED ORAL at 11:44

## 2023-10-13 RX ADMIN — ZOLPIDEM TARTRATE 10 MG: 5 TABLET ORAL at 21:50

## 2023-10-13 NOTE — PLAN OF CARE
Goal Outcome Evaluation:   Patient medicated once for generalized, see MAR.  VSS

## 2023-10-13 NOTE — CONSULTS
Cardiology Consult Note  [facility] CARE UNIT 2          Patient Identification:  Carol Abarca      [id number]  53 y.o.        female  1970       Date of Consultation: 10/13    Reason for Consultation: Chest discomfort    PCP: Sonia Mosquera APRN  Primary cardiologist: Autumn    History of Present Illness:     53-year-old -American female.  She has multiple cardiovascular risk factors.  She has heart failure with preserved ejection fraction.  She presents with very atypical chest discomfort she describes as a pressure like someone sitting on her entire body from her lips down to her toes.  It is not specifically in the chest.  It has been constant over the past month.  She was hospitalized with syncope/falls in  and July of this year.  At that time an echo showed LVH with diastolic dysfunction but no other significant structural abnormalities.  Her chart indicates she has had an extensive cardiac work-up in the past but I do not see any recent ischemia evaluation.  Her biomarkers are unremarkable.  Her EKG showed T wave inversions.    Past History:  Past Medical History:   Diagnosis Date    Anemia     Arthritis     Diabetes     Essential hypertension 2021    History of pulmonary embolism     Lumbago     Low back pain    Mild left ventricular hypertrophy 2021    Mixed hyperlipidemia 2021    Obstructive sleep apnea     Reflux esophagitis     Seasonal allergies      Past Surgical History:   Procedure Laterality Date    APPENDECTOMY  2010     SECTION      , , ,     COLONOSCOPY      2019    COLONOSCOPY N/A 2022    Procedure: COLONOSCOPY;  Surgeon: Radha James MD;  Location: [facility] ENDOSCOPY;  Service: Gastroenterology;  Laterality: N/A;  COLON POLYP     ENDOSCOPY  2019    ENDOSCOPY N/A 2023    Procedure: ESOPHAGOGASTRODUODENOSCOPY WITH BIPOSIES;  Surgeon: Coy Patrick MD;  Location: [facility] ENDOSCOPY;   Service: Gastroenterology;  Laterality: N/A;  PRIOR LAPBAND, GASTRITIS    HEMORRHOIDECTOMY N/A 07/25/2022    Procedure: HEMORRHOIDECTOMY;  Surgeon: Remi Reeder MD;  Location: [facility] MAIN OR;  Service: General;  Laterality: N/A;    HERNIA REPAIR      2005, 2006, 2007, 2008    HYSTERECTOMY  2004    LAPAROSCOPIC GASTRIC BANDING      OTHER SURGICAL HISTORY      Metal implants     Allergies   Allergen Reactions    Tramadol Anaphylaxis    Tramadol Hcl Anaphylaxis    Moxifloxacin Hives    Sulfa Antibiotics Hives     Social History     Socioeconomic History    Marital status: Single   Tobacco Use    Smoking status: Every Day     Packs/day: 1.00     Years: 23.00     Additional pack years: 0.00     Total pack years: 23.00     Types: Cigarettes    Smokeless tobacco: Never    Tobacco comments:     Smoked 11-20 years. last 7/24/22 1700   Vaping Use    Vaping Use: Never used   Substance and Sexual Activity    Alcohol use: Not Currently     Comment: Occasionally drinks, less than 1 drink per day, has been drinking for less than 1 year    Drug use: Never    Sexual activity: Defer     Family History   Problem Relation Age of Onset    Heart disease Mother     Diabetes Mother         Unspecified type    Arthritis Mother     Heart disease Father     Diabetes Son         Unspecified type    Malig Hyperthermia Neg Hx      Medications:  Medications Prior to Admission   Medication Sig Dispense Refill Last Dose    ALPRAZolam (XANAX) 1 MG tablet Take 1 tablet by mouth 3 (Three) Times a Day As Needed. for anxiety       amLODIPine (NORVASC) 5 MG tablet Take 1 tablet by mouth Daily.       aspirin 81 MG EC tablet Take 1 tablet by mouth Daily.       cetirizine (zyrTEC) 10 MG tablet Take 1 tablet by mouth Daily.       HYDROcodone-acetaminophen (NORCO) 7.5-325 MG per tablet Take 1 tablet by mouth Every 8 (Eight) Hours As Needed for Moderate Pain, Severe Pain or Mild Pain.       levETIRAcetam (Keppra) 500 MG tablet Take 1 tablet by mouth 2  "(Two) Times a Day for 30 days. 60 tablet 0     metoprolol succinate XL (TOPROL-XL) 25 MG 24 hr tablet Take 1 tablet by mouth Daily for 30 days. (Patient taking differently: Take 3 tablets by mouth 2 (Two) Times a Day.) 30 tablet 0     QUEtiapine XR (SEROquel XR) 300 MG 24 hr tablet Take 1 tablet by mouth Every Evening for 30 days. (Patient taking differently: Take 2 tablets by mouth Every Night.) 30 tablet 0     zolpidem (AMBIEN) 10 MG tablet Take 1 tablet by mouth At Night As Needed.       glipizide (GLUCOTROL XL) 5 MG ER tablet Take 1 tablet by mouth Daily.       pravastatin (PRAVACHOL) 40 MG tablet Take 1 tablet by mouth Daily.        Current medications:  aspirin, 81 mg, Oral, Daily  cetirizine, 10 mg, Oral, Daily  empagliflozin, 10 mg, Oral, Daily  folic acid, 1 mg, Oral, Q24H  furosemide, 20 mg, Oral, Daily  insulin lispro, 2-7 Units, Subcutaneous, 4x Daily AC & at Bedtime  levETIRAcetam, 500 mg, Oral, BID  metoprolol succinate XL, 25 mg, Oral, Q24H  pravastatin, 40 mg, Oral, Daily  QUEtiapine XR, 300 mg, Oral, Nightly  senna-docusate sodium, 2 tablet, Oral, BID  sodium chloride, 10 mL, Intravenous, Q12H  sodium chloride, 10 mL, Intravenous, Q12H      Current IV drips:       ROS      Physical exam:    /99 (BP Location: Right arm, Patient Position: Lying)   Pulse 69   Temp 97.5 øF (36.4 øC) (Oral)   Resp 18   Ht 157.5 cm (62\")   Wt 87.7 kg (193 lb 5.5 oz)   SpO2 99%   BMI 35.36 kg/mý  Body mass index is 35.36 kg/mý.    SpO2  Min: 90 %  Max: 100 %    General Appearance:   well developed  well nourished, obese  HENT:   oropharynx moist  lips not cyanotic  Neck:  thyroid not enlarged  supple  Respiratory:  no respiratory distress  normal breath sounds  no rales  Cardiovascular:  no jugular venous distention  regular rhythm  apical impulse normal  S1 normal, S2 normal  no S3, no S4   no murmur  no rub, no thrill  carotid pulses normal; no bruit  pedal pulses normal  lower extremity edema: none  " "  Gastrointestinal:   bowel sounds normal  non-tender  no hepatomegaly, no splenomegaly  Musculoskeletal:  no clubbing of fingers.   normocephalic, head atraumatic  Skin:   warm, dry  Neuro/Psychiatric:  judgement and insight appropriate  normal mood and affect    Cardiographics:     ECG  (personally reviewed) sinus rhythm, left atrial abnormality, nonspecific T wave changes   Telemetry:  (personally reviewed)    ECHO: Reviewed from June   CATH:     CARDIOLITE:      Lab Review:       Results from last 7 days   Lab Units 10/13/23  0434 10/12/23  1909 10/12/23  1311 10/06/23  1353   WBC 10*3/mm3 5.05 4.30 4.32 4.57   HEMOGLOBIN g/dL 9.7* 10.1* 10.3* 9.4*   HEMATOCRIT % 32.8* 33.9* 34.0 31.6*            Results from last 7 days   Lab Units 10/13/23  0430 10/12/23  1909 10/12/23  1428 10/06/23  1353   SODIUM mmol/L 138 138 139 140   BUN mg/dL 10 10 10 8   CREATININE mg/dL 0.59 0.58 0.55* 0.61   GLUCOSE mg/dL 106* 112* 79 89      Estimated Creatinine Clearance: 113.3 mL/min (by C-G formula based on SCr of 0.59 mg/dL).         Invalid input(s): \"LDLCALC\"     Results from last 7 days   Lab Units 10/13/23  0430   INR  1.15        Lab Results   Component Value Date    TSH 1.290 10/13/2023        Lab Results   Component Value Date    HGBA1C 5.50 08/28/2023           No results found for: \"DIGOXIN\"   No components found for: \"DDIMERQUAN\"     Imaging:   XR Chest 1 View    Result Date: 10/12/2023  PROCEDURE: XR CHEST 1 VW  COMPARISON: [facility], CR, XR CHEST 1 VW, 10/06/2023, 15:10.  INDICATIONS: CHEST PAIN  FINDINGS:   Prior median sternotomy.  Stable cardiomegaly.  The pulmonary vasculature is within normal limits.  There are no active appearing infiltrates.  No evidence of pneumothorax or large pleural effusion.  IMPRESSION: Cardiomegaly.  No active pulmonary disease.   EDNA OVIEDO MD       Electronically Signed and Approved By: EDNA OVIEDO MD on 10/12/2023 at 13:08                The 10-year ASCVD " risk score (Irma MAHER, et al., 2019) is: 42.1%    Values used to calculate the score:      Age: 53 years      Sex: Female      Is Non- : Yes      Diabetic: Yes      Tobacco smoker: Yes      Systolic Blood Pressure: 147 mmHg      Is BP treated: Yes      HDL Cholesterol: 42 mg/dL      Total Cholesterol: 229 mg/dL      Assessment:      Chest pain    Essential hypertension    Chronic heart failure with preserved ejection fraction (HFpEF)      Initial cardiac assessment: 53-year-old -American female with risk factors presenting with very atypical chest discomfort, essentially she is describing whole body pressure over the past month constantly.  Biomarkers were unremarkable.  EKG shows nonspecific T wave changes.      Recommendations:  1.  Stress imaging will be scheduled today to evaluate for significant ischemia given her multitude of risk factors  2.  Add ARB for blood pressure control  3.  Continue low-dose aspirin, statin, beta-blocker  4.  Volume status appears stable at this time continue oral Lasix  5.  If stress testing is low risk she can be discharged today from my standpoint                Efren Ford MD  10/13/2023    08:06 EDT

## 2023-10-13 NOTE — PROGRESS NOTES
[facility]     Progress Note    Patient Name: Carol Abarca  : 1970  MRN: [id number]  Primary Care Physician:  Sonia Mosquera APRN  Date of admission: 10/12/2023    Subjective   Subjective     Chief Complaint: Patient is getting stress test if it is normal patient will be discharged otherwise she will be getting further work-up as per cardiology    HPI: Stable doing well waiting for the results of the cardiology    Review of Systems   All systems were reviewed and negative except for: Reviewed    Objective   Objective     Vitals:   Temp:  [97.5 øF (36.4 øC)-98.7 øF (37.1 øC)] 98.2 øF (36.8 øC)  Heart Rate:  [69-89] 79  Resp:  [18] 18  BP: (127-173)/() 168/105  Flow (L/min):  [2] 2    Physical Exam    Constitutional: Awake, alert   Eyes: PERRLA, sclerae anicteric, no conjunctival injection   HENT: NCAT, mucous membranes moist   Neck: Supple, no thyromegaly, no lymphadenopathy, trachea midline   Respiratory: Clear to auscultation bilaterally, nonlabored respirations    Cardiovascular: RRR, no murmurs, rubs, or gallops, palpable pedal pulses bilaterally   Gastrointestinal: Positive bowel sounds, soft, nontender, nondistended   Musculoskeletal: No bilateral ankle edema, no clubbing or cyanosis to extremities   Psychiatric: Appropriate affect, cooperative   Neurologic: Oriented x 3, strength symmetric in all extremities, Cranial Nerves grossly intact to confrontation, speech clear   Skin: No rashes   No change  Result Review    Result Review:  I have personally reviewed the results from the time of this admission to 10/13/2023 11:13 EDT and agree with these findings:  [x]  Laboratory grade anemia  []  Microbiology  []  Radiology  []  EKG/Telemetry   []  Cardiology/Vascular   []  Pathology  []  Old records  []  Other:  Most notable findings include: Low-grade anemia    Assessment & Plan   Assessment / Plan     Brief Patient Summary:  Carol Abarca is a 53 y.o. female who chest pain with possible abnormal  T wave    Active Hospital Problems:  Active Hospital Problems    Diagnosis     **Chest pain     Chronic heart failure with preserved ejection fraction (HFpEF)     Essential hypertension        Plan:   Stress test    DVT prophylaxis:  Mechanical DVT prophylaxis orders are present.    CODE STATUS:   Level Of Support Discussed With: Patient  Code Status (Patient has no pulse and is not breathing): CPR (Attempt to Resuscitate)  Medical Interventions (Patient has pulse or is breathing): Full Support    Disposition:  I expect patient to be discharged to the stress test we will decide about further management.    Electronically signed by Edward Manuel MD, 10/13/23, 11:13 AM EDT.      Part of this note may be an electronic transcription/translation of spoken language to printed text using the Dragon Dictation System.

## 2023-10-13 NOTE — SIGNIFICANT NOTE
10/13/23 1300   Coping/Psychosocial   Observed Emotional State calm;cooperative   Verbalized Emotional State relief   Trust Relationship/Rapport empathic listening provided   Involvement in Care interacting with patient   Additional Documentation Spiritual Care (Group)   Spiritual Care   Use of Spiritual Resources non-Episcopalian use of spiritual care   Spiritual Care Source  initiative   Spiritual Care Follow-Up follow-up, none required as presently assessed   Response to Spiritual Care receptive of support   Spiritual Care Interventions supportive conversation provided   Spiritual Care Visit Type initial   Receptivity to Spiritual Care visit welcomed

## 2023-10-14 ENCOUNTER — READMISSION MANAGEMENT (OUTPATIENT)
Dept: CALL CENTER | Facility: HOSPITAL | Age: 53
End: 2023-10-14
Payer: MEDICARE

## 2023-10-14 VITALS
SYSTOLIC BLOOD PRESSURE: 136 MMHG | TEMPERATURE: 98.4 F | HEART RATE: 76 BPM | DIASTOLIC BLOOD PRESSURE: 97 MMHG | BODY MASS INDEX: 35.58 KG/M2 | OXYGEN SATURATION: 91 % | WEIGHT: 193.34 LBS | HEIGHT: 62 IN | RESPIRATION RATE: 20 BRPM

## 2023-10-14 LAB — GLUCOSE BLDC GLUCOMTR-MCNC: 107 MG/DL (ref 70–99)

## 2023-10-14 PROCEDURE — G0378 HOSPITAL OBSERVATION PER HR: HCPCS

## 2023-10-14 PROCEDURE — 97161 PT EVAL LOW COMPLEX 20 MIN: CPT

## 2023-10-14 PROCEDURE — 82948 REAGENT STRIP/BLOOD GLUCOSE: CPT

## 2023-10-14 RX ORDER — FOLIC ACID 1 MG/1
1 TABLET ORAL EVERY 24 HOURS
Qty: 30 TABLET | Refills: 0 | Status: SHIPPED | OUTPATIENT
Start: 2023-10-14 | End: 2023-11-13

## 2023-10-14 RX ORDER — FUROSEMIDE 20 MG/1
20 TABLET ORAL DAILY
Qty: 30 TABLET | Refills: 0 | Status: SHIPPED | OUTPATIENT
Start: 2023-10-14 | End: 2023-11-13

## 2023-10-14 RX ORDER — LEVOTHYROXINE SODIUM 0.03 MG/1
25 TABLET ORAL
Status: DISCONTINUED | OUTPATIENT
Start: 2023-10-14 | End: 2023-10-14

## 2023-10-14 RX ORDER — LEVOTHYROXINE SODIUM 0.03 MG/1
25 TABLET ORAL
Status: DISCONTINUED | OUTPATIENT
Start: 2023-10-15 | End: 2023-10-14

## 2023-10-14 RX ADMIN — Medication 10 ML: at 09:09

## 2023-10-14 RX ADMIN — HYDROCODONE BITARTRATE AND ACETAMINOPHEN 1 TABLET: 5; 325 TABLET ORAL at 09:07

## 2023-10-14 RX ADMIN — CETIRIZINE HYDROCHLORIDE 10 MG: 10 TABLET, FILM COATED ORAL at 09:08

## 2023-10-14 RX ADMIN — LOSARTAN POTASSIUM 25 MG: 25 TABLET, FILM COATED ORAL at 09:08

## 2023-10-14 RX ADMIN — PRAVASTATIN SODIUM 40 MG: 20 TABLET ORAL at 09:09

## 2023-10-14 RX ADMIN — LEVETIRACETAM 500 MG: 500 TABLET, FILM COATED ORAL at 09:08

## 2023-10-14 RX ADMIN — FUROSEMIDE 20 MG: 20 TABLET ORAL at 09:08

## 2023-10-14 RX ADMIN — ASPIRIN 81 MG: 81 TABLET, COATED ORAL at 09:08

## 2023-10-14 RX ADMIN — ALPRAZOLAM 1 MG: 1 TABLET ORAL at 09:07

## 2023-10-14 RX ADMIN — METOPROLOL SUCCINATE 25 MG: 25 TABLET, EXTENDED RELEASE ORAL at 09:08

## 2023-10-14 NOTE — PLAN OF CARE
Goal Outcome Evaluation:  Plan of Care Reviewed With: patient           Outcome Evaluation: Pt presents with decreased ambulatory function due to weakness and decreased endurance. Pt will benefit from skilled physical therapy to address deficits. Recommend pt attend SNF or IRF upon discharge.      Anticipated Discharge Disposition (PT): skilled nursing facility, inpatient rehabilitation facility

## 2023-10-14 NOTE — THERAPY EVALUATION
Acute Care - Physical Therapy Initial Evaluation   [place]     Patient Name: Carol Abarca  : 1970  MRN: [id number]  Today's Date: 10/14/2023      Visit Dx:     ICD-10-CM ICD-9-CM   1. Chest pain, unspecified type  R07.9 786.50   2. Severe uncontrolled hypertension  I10 401.9   3. Difficulty walking  R26.2 719.7     Patient Active Problem List   Diagnosis    Mixed hyperlipidemia    Essential hypertension    Mild left ventricular hypertrophy    Atypical chest pain    Obstructive sleep apnea    History of pulmonary embolism    Screening for colon cancer    Hemorrhoids    Severe anemia    B12 deficiency    Pleural effusion    Seizure disorder    Type 2 diabetes mellitus    Generalized weakness    Unsteady gait when walking    Chronic heart failure with preserved ejection fraction (HFpEF)    Cervical spine degeneration    Weakness    Seizures    Chest pain     Past Medical History:   Diagnosis Date    Anemia     Arthritis     Diabetes     Essential hypertension 2021    History of pulmonary embolism     Lumbago     Low back pain    Mild left ventricular hypertrophy 2021    Mixed hyperlipidemia 2021    Obstructive sleep apnea     Reflux esophagitis     Seasonal allergies      Past Surgical History:   Procedure Laterality Date    APPENDECTOMY  2010     SECTION      , , ,     COLONOSCOPY      2019    COLONOSCOPY N/A 2022    Procedure: COLONOSCOPY;  Surgeon: Radha James MD;  Location: [facility] ENDOSCOPY;  Service: Gastroenterology;  Laterality: N/A;  COLON POLYP     ENDOSCOPY  2019    ENDOSCOPY N/A 2023    Procedure: ESOPHAGOGASTRODUODENOSCOPY WITH BIPOSIES;  Surgeon: Coy Patrick MD;  Location: [facility] ENDOSCOPY;  Service: Gastroenterology;  Laterality: N/A;  PRIOR LAPBAND, GASTRITIS    HEMORRHOIDECTOMY N/A 2022    Procedure: HEMORRHOIDECTOMY;  Surgeon: Remi Reeder MD;  Location: [facility] MAIN OR;  Service: General;   Laterality: N/A;    HERNIA REPAIR      2005, 2006, 2007, 2008    HYSTERECTOMY  2004    LAPAROSCOPIC GASTRIC BANDING      OTHER SURGICAL HISTORY      Metal implants     PT Assessment (last 12 hours)       PT Evaluation and Treatment       [facility] Name 10/14/23 1258          Physical Therapy Time and Intention    Subjective Information no complaints  -     Document Type evaluation  -     Mode of Treatment individual therapy;physical therapy  -     Patient Effort good  -     Symptoms Noted During/After Treatment none  -       Row Name 10/14/23 1258          General Information    Patient Profile Reviewed yes  -     Barriers to Rehab none identified  -       Row Name 10/14/23 1258          Living Environment    Current Living Arrangements home  -     People in Home child(cory), adult  -     Primary Care Provided by self  -       Row Name 10/14/23 1258          Home Use of Assistive/Adaptive Equipment    Equipment Currently Used at Home walker, rolling;oxygen  -[facility] Name 10/14/23 1258          Range of Motion (ROM)    Range of Motion bilateral lower extremities;ROM is [facility] Name 10/14/23 1258          Strength (Manual Muscle Testing)    Strength (Manual Muscle Testing) bilateral lower extremities;strength is [facility] Name 10/14/23 1258          Bed Mobility    Bed Mobility bed mobility (all) activities  -     All Activities, [place] (Bed Mobility) standby assist  -       Row Name 10/14/23 1258          Transfers    Transfers sit-stand transfer;stand-sit transfer  -[facility] Name 10/14/23 1258          Sit-Stand Transfer    Sit-Stand [place] (Transfers) contact guard  [facility]     Assistive Device (Sit-Stand Transfers) walker, front-wheeled  -       Row Name 10/14/23 1258          Stand-Sit Transfer    Stand-Sit [place] (Transfers) contact guard  [facility]     Assistive Device (Stand-Sit Transfers) walker, front-wheeled  [facility]       Row Name 10/14/23 1258           Gait/Stairs (Locomotion)    Gait/Stairs Locomotion gait/ambulation assistive device  -     [place] Level (Gait) contact guard;verbal cues  -     Assistive Device (Gait) walker, front-wheeled  -     Patient was able to Ambulate yes  -     Distance in Feet (Gait) 70  -     Pattern (Gait) step-through  -     Deviations/Abnormal Patterns (Gait) gait speed decreased;stride length decreased  -       Row Name 10/14/23 1258          Safety Issues, Functional Mobility    Impairments Affecting Function (Mobility) balance;endurance/activity tolerance;strength  -       Row Name 10/14/23 1258          Plan of Care Review    Plan of Care Reviewed With patient  -     Outcome Evaluation Pt presents with decreased ambulatory function due to weakness and decreased endurance. Pt will benefit from skilled physical therapy to address deficits. Recommend pt attend SNF or IRF upon discharge.  -       Row Name 10/14/23 1258          Positioning and Restraints    Pre-Treatment Position in bed  -     Post Treatment Position bed  -     In Bed encouraged to call for assist;call light within reach;exit alarm on  -       Row Name 10/14/23 125          Therapy Assessment/Plan (PT)    Patient/Family Therapy Goals Statement (PT) Improve strength  -     Rehab Potential (PT) good, to achieve stated therapy goals  -     Criteria for Skilled Interventions Met (PT) yes;skilled treatment is necessary  -     Therapy Frequency (PT) daily  -     Predicted Duration of Therapy Intervention (PT) 10  -     Problem List (PT) problems related to;balance;range of motion (ROM);strength  -     Activity Limitations Related to Problem List (PT) unable to ambulate safely;unable to transfer safely  -       Row Name 10/14/23 1253          Therapy Plan Review/Discharge Plan (PT)    Therapy Plan Review (PT) evaluation/treatment results reviewed;participants voiced agreement with care plan;patient  -       Row Name 10/14/23  1258          Physical Therapy Goals    Bed Mobility Goal Selection (PT) bed mobility, PT goal 1  -     Transfer Goal Selection (PT) transfer, PT goal 1  -     Gait Training Goal Selection (PT) gait training, PT goal 1  -       Row Name 10/14/23 1258          Bed Mobility Goal 1 (PT)    Activity/Assistive Device (Bed Mobility Goal 1, PT) bed mobility activities, all  -     [place] Level/Cues Needed (Bed Mobility Goal 1, PT) independent  -JH     Time Frame (Bed Mobility Goal 1, PT) long term goal (LTG);10 days  -       Row Name 10/14/23 1258          Transfer Goal 1 (PT)    Activity/Assistive Device (Transfer Goal 1, PT) transfers, all;walker, rolling  -     [place] Level/Cues Needed (Transfer Goal 1, PT) modified independence  -JH     Time Frame (Transfer Goal 1, PT) long term goal (LTG);10 days  -       Row Name 10/14/23 1258          Gait Training Goal 1 (PT)    Activity/Assistive Device (Gait Training Goal 1, PT) gait (walking locomotion);assistive device use  -     [place] Level (Gait Training Goal 1, PT) independent  -     Distance (Gait Training Goal 1, PT) 250  -JH     Time Frame (Gait Training Goal 1, PT) long term goal (LTG);10 days  -               User Key  (r) = Recorded By, (t) = Taken By, (c) = Cosigned By      Initials Name Provider Type    Salinas Caballero, DANIELA Physical Therapist                    Physical Therapy Education       Title: PT OT SLP Therapies (Done)       Topic: Physical Therapy (Done)       Point: Mobility training (Done)       Learning Progress Summary             Patient Acceptance, E, VU by  at 10/14/2023 1315                         Point: Home exercise program (Done)       Learning Progress Summary             Patient Acceptance, E, VU by  at 10/14/2023 1315                         Point: Body mechanics (Done)       Learning Progress Summary             Patient Acceptance, E, VU by  at 10/14/2023 1315                         Point:  Precautions (Done)       Learning Progress Summary             Patient Acceptance, E, VU by  at 10/14/2023 1315                                         User Key       Initials Effective Dates Name Provider Type Discipline     05/08/23 -  Salinas Mas, DANIELA Physical Therapist PT                  PT Recommendation and Plan  Anticipated Discharge Disposition (PT): skilled nursing facility, inpatient rehabilitation facility  Planned Therapy Interventions (PT): bed mobility training, balance training, gait training, home exercise program, stair training, strengthening, transfer training  Therapy Frequency (PT): daily  Plan of Care Reviewed With: patient  Outcome Evaluation: Pt presents with decreased ambulatory function due to weakness and decreased endurance. Pt will benefit from skilled physical therapy to address deficits. Recommend pt attend SNF or IRF upon discharge.   Outcome Measures       Row Name 10/14/23 1300             How much help from another person do you currently need...    Turning from your back to your side while in flat bed without using bedrails? 4  -JH      Moving from lying on back to sitting on the side of a flat bed without bedrails? 4  -JH      Moving to and from a bed to a chair (including a wheelchair)? 3  -JH      Standing up from a chair using your arms (e.g., wheelchair, bedside chair)? 3  -JH      Climbing 3-5 steps with a railing? 3  -JH      To walk in hospital room? 3  -      AM-PAC 6 Clicks Score (PT) 20  -      Highest level of mobility 6 --> Walked 10 steps or more  -         Functional Assessment    Outcome Measure Options AM-PAC 6 Clicks Basic Mobility (PT)  -                User Key  (r) = Recorded By, (t) = Taken By, (c) = Cosigned By      Initials Name Provider Type     Salinas Mas, DANIELA Physical Therapist                     Time Calculation:    PT Charges       Row Name 10/14/23 1258             Time Calculation    PT Received On 10/14/23  -      PT Goal  Re-Cert Due Date 10/23/23  -         Untimed Charges    PT Eval/Re-eval Minutes 35  -JH         Total Minutes    Untimed Charges Total Minutes 35  -JH       Total Minutes 35  -JH                User Key  (r) = Recorded By, (t) = Taken By, (c) = Cosigned By      Initials Name Provider Type    Salinas Caballero, PT Physical Therapist                  Therapy Charges for Today       Code Description Service Date Service Provider Modifiers Qty    [id number] HC PT EVAL LOW COMPLEXITY 3 10/14/2023 Salinas Mas, PT GP 1            PT G-Codes  Outcome Measure Options: AM-PAC 6 Clicks Basic Mobility (PT)  AM-PAC 6 Clicks Score (PT): 20    Salinas Mas PT  10/14/2023

## 2023-10-14 NOTE — DISCHARGE SUMMARY
[facility]         DISCHARGE SUMMARY    Patient Name: Carol Abarca  : 1970  MRN: [id number]    Date of Admission: 10/12/2023  Date of Discharge: 10/14/2023  Primary Care Physician: Sonia Mosquera APRN    Consults       Date and Time Order Name Status Description    10/12/2023  6:15 PM Inpatient Cardiology Consult      10/12/2023  3:59 PM Cardiology (on-call MD unless specified)      10/12/2023  3:04 PM Internal Medicine (on-call MD unless specified)              Presenting Problem:   Chest pain [R07.9]  Severe uncontrolled hypertension [I10]  Chest pain, unspecified type [R07.9]    Active and Resolved Hospital Problems:  Active Hospital Problems    Diagnosis POA    **Chest pain [R07.9] Yes    Chronic heart failure with preserved ejection fraction (HFpEF) [I50.32] Yes    Essential hypertension [I10] Yes      Resolved Hospital Problems   No resolved problems to display.         Hospital Course     Hospital Course:  Carol Abarca is a 53 y.o. female was complaining of generalized aches and pains especially chest pain and irregular heartbeat stable questionable EKG changes and therefore cardiology consultation was made with performed stress test which was negative chest pain is not felt to be cardiac in origin she already has appointment for primary care physician will be seeing her she also has been going to the pain management she is stable and back to her normal and she is therefore being discharged home today      DISCHARGE Follow Up Recommendations for labs and diagnostics: Chest pain and allover body pain probably because of musculoskeletal origin      Pertinent  and/or Most Recent Results     LAB RESULTS:      Lab 10/13/23  0434 10/13/23  0430 10/12/23  1909 10/12/23  1311   WBC 5.05  --  4.30 4.32   HEMOGLOBIN 9.7*  --  10.1* 10.3*   HEMATOCRIT 32.8*  --  33.9* 34.0   PLATELETS 271  --  281 275   NEUTROS ABS 2.83  --   --  2.55   IMMATURE GRANS (ABS) 0.01  --   --  0.01   LYMPHS ABS  1.54  --   --  1.20   MONOS ABS 0.32  --   --  0.31   EOS ABS 0.33  --   --  0.23   MCV 82.0  --  82.1 81.7   PROTIME  --  14.8  --   --          Lab 10/13/23  0430 10/12/23  1909 10/12/23  1428   SODIUM 138 138 139   POTASSIUM 3.6 3.8 3.8   CHLORIDE 103 104 104   CO2 26.9 26.7 24.6   ANION GAP 8.1 7.3 10.4   BUN 10 10 10   CREATININE 0.59 0.58 0.55*   EGFR 107.9 108.4 109.8   GLUCOSE 106* 112* 79   CALCIUM 8.8 9.1 9.2   MAGNESIUM  --   --  1.9   TSH 1.290  --   --          Lab 10/13/23  0430 10/12/23  1909 10/12/23  1428 10/12/23  1311   TOTAL PROTEIN 7.4 8.1 7.7  --    ALBUMIN 3.5 3.6 3.6  --    GLOBULIN 3.9 4.5 4.1  --    ALT (SGPT) <5 <5 <5  --    AST (SGOT) 10 10 10  --    BILIRUBIN 0.2 0.4 0.4  --    ALK PHOS 83 90 89  --    LIPASE  --   --   --  15         Lab 10/13/23  0430 10/12/23  1428   PROBNP  --  115.4   HSTROP T  --  12*   PROTIME 14.8  --    INR 1.15  --              Lab 10/13/23  0430   IRON 33*   IRON SATURATION (TSAT) 10*   TIBC 337   TRANSFERRIN 226   FERRITIN 20.38   FOLATE 9.60   VITAMIN B 12 294         Brief Urine Lab Results  (Last result in the past 365 days)        Color   Clarity   Blood   Leuk Est   Nitrite   Protein   CREAT   Urine HCG        08/27/23 1546 Yellow   Clear   Negative   Trace   Negative   Trace                 Microbiology Results (last 10 days)       ** No results found for the last 240 hours. **            XR Chest 1 View    Result Date: 10/6/2023  Impression:   1. No acute cardiopulmonary disease.       JORY VILLALOBOS MD       Electronically Signed and Approved By: JORY VILLALOBOS MD on 10/06/2023 at 15:18             CT Chest With Contrast Diagnostic    Result Date: 9/27/2023  Impression:  No pulmonary embolism.  No acute infiltrate.  There is a stable 4.3 cm fusiform ascending aortic aneurysm.  The patient has undergone median sternotomy.  There is suspected prior CABG surgery.  Mild-to-moderate cardiomegaly is seen.  There is a small pericardial effusion.  Please see  above comments for further detail.     Please note that portions of this note were completed with a voice recognition program.  PARVEZ PIERCE JR, MD       Electronically Signed and Approved By: PARVEZ PIERCE JR, MD on 9/27/2023 at 1:03              XR Chest 1 View    Result Date: 9/26/2023  Impression:  No acute cardiopulmonary process identified.       COOPER BARNES MD       Electronically Signed and Approved By: COOPER BARNES MD on 9/26/2023 at 20:20                      Results for orders placed during the hospital encounter of 08/27/23    Adult Transthoracic Echo Complete W/ Cont if Necessary Per Protocol (With Agitated Saline)    Interpretation Summary    Left ventricular systolic function is normal. Calculated left ventricular EF = 64.7%    Left ventricular wall thickness is consistent with concentric hypertrophy.    Left ventricular diastolic function was indeterminate.    Estimated right ventricular systolic pressure from tricuspid regurgitation is normal (<35 mmHg).    There is a small (<1cm) pericardial effusion.    No obvious wall motion abnormalities      Labs Pending at Discharge:  Pending Labs       Order Current Status    Immunofixation, Serum In process    Immunoglobulin Free LT Chains Blood In process              Discharge Details        Discharge Medications        Changes to Medications        Instructions Start Date   metoprolol succinate XL 25 MG 24 hr tablet  Commonly known as: TOPROL-XL  What changed:   how much to take  when to take this   25 mg, Oral, Every 24 Hours Scheduled      QUEtiapine  MG 24 hr tablet  Commonly known as: SEROquel XR  What changed:   how much to take  when to take this   300 mg, Oral, Every Evening             Continue These Medications        Instructions Start Date   ALPRAZolam 1 MG tablet  Commonly known as: XANAX   1 mg, Oral, 3 Times Daily PRN, for anxiety      amLODIPine 5 MG tablet  Commonly known as: NORVASC   1 tablet, Oral, Daily      aspirin  81 MG EC tablet   81 mg, Oral, Daily      cetirizine 10 MG tablet  Commonly known as: zyrTEC   10 mg, Oral, Daily      empagliflozin 10 MG tablet tablet  Commonly known as: JARDIANCE   10 mg, Oral, Daily      folic acid 1 MG tablet  Commonly known as: FOLVITE   1 mg, Oral, Every 24 Hours      furosemide 20 MG tablet  Commonly known as: LASIX   20 mg, Oral, Daily      glipizide 5 MG ER tablet  Commonly known as: GLUCOTROL XL   5 mg, Oral, Daily      HYDROcodone-acetaminophen 7.5-325 MG per tablet  Commonly known as: NORCO   1 tablet, Oral, Every 8 Hours PRN      levETIRAcetam 500 MG tablet  Commonly known as: Keppra   500 mg, Oral, 2 Times Daily      pravastatin 40 MG tablet  Commonly known as: PRAVACHOL   40 mg, Oral, Daily      zolpidem 10 MG tablet  Commonly known as: AMBIEN   10 mg, Oral, Nightly PRN               Allergies   Allergen Reactions    Tramadol Anaphylaxis    Tramadol Hcl Anaphylaxis    Moxifloxacin Hives    Sulfa Antibiotics Hives         Discharge Disposition:  Home or Self Care    Diet:  Hospital:  Diet Order   Procedures    Diet: Diabetic Diets; Consistent Carbohydrate; Texture: Regular Texture (IDDSI 7); Fluid Consistency: Thin (IDDSI 0)         Discharge Activity: As tolerated        CODE STATUS:  Code Status and Medical Interventions:   Ordered at: 10/12/23 2264     Level Of Support Discussed With:    Patient     Code Status (Patient has no pulse and is not breathing):    CPR (Attempt to Resuscitate)     Medical Interventions (Patient has pulse or is breathing):    Full Support         No future appointments.        Time spent on Discharge including face to face service: 45 minutes    Electronically signed by Edward Manuel MD, 10/14/23, 12:53 PM EDT.    Part of this note may be an electronic transcription/translation of spoken language to printed text using the Dragon Dictation System.

## 2023-10-14 NOTE — OUTREACH NOTE
Prep Survey      Flowsheet Row Responses   [facility] facility patient discharged from? [facility]   Is LACE score < 7 ? No   Eligibility Readm Mgmt   Discharge diagnosis Chest pain, Chronic heart failure with preserved ejection fraction (HFpEF)   Does the patient have one of the following disease processes/diagnoses(primary or secondary)? CHF   Does the patient have Home health ordered? No   Is there a DME ordered? No   Prep survey completed? Yes            Yasmin NOGUERA - Registered Nurse

## 2023-10-16 LAB
IGA SERPL-MCNC: 432 MG/DL (ref 87–352)
IGG SERPL-MCNC: 1840 MG/DL (ref 586–1602)
IGM SERPL-MCNC: 65 MG/DL (ref 26–217)
KAPPA LC FREE SER-MCNC: 60.8 MG/L (ref 3.3–19.4)
KAPPA LC FREE/LAMBDA FREE SER: 2.32 {RATIO} (ref 0.26–1.65)
LAMBDA LC FREE SERPL-MCNC: 26.2 MG/L (ref 5.7–26.3)
PROT PATTERN SERPL IFE-IMP: ABNORMAL

## 2023-10-17 ENCOUNTER — READMISSION MANAGEMENT (OUTPATIENT)
Dept: CALL CENTER | Facility: HOSPITAL | Age: 53
End: 2023-10-17
Payer: MEDICARE

## 2023-10-17 NOTE — OUTREACH NOTE
CHF Week 1 Survey      Flowsheet Row Responses   [facility] patient discharged from? [facility]   Does the patient have one of the following disease processes/diagnoses(primary or secondary)? CHF   CHF Week 1 attempt successful? Yes   Call start time 1532   Call end time 1537   Discharge diagnosis Chest pain, Chronic heart failure with preserved ejection fraction (HFpEF)   Medication alerts for this patient Pt reports that she does not take Lasix which is listed on her AVS. Pt denies taking any diuretics.   Meds reviewed with patient/caregiver? Yes   Is the patient having any side effects they believe may be caused by any medication additions or changes? No   Does the patient have all medications ordered at discharge? N/A   Does the patient have a primary care provider?  Yes   Does the patient have an appointment with their PCP within 7 days of discharge? Yes   Psychosocial issues? No   Comments Pt reports no chest pain, palpitations or SOA but does report some edema in LE. She is monitoring am/pm daily wts, she reports but does not report gains to anyone, she reports.   What is the patient's perception of their health status since discharge? Improving   Nursing interventions Nurse provided patient education   Is the patient/caregiver able to teach back the hierarchy of who to call/visit for symptoms/problems? PCP, Specialist, Home health nurse, Urgent Care, ED, 911 Yes   CHF Zone this Call Yellow Zone   Yellow Zone Not feeling as good as usual   Yellow Zone Interventions Consider contacting your doctor or health care team    CHF Week 1 call completed? Yes   Is the patient interested in additional calls from an ambulatory ? No   Wrap up additional comments quick call   Call end time 1537            Dominique CRAIG - Registered Nurse

## 2023-10-24 ENCOUNTER — READMISSION MANAGEMENT (OUTPATIENT)
Dept: CALL CENTER | Facility: HOSPITAL | Age: 53
End: 2023-10-24
Payer: MEDICARE

## 2023-10-24 NOTE — OUTREACH NOTE
"CHF Week 2 Survey      Flowsheet Row Responses   [facility] patient discharged from? [facility]   Does the patient have one of the following disease processes/diagnoses(primary or secondary)? CHF   Week 2 attempt successful? Yes   Call start time 1950   Call end time 1954   Discharge diagnosis Chest pain, Chronic heart failure with preserved ejection fraction (HFpEF)   Is the patient taking all medications as directed (includes completed medication regime)? Yes   Does the patient have a primary care provider?  Yes   Has the patient kept scheduled appointments due by today? Yes   Pulse Ox monitoring Intermittent   Pulse Ox device source Patient   O2 Sat comments 90% and above   O2 Sat: education provided Sat levels, When to seek care   Psychosocial issues? No   What is the patient's perception of their health status since discharge? Improving   Is the patient/caregiver able to teach back the hierarchy of who to call/visit for symptoms/problems? PCP, Specialist, Home health nurse, Urgent Care, ED, 911 Yes   Is the patient able to teach back Heart Failure Zones? Yes   CHF Zone this Call Green Zone   Green Zone Patient reports doing well, No new or worsening shortness of breath, Physical activity level is normal for you, No new swelling -  feet, ankles and legs look normal for you, Weight check stable, No chest pain   Green Zone Interventions Daily weight check, Follow up visits planned, Meds as directed   CHF Week 2 call completed? Yes   Wrap up additional comments States she is doing well but she is trying to get qualified for oxygen.  States \"I'm working on this\".   Call end time 1954            Lisa NI - Licensed Nurse  "

## 2023-11-02 ENCOUNTER — READMISSION MANAGEMENT (OUTPATIENT)
Dept: CALL CENTER | Facility: HOSPITAL | Age: 53
End: 2023-11-02
Payer: MEDICARE

## 2023-11-02 NOTE — OUTREACH NOTE
CHF Week 3 Survey      Flowsheet Row Responses   [facility] patient discharged from? [facility]   Does the patient have one of the following disease processes/diagnoses(primary or secondary)? CHF   Week 3 attempt successful? Yes   Call start time 1209   Call end time 1210   Discharge diagnosis Chest pain, Chronic heart failure with preserved ejection fraction (HFpEF)   Is patient permission given to speak with other caregiver? Yes   List who call center can speak with Cindy mother   Person spoke with today (if not patient) and relationship Cindy mother   What is the patient's perception of their health status since discharge? Improving   CHF Week 3 call completed? Yes   Graduated Yes   Graduated/Revoked comments A couple of days ago when the mother spoke with the pt, the pt was doing ok (nurse unable to reach pt)   Call end time 1210            Norma BUNDY - Registered Nurse

## 2023-11-07 ENCOUNTER — TELEPHONE (OUTPATIENT)
Dept: CARDIOLOGY | Facility: CLINIC | Age: 53
End: 2023-11-07

## 2023-11-07 NOTE — TELEPHONE ENCOUNTER
Caller: Carol Abarca    Relationship to patient: Self    Best call back number: [phone]     Chief complaint: CHEST PAIN/SWELLING    Type of visit: FU    Requested date: ASAP     If rescheduling, when is the original appointment: NA     Additional notes:PT STATES SHE HAS BEEN IN AND OUT OF THE ED FOR CHEST PAIN AND IS WANTING TO RSD THE APPT SHE HAD WITH DR BOWENS IN [place] - PT STATES HER SWELLING AND SYMPTOMS HAVE NOT IMPROVED - PT AWARE OF [facility] BEING NON PAR

## 2023-11-14 ENCOUNTER — OFFICE VISIT (OUTPATIENT)
Dept: CARDIOLOGY | Facility: CLINIC | Age: 53
End: 2023-11-14
Payer: MEDICARE

## 2023-11-14 VITALS
SYSTOLIC BLOOD PRESSURE: 140 MMHG | HEART RATE: 81 BPM | WEIGHT: 193 LBS | BODY MASS INDEX: 35.51 KG/M2 | DIASTOLIC BLOOD PRESSURE: 95 MMHG | HEIGHT: 62 IN

## 2023-11-14 DIAGNOSIS — I50.32 CHRONIC HEART FAILURE WITH PRESERVED EJECTION FRACTION (HFPEF): Primary | ICD-10-CM

## 2023-11-14 DIAGNOSIS — I10 ESSENTIAL HYPERTENSION: ICD-10-CM

## 2023-11-14 PROBLEM — R07.89 ATYPICAL CHEST PAIN: Status: RESOLVED | Noted: 2021-06-24 | Resolved: 2023-11-14

## 2023-11-14 PROBLEM — J30.2 SEASONAL ALLERGIC RHINITIS: Status: ACTIVE | Noted: 2023-01-01

## 2023-11-14 PROBLEM — J30.2 SEASONAL ALLERGIC RHINITIS: Status: ACTIVE | Noted: 2023-11-14

## 2023-11-14 PROBLEM — R07.9 CHEST PAIN: Status: RESOLVED | Noted: 2023-10-12 | Resolved: 2023-11-14

## 2023-11-14 PROBLEM — R07.9 CHEST PAIN: Status: RESOLVED | Noted: 2023-01-01 | Resolved: 2023-01-01

## 2023-11-14 PROBLEM — J90 PLEURAL EFFUSION: Status: RESOLVED | Noted: 2023-06-12 | Resolved: 2023-11-14

## 2023-11-14 PROBLEM — M19.90 ARTHRITIS: Status: ACTIVE | Noted: 2023-11-14

## 2023-11-14 PROBLEM — G40.909 SEIZURE DISORDER: Status: RESOLVED | Noted: 2023-06-15 | Resolved: 2023-11-14

## 2023-11-14 PROBLEM — J90 PLEURAL EFFUSION: Status: RESOLVED | Noted: 2023-01-01 | Resolved: 2023-01-01

## 2023-11-14 PROBLEM — G40.909 SEIZURE DISORDER: Status: RESOLVED | Noted: 2023-01-01 | Resolved: 2023-01-01

## 2023-11-14 PROBLEM — R07.89 ATYPICAL CHEST PAIN: Status: RESOLVED | Noted: 2021-06-24 | Resolved: 2023-01-01

## 2023-11-14 PROBLEM — M19.90 ARTHRITIS: Status: ACTIVE | Noted: 2023-01-01

## 2023-11-14 RX ORDER — POTASSIUM CHLORIDE 750 MG/1
10 TABLET, FILM COATED, EXTENDED RELEASE ORAL DAILY
Qty: 90 TABLET | Refills: 3 | Status: SHIPPED | OUTPATIENT
Start: 2023-11-14

## 2023-11-14 RX ORDER — DULOXETIN HYDROCHLORIDE 60 MG/1
60 CAPSULE, DELAYED RELEASE ORAL EVERY 12 HOURS SCHEDULED
COMMUNITY
Start: 2023-10-26

## 2023-11-14 RX ORDER — METOLAZONE 2.5 MG/1
2.5 TABLET ORAL DAILY
Qty: 3 TABLET | Refills: 0 | Status: SHIPPED | OUTPATIENT
Start: 2023-11-14

## 2023-11-14 RX ORDER — MONTELUKAST SODIUM 10 MG/1
10 TABLET ORAL NIGHTLY
COMMUNITY

## 2023-11-14 RX ORDER — LISINOPRIL 40 MG/1
40 TABLET ORAL DAILY
COMMUNITY

## 2023-11-14 RX ORDER — LEVETIRACETAM 750 MG/1
750 TABLET, FILM COATED ORAL EVERY 12 HOURS SCHEDULED
COMMUNITY

## 2023-11-14 RX ORDER — GABAPENTIN 300 MG/1
300 CAPSULE ORAL 3 TIMES DAILY
COMMUNITY

## 2023-11-14 NOTE — PROGRESS NOTES
"Chief Complaint  Hospital Follow Up Visit and Congestive Heart Failure    Subjective            History of Present Illness  Carol Abarca is a 53-year-old female patient who presents to the office today for hospital follow-up.  She first went to the emergency room for chest pain on 2023.  Her cardiac work-up was negative for any acute findings since she was discharged to home at that time.  She was admitted to [facility] from 10/12/2023 to 10/14/2023 for chest pain and palpitations.  Stress test was ordered and performed which was negative for ischemia.  She was instructed upon discharge to follow-up with PCP.  Upon discharge her metoprolol and Seroquel doses were adjusted.  Today she reports a lot of chest pressure \"feels like an elephant sitting on top of me\" constant since July. She also reports trouble with usage of both hands since July due to loss of sensation which is only affecting the palm side of her hands. She has been having bilateral lower extremity edema ever since being discharged from the hospital on 23. She denies any new or worsening shortness of breath. She  She denies lightheadedness/dizziness, or palpitations.     [facility]  Past Medical History:   Diagnosis Date    Anemia     Arthritis     Diabetes     Essential hypertension 2021    History of pulmonary embolism     Lumbago     Low back pain    Mild left ventricular hypertrophy 2021    Mixed hyperlipidemia 2021    Obstructive sleep apnea     Reflux esophagitis     Seasonal allergies          ALLERGY  Allergies   Allergen Reactions    Tramadol Anaphylaxis    Tramadol Hcl Anaphylaxis    Moxifloxacin Hives    Sulfa Antibiotics Hives          SURGICALHX  Past Surgical History:   Procedure Laterality Date    APPENDECTOMY  2010     SECTION      1990, 1992, 1998,     COLONOSCOPY      2019    COLONOSCOPY N/A 2022    Procedure: COLONOSCOPY;  Surgeon: Radha James MD;  Location: [facility]" ENDOSCOPY;  Service: Gastroenterology;  Laterality: N/A;  COLON POLYP     ENDOSCOPY  2019    ENDOSCOPY N/A 06/08/2023    Procedure: ESOPHAGOGASTRODUODENOSCOPY WITH BIPOSIES;  Surgeon: Coy Patrick MD;  Location: [facility] ENDOSCOPY;  Service: Gastroenterology;  Laterality: N/A;  PRIOR LAPBAND, GASTRITIS    HEMORRHOIDECTOMY N/A 07/25/2022    Procedure: HEMORRHOIDECTOMY;  Surgeon: Remi Reeder MD;  Location: [facility] MAIN OR;  Service: General;  Laterality: N/A;    HERNIA REPAIR      2005, 2006, 2007, 2008    HYSTERECTOMY  2004    LAPAROSCOPIC GASTRIC BANDING      OTHER SURGICAL HISTORY      Metal implants          SOC  Social History     Socioeconomic History    Marital status: Single   Tobacco Use    Smoking status: Every Day     Packs/day: 1.00     Years: 23.00     Additional pack years: 0.00     Total pack years: 23.00     Types: Cigarettes    Smokeless tobacco: Never    Tobacco comments:     Smoked 11-20 years. last 7/24/22 1700   Vaping Use    Vaping Use: Never used   Substance and Sexual Activity    Alcohol use: Not Currently     Comment: Occasionally drinks, less than 1 drink per day, has been drinking for less than 1 year    Drug use: Never    Sexual activity: Defer         FAMHX  Family History   Problem Relation Age of Onset    Heart disease Mother     Diabetes Mother         Unspecified type    Arthritis Mother     Heart disease Father     Diabetes Son         Unspecified type    Malig Hyperthermia Neg Hx           MEDSIGONLY  Current Outpatient Medications on File Prior to Visit   Medication Sig    ALPRAZolam (XANAX) 1 MG tablet Take 1 tablet by mouth 3 (Three) Times a Day As Needed. for anxiety    amLODIPine (NORVASC) 5 MG tablet Take 1 tablet by mouth Daily.    aspirin 81 MG EC tablet Take 1 tablet by mouth Daily.    cetirizine (zyrTEC) 10 MG tablet Take 1 tablet by mouth Daily.    DULoxetine (CYMBALTA) 60 MG capsule Take 1 capsule by mouth Every 12 (Twelve) Hours.    furosemide (LASIX) 20 MG  "tablet Take 1 tablet by mouth Daily for 30 days.    gabapentin (NEURONTIN) 300 MG capsule Take 1 capsule by mouth 3 (Three) Times a Day.    glipizide (GLUCOTROL XL) 5 MG ER tablet Take 2 tablets by mouth Daily.    HYDROcodone-acetaminophen (NORCO) 7.5-325 MG per tablet Take 1 tablet by mouth Every 8 (Eight) Hours As Needed for Moderate Pain, Severe Pain or Mild Pain.    Insulin Glargine (TOUJEO SOLOSTAR SC) Inject 40 Units under the skin into the appropriate area as directed.    Keppra 750 MG tablet 1 tablet Every 12 (Twelve) Hours.    lisinopril (PRINIVIL,ZESTRIL) 40 MG tablet Take 1 tablet by mouth Daily.    metoprolol succinate XL (TOPROL-XL) 25 MG 24 hr tablet Take 1 tablet by mouth Daily for 30 days. (Patient taking differently: Take 3 tablets by mouth 2 (Two) Times a Day.)    montelukast (SINGULAIR) 10 MG tablet Take 1 tablet by mouth Every Night.    pravastatin (PRAVACHOL) 40 MG tablet Take 1 tablet by mouth Daily.    QUEtiapine XR (SEROquel XR) 300 MG 24 hr tablet Take 1 tablet by mouth Every Evening for 30 days. (Patient taking differently: Take 2 tablets by mouth Every Night.)    zolpidem (AMBIEN) 10 MG tablet Take 1 tablet by mouth At Night As Needed.    [DISCONTINUED] empagliflozin (JARDIANCE) 10 MG tablet tablet Take 1 tablet by mouth Daily. (Patient not taking: Reported on 11/14/2023)    [DISCONTINUED] folic acid (FOLVITE) 1 MG tablet Take 1 tablet by mouth Daily for 30 days. (Patient not taking: Reported on 11/14/2023)    [DISCONTINUED] levETIRAcetam (Keppra) 500 MG tablet Take 1 tablet by mouth 2 (Two) Times a Day for 30 days.     No current facility-administered medications on file prior to visit.         Objective   /95   Pulse 81   Ht 157.5 cm (62\")   Wt 87.5 kg (193 lb)   BMI 35.30 kg/m²       Physical Exam  HENT:      Head: Normocephalic.   Neck:      Vascular: No carotid bruit.   Cardiovascular:      Rate and Rhythm: Normal rate and regular rhythm.      Pulses: Normal pulses.      " "Heart sounds: Normal heart sounds. No murmur heard.  Pulmonary:      Effort: Pulmonary effort is normal.      Breath sounds: Normal breath sounds.   Musculoskeletal:      Cervical back: Neck supple.      Right lower le+ Pitting Edema present.      Left lower le+ Pitting Edema present.   Skin:     General: Skin is dry.   Neurological:      Mental Status: She is alert and oriented to person, place, and time.   Psychiatric:         Behavior: Behavior normal.       Result Review :   The following data was reviewed by: REGAN Bishop on 2023:  proBNP   Date Value Ref Range Status   10/12/2023 115.4 0.0 - 900.0 pg/mL Final     CMP          10/12/2023    19:09 10/13/2023    04:30   CMP   Glucose 112  106    BUN 10  10    Creatinine 0.58  0.59    EGFR 108.4  107.9    Sodium 138  138    Potassium 3.8  3.6    Chloride 104  103    Calcium 9.1  8.8    Total Protein 8.1  7.4    Albumin 3.6  3.5    Globulin 4.5  3.9    Total Bilirubin 0.4  0.2    Alkaline Phosphatase 90  83    AST (SGOT) 10  10    ALT (SGPT) <5  <5    Albumin/Globulin Ratio 0.8  0.9    BUN/Creatinine Ratio 17.2  16.9    Anion Gap 7.3  8.1      CBC w/diff          10/13/2023    04:34   CBC w/Diff   WBC 5.05    RBC 4.00    Hemoglobin 9.7    Hematocrit 32.8    MCV 82.0    MCH 24.3    MCHC 29.6    RDW 15.2    Platelets 271    Neutrophil Rel % 56.1    Immature Granulocyte Rel % 0.2    Lymphocyte Rel % 30.5    Monocyte Rel % 6.3    Eosinophil Rel % 6.5    Basophil Rel % 0.4       Lab Results   Component Value Date    TSH 1.290 10/13/2023      Lab Results   Component Value Date    FREET4 0.82 (L) 10/12/2023      No results found for: \"DDIMERQUANT\"  Magnesium   Date Value Ref Range Status   10/12/2023 1.9 1.6 - 2.6 mg/dL Final      No results found for: \"DIGOXIN\"   Lab Results   Component Value Date    TROPONINT 12 (H) 10/12/2023           Lipid Panel          2023    04:11   Lipid Panel   Total Cholesterol 229    Triglycerides 180    HDL " Cholesterol 42    VLDL Cholesterol 33    LDL Cholesterol  154    LDL/HDL Ratio 3.60        Results for orders placed during the hospital encounter of 08/27/23    Adult Transthoracic Echo Complete W/ Cont if Necessary Per Protocol (With Agitated Saline)    Interpretation Summary    Left ventricular systolic function is normal. Calculated left ventricular EF = 64.7%    Left ventricular wall thickness is consistent with concentric hypertrophy.    Left ventricular diastolic function was indeterminate.    Estimated right ventricular systolic pressure from tricuspid regurgitation is normal (<35 mmHg).    There is a small (<1cm) pericardial effusion.    No obvious wall motion abnormalities           Assessment and Plan    Diagnoses and all orders for this visit:    1. Chronic heart failure with preserved ejection fraction (HFpEF) (Primary)  She has evidence of fluid overload on exam today.  She will take metolazone 2.5 mg daily for the next 3 days.  Start potassium 10 mill equivalent daily.  Continue Lasix 20 mg daily.  We talked about reducing fluid and sodium intake, compression socks, and daily weight.  Check BMP and proBNP in 2 weeks to assess renal function, electrolytes, and CHF.  -     Basic Metabolic Panel; Future  -     proBNP; Future    2. Essential hypertension  Currently elevated which could be due to her fluid levels being elevated. Check blood pressure twice a day for the next two weeks, blood pressure log provided for patient.  Will review log once available to me and will make any necessary medication adjustments needed at that time.  For now continue amlodipine 5 mg daily, lisinopril 40 mg daily, and metoprolol 25 mg daily.    Other orders  -     metOLazone (ZAROXOLYN) 2.5 MG tablet; Take 1 tablet by mouth Daily.  Dispense: 3 tablet; Refill: 0  -     potassium chloride 10 MEQ CR tablet; Take 1 tablet by mouth Daily.  Dispense: 90 tablet; Refill: 3            Follow Up   Return in about 6 months (around  5/14/2024) for Follow up with Dr Leyva.    Patient was given instructions and counseling regarding her condition or for health maintenance advice. Please see specific information pulled into the AVS if appropriate.     Carol Abarca  reports that she has been smoking cigarettes. She has a 23.00 pack-year smoking history. She has never used smokeless tobacco.. I have educated her on the risk of diseases from using tobacco products such as cancer, COPD, and heart disease.     I advised her to quit and she is not willing to quit.    I spent 3  minutes counseling the patient.           Marleny Gomez, APRN  11/14/23  13:18 EST    Dictated Utilizing Dragon Dictation

## 2023-11-16 RX ORDER — METOPROLOL SUCCINATE 25 MG/1
25 TABLET, EXTENDED RELEASE ORAL
Qty: 90 TABLET | Refills: 1 | Status: SHIPPED | OUTPATIENT
Start: 2023-11-16

## 2023-11-16 RX ORDER — FUROSEMIDE 20 MG/1
20 TABLET ORAL DAILY
Qty: 90 TABLET | Refills: 1 | Status: SHIPPED | OUTPATIENT
Start: 2023-11-16

## 2023-11-21 ENCOUNTER — APPOINTMENT (OUTPATIENT)
Dept: GENERAL RADIOLOGY | Facility: HOSPITAL | Age: 53
End: 2023-11-21
Payer: MEDICARE

## 2023-11-21 ENCOUNTER — HOSPITAL ENCOUNTER (EMERGENCY)
Facility: HOSPITAL | Age: 53
Discharge: HOME | End: 2023-11-21
Attending: EMERGENCY MEDICINE | Admitting: EMERGENCY MEDICINE
Payer: MEDICARE

## 2023-11-21 VITALS
HEIGHT: 62 IN | HEART RATE: 79 BPM | OXYGEN SATURATION: 94 % | WEIGHT: 192.9 LBS | RESPIRATION RATE: 18 BRPM | SYSTOLIC BLOOD PRESSURE: 101 MMHG | TEMPERATURE: 97.7 F | BODY MASS INDEX: 35.5 KG/M2 | DIASTOLIC BLOOD PRESSURE: 84 MMHG

## 2023-11-21 DIAGNOSIS — E87.6 HYPOKALEMIA: Primary | ICD-10-CM

## 2023-11-21 DIAGNOSIS — R06.02 SHORTNESS OF BREATH: ICD-10-CM

## 2023-11-21 LAB
ALBUMIN SERPL-MCNC: 3.7 G/DL (ref 3.5–5.2)
ALBUMIN/GLOB SERPL: 0.8 G/DL
ALP SERPL-CCNC: 103 U/L (ref 39–117)
ALT SERPL W P-5'-P-CCNC: 5 U/L (ref 1–33)
ANION GAP SERPL CALCULATED.3IONS-SCNC: 15 MMOL/L (ref 5–15)
ARTERIAL PATENCY WRIST A: POSITIVE
AST SERPL-CCNC: 11 U/L (ref 1–32)
BASE EXCESS BLDA CALC-SCNC: 3.7 MMOL/L (ref -2–2)
BASOPHILS # BLD AUTO: 0.02 10*3/MM3 (ref 0–0.2)
BASOPHILS NFR BLD AUTO: 0.4 % (ref 0–1.5)
BDY SITE: ABNORMAL
BILIRUB SERPL-MCNC: 0.3 MG/DL (ref 0–1.2)
BUN SERPL-MCNC: 15 MG/DL (ref 6–20)
BUN/CREAT SERPL: 21.7 (ref 7–25)
CALCIUM SPEC-SCNC: 8.8 MG/DL (ref 8.6–10.5)
CHLORIDE SERPL-SCNC: 93 MMOL/L (ref 98–107)
CO2 SERPL-SCNC: 27 MMOL/L (ref 22–29)
COHGB MFR BLD: 7.4 % (ref 0–1.5)
CREAT SERPL-MCNC: 0.69 MG/DL (ref 0.57–1)
DEPRECATED RDW RBC AUTO: 45.1 FL (ref 37–54)
EGFRCR SERPLBLD CKD-EPI 2021: 103.9 ML/MIN/1.73
EOSINOPHIL # BLD AUTO: 0.17 10*3/MM3 (ref 0–0.4)
EOSINOPHIL NFR BLD AUTO: 3.2 % (ref 0.3–6.2)
ERYTHROCYTE [DISTWIDTH] IN BLOOD BY AUTOMATED COUNT: 15.8 % (ref 12.3–15.4)
FHHB: 6.3 % (ref 0–5)
GAS FLOW AIRWAY: 2 LPM
GLOBULIN UR ELPH-MCNC: 4.5 GM/DL
GLUCOSE SERPL-MCNC: 129 MG/DL (ref 65–99)
HCO3 BLDA-SCNC: 29.6 MMOL/L (ref 22–26)
HCT VFR BLD AUTO: 34.8 % (ref 34–46.6)
HGB BLD-MCNC: 10.5 G/DL (ref 12–15.9)
HGB BLDA-MCNC: 11.8 G/DL (ref 11.7–14.6)
HOLD SPECIMEN: NORMAL
HOLD SPECIMEN: NORMAL
IMM GRANULOCYTES # BLD AUTO: 0.01 10*3/MM3 (ref 0–0.05)
IMM GRANULOCYTES NFR BLD AUTO: 0.2 % (ref 0–0.5)
INHALED O2 CONCENTRATION: 28 %
LYMPHOCYTES # BLD AUTO: 1.4 10*3/MM3 (ref 0.7–3.1)
LYMPHOCYTES NFR BLD AUTO: 26.5 % (ref 19.6–45.3)
MAGNESIUM SERPL-MCNC: 1.8 MG/DL (ref 1.6–2.6)
MCH RBC QN AUTO: 23.9 PG (ref 26.6–33)
MCHC RBC AUTO-ENTMCNC: 30.2 G/DL (ref 31.5–35.7)
MCV RBC AUTO: 79.3 FL (ref 79–97)
METHGB BLD QL: 0.1 % (ref 0–1.5)
MODALITY: ABNORMAL
MONOCYTES # BLD AUTO: 0.45 10*3/MM3 (ref 0.1–0.9)
MONOCYTES NFR BLD AUTO: 8.5 % (ref 5–12)
NEUTROPHILS NFR BLD AUTO: 3.23 10*3/MM3 (ref 1.7–7)
NEUTROPHILS NFR BLD AUTO: 61.2 % (ref 42.7–76)
NRBC BLD AUTO-RTO: 0 /100 WBC (ref 0–0.2)
NT-PROBNP SERPL-MCNC: <36 PG/ML (ref 0–900)
OXYHGB MFR BLDV: 86.2 % (ref 94–99)
PCO2 BLDA: 50.7 MM HG (ref 35–45)
PH BLDA: 7.38 PH UNITS (ref 7.35–7.45)
PLATELET # BLD AUTO: 294 10*3/MM3 (ref 140–450)
PMV BLD AUTO: 11.5 FL (ref 6–12)
PO2 BLD: 259 MM[HG] (ref 0–500)
PO2 BLDA: 72.6 MM HG (ref 80–100)
POTASSIUM SERPL-SCNC: 2.8 MMOL/L (ref 3.5–5.2)
PROT SERPL-MCNC: 8.2 G/DL (ref 6–8.5)
QT INTERVAL: 503 MS
QTC INTERVAL: 588 MS
RBC # BLD AUTO: 4.39 10*6/MM3 (ref 3.77–5.28)
SAO2 % BLDCOA: 93.2 % (ref 95–99)
SODIUM SERPL-SCNC: 135 MMOL/L (ref 136–145)
TROPONIN T SERPL HS-MCNC: 11 NG/L
WBC NRBC COR # BLD AUTO: 5.28 10*3/MM3 (ref 3.4–10.8)
WHOLE BLOOD HOLD COAG: NORMAL
WHOLE BLOOD HOLD SPECIMEN: NORMAL

## 2023-11-21 PROCEDURE — 82375 ASSAY CARBOXYHB QUANT: CPT | Performed by: EMERGENCY MEDICINE

## 2023-11-21 PROCEDURE — 94799 UNLISTED PULMONARY SVC/PX: CPT

## 2023-11-21 PROCEDURE — 83880 ASSAY OF NATRIURETIC PEPTIDE: CPT | Performed by: EMERGENCY MEDICINE

## 2023-11-21 PROCEDURE — 93005 ELECTROCARDIOGRAM TRACING: CPT

## 2023-11-21 PROCEDURE — 85025 COMPLETE CBC W/AUTO DIFF WBC: CPT

## 2023-11-21 PROCEDURE — 80053 COMPREHEN METABOLIC PANEL: CPT | Performed by: EMERGENCY MEDICINE

## 2023-11-21 PROCEDURE — 71045 X-RAY EXAM CHEST 1 VIEW: CPT

## 2023-11-21 PROCEDURE — 99284 EMERGENCY DEPT VISIT MOD MDM: CPT

## 2023-11-21 PROCEDURE — 83735 ASSAY OF MAGNESIUM: CPT | Performed by: EMERGENCY MEDICINE

## 2023-11-21 PROCEDURE — 82805 BLOOD GASES W/O2 SATURATION: CPT | Performed by: EMERGENCY MEDICINE

## 2023-11-21 PROCEDURE — 36600 WITHDRAWAL OF ARTERIAL BLOOD: CPT | Performed by: EMERGENCY MEDICINE

## 2023-11-21 PROCEDURE — 83050 HGB METHEMOGLOBIN QUAN: CPT | Performed by: EMERGENCY MEDICINE

## 2023-11-21 PROCEDURE — 84484 ASSAY OF TROPONIN QUANT: CPT | Performed by: EMERGENCY MEDICINE

## 2023-11-21 PROCEDURE — 93005 ELECTROCARDIOGRAM TRACING: CPT | Performed by: EMERGENCY MEDICINE

## 2023-11-21 RX ORDER — HYDROCODONE BITARTRATE AND ACETAMINOPHEN 5; 325 MG/1; MG/1
1 TABLET ORAL ONCE
Status: COMPLETED | OUTPATIENT
Start: 2023-11-21 | End: 2023-11-21

## 2023-11-21 RX ORDER — SODIUM CHLORIDE 0.9 % (FLUSH) 0.9 %
10 SYRINGE (ML) INJECTION AS NEEDED
Status: DISCONTINUED | OUTPATIENT
Start: 2023-11-21 | End: 2023-11-21 | Stop reason: HOSPADM

## 2023-11-21 RX ORDER — HYDROCODONE BITARTRATE AND ACETAMINOPHEN 5; 325 MG/1; MG/1
1 TABLET ORAL EVERY 6 HOURS PRN
Status: DISCONTINUED | OUTPATIENT
Start: 2023-11-21 | End: 2023-11-21

## 2023-11-21 RX ORDER — POTASSIUM CHLORIDE 750 MG/1
40 CAPSULE, EXTENDED RELEASE ORAL ONCE
Status: COMPLETED | OUTPATIENT
Start: 2023-11-21 | End: 2023-11-21

## 2023-11-21 RX ADMIN — HYDROCODONE BITARTRATE AND ACETAMINOPHEN 1 TABLET: 5; 325 TABLET ORAL at 17:42

## 2023-11-21 RX ADMIN — POTASSIUM CHLORIDE 40 MEQ: 10 CAPSULE, COATED, EXTENDED RELEASE ORAL at 17:35

## 2023-11-21 NOTE — ED NOTES
Arrives from home c/o SOA; per EMS pt's family placed pt on her CPAP prior to their arrival.  EMS states that pt's O2 saturation was 100% during transport. Pt arrived on 2 L NC, which is what she's on at home.

## 2023-11-21 NOTE — ED PROVIDER NOTES
Time: 4:52 PM EST  Date of encounter:  2023  Independent Historian/Clinical History and Information was obtained by:   Patient    History is limited by: N/A    Chief Complaint: Shortness of breath      History of Present Illness:  Patient is a 53 y.o. year old female who presents to the emergency department for evaluation of shortness of breath.  Patient has a history of diabetes, hypertension, SOCO, CHF and complains of shortness of breath.  She is wearing her baseline oxygen.    HPI    Patient Care Team  Primary Care Provider: Sonia Mosquera APRN    Past Medical History:     Allergies   Allergen Reactions    Tramadol Anaphylaxis    Tramadol Hcl Anaphylaxis    Moxifloxacin Hives    Sulfa Antibiotics Hives     Past Medical History:   Diagnosis Date    Anemia     Arthritis     Diabetes     Essential hypertension 2021    History of pulmonary embolism     Lumbago     Low back pain    Mild left ventricular hypertrophy 2021    Mixed hyperlipidemia 2021    Obstructive sleep apnea     Reflux esophagitis     Seasonal allergies      Past Surgical History:   Procedure Laterality Date    APPENDECTOMY  2010     SECTION      , , ,     COLONOSCOPY       2018    COLONOSCOPY N/A 2022    Procedure: COLONOSCOPY;  Surgeon: Radha James MD;  Location: [facility] ENDOSCOPY;  Service: Gastroenterology;  Laterality: N/A;  COLON POLYP     ENDOSCOPY  2019    ENDOSCOPY N/A 2023    Procedure: ESOPHAGOGASTRODUODENOSCOPY WITH BIPOSIES;  Surgeon: Coy Patrick MD;  Location: [facility] ENDOSCOPY;  Service: Gastroenterology;  Laterality: N/A;  PRIOR LAPBAND, GASTRITIS    HEMORRHOIDECTOMY N/A 2022    Procedure: HEMORRHOIDECTOMY;  Surgeon: Remi Reeder MD;  Location: [facility] MAIN OR;  Service: General;  Laterality: N/A;    HERNIA REPAIR      2005, 2006, 2007, 2008    HYSTERECTOMY  2004    LAPAROSCOPIC GASTRIC BANDING      OTHER SURGICAL HISTORY      Metal implants      Family History   Problem Relation Age of Onset    Heart disease Mother     Diabetes Mother         Unspecified type    Arthritis Mother     Heart disease Father     Diabetes Son         Unspecified type    Malig Hyperthermia Neg Hx        Home Medications:  Prior to Admission medications    Medication Sig Start Date End Date Taking? Authorizing Provider   ALPRAZolam (XANAX) 1 MG tablet Take 1 tablet by mouth 3 (Three) Times a Day As Needed. for anxiety 7/26/23   Silas Bhatt MD   amLODIPine (NORVASC) 5 MG tablet Take 1 tablet by mouth Daily. 9/21/23   Silas Bhatt MD   aspirin 81 MG EC tablet Take 1 tablet by mouth Daily.    Silas Bhatt MD   cetirizine (zyrTEC) 10 MG tablet Take 1 tablet by mouth Daily.    Silas Bhatt MD   DULoxetine (CYMBALTA) 60 MG capsule Take 1 capsule by mouth Every 12 (Twelve) Hours. 10/26/23   Silas Bhatt MD   furosemide (LASIX) 20 MG tablet Take 1 tablet by mouth Daily. 11/16/23   Marleny Gomez APRN   gabapentin (NEURONTIN) 300 MG capsule Take 1 capsule by mouth 3 (Three) Times a Day.    Silas Bhatt MD   glipizide (GLUCOTROL XL) 5 MG ER tablet Take 2 tablets by mouth Daily.    Silas Bhatt MD   HYDROcodone-acetaminophen (NORCO) 7.5-325 MG per tablet Take 1 tablet by mouth Every 8 (Eight) Hours As Needed for Moderate Pain, Severe Pain or Mild Pain.    Silas Bhatt MD   Insulin Glargine (TOUJEO SOLOSTAR SC) Inject 40 Units under the skin into the appropriate area as directed.    Silas Bhatt MD   Keppra 750 MG tablet 1 tablet Every 12 (Twelve) Hours.    Silas Bhatt MD   lisinopril (PRINIVIL,ZESTRIL) 40 MG tablet Take 1 tablet by mouth Daily.    Silas Bhatt MD   metOLazone (ZAROXOLYN) 2.5 MG tablet Take 1 tablet by mouth Daily. 11/14/23   Marleny Gomez APRN   metoprolol succinate XL (TOPROL-XL) 25 MG 24 hr tablet Take 1 tablet by mouth Daily. 11/16/23   Marleny Gomez  REGAN De La Torre   montelukast (SINGULAIR) 10 MG tablet Take 1 tablet by mouth Every Night.    Provider, MD Silas   potassium chloride 10 MEQ CR tablet Take 1 tablet by mouth Daily. 11/14/23   Jason Marleny REGAN De La Torre   pravastatin (PRAVACHOL) 40 MG tablet Take 1 tablet by mouth Daily.    ProviderSilas MD   QUEtiapine XR (SEROquel XR) 300 MG 24 hr tablet Take 1 tablet by mouth Every Evening for 30 days.  Patient taking differently: Take 2 tablets by mouth Every Night. 7/21/23 11/14/23  Dung Hall MD   zolpidem (AMBIEN) 10 MG tablet Take 1 tablet by mouth At Night As Needed. 8/23/23   ProviderSilas MD        Social History:   Social History     Tobacco Use    Smoking status: Every Day     Packs/day: 1.00     Years: 23.00     Additional pack years: 0.00     Total pack years: 23.00     Types: Cigarettes    Smokeless tobacco: Never    Tobacco comments:     Smoked 11-20 years. last 7/24/22 1700   Vaping Use    Vaping Use: Never used   Substance Use Topics    Alcohol use: Not Currently     Comment: Occasionally drinks, less than 1 drink per day, has been drinking for less than 1 year    Drug use: Never         Review of Systems:  Review of Systems   Constitutional:  Negative for chills and fever.   HENT:  Negative for congestion, rhinorrhea and sore throat.    Eyes:  Negative for pain and visual disturbance.   Respiratory:  Positive for shortness of breath. Negative for apnea, cough and chest tightness.    Cardiovascular:  Negative for chest pain and palpitations.   Gastrointestinal:  Negative for abdominal pain, diarrhea, nausea and vomiting.   Genitourinary:  Negative for difficulty urinating and dysuria.   Musculoskeletal:  Negative for joint swelling and myalgias.   Skin:  Negative for color change.   Neurological:  Negative for seizures and headaches.   Psychiatric/Behavioral: Negative.     All other systems reviewed and are negative.       Physical Exam:  /91   Pulse 70   Temp 97.7 °F  "(36.5 °C) (Oral)   Resp 18   Ht 157.5 cm (62\")   Wt 87.5 kg (192 lb 14.4 oz)   SpO2 94%   BMI 35.28 kg/m²     Physical Exam  Vitals and nursing note reviewed.   Constitutional:       General: She is not in acute distress.     Appearance: Normal appearance. She is not toxic-appearing.   HENT:      Head: Normocephalic and atraumatic.      Jaw: There is normal jaw occlusion.   Eyes:      General: Lids are normal.      Extraocular Movements: Extraocular movements intact.      Conjunctiva/sclera: Conjunctivae normal.      Pupils: Pupils are equal, round, and reactive to light.   Cardiovascular:      Rate and Rhythm: Normal rate and regular rhythm.      Pulses: Normal pulses.      Heart sounds: Normal heart sounds.   Pulmonary:      Effort: Pulmonary effort is normal. No respiratory distress.      Breath sounds: Normal breath sounds. No wheezing or rhonchi.   Abdominal:      General: Abdomen is flat.      Palpations: Abdomen is soft.      Tenderness: There is no abdominal tenderness. There is no guarding or rebound.   Musculoskeletal:         General: Normal range of motion.      Cervical back: Normal range of motion and neck supple.      Right lower leg: No edema.      Left lower leg: No edema.   Skin:     General: Skin is warm and dry.   Neurological:      Mental Status: She is alert and oriented to person, place, and time. Mental status is at baseline.   Psychiatric:         Mood and Affect: Mood normal.                  Procedures:  Procedures      Medical Decision Making:      Comorbidities that affect care:    CHF, diabetes, hyperlipidemia, hypertension, SOCO    External Notes reviewed:    Previous Clinic Note: Cardiology office visit for CHF management      The following orders were placed and all results were independently analyzed by me:  Orders Placed This Encounter   Procedures    XR Chest 1 View    Benedict Draw    Comprehensive Metabolic Panel    BNP    Single High Sensitivity Troponin T    CBC Auto " Differential    Blood Gas, Arterial -With Co-Ox Panel: Yes    Magnesium    NPO Diet NPO Type: Strict NPO    Undress & Gown    Continuous Pulse Oximetry    Vital Signs    Oxygen Therapy- Nasal Cannula; Titrate 1-6 LPM Per SpO2; 90 - 95%    ECG 12 Lead ED Triage Standing Order; SOA    Insert Peripheral IV    CBC & Differential    Green Top (Gel)    Lavender Top    Gold Top - SST    Light Blue Top       Medications Given in the Emergency Department:  Medications   sodium chloride 0.9 % flush 10 mL (has no administration in time range)   HYDROcodone-acetaminophen (NORCO) 5-325 MG per tablet 1 tablet (has no administration in time range)   potassium chloride (MICRO-K/KLOR-CON) CR capsule (has no administration in time range)        ED Course:         Labs:    Lab Results (last 24 hours)       Procedure Component Value Units Date/Time    CBC & Differential [[id number]]  (Abnormal) Collected: 11/21/23 1357    Specimen: Blood Updated: 11/21/23 1406    Narrative:      The following orders were created for panel order CBC & Differential.  Procedure                               Abnormality         Status                     ---------                               -----------         ------                     CBC Auto Differential[[id number]]        Abnormal            Final result                 Please view results for these tests on the individual orders.    Comprehensive Metabolic Panel [[id number]]  (Abnormal) Collected: 11/21/23 1357    Specimen: Blood Updated: 11/21/23 1441     Glucose 129 mg/dL      BUN 15 mg/dL      Creatinine 0.69 mg/dL      Sodium 135 mmol/L      Potassium 2.8 mmol/L      Chloride 93 mmol/L      CO2 27.0 mmol/L      Calcium 8.8 mg/dL      Total Protein 8.2 g/dL      Albumin 3.7 g/dL      ALT (SGPT) 5 U/L      AST (SGOT) 11 U/L      Alkaline Phosphatase 103 U/L      Total Bilirubin 0.3 mg/dL      Globulin 4.5 gm/dL      A/G Ratio 0.8 g/dL      BUN/Creatinine Ratio 21.7     Anion Gap 15.0 mmol/L       eGFR 103.9 mL/min/1.73     Narrative:      GFR Normal >60  Chronic Kidney Disease <60  Kidney Failure <15      BNP [[id number]]  (Normal) Collected: 11/21/23 1357    Specimen: Blood Updated: 11/21/23 1429     proBNP <36.0 pg/mL     Narrative:      This assay is used as an aid in the diagnosis of individuals suspected of having heart failure. It can be used as an aid in the diagnosis of acute decompensated heart failure (ADHF) in patients presenting with signs and symptoms of ADHF to the emergency department (ED). In addition, NT-proBNP of <300 pg/mL indicates ADHF is not likely.    Age Range Result Interpretation  NT-proBNP Concentration (pg/mL:      <50             Positive            >450                   Gray                 300-450                    Negative             <300    50-75           Positive            >900                  Gray                300-900                  Negative            <300      >75             Positive            >1800                  Gray                300-1800                  Negative            <300    Single High Sensitivity Troponin T [[id number]]  (Normal) Collected: 11/21/23 1357    Specimen: Blood Updated: 11/21/23 1430     HS Troponin T 11 ng/L     Narrative:      High Sensitive Troponin T Reference Range:  <14.0 ng/L- Negative Female for AMI  <22.0 ng/L- Negative Male for AMI  >=14 - Abnormal Female indicating possible myocardial injury.  >=22 - Abnormal Male indicating possible myocardial injury.   Clinicians would have to utilize clinical acumen, EKG, Troponin, and serial changes to determine if it is an Acute Myocardial Infarction or myocardial injury due to an underlying chronic condition.         CBC Auto Differential [[id number]]  (Abnormal) Collected: 11/21/23 1357    Specimen: Blood Updated: 11/21/23 1406     WBC 5.28 10*3/mm3      RBC 4.39 10*6/mm3      Hemoglobin 10.5 g/dL      Hematocrit 34.8 %      MCV 79.3 fL      MCH 23.9 pg      MCHC 30.2 g/dL      RDW  15.8 %      RDW-SD 45.1 fl      MPV 11.5 fL      Platelets 294 10*3/mm3      Neutrophil % 61.2 %      Lymphocyte % 26.5 %      Monocyte % 8.5 %      Eosinophil % 3.2 %      Basophil % 0.4 %      Immature Grans % 0.2 %      Neutrophils, Absolute 3.23 10*3/mm3      Lymphocytes, Absolute 1.40 10*3/mm3      Monocytes, Absolute 0.45 10*3/mm3      Eosinophils, Absolute 0.17 10*3/mm3      Basophils, Absolute 0.02 10*3/mm3      Immature Grans, Absolute 0.01 10*3/mm3      nRBC 0.0 /100 WBC     Magnesium [[id number]]  (Normal) Collected: 11/21/23 1357    Specimen: Blood Updated: 11/21/23 1517     Magnesium 1.8 mg/dL     Blood Gas, Arterial -With Co-Ox Panel: Yes [[id number]]  (Abnormal) Collected: 11/21/23 1425    Specimen: Arterial Blood from Arm, Right Updated: 11/21/23 1429     pH, Arterial 7.384 pH units      pCO2, Arterial 50.7 mm Hg      pO2, Arterial 72.6 mm Hg      HCO3, Arterial 29.6 mmol/L      Base Excess, Arterial 3.7 mmol/L      O2 Saturation, Arterial 93.2 %      Hemoglobin, Blood Gas 11.8 g/dL      Carboxyhemoglobin 7.4 %      Methemoglobin 0.10 %      Oxyhemoglobin 86.2 %      FHHB 6.3 %      Site Arterial: right radial     Modality Cannula - Nasal     FIO2 28 %      Flow Rate 2 lpm      PO2/FIO2 259     Del's Test Positive             Imaging:    XR Chest 1 View    Result Date: 11/21/2023  PROCEDURE: XR CHEST 1 VW  COMPARISON: [facility], , XR CHEST 1 VW, 10/12/2023, 13:05.  INDICATIONS: SOA Triage Protocol  FINDINGS:  Heart size is borderline enlarged, unchanged from the previous study.  There are postoperative changes of prior CABG.  Pulmonary vascular markings in the chest are normal and the lungs are clear.        1. Status post CABG 2. Borderline cardiac enlargement. 3. No acute process in the chest       Remi Mcgrath MD       Electronically Signed and Approved By: Remi Mcgrath MD on 11/21/2023 at 14:28                Differential Diagnosis and Discussion:    Dyspnea: Differential  diagnosis includes but is not limited to metabolic acidosis, neurological disorders, psychogenic, asthma, pneumothorax, upper airway obstruction, COPD, pneumonia, noncardiogenic pulmonary edema, interstitial lung disease, anemia, congestive heart failure, and pulmonary embolism    All labs were reviewed and interpreted by me.  All X-rays impressions were independently interpreted by me.  EKG was interpreted by me.    MDM  Number of Diagnoses or Management Options  Diagnosis management comments: In summary this is a 53-year-old female who presents to the emergency department for evaluation of shortness of breath.  CBC independently reviewed by me and shows no critical abnormalities.  CMP independently reviewed by me and shows no critical abnormalities except mild hypokalemia.  ABG unremarkable for acute pathology.  Chest x-ray unremarkable for acute pathology as well.  EKG unremarkable.  Patient is otherwise appearing to be at her baseline on her baseline nasal cannula oxygen.  Very strict return to ER and follow-up instructions have been provided to the patient.                   Patient Care Considerations:    CONSULT: I considered consulting cardiology, however no emergent indication      Consultants/Shared Management Plan:    None    Social Determinants of Health:    Patient is independent, reliable, and has access to care.       Disposition and Care Coordination:    Discharged: I considered escalation of care by admitting this patient for observation, however the patient has improved and is suitable and  stable for discharge.    I have explained the patient´s condition, diagnoses and treatment plan based on the information available to me at this time. I have answered questions and addressed any concerns. The patient has a good  understanding of the patient´s diagnosis, condition, and treatment plan as can be expected at this point. The vital signs have been stable. The patient´s condition is stable and  appropriate for discharge from the emergency department.      The patient will pursue further outpatient evaluation with the primary care physician or other designated or consulting physician as outlined in the discharge instructions. They are agreeable to this plan of care and follow-up instructions have been explained in detail. The patient has received these instructions in written format and have expressed an understanding of the discharge instructions. The patient is aware that any significant change in condition or worsening of symptoms should prompt an immediate return to this or the closest emergency department or call to 911.  I have explained discharge medications and the need for follow up with the patient/caretakers. This was also printed in the discharge instructions. Patient was discharged with the following medications and follow up:      Medication List        Changed      QUEtiapine  MG 24 hr tablet  Commonly known as: SEROquel XR  Take 1 tablet by mouth Every Evening for 30 days.  What changed:   how much to take  when to take this           Sonia Mosquera, APRN  [address]  [address]  [place] [place] [facility] [id number]  [phone]    In 1 week         Final diagnoses:   Hypokalemia   Shortness of breath        ED Disposition       ED Disposition   Discharge    Condition   Stable    Comment   --               This medical record created using voice recognition software.             Edwardo Braswell MD  11/21/23 9227

## 2023-11-29 LAB
QT INTERVAL: 503 MS
QTC INTERVAL: 588 MS

## 2023-12-07 ENCOUNTER — OFFICE VISIT (OUTPATIENT)
Dept: PULMONOLOGY | Facility: CLINIC | Age: 53
End: 2023-12-07
Payer: MEDICARE

## 2023-12-07 VITALS
RESPIRATION RATE: 16 BRPM | OXYGEN SATURATION: 97 % | WEIGHT: 203.8 LBS | SYSTOLIC BLOOD PRESSURE: 121 MMHG | BODY MASS INDEX: 37.5 KG/M2 | HEIGHT: 62 IN | TEMPERATURE: 97.6 F | HEART RATE: 112 BPM | DIASTOLIC BLOOD PRESSURE: 84 MMHG

## 2023-12-07 DIAGNOSIS — G47.33 OSA ON CPAP: ICD-10-CM

## 2023-12-07 DIAGNOSIS — Z71.6 ENCOUNTER FOR SMOKING CESSATION COUNSELING: ICD-10-CM

## 2023-12-07 DIAGNOSIS — R76.8 ELEVATED IGE LEVEL: ICD-10-CM

## 2023-12-07 DIAGNOSIS — R06.09 DYSPNEA ON EXERTION: ICD-10-CM

## 2023-12-07 DIAGNOSIS — I50.32 CHRONIC HEART FAILURE WITH PRESERVED EJECTION FRACTION (HFPEF): Primary | ICD-10-CM

## 2023-12-07 DIAGNOSIS — M79.89 LEG SWELLING: ICD-10-CM

## 2023-12-07 DIAGNOSIS — F17.210 NICOTINE DEPENDENCE, CIGARETTES, UNCOMPLICATED: ICD-10-CM

## 2023-12-07 DIAGNOSIS — J30.2 SEASONAL ALLERGIES: ICD-10-CM

## 2023-12-07 RX ORDER — METOLAZONE 2.5 MG/1
2.5 TABLET ORAL DAILY
Qty: 3 TABLET | Refills: 0 | Status: SHIPPED | OUTPATIENT
Start: 2023-12-07

## 2023-12-07 NOTE — PATIENT INSTRUCTIONS
"Smoking Tobacco Information, Adult  Smoking tobacco can be harmful to your health. Tobacco contains a toxic colorless chemical called nicotine. Nicotine causes changes in your brain that make you want more and more. This is called addiction. This can make it hard to stop smoking once you start. Tobacco also has other toxic chemicals that can hurt your body and raise your risk of many cancers.  Menthol or \"lite\" tobacco or cigarette brands are not safer than regular brands.  How can smoking tobacco affect me?  Smoking tobacco puts you at risk for:  Cancer. Smoking is most commonly associated with lung cancer, but can also lead to cancer in other parts of the body.  Chronic obstructive pulmonary disease (COPD). This is a long-term lung condition that makes it hard to breathe. It also gets worse over time.  High blood pressure (hypertension), heart disease, stroke, heart attack, and lung infections, such as pneumonia.  Cataracts. This is when the lenses in the eyes become clouded.  Digestive problems. This may include peptic ulcers, heartburn, and gastroesophageal reflux disease (GERD).  Oral health problems, such as gum disease, mouth sores, and tooth loss.  Loss of taste and smell.  Smoking also affects how you look and smell. Smoking may cause:  Wrinkles.  Yellow or stained teeth, fingers, and fingernails.  Bad breath.  Bad-smelling clothes and hair.  Smoking tobacco can also affect your social life, because:  It may be challenging to find places to smoke when away from home. Many workplaces, restaurants, hotels, and public places are tobacco-free.  Smoking is expensive. This is due to the cost of tobacco and the long-term costs of treating health problems from smoking.  Secondhand smoke may affect those around you. Secondhand smoke can cause lung cancer, breathing problems, and heart disease. Children of smokers have a higher risk for:  Sudden infant death syndrome (SIDS).  Ear infections.  Lung infections.  What " actions can I take to prevent health problems?  Quit smoking    Do not start smoking. Quit if you already smoke.  Do not replace cigarette smoking with vaping devices, such as e-cigarettes.  Make a plan to quit smoking and commit to it. Look for programs to help you, and ask your health care provider for recommendations and ideas. Set a date and write down all the reasons you want to quit.  Let your friends and family know you are quitting so they can help and support you. Consider finding friends who also want to quit. It can be easier to quit with someone else, so that you can support each other.  Talk with your health care provider about using nicotine replacement medicines to help you quit. These include gum, lozenges, patches, sprays, or pills.  If you try to quit but return to smoking, stay positive. It is common to slip up when you first quit, so take it one day at a time.  Be prepared for cravings. When you feel the urge to smoke, chew gum or suck on hard candy.  Lifestyle  Stay busy.  Take care of your body. Get plenty of exercise, eat a healthy diet, and drink plenty of water.  Find ways to manage your stress, such as meditation, yoga, exercise, or time spent with friends and family.  Ask your health care provider about having regular tests (screenings) to check for cancer. This may include blood tests, imaging tests, and other tests.  Where to find support  To get support to quit smoking, consider:  Asking your health care provider for more information and resources.  Joining a support group for people who want to quit smoking in your local community. There are many effective programs that may help you to quit.  Calling the smokefree.gov counselor helpline at 3-103-QUIT-NOW (1-504.588.5061).  Where to find more information  You may find more information about quitting smoking from:  Centers for Disease Control and Prevention: cdc.gov/tobacco  Smokefree.gov: smokefree.gov  American Lung Association:  [place]org  Contact a health care provider if:  You have problems breathing.  Your lips, nose, or fingers turn blue.  You have chest pain.  You are coughing up blood.  You feel like you will faint.  You have other health changes that cause you to worry.  Summary  Smoking tobacco can negatively affect your health, the health of those around you, your finances, and your social life.  Do not start smoking. Quit if you already smoke. If you need help quitting, ask your health care provider.  Consider joining a support group for people in your local community who want to quit smoking. There are many effective programs that may help you to quit.  This information is not intended to replace advice given to you by your health care provider. Make sure you discuss any questions you have with your health care provider.  Document Revised: 12/13/2022 Document Reviewed: 12/13/2022  [employer] Patient Education © 2023 [employer]  Sleep Apnea  Sleep apnea is a condition in which breathing pauses or becomes shallow during sleep. People with sleep apnea usually snore loudly. They may have times when they gasp and stop breathing for 10 seconds or more during sleep. This may happen many times during the night.  Sleep apnea disrupts your sleep and keeps your body from getting the rest that it needs. This condition can increase your risk of certain health problems, including:  Heart attack.  Stroke.  Obesity.  Type 2 diabetes.  Heart failure.  Irregular heartbeat.  High blood pressure.  The goal of treatment is to help you breathe normally again.  What are the causes?    The most common cause of sleep apnea is a collapsed or blocked airway.  There are three kinds of sleep apnea:  Obstructive sleep apnea. This kind is caused by a blocked or collapsed airway.  Central sleep apnea. This kind happens when the part of the brain that controls breathing does not send the correct signals to the muscles that control breathing.  Mixed  sleep apnea. This is a combination of obstructive and central sleep apnea.  What increases the risk?  You are more likely to develop this condition if you:  Are overweight.  Smoke.  Have a smaller than normal airway.  Are older.  Are male.  Drink alcohol.  Take sedatives or tranquilizers.  Have a family history of sleep apnea.  Have a tongue or tonsils that are larger than normal.  What are the signs or symptoms?  Symptoms of this condition include:  Trouble staying asleep.  Loud snoring.  Morning headaches.  Waking up gasping.  Dry mouth or sore throat in the morning.  Daytime sleepiness and tiredness.  If you have daytime fatigue because of sleep apnea, you may be more likely to have:  Trouble concentrating.  Forgetfulness.  Irritability or mood swings.  Personality changes.  Feelings of depression.  Sexual dysfunction. This may include loss of interest if you are female, or erectile dysfunction if you are male.  How is this diagnosed?  This condition may be diagnosed with:  A medical history.  A physical exam.  A series of tests that are done while you are sleeping (sleep study). These tests are usually done in a sleep lab, but they may also be done at home.  How is this treated?  Treatment for this condition aims to restore normal breathing and to ease symptoms during sleep. It may involve managing health issues that can affect breathing, such as high blood pressure or obesity. Treatment may include:  Sleeping on your side.  Using a decongestant if you have nasal congestion.  Avoiding the use of depressants, including alcohol, sedatives, and narcotics.  Losing weight if you are overweight.  Making changes to your diet.  Quitting smoking.  Using a device to open your airway while you sleep, such as:  An oral appliance. This is a custom-made mouthpiece that shifts your lower jaw forward.  A continuous positive airway pressure (CPAP) device. This device blows air through a mask when you breathe out (exhale).  A  nasal expiratory positive airway pressure (EPAP) device. This device has valves that you put into each nostril.  A bi-level positive airway pressure (BIPAP) device. This device blows air through a mask when you breathe in (inhale) and breathe out (exhale).  Having surgery if other treatments do not work. During surgery, excess tissue is removed to create a wider airway.  Follow these instructions at home:  Lifestyle  Make any lifestyle changes that your health care provider recommends.  Eat a healthy, well-balanced diet.  Take steps to lose weight if you are overweight.  Avoid using depressants, including alcohol, sedatives, and narcotics.  Do not use any products that contain nicotine or tobacco. These products include cigarettes, chewing tobacco, and vaping devices, such as e-cigarettes. If you need help quitting, ask your health care provider.  General instructions  Take over-the-counter and prescription medicines only as told by your health care provider.  If you were given a device to open your airway while you sleep, use it only as told by your health care provider.  If you are having surgery, make sure to tell your health care provider you have sleep apnea. You may need to bring your device with you.  Keep all follow-up visits. This is important.  Contact a health care provider if:  The device that you received to open your airway during sleep is uncomfortable or does not seem to be working.  Your symptoms do not improve.  Your symptoms get worse.  Get help right away if:  You develop:  Chest pain.  Shortness of breath.  Discomfort in your back, arms, or stomach.  You have:  Trouble speaking.  Weakness on one side of your body.  Drooping in your face.  These symptoms may represent a serious problem that is an emergency. Do not wait to see if the symptoms will go away. Get medical help right away. Call your local emergency services (911 in the [place] Do not drive yourself to the hospital.  Summary  Sleep apnea  is a condition in which breathing pauses or becomes shallow during sleep.  The most common cause is a collapsed or blocked airway.  The goal of treatment is to restore normal breathing and to ease symptoms during sleep.  This information is not intended to replace advice given to you by your health care provider. Make sure you discuss any questions you have with your health care provider.  Document Revised: 07/27/2022 Document Reviewed: 11/26/2021  Elsevier Patient Education © 2023 [employer].

## 2023-12-07 NOTE — PROGRESS NOTES
Primary Care Provider  Sonia Mosquera APRN     Referring Provider  No ref. provider found     Chief Complaint  Shortness of Breath and Follow-up (ER f/up [facility] 11/21 SOB )    Subjective          Carol Abarca presents to [facility]INE  History of Present Illness  Carol Abarca is a 53 y.o. female patient here for hospital follow-up.    Patient most recent hospital stay that pulmonology was consulted was from 8/27/2023 until 8/31/2023.  Per that discharge summary, she was admitted for weakness and generalized fatigue, along with altered mental status.  Patient was admitted for possible stroke and was diagnosed with seizures.  She was admitted to PCU with stroke workup negative.  Patient did have issues swallowing with tongue swelling.  She was seen by Dr. Bethea and was worked up for tickborne illnesses as well as C1 esterase deficiency.  Patient's home Keppra was increased.  She had a subsequent admission from 10/12/2023 until 10/14/2023 due to chest pain and irregular heartbeat.  She was recently seen in the emergency room on 11/21/2023 for shortness of breath.  I does note that she has history of diabetes, hypertension, obstructive sleep apnea and congestive heart failure.  Patient does wear baseline oxygen.  Patient was hypokalemic in the emergency room with a potassium of 2.8.  Patient states that she was given 40 meq of potassium.  I strongly encourage patient to get blood redrawn and start taking her potassium daily as prescribed.    Patient's alpha-gal panel from 8/29/2023 shows an IgE of 391.  Her alpha gal is negative.  But she does have an elevated levels for beef, pork and lamb.  After speak with Dr. Bethea, we will refer patient to an allergist for further workup.  She does see cardiology for congestive heart failure.  She is currently taking Lasix and potassium.  She was given 3 days of metolazone and states that that did help her leg swelling  significantly.  Patient states her leg swelling has returned.  She does complain of a pressure from her chin down to her feet.  She also wears a CPAP and this is previously being managed by Dr. Yi.  She wears 2 L of oxygen bled into her CPAP.  She is currently smoking 0.5 packs of cigarettes daily and is strongly encourage patient to quit.  She states that she will work on quitting.  She is agreeable to having a pulmonary function test.  I encourage patient to limit her salt intake and fluid intake.  Also discussed patient's echocardiogram with her in detail.  Otherwise, she has no additional concerns at this time.  She is able to perform her ADLs without difficulty.  She is up-to-date with her COVID and Pneumovax 23 vaccines.     Her history of smoking is   Tobacco Use: High Risk (12/7/2023)    Patient History     Smoking Tobacco Use: Every Day     Smokeless Tobacco Use: Never     Passive Exposure: Current     Carol Abarca  reports that she has been smoking cigarettes. She started smoking about 24 years ago. She has a 11.50 pack-year smoking history. She has been exposed to tobacco smoke. She has never used smokeless tobacco.. I have educated her on the risk of diseases from using tobacco products such as cancer, COPD, and heart disease.     I advised her to quit and she is willing to quit. We have discussed the following method/s for tobacco cessation:  Education Material Counseling [place].  Encourage a quit date for  3 months .  She will follow up with me in 3 months or sooner to check on her progress.    I spent 5 minutes counseling the patient.       Review of Systems   Constitutional:  Negative for chills, fatigue, fever, unexpected weight gain and unexpected weight loss.   HENT:  Congestion: Nasal.    Respiratory:  Positive for shortness of breath. Negative for apnea, cough and wheezing.         Negative for Hemoptysis     Cardiovascular:  Positive for chest pain (Chest pressure) and leg swelling.  Negative for palpitations.   Skin:         Negative for cyanosis      Sleep: Negative for Excessive daytime sleepiness  Negative for morning headaches  Negative for Snoring    Family History   Problem Relation Age of Onset    Heart disease Mother     Diabetes Mother         Unspecified type    Arthritis Mother     Heart disease Father     Diabetes Son         Unspecified type    Malig Hyperthermia Neg Hx         Social History     Socioeconomic History    Marital status: Single   Tobacco Use    Smoking status: Every Day     Packs/day: 0.50     Years: 23.00     Additional pack years: 0.00     Total pack years: 11.50     Types: Cigarettes     Start date: 1999     Passive exposure: Current    Smokeless tobacco: Never    Tobacco comments:     Smoked 11-20 years. last 7/24/22 1700   Vaping Use    Vaping Use: Never used   Substance and Sexual Activity    Alcohol use: Not Currently     Comment: Occasionally drinks, less than 1 drink per day, has been drinking for less than 1 year    Drug use: Never    Sexual activity: Defer        Past Medical History:   Diagnosis Date    Anemia     Arthritis     Diabetes     Essential hypertension 06/24/2021    History of pulmonary embolism     Lumbago     Low back pain    Mild left ventricular hypertrophy 06/24/2021    Mixed hyperlipidemia 06/24/2021    Obstructive sleep apnea     Reflux esophagitis     Seasonal allergies         Immunization History   Administered Date(s) Administered    COVID-19 (PFIZER) Purple Cap Monovalent 03/19/2021, 04/09/2021    Flu Vaccine Intradermal Quad 18-64YR 10/16/2023    Influenza Inj MDCK Preserative Free 11/09/2015    Influenza Injectable Mdck Pf Quad 09/07/2023    Pneumococcal Polysaccharide (PPSV23) 10/06/2013         Allergies   Allergen Reactions    Tramadol Anaphylaxis    Tramadol Hcl Anaphylaxis    Moxifloxacin Hives    Sulfa Antibiotics Hives          Current Outpatient Medications:     ALPRAZolam (XANAX) 1 MG tablet, Take 1 tablet by mouth 3  (Three) Times a Day As Needed. for anxiety, Disp: , Rfl:     amLODIPine (NORVASC) 5 MG tablet, Take 1 tablet by mouth Daily., Disp: , Rfl:     aspirin 81 MG EC tablet, Take 1 tablet by mouth Daily., Disp: , Rfl:     cetirizine (zyrTEC) 10 MG tablet, Take 1 tablet by mouth Daily., Disp: , Rfl:     DULoxetine (CYMBALTA) 60 MG capsule, Take 1 capsule by mouth Every 12 (Twelve) Hours., Disp: , Rfl:     furosemide (LASIX) 20 MG tablet, Take 1 tablet by mouth Daily., Disp: 90 tablet, Rfl: 1    gabapentin (NEURONTIN) 300 MG capsule, Take 1 capsule by mouth 3 (Three) Times a Day., Disp: , Rfl:     glipizide (GLUCOTROL XL) 5 MG ER tablet, Take 2 tablets by mouth Daily., Disp: , Rfl:     HYDROcodone-acetaminophen (NORCO) 7.5-325 MG per tablet, Take 1 tablet by mouth Every 8 (Eight) Hours As Needed for Moderate Pain, Severe Pain or Mild Pain., Disp: , Rfl:     Insulin Glargine (TOUJEO SOLOSTAR SC), Inject 40 Units under the skin into the appropriate area as directed., Disp: , Rfl:     Keppra 750 MG tablet, 1 tablet Every 12 (Twelve) Hours., Disp: , Rfl:     lisinopril (PRINIVIL,ZESTRIL) 40 MG tablet, Take 1 tablet by mouth Daily., Disp: , Rfl:     metOLazone (ZAROXOLYN) 2.5 MG tablet, Take 1 tablet by mouth Daily., Disp: 3 tablet, Rfl: 0    metoprolol succinate XL (TOPROL-XL) 25 MG 24 hr tablet, Take 1 tablet by mouth Daily., Disp: 90 tablet, Rfl: 1    montelukast (SINGULAIR) 10 MG tablet, Take 1 tablet by mouth Every Night., Disp: , Rfl:     potassium chloride 10 MEQ CR tablet, Take 1 tablet by mouth Daily., Disp: 90 tablet, Rfl: 3    pravastatin (PRAVACHOL) 40 MG tablet, Take 1 tablet by mouth Daily., Disp: , Rfl:     zolpidem (AMBIEN) 10 MG tablet, Take 1 tablet by mouth At Night As Needed., Disp: , Rfl:     QUEtiapine XR (SEROquel XR) 300 MG 24 hr tablet, Take 1 tablet by mouth Every Evening for 30 days. (Patient taking differently: Take 2 tablets by mouth Every Night.), Disp: 30 tablet, Rfl: 0     Objective   Physical  "Exam  Constitutional:       General: She is not in acute distress.     Appearance: Normal appearance. She is normal weight.   HENT:      Right Ear: Hearing normal.      Left Ear: Hearing normal.      Nose: No nasal tenderness or congestion.      Mouth/Throat:      Mouth: Mucous membranes are moist. No oral lesions.   Eyes:      Extraocular Movements: Extraocular movements intact.      Pupils: Pupils are equal, round, and reactive to light.   Neck:      Thyroid: No thyroid mass or thyromegaly.   Cardiovascular:      Rate and Rhythm: Normal rate and regular rhythm.      Pulses: Normal pulses.      Heart sounds: Normal heart sounds. No murmur heard.  Pulmonary:      Effort: Pulmonary effort is normal.      Breath sounds: Decreased breath sounds present. No wheezing, rhonchi or rales.   Musculoskeletal:      Cervical back: Neck supple.      Right lower le+ Pitting No edema.      Left lower le+ Pitting No edema.   Lymphadenopathy:      Cervical: No cervical adenopathy.      Upper Body:      Right upper body: No axillary adenopathy.   Skin:     General: Skin is warm and dry.      Findings: No lesion or rash.   Neurological:      General: No focal deficit present.      Mental Status: She is alert and oriented to person, place, and time.   Psychiatric:         Mood and Affect: Affect normal. Mood is not anxious or depressed.         Vital Signs:   /84 (BP Location: Left arm, Patient Position: Sitting, Cuff Size: Large Adult)   Pulse 112   Temp 97.6 °F (36.4 °C) (Temporal)   Resp 16   Ht 157.5 cm (62\")   Wt 92.4 kg (203 lb 12.8 oz)   SpO2 97% Comment: RA; 2 L's PRN  BMI 37.28 kg/m²        Result Review :   The following data was reviewed by: REGAN Renae on 2023:  CMP          10/12/2023    14:28 10/12/2023    19:09 10/13/2023    04:30 2023    13:57   CMP   Glucose 79  112  106  129    BUN 10  10  10  15    Creatinine 0.55  0.58  0.59  0.69    EGFR 109.8  108.4  107.9  103.9    Sodium " 139  138  138  135    Potassium 3.8  3.8  3.6  2.8    Chloride 104  104  103  93    Calcium 9.2  9.1  8.8  8.8    Total Protein 7.7  8.1  7.4  8.2    Albumin 3.6  3.6  3.5  3.7    Globulin 4.1  4.5  3.9  4.5    Total Bilirubin 0.4  0.4  0.2  0.3    Alkaline Phosphatase 89  90  83  103    AST (SGOT) 10  10  10  11    ALT (SGPT) <5  <5  <5  5    Albumin/Globulin Ratio 0.9  0.8  0.9  0.8    BUN/Creatinine Ratio 18.2  17.2  16.9  21.7    Anion Gap 10.4  7.3  8.1  15.0      CBC w/diff          10/12/2023    13:11 10/12/2023    19:09 10/13/2023    04:34 11/21/2023    13:57   CBC w/Diff   WBC 4.32  4.30  5.05  5.28    RBC 4.16  4.13  4.00  4.39    Hemoglobin 10.3  10.1  9.7  10.5    Hematocrit 34.0  33.9  32.8  34.8    MCV 81.7  82.1  82.0  79.3    MCH 24.8  24.5  24.3  23.9    MCHC 30.3  29.8  29.6  30.2    RDW 15.5  15.3  15.2  15.8    Platelets 275  281  271  294    Neutrophil Rel % 59.0   56.1  61.2    Immature Granulocyte Rel % 0.2   0.2  0.2    Lymphocyte Rel % 27.8   30.5  26.5    Monocyte Rel % 7.2   6.3  8.5    Eosinophil Rel % 5.3   6.5  3.2    Basophil Rel % 0.5   0.4  0.4    Percent reviewed alpha gal IgE panel 8/29/2023 showing an IgE of 391 and increased levels of beef, pork and lamb but alpha gal was negative.  Data reviewed : Radiologic studies chest x-ray 11/21/2023, Cardiology studies echocardiogram 8/28/2023, Consultant notes Dr. Bethea consult note 8/29/2023, and Recent hospitalization notes Hospital discharge summary 8/31/2023    Procedures        Assessment and Plan    Diagnoses and all orders for this visit:    1. Chronic heart failure with preserved ejection fraction (HFpEF) (Primary)  -     Comprehensive Metabolic Panel; Future  -     metOLazone (ZAROXOLYN) 2.5 MG tablet; Take 1 tablet by mouth Daily.  Dispense: 3 tablet; Refill: 0    2. Seasonal allergies  -     Ambulatory Referral to Allergy    3. Dyspnea on exertion  -     Complete PFT - Pre & Post Bronchodilator; Future    4. Nicotine  dependence, cigarettes, uncomplicated  -     Complete PFT - Pre & Post Bronchodilator; Future  -     Alpha - 1 - Antitrypsin; Future    5. Leg swelling  -     metOLazone (ZAROXOLYN) 2.5 MG tablet; Take 1 tablet by mouth Daily.  Dispense: 3 tablet; Refill: 0    6. Elevated IgE level    7. SOCO on CPAP    8. Encounter for smoking cessation counseling    9.  Encourage patient to avoid beef, lamb and pork.  10.  Continue Singulair and Zyrtec for allergies.  11.  Follow-up with cardiology as scheduled  12.  Referral sent to allergist for reactions to beef, pork and Lamb on alpha gal IgE panel  13.  Smoking cessation counseling provided.  I spent 5 minutes today counseling patient on the risks of smoking, including throat cancer, lung cancer, COPD, heart disease and death.  Also discussed the benefits of quitting.   14.  Follow up in 3 months, sooner if needed.    I spent 45 minutes caring for Carol on this date of service. This time includes time spent by me in the following activities:preparing for the visit, reviewing tests, obtaining and/or reviewing a separately obtained history, performing a medically appropriate examination and/or evaluation , counseling and educating the patient/family/caregiver, ordering medications, tests, or procedures, referring and communicating with other health care professionals , and documenting information in the medical record    Follow Up   Return in about 3 months (around 3/7/2024) for Recheck with Staci.  Patient was given instructions and counseling regarding her condition or for health maintenance advice. Please see specific information pulled into the AVS if appropriate.

## 2023-12-08 ENCOUNTER — TRANSCRIBE ORDERS (OUTPATIENT)
Dept: ADMINISTRATIVE | Facility: HOSPITAL | Age: 53
End: 2023-12-08
Payer: MEDICARE

## 2023-12-08 DIAGNOSIS — Z12.31 VISIT FOR SCREENING MAMMOGRAM: Primary | ICD-10-CM

## 2023-12-08 DIAGNOSIS — M81.0 SENILE OSTEOPOROSIS: Primary | ICD-10-CM

## 2023-12-28 ENCOUNTER — HOSPITAL ENCOUNTER (OUTPATIENT)
Dept: RESPIRATORY THERAPY | Facility: HOSPITAL | Age: 53
Discharge: HOME | End: 2023-12-28
Payer: MEDICARE

## 2023-12-28 DIAGNOSIS — F17.210 NICOTINE DEPENDENCE, CIGARETTES, UNCOMPLICATED: ICD-10-CM

## 2023-12-28 DIAGNOSIS — R06.09 DYSPNEA ON EXERTION: ICD-10-CM

## 2023-12-28 PROCEDURE — 94726 PLETHYSMOGRAPHY LUNG VOLUMES: CPT

## 2023-12-28 PROCEDURE — 94729 DIFFUSING CAPACITY: CPT

## 2023-12-28 PROCEDURE — 94060 EVALUATION OF WHEEZING: CPT

## 2023-12-28 RX ORDER — ALBUTEROL SULFATE 2.5 MG/3ML
2.5 SOLUTION RESPIRATORY (INHALATION) ONCE
Status: COMPLETED | OUTPATIENT
Start: 2023-12-28 | End: 2023-12-28

## 2023-12-28 RX ADMIN — ALBUTEROL SULFATE 2.5 MG: 2.5 SOLUTION RESPIRATORY (INHALATION) at 14:55

## 2023-12-30 ENCOUNTER — APPOINTMENT (OUTPATIENT)
Dept: GENERAL RADIOLOGY | Facility: HOSPITAL | Age: 53
DRG: 948 | End: 2023-12-30
Payer: MEDICARE

## 2023-12-30 ENCOUNTER — APPOINTMENT (OUTPATIENT)
Dept: CT IMAGING | Facility: HOSPITAL | Age: 53
DRG: 948 | End: 2023-12-30
Payer: MEDICARE

## 2023-12-30 ENCOUNTER — HOSPITAL ENCOUNTER (INPATIENT)
Facility: HOSPITAL | Age: 53
LOS: 1 days | Discharge: HOME | DRG: 948 | End: 2023-12-31
Attending: EMERGENCY MEDICINE | Admitting: INTERNAL MEDICINE
Payer: MEDICARE

## 2023-12-30 DIAGNOSIS — R41.82 ALTERED MENTAL STATUS, UNSPECIFIED ALTERED MENTAL STATUS TYPE: Primary | ICD-10-CM

## 2023-12-30 LAB
ALBUMIN SERPL-MCNC: 3.5 G/DL (ref 3.5–5.2)
ALBUMIN SERPL-MCNC: 4 G/DL (ref 3.5–5.2)
ALBUMIN/GLOB SERPL: 0.8 G/DL
ALBUMIN/GLOB SERPL: 0.9 G/DL
ALP SERPL-CCNC: 109 U/L (ref 39–117)
ALP SERPL-CCNC: 122 U/L (ref 39–117)
ALT SERPL W P-5'-P-CCNC: 6 U/L (ref 1–33)
ALT SERPL W P-5'-P-CCNC: 6 U/L (ref 1–33)
ANION GAP SERPL CALCULATED.3IONS-SCNC: 11.3 MMOL/L (ref 5–15)
ANION GAP SERPL CALCULATED.3IONS-SCNC: 13.9 MMOL/L (ref 5–15)
AST SERPL-CCNC: 12 U/L (ref 1–32)
AST SERPL-CCNC: 14 U/L (ref 1–32)
BASE EXCESS BLDA CALC-SCNC: -1.8 MMOL/L (ref -2–2)
BASOPHILS # BLD AUTO: 0.02 10*3/MM3 (ref 0–0.2)
BASOPHILS NFR BLD AUTO: 0.5 % (ref 0–1.5)
BDY SITE: ABNORMAL
BILIRUB SERPL-MCNC: 0.2 MG/DL (ref 0–1.2)
BILIRUB SERPL-MCNC: 0.2 MG/DL (ref 0–1.2)
BILIRUB UR QL STRIP: NEGATIVE
BUN SERPL-MCNC: 10 MG/DL (ref 6–20)
BUN SERPL-MCNC: 10 MG/DL (ref 6–20)
BUN/CREAT SERPL: 12.5 (ref 7–25)
BUN/CREAT SERPL: 15.9 (ref 7–25)
CALCIUM SPEC-SCNC: 8.4 MG/DL (ref 8.6–10.5)
CALCIUM SPEC-SCNC: 8.9 MG/DL (ref 8.6–10.5)
CHLORIDE SERPL-SCNC: 101 MMOL/L (ref 98–107)
CHLORIDE SERPL-SCNC: 104 MMOL/L (ref 98–107)
CLARITY UR: CLEAR
CO2 SERPL-SCNC: 25.1 MMOL/L (ref 22–29)
CO2 SERPL-SCNC: 25.7 MMOL/L (ref 22–29)
COHGB MFR BLD: 5.4 % (ref 0–1.5)
COLOR UR: YELLOW
CREAT SERPL-MCNC: 0.63 MG/DL (ref 0.57–1)
CREAT SERPL-MCNC: 0.8 MG/DL (ref 0.57–1)
D-LACTATE SERPL-SCNC: 2 MMOL/L (ref 0.5–2)
D-LACTATE SERPL-SCNC: 2.3 MMOL/L (ref 0.5–2)
D-LACTATE SERPL-SCNC: 2.5 MMOL/L (ref 0.5–2)
D-LACTATE SERPL-SCNC: 2.6 MMOL/L (ref 0.5–2)
DEPRECATED RDW RBC AUTO: 49.2 FL (ref 37–54)
DEPRECATED RDW RBC AUTO: 50 FL (ref 37–54)
EGFRCR SERPLBLD CKD-EPI 2021: 106.2 ML/MIN/1.73
EGFRCR SERPLBLD CKD-EPI 2021: 88.2 ML/MIN/1.73
EOSINOPHIL # BLD AUTO: 0.19 10*3/MM3 (ref 0–0.4)
EOSINOPHIL NFR BLD AUTO: 4.9 % (ref 0.3–6.2)
ERYTHROCYTE [DISTWIDTH] IN BLOOD BY AUTOMATED COUNT: 16.9 % (ref 12.3–15.4)
ERYTHROCYTE [DISTWIDTH] IN BLOOD BY AUTOMATED COUNT: 17.1 % (ref 12.3–15.4)
FHHB: 6.2 % (ref 0–5)
GAS FLOW AIRWAY: 2 LPM
GLOBULIN UR ELPH-MCNC: 4.2 GM/DL
GLOBULIN UR ELPH-MCNC: 4.7 GM/DL
GLUCOSE SERPL-MCNC: 101 MG/DL (ref 65–99)
GLUCOSE SERPL-MCNC: 143 MG/DL (ref 65–99)
GLUCOSE UR STRIP-MCNC: NEGATIVE MG/DL
HCO3 BLDA-SCNC: 24 MMOL/L (ref 22–26)
HCT VFR BLD AUTO: 33.5 % (ref 34–46.6)
HCT VFR BLD AUTO: 34.7 % (ref 34–46.6)
HGB BLD-MCNC: 10 G/DL (ref 12–15.9)
HGB BLD-MCNC: 10.3 G/DL (ref 12–15.9)
HGB BLDA-MCNC: 10.5 G/DL (ref 11.7–14.6)
HGB UR QL STRIP.AUTO: NEGATIVE
HOLD SPECIMEN: NORMAL
HOLD SPECIMEN: NORMAL
IMM GRANULOCYTES # BLD AUTO: 0 10*3/MM3 (ref 0–0.05)
IMM GRANULOCYTES NFR BLD AUTO: 0 % (ref 0–0.5)
KETONES UR QL STRIP: NEGATIVE
LACTATE BLDA-SCNC: ABNORMAL MMOL/L
LEUKOCYTE ESTERASE UR QL STRIP.AUTO: NEGATIVE
LYMPHOCYTES # BLD AUTO: 1.6 10*3/MM3 (ref 0.7–3.1)
LYMPHOCYTES NFR BLD AUTO: 41.2 % (ref 19.6–45.3)
MAGNESIUM SERPL-MCNC: 2 MG/DL (ref 1.6–2.6)
MCH RBC QN AUTO: 24.1 PG (ref 26.6–33)
MCH RBC QN AUTO: 24.2 PG (ref 26.6–33)
MCHC RBC AUTO-ENTMCNC: 29.7 G/DL (ref 31.5–35.7)
MCHC RBC AUTO-ENTMCNC: 29.9 G/DL (ref 31.5–35.7)
MCV RBC AUTO: 81.1 FL (ref 79–97)
MCV RBC AUTO: 81.3 FL (ref 79–97)
METHGB BLD QL: 0.1 % (ref 0–1.5)
MODALITY: ABNORMAL
MONOCYTES # BLD AUTO: 0.21 10*3/MM3 (ref 0.1–0.9)
MONOCYTES NFR BLD AUTO: 5.4 % (ref 5–12)
NEUTROPHILS NFR BLD AUTO: 1.86 10*3/MM3 (ref 1.7–7)
NEUTROPHILS NFR BLD AUTO: 48 % (ref 42.7–76)
NITRITE UR QL STRIP: NEGATIVE
NRBC BLD AUTO-RTO: 0 /100 WBC (ref 0–0.2)
NT-PROBNP SERPL-MCNC: 50.7 PG/ML (ref 0–900)
OXYHGB MFR BLDV: 88.3 % (ref 94–99)
PCO2 BLDA: 45.4 MM HG (ref 35–45)
PH BLDA: 7.34 PH UNITS (ref 7.35–7.45)
PH UR STRIP.AUTO: 6.5 [PH] (ref 5–8)
PLATELET # BLD AUTO: 271 10*3/MM3 (ref 140–450)
PLATELET # BLD AUTO: 287 10*3/MM3 (ref 140–450)
PMV BLD AUTO: 10.4 FL (ref 6–12)
PMV BLD AUTO: 10.6 FL (ref 6–12)
PO2 BLDA: 77.1 MM HG (ref 80–100)
POTASSIUM SERPL-SCNC: 3.5 MMOL/L (ref 3.5–5.2)
POTASSIUM SERPL-SCNC: 3.8 MMOL/L (ref 3.5–5.2)
PROT SERPL-MCNC: 7.7 G/DL (ref 6–8.5)
PROT SERPL-MCNC: 8.7 G/DL (ref 6–8.5)
PROT UR QL STRIP: NEGATIVE
RBC # BLD AUTO: 4.13 10*6/MM3 (ref 3.77–5.28)
RBC # BLD AUTO: 4.27 10*6/MM3 (ref 3.77–5.28)
SAO2 % BLDCOA: 93.4 % (ref 95–99)
SODIUM SERPL-SCNC: 138 MMOL/L (ref 136–145)
SODIUM SERPL-SCNC: 143 MMOL/L (ref 136–145)
SP GR UR STRIP: <=1.005 (ref 1–1.03)
TROPONIN T SERPL HS-MCNC: 8 NG/L
UROBILINOGEN UR QL STRIP: NORMAL
WBC NRBC COR # BLD AUTO: 3.38 10*3/MM3 (ref 3.4–10.8)
WBC NRBC COR # BLD AUTO: 3.88 10*3/MM3 (ref 3.4–10.8)
WHOLE BLOOD HOLD COAG: NORMAL
WHOLE BLOOD HOLD SPECIMEN: NORMAL

## 2023-12-30 PROCEDURE — 87040 BLOOD CULTURE FOR BACTERIA: CPT | Performed by: EMERGENCY MEDICINE

## 2023-12-30 PROCEDURE — 83050 HGB METHEMOGLOBIN QUAN: CPT | Performed by: EMERGENCY MEDICINE

## 2023-12-30 PROCEDURE — 93005 ELECTROCARDIOGRAM TRACING: CPT | Performed by: EMERGENCY MEDICINE

## 2023-12-30 PROCEDURE — 70450 CT HEAD/BRAIN W/O DYE: CPT

## 2023-12-30 PROCEDURE — 83735 ASSAY OF MAGNESIUM: CPT | Performed by: EMERGENCY MEDICINE

## 2023-12-30 PROCEDURE — 71045 X-RAY EXAM CHEST 1 VIEW: CPT

## 2023-12-30 PROCEDURE — 25810000003 SODIUM CHLORIDE 0.9 % SOLUTION: Performed by: EMERGENCY MEDICINE

## 2023-12-30 PROCEDURE — 25010000002 FUROSEMIDE PER 20 MG: Performed by: EMERGENCY MEDICINE

## 2023-12-30 PROCEDURE — 36415 COLL VENOUS BLD VENIPUNCTURE: CPT | Performed by: EMERGENCY MEDICINE

## 2023-12-30 PROCEDURE — 82375 ASSAY CARBOXYHB QUANT: CPT | Performed by: EMERGENCY MEDICINE

## 2023-12-30 PROCEDURE — 85025 COMPLETE CBC W/AUTO DIFF WBC: CPT | Performed by: EMERGENCY MEDICINE

## 2023-12-30 PROCEDURE — 80053 COMPREHEN METABOLIC PANEL: CPT | Performed by: EMERGENCY MEDICINE

## 2023-12-30 PROCEDURE — 81003 URINALYSIS AUTO W/O SCOPE: CPT | Performed by: EMERGENCY MEDICINE

## 2023-12-30 PROCEDURE — 80053 COMPREHEN METABOLIC PANEL: CPT | Performed by: INTERNAL MEDICINE

## 2023-12-30 PROCEDURE — 36600 WITHDRAWAL OF ARTERIAL BLOOD: CPT | Performed by: EMERGENCY MEDICINE

## 2023-12-30 PROCEDURE — 83880 ASSAY OF NATRIURETIC PEPTIDE: CPT | Performed by: EMERGENCY MEDICINE

## 2023-12-30 PROCEDURE — 82805 BLOOD GASES W/O2 SATURATION: CPT | Performed by: EMERGENCY MEDICINE

## 2023-12-30 PROCEDURE — 93010 ELECTROCARDIOGRAM REPORT: CPT | Performed by: INTERNAL MEDICINE

## 2023-12-30 PROCEDURE — 85027 COMPLETE CBC AUTOMATED: CPT | Performed by: INTERNAL MEDICINE

## 2023-12-30 PROCEDURE — 84484 ASSAY OF TROPONIN QUANT: CPT | Performed by: EMERGENCY MEDICINE

## 2023-12-30 PROCEDURE — 83605 ASSAY OF LACTIC ACID: CPT | Performed by: EMERGENCY MEDICINE

## 2023-12-30 PROCEDURE — 99285 EMERGENCY DEPT VISIT HI MDM: CPT

## 2023-12-30 RX ORDER — FUROSEMIDE 20 MG/1
20 TABLET ORAL DAILY
Status: DISCONTINUED | OUTPATIENT
Start: 2023-12-30 | End: 2023-12-31 | Stop reason: HOSPADM

## 2023-12-30 RX ORDER — SODIUM CHLORIDE 0.9 % (FLUSH) 0.9 %
10 SYRINGE (ML) INJECTION AS NEEDED
Status: DISCONTINUED | OUTPATIENT
Start: 2023-12-30 | End: 2023-12-31 | Stop reason: HOSPADM

## 2023-12-30 RX ORDER — NITROGLYCERIN 0.4 MG/1
0.4 TABLET SUBLINGUAL
Status: DISCONTINUED | OUTPATIENT
Start: 2023-12-30 | End: 2023-12-31 | Stop reason: HOSPADM

## 2023-12-30 RX ORDER — BISACODYL 5 MG/1
5 TABLET, DELAYED RELEASE ORAL DAILY PRN
Status: DISCONTINUED | OUTPATIENT
Start: 2023-12-30 | End: 2023-12-31 | Stop reason: HOSPADM

## 2023-12-30 RX ORDER — SODIUM CHLORIDE 9 MG/ML
40 INJECTION, SOLUTION INTRAVENOUS AS NEEDED
Status: DISCONTINUED | OUTPATIENT
Start: 2023-12-30 | End: 2023-12-31 | Stop reason: HOSPADM

## 2023-12-30 RX ORDER — METOPROLOL SUCCINATE 25 MG/1
25 TABLET, EXTENDED RELEASE ORAL
Status: DISCONTINUED | OUTPATIENT
Start: 2023-12-30 | End: 2023-12-31 | Stop reason: HOSPADM

## 2023-12-30 RX ORDER — ALPRAZOLAM 1 MG/1
1 TABLET ORAL 3 TIMES DAILY PRN
Status: DISCONTINUED | OUTPATIENT
Start: 2023-12-30 | End: 2023-12-31 | Stop reason: HOSPADM

## 2023-12-30 RX ORDER — POTASSIUM CHLORIDE 750 MG/1
10 CAPSULE, EXTENDED RELEASE ORAL 2 TIMES DAILY WITH MEALS
Status: DISCONTINUED | OUTPATIENT
Start: 2023-12-30 | End: 2023-12-31 | Stop reason: HOSPADM

## 2023-12-30 RX ORDER — ACETAMINOPHEN 325 MG/1
650 TABLET ORAL EVERY 4 HOURS PRN
Status: DISCONTINUED | OUTPATIENT
Start: 2023-12-30 | End: 2023-12-31 | Stop reason: HOSPADM

## 2023-12-30 RX ORDER — ONDANSETRON 2 MG/ML
4 INJECTION INTRAMUSCULAR; INTRAVENOUS EVERY 6 HOURS PRN
Status: DISCONTINUED | OUTPATIENT
Start: 2023-12-30 | End: 2023-12-31 | Stop reason: HOSPADM

## 2023-12-30 RX ORDER — AMLODIPINE BESYLATE 5 MG/1
5 TABLET ORAL DAILY
Status: DISCONTINUED | OUTPATIENT
Start: 2023-12-30 | End: 2023-12-31 | Stop reason: HOSPADM

## 2023-12-30 RX ORDER — ASPIRIN 81 MG/1
81 TABLET ORAL DAILY
Status: DISCONTINUED | OUTPATIENT
Start: 2023-12-30 | End: 2023-12-31 | Stop reason: HOSPADM

## 2023-12-30 RX ORDER — CETIRIZINE HYDROCHLORIDE 10 MG/1
10 TABLET ORAL DAILY
Status: DISCONTINUED | OUTPATIENT
Start: 2023-12-30 | End: 2023-12-31 | Stop reason: HOSPADM

## 2023-12-30 RX ORDER — POLYETHYLENE GLYCOL 3350 17 G/17G
17 POWDER, FOR SOLUTION ORAL DAILY PRN
Status: DISCONTINUED | OUTPATIENT
Start: 2023-12-30 | End: 2023-12-31 | Stop reason: HOSPADM

## 2023-12-30 RX ORDER — AMOXICILLIN 250 MG
2 CAPSULE ORAL 2 TIMES DAILY
Status: DISCONTINUED | OUTPATIENT
Start: 2023-12-30 | End: 2023-12-31 | Stop reason: HOSPADM

## 2023-12-30 RX ORDER — FUROSEMIDE 10 MG/ML
40 INJECTION INTRAMUSCULAR; INTRAVENOUS ONCE
Status: COMPLETED | OUTPATIENT
Start: 2023-12-30 | End: 2023-12-30

## 2023-12-30 RX ORDER — BISACODYL 10 MG
10 SUPPOSITORY, RECTAL RECTAL DAILY PRN
Status: DISCONTINUED | OUTPATIENT
Start: 2023-12-30 | End: 2023-12-31 | Stop reason: HOSPADM

## 2023-12-30 RX ORDER — LEVETIRACETAM 750 MG/1
750 TABLET ORAL DAILY
Status: DISCONTINUED | OUTPATIENT
Start: 2023-12-30 | End: 2023-12-31 | Stop reason: HOSPADM

## 2023-12-30 RX ORDER — DULOXETIN HYDROCHLORIDE 30 MG/1
60 CAPSULE, DELAYED RELEASE ORAL EVERY 12 HOURS SCHEDULED
Status: DISCONTINUED | OUTPATIENT
Start: 2023-12-30 | End: 2023-12-31 | Stop reason: HOSPADM

## 2023-12-30 RX ORDER — ALUMINA, MAGNESIA, AND SIMETHICONE 2400; 2400; 240 MG/30ML; MG/30ML; MG/30ML
15 SUSPENSION ORAL EVERY 6 HOURS PRN
Status: DISCONTINUED | OUTPATIENT
Start: 2023-12-30 | End: 2023-12-31 | Stop reason: HOSPADM

## 2023-12-30 RX ORDER — SODIUM CHLORIDE 0.9 % (FLUSH) 0.9 %
10 SYRINGE (ML) INJECTION EVERY 12 HOURS SCHEDULED
Status: DISCONTINUED | OUTPATIENT
Start: 2023-12-30 | End: 2023-12-31 | Stop reason: HOSPADM

## 2023-12-30 RX ORDER — HYDROCODONE BITARTRATE AND ACETAMINOPHEN 7.5; 325 MG/1; MG/1
1 TABLET ORAL EVERY 8 HOURS PRN
Status: DISCONTINUED | OUTPATIENT
Start: 2023-12-30 | End: 2023-12-31 | Stop reason: HOSPADM

## 2023-12-30 RX ORDER — GABAPENTIN 300 MG/1
300 CAPSULE ORAL 3 TIMES DAILY
Status: DISCONTINUED | OUTPATIENT
Start: 2023-12-30 | End: 2023-12-31 | Stop reason: HOSPADM

## 2023-12-30 RX ADMIN — Medication 10 ML: at 21:02

## 2023-12-30 RX ADMIN — CETIRIZINE HYDROCHLORIDE 10 MG: 10 TABLET, FILM COATED ORAL at 14:09

## 2023-12-30 RX ADMIN — ACETAMINOPHEN 650 MG: 325 TABLET ORAL at 17:19

## 2023-12-30 RX ADMIN — LEVETIRACETAM 750 MG: 750 TABLET, FILM COATED ORAL at 14:09

## 2023-12-30 RX ADMIN — AMLODIPINE BESYLATE 5 MG: 5 TABLET ORAL at 14:09

## 2023-12-30 RX ADMIN — FUROSEMIDE 40 MG: 10 INJECTION, SOLUTION INTRAMUSCULAR; INTRAVENOUS at 09:43

## 2023-12-30 RX ADMIN — ASPIRIN 81 MG: 81 TABLET, COATED ORAL at 14:09

## 2023-12-30 RX ADMIN — FUROSEMIDE 20 MG: 20 TABLET ORAL at 14:09

## 2023-12-30 RX ADMIN — GABAPENTIN 300 MG: 300 CAPSULE ORAL at 21:01

## 2023-12-30 RX ADMIN — SODIUM CHLORIDE 500 ML: 9 INJECTION, SOLUTION INTRAVENOUS at 08:50

## 2023-12-30 RX ADMIN — DULOXETINE HYDROCHLORIDE 60 MG: 30 CAPSULE, DELAYED RELEASE ORAL at 14:09

## 2023-12-30 RX ADMIN — POTASSIUM CHLORIDE 10 MEQ: 10 CAPSULE, COATED, EXTENDED RELEASE ORAL at 17:16

## 2023-12-30 RX ADMIN — GABAPENTIN 300 MG: 300 CAPSULE ORAL at 17:16

## 2023-12-30 RX ADMIN — QUETIAPINE FUMARATE 300 MG: 200 TABLET, EXTENDED RELEASE ORAL at 21:01

## 2023-12-30 RX ADMIN — DULOXETINE HYDROCHLORIDE 60 MG: 30 CAPSULE, DELAYED RELEASE ORAL at 21:00

## 2023-12-30 RX ADMIN — METOPROLOL SUCCINATE 25 MG: 25 TABLET, EXTENDED RELEASE ORAL at 14:09

## 2023-12-30 NOTE — H&P
[facility]   HISTORY AND PHYSICAL    Patient Name: Carol Abarca  : 1970  MRN: [id number]  Primary Care Physician:  Sonia Mosquera APRN  Date of admission: 2023    Subjective   Subjective     Chief Complaint: Patient is being admitted because of altered mental status but she is taking many different medications which could be doing that she also feels sick she says she has been febrile weak tired was found to have anemia with a hemoglobin of 10.3 white count is somewhat low blood sugar elevated she did have also elevated lactate and possibility of underlying infection cannot rule out we will therefore put her on IV antibiotics put her in the unit and observe she has seizure disorder hypertension congestive heart failure arthritis and in the past has been admitted by me in the hospital    HPI: Patient being admitted with altered mental status confusion but when I examined him she was already alert oriented    Carol Abarca is a 53 y.o. female has been clear now it could be medication effect she was altered but she does have elevated lactate so we will start antibiotic    Review of Systems   All systems were reviewed and negative except for: Has been reviewed and discussed    Personal History     Past Medical History:   Diagnosis Date    Anemia     Arthritis     Diabetes     Essential hypertension 2021    History of pulmonary embolism     Lumbago     Low back pain    Mild left ventricular hypertrophy 2021    Mixed hyperlipidemia 2021    Obstructive sleep apnea     Reflux esophagitis     Seasonal allergies        Past Surgical History:   Procedure Laterality Date    APPENDECTOMY  2010     SECTION      , , ,     COLONOSCOPY      2019    COLONOSCOPY N/A 2022    Procedure: COLONOSCOPY;  Surgeon: Radha James MD;  Location: [facility] ENDOSCOPY;  Service: Gastroenterology;  Laterality: N/A;  COLON POLYP     ENDOSCOPY  2019    ENDOSCOPY N/A  06/08/2023    Procedure: ESOPHAGOGASTRODUODENOSCOPY WITH BIPOSIES;  Surgeon: Coy Patrick MD;  Location: [facility] ENDOSCOPY;  Service: Gastroenterology;  Laterality: N/A;  PRIOR LAPBAND, GASTRITIS    HEMORRHOIDECTOMY N/A 07/25/2022    Procedure: HEMORRHOIDECTOMY;  Surgeon: Remi Reeder MD;  Location: [facility] MAIN OR;  Service: General;  Laterality: N/A;    HERNIA REPAIR      2005, 2006, 2007, 2008    HYSTERECTOMY  2004    LAPAROSCOPIC GASTRIC BANDING      OTHER SURGICAL HISTORY      Metal implants       Family History: family history includes Arthritis in her mother; Diabetes in her mother and son; Heart disease in her father and mother. Otherwise pertinent FHx was reviewed and not pertinent to current issue.    Social History:  reports that she has been smoking cigarettes. She started smoking about 25 years ago. She has a 11.50 pack-year smoking history. She has been exposed to tobacco smoke. She has never used smokeless tobacco. She reports that she does not currently use alcohol. She reports that she does not use drugs.    Home Medications:  ALPRAZolam, DULoxetine, HYDROcodone-acetaminophen, QUEtiapine XR, amLODIPine, aspirin, cetirizine, furosemide, gabapentin, levETIRAcetam, metoprolol succinate XL, potassium chloride, pravastatin, and zolpidem      Allergies:  Allergies   Allergen Reactions    Tramadol Anaphylaxis    Tramadol Hcl Anaphylaxis    Moxifloxacin Hives    Sulfa Antibiotics Hives       Objective   Objective     Vitals:   Temp:  [97.7 °F (36.5 °C)-98.3 °F (36.8 °C)] 97.7 °F (36.5 °C)  Heart Rate:  [79-95] 79  Resp:  [18-24] 24  BP: (132-139)/(85-92) 139/85  Flow (L/min):  [2] 2  Physical Exam    Constitutional: Alert and oriented not in acute distress   Eyes: Pupils equal reacting to light and accommodation   HENT: Normal   Neck: Normal   Respiratory: No rales or rhonchi   Cardiovascular: S1-S2 normal no S3 or S4   Gastrointestinal: Normal   Musculoskeletal: Normal   Neurologic:  Localizing signs  Skin: Normal    Result Review    Result Review:  I have personally reviewed the results from the time of this admission to 12/30/2023 13:06 EST and agree with these findings:  [x]  Laboratory  []  Microbiology  []  Radiology  []  EKG/Telemetry   []  Cardiology/Vascular   []  Pathology  []  Old records  []  Other:  Most notable findings include: Weighted lactate levels    Assessment & Plan   Assessment / Plan     Brief Patient Summary:  Carol Abarca is a 53 y.o. female who underlying infection may be pneumonia or UTI    Active Hospital Problems:  Active Hospital Problems    Diagnosis     **Altered mental status     AMS (altered mental status)        Plan:   IV antibiotic    DVT prophylaxis:  No DVT prophylaxis order currently exists.    CODE STATUS:         Admission Status:  I believe this patient meets inpatient status.    Electronically signed by Edward Manuel MD, 12/30/23, 1:06 PM EST.      Part of this note may be an electronic transcription/translation of spoken language to printed text using the Dragon Dictation System.

## 2023-12-30 NOTE — Clinical Note
Level of Care: Telemetry [5]   Diagnosis: Altered mental status [780.97.ICD-9-CM]   Admitting Physician: GRACIELA FUENTES [[id number]]   Attending Physician: GRACIELA FUENTES [[id number]]

## 2023-12-30 NOTE — ED PROVIDER NOTES
Time: 7:15 AM EST  Date of encounter:  2023  Independent Historian/Clinical History and Information was obtained by:   Patient and EMS    History is limited by: Altered Mental Status    Chief Complaint: Altered mental status      History of Present Illness:  Patient is a 53 y.o. year old female who presents to the emergency department for evaluation of altered mental status.  Patient has a history of diabetes, hypertension who presents with altered mental status.  Per EMS the call was for mental status change by family.  Unknown last well.  Family reported that she was not herself and does not acting herself.  She has no current complaints to me.  Received Narcan prior to arrival. history is somewhat limited as she is altered.    HPI    Patient Care Team  Primary Care Provider: Sonia Mosquera APRN    Past Medical History:     Allergies   Allergen Reactions    Tramadol Anaphylaxis    Tramadol Hcl Anaphylaxis    Moxifloxacin Hives    Sulfa Antibiotics Hives     Past Medical History:   Diagnosis Date    Anemia     Arthritis     Diabetes     Essential hypertension 2021    History of pulmonary embolism     Lumbago     Low back pain    Mild left ventricular hypertrophy 2021    Mixed hyperlipidemia 2021    Obstructive sleep apnea     Reflux esophagitis     Seasonal allergies      Past Surgical History:   Procedure Laterality Date    APPENDECTOMY  2010     SECTION      , , ,     COLONOSCOPY      2019    COLONOSCOPY N/A 2022    Procedure: COLONOSCOPY;  Surgeon: Radha James MD;  Location: [facility] ENDOSCOPY;  Service: Gastroenterology;  Laterality: N/A;  COLON POLYP     ENDOSCOPY  2019    ENDOSCOPY N/A 2023    Procedure: ESOPHAGOGASTRODUODENOSCOPY WITH BIPOSIES;  Surgeon: Coy Patrick MD;  Location: [facility] ENDOSCOPY;  Service: Gastroenterology;  Laterality: N/A;  PRIOR LAPBAND, GASTRITIS    HEMORRHOIDECTOMY N/A 2022    Procedure:  HEMORRHOIDECTOMY;  Surgeon: Remi Reeder MD;  Location: [facility] MAIN OR;  Service: General;  Laterality: N/A;    HERNIA REPAIR      2005, 2006, 2007, 2008    HYSTERECTOMY  2004    LAPAROSCOPIC GASTRIC BANDING      OTHER SURGICAL HISTORY      Metal implants     Family History   Problem Relation Age of Onset    Heart disease Mother     Diabetes Mother         Unspecified type    Arthritis Mother     Heart disease Father     Diabetes Son         Unspecified type    Malig Hyperthermia Neg Hx        Home Medications:  Prior to Admission medications    Medication Sig Start Date End Date Taking? Authorizing Provider   ALPRAZolam (XANAX) 1 MG tablet Take 1 tablet by mouth 3 (Three) Times a Day As Needed. for anxiety 7/26/23   Silas Bhatt MD   amLODIPine (NORVASC) 5 MG tablet Take 1 tablet by mouth Daily. 9/21/23   Silas Bhatt MD   aspirin 81 MG EC tablet Take 1 tablet by mouth Daily.    Silas Bhatt MD   cetirizine (zyrTEC) 10 MG tablet Take 1 tablet by mouth Daily.    Silas Bhatt MD   DULoxetine (CYMBALTA) 60 MG capsule Take 1 capsule by mouth Every 12 (Twelve) Hours. 10/26/23   Silas Bhatt MD   furosemide (LASIX) 20 MG tablet Take 1 tablet by mouth Daily. 11/16/23   Marleny Gomez APRN   gabapentin (NEURONTIN) 300 MG capsule Take 1 capsule by mouth 3 (Three) Times a Day.    Silas Bhatt MD   glipizide (GLUCOTROL XL) 5 MG ER tablet Take 2 tablets by mouth Daily.    Silas Bhatt MD   HYDROcodone-acetaminophen (NORCO) 7.5-325 MG per tablet Take 1 tablet by mouth Every 8 (Eight) Hours As Needed for Moderate Pain, Severe Pain or Mild Pain.    Silas Bhatt MD   Insulin Glargine (TOUJEO SOLOSTAR SC) Inject 40 Units under the skin into the appropriate area as directed.    Silas Bhatt MD   Keppra 750 MG tablet 1 tablet Every 12 (Twelve) Hours.    Silas Bhatt MD   lisinopril (PRINIVIL,ZESTRIL) 40 MG tablet Take 1 tablet by  "mouth Daily.    Silas Bhatt MD   metOLazone (ZAROXOLYN) 2.5 MG tablet Take 1 tablet by mouth Daily. 12/7/23   Staci Dunne APRN   metoprolol succinate XL (TOPROL-XL) 25 MG 24 hr tablet Take 1 tablet by mouth Daily. 11/16/23   Marleny Gomez APRN   montelukast (SINGULAIR) 10 MG tablet Take 1 tablet by mouth Every Night.    Silas Bhatt MD   potassium chloride 10 MEQ CR tablet Take 1 tablet by mouth Daily. 11/14/23   Marleny Gomez APRN   pravastatin (PRAVACHOL) 40 MG tablet Take 1 tablet by mouth Daily.    Silas Bhatt MD   QUEtiapine XR (SEROquel XR) 300 MG 24 hr tablet Take 1 tablet by mouth Every Evening for 30 days.  Patient taking differently: Take 2 tablets by mouth Every Night. 7/21/23 11/14/23  Dung Hall MD   zolpidem (AMBIEN) 10 MG tablet Take 1 tablet by mouth At Night As Needed. 8/23/23   Silas Bhatt MD        Social History:   Social History     Tobacco Use    Smoking status: Every Day     Packs/day: 0.50     Years: 23.00     Additional pack years: 0.00     Total pack years: 11.50     Types: Cigarettes     Start date: 1999     Passive exposure: Current    Smokeless tobacco: Never    Tobacco comments:     Smoked 11-20 years. last 7/24/22 1700   Vaping Use    Vaping Use: Never used   Substance Use Topics    Alcohol use: Not Currently     Comment: Occasionally drinks, less than 1 drink per day, has been drinking for less than 1 year    Drug use: Never         Review of Systems:  Review of Systems   Unable to perform ROS: Mental status change        Physical Exam:  /92 (BP Location: Right arm, Patient Position: Lying)   Pulse 89   Temp 98.3 °F (36.8 °C) (Oral)   Resp 18   Ht 157.5 cm (62\")   Wt 99.9 kg (220 lb 3.8 oz)   SpO2 97%   BMI 40.28 kg/m²     Physical Exam  Vitals and nursing note reviewed.   Constitutional:       Appearance: Normal appearance.   HENT:      Head: Normocephalic and atraumatic.   Eyes:      General: No scleral " icterus.  Cardiovascular:      Rate and Rhythm: Normal rate and regular rhythm.      Heart sounds: Normal heart sounds.   Pulmonary:      Effort: Pulmonary effort is normal.      Breath sounds: Normal breath sounds.   Abdominal:      Palpations: Abdomen is soft.      Tenderness: There is no abdominal tenderness.   Musculoskeletal:         General: Normal range of motion.      Cervical back: Normal range of motion.      Right lower leg: Edema present.      Left lower leg: Edema present.   Skin:     Findings: No rash.   Neurological:      Mental Status: She is alert.      Comments: Patiently generally weak throughout but with no focal deficits noted.  She is alert and oriented x 2.  Patient is lethargic        \          Procedures:  Procedures      Medical Decision Making:      Comorbidities that affect care:    Congestive Heart Failure, Diabetes, Hypertension    External Notes reviewed:    Reviewed ER note from 11/21/2023      The following orders were placed and all results were independently analyzed by me:  Orders Placed This Encounter   Procedures    Blood Culture - Blood,    Blood Culture - Blood,    XR Chest 1 View    CT Head Without Contrast    [place] Draw    Comprehensive Metabolic Panel    Single High Sensitivity Troponin T    Magnesium    Urinalysis With Microscopic If Indicated (No Culture) - Urine, Clean Catch    CBC Auto Differential    Blood Gas, Arterial -With Co-Ox Panel: Yes    Lactic Acid, Plasma    STAT Lactic Acid, Reflex    BNP    NPO Diet NPO Type: Strict NPO    Undress & Gown    Continuous Pulse Oximetry    Vital Signs    Orthostatic Blood Pressure    IP General Consult (Use specialty-specific consult if known)    Oxygen Therapy- Nasal Cannula; Titrate 1-6 LPM Per SpO2; 90 - 95%    POC Glucose Once    ECG 12 Lead ED Triage Standing Order; Weak / Dizzy / AMS    Insert Peripheral IV    Initiate Observation Status    Fall Precautions    CBC & Differential    Green Top (Gel)    Lavender Top     Gold Top - SST    Light Blue Top       Medications Given in the Emergency Department:  Medications   sodium chloride 0.9 % flush 10 mL (has no administration in time range)   sodium chloride 0.9 % bolus 500 mL (500 mL Intravenous New Bag 12/30/23 0850)   furosemide (LASIX) injection 40 mg (40 mg Intravenous Given 12/30/23 0943)        ED Course:         Labs:    Lab Results (last 24 hours)       Procedure Component Value Units Date/Time    CBC & Differential [[id number]]  (Abnormal) Collected: 12/30/23 0725    Specimen: Blood Updated: 12/30/23 0735    Narrative:      The following orders were created for panel order CBC & Differential.  Procedure                               Abnormality         Status                     ---------                               -----------         ------                     CBC Auto Differential[[id number]]        Abnormal            Final result                 Please view results for these tests on the individual orders.    Comprehensive Metabolic Panel [[id number]]  (Abnormal) Collected: 12/30/23 0725    Specimen: Blood Updated: 12/30/23 0754     Glucose 143 mg/dL      BUN 10 mg/dL      Creatinine 0.80 mg/dL      Sodium 138 mmol/L      Potassium 3.5 mmol/L      Chloride 101 mmol/L      CO2 25.7 mmol/L      Calcium 8.9 mg/dL      Total Protein 8.7 g/dL      Albumin 4.0 g/dL      ALT (SGPT) 6 U/L      AST (SGOT) 14 U/L      Alkaline Phosphatase 122 U/L      Total Bilirubin 0.2 mg/dL      Globulin 4.7 gm/dL      A/G Ratio 0.9 g/dL      BUN/Creatinine Ratio 12.5     Anion Gap 11.3 mmol/L      eGFR 88.2 mL/min/1.73     Narrative:      GFR Normal >60  Chronic Kidney Disease <60  Kidney Failure <15      Single High Sensitivity Troponin T [[id number]]  (Normal) Collected: 12/30/23 0725    Specimen: Blood Updated: 12/30/23 0754     HS Troponin T 8 ng/L     Narrative:      High Sensitive Troponin T Reference Range:  <14.0 ng/L- Negative Female for AMI  <22.0 ng/L- Negative Male for AMI  >=14 -  Abnormal Female indicating possible myocardial injury.  >=22 - Abnormal Male indicating possible myocardial injury.   Clinicians would have to utilize clinical acumen, EKG, Troponin, and serial changes to determine if it is an Acute Myocardial Infarction or myocardial injury due to an underlying chronic condition.         Magnesium [[id number]]  (Normal) Collected: 12/30/23 0725    Specimen: Blood Updated: 12/30/23 0754     Magnesium 2.0 mg/dL     CBC Auto Differential [[id number]]  (Abnormal) Collected: 12/30/23 0725    Specimen: Blood Updated: 12/30/23 0735     WBC 3.88 10*3/mm3      RBC 4.27 10*6/mm3      Hemoglobin 10.3 g/dL      Hematocrit 34.7 %      MCV 81.3 fL      MCH 24.1 pg      MCHC 29.7 g/dL      RDW 16.9 %      RDW-SD 49.2 fl      MPV 10.4 fL      Platelets 287 10*3/mm3      Neutrophil % 48.0 %      Lymphocyte % 41.2 %      Monocyte % 5.4 %      Eosinophil % 4.9 %      Basophil % 0.5 %      Immature Grans % 0.0 %      Neutrophils, Absolute 1.86 10*3/mm3      Lymphocytes, Absolute 1.60 10*3/mm3      Monocytes, Absolute 0.21 10*3/mm3      Eosinophils, Absolute 0.19 10*3/mm3      Basophils, Absolute 0.02 10*3/mm3      Immature Grans, Absolute 0.00 10*3/mm3      nRBC 0.0 /100 WBC     Blood Culture - Blood, Arm, Left [[id number]] Collected: 12/30/23 0725    Specimen: Blood from Arm, Left Updated: 12/30/23 0731    Blood Culture - Blood, Arm, Right [[id number]] Collected: 12/30/23 0725    Specimen: Blood from Arm, Right Updated: 12/30/23 0732    Lactic Acid, Plasma [[id number]]  (Abnormal) Collected: 12/30/23 0725    Specimen: Blood Updated: 12/30/23 0838     Lactate 2.3 mmol/L     Blood Gas, Arterial -With Co-Ox Panel: Yes [[id number]]  (Abnormal) Collected: 12/30/23 0809    Specimen: Arterial Blood from Arm, Right Updated: 12/30/23 0814     pH, Arterial 7.341 pH units      pCO2, Arterial 45.4 mm Hg      pO2, Arterial 77.1 mm Hg      HCO3, Arterial 24.0 mmol/L      Base Excess, Arterial -1.8 mmol/L      O2  Saturation, Arterial 93.4 %      Hemoglobin, Blood Gas 10.5 g/dL      Carboxyhemoglobin 5.4 %      Methemoglobin 0.10 %      Oxyhemoglobin 88.3 %      FHHB 6.2 %      Site Arterial: right brachial     Modality Cannula     Flow Rate 2 lpm      Lactate, Arterial --             Imaging:    XR Chest 1 View    Result Date: 12/30/2023  PROCEDURE: XR CHEST 1 VW  COMPARISON: [facility], CR, XR CHEST 1 VW, 11/21/2023, 14:13.  INDICATIONS: AMS. Weakness  FINDINGS:  Pulmonary vascular cephalization is noted.  There is mild perihilar and basilar airspace opacity.  Mild cardiomegaly is worse in the interval.  No significant pleural effusion is identified.        1. Mild cardiomegaly, worse in the interval 2. Mild perihilar and basilar airspace opacity favored to represent pulmonary edema.  Pneumonia is also in the differential.       Tejas Hernandez M.D.       Electronically Signed and Approved By: Tejas Hernandez M.D. on 12/30/2023 at 7:50             CT Head Without Contrast    Result Date: 12/30/2023  PROCEDURE: CT HEAD WO CONTRAST  COMPARISON:  [facility], CT, CT HEAD WO CONTRAST, 6/17/2023, 5:00. INDICATIONS: Mental status change, unknown cause  PROTOCOL:   Standard imaging protocol performed    RADIATION:   DLP: 954.2 mGy*cm   MA and/or KV was adjusted to minimize radiation dose.     TECHNIQUE: After obtaining the patient's consent, CT images were obtained without non-ionic intravenous contrast material.  FINDINGS:  No focal brain lesion, mass or intracranial hemorrhage is seen.  No acute infarction is identified.  The ventricles are normal in size and configuration.  No focal calvarial abnormality is seen.  Visualized extracranial soft tissues appear within normal limits.        1. No acute intracranial abnormality     Tejas Hernandez M.D.       Electronically Signed and Approved By: Tejas Hernandez M.D. on 12/30/2023 at 7:46                Differential Diagnosis and Discussion:    Altered Mental  Status: Based on the patient's signs and symptoms, differential diagnosis includes but is not limited to meningitis, stroke, sepsis, subarachnoid hemorrhage, intracranial bleeding, encephalitis, and metabolic encephalopathy.    All labs were reviewed and interpreted by me.  All X-rays impressions were independently interpreted by me.  EKG was interpreted by me.  CT scan radiology impression was interpreted by me.    MDM     Patient is a 53-year-old female who was brought in for decreased responsiveness and altered mental status.  Appears to be is globally weak but no acute findings here.  May have some CHF.  Will give some Lasix here.  CT of the head is unremarkable.  I do not note any focal deficits on exam and she just is more lethargic.  Appears to be likely metabolic encephalopathy.  Did not respond to Narcan.  Will admit to the hospital further workup management.        Patient Care Considerations:          Consultants/Shared Management Plan:    Hospitalist: I have discussed the case with Dr. Fuentes who agrees to accept the patient for admission.    Social Determinants of Health:          Disposition and Care Coordination:    Admit:   Through independent evaluation of the patient's history, physical, and imperical data, the patient meets criteria for observation/admission to the hospital.        Final diagnoses:   Altered mental status, unspecified altered mental status type        ED Disposition       ED Disposition   Decision to Admit    Condition   --    Comment   Level of Care: Telemetry [5]   Diagnosis: Altered mental status [780.97.ICD-9-CM]   Admitting Physician: GRACIELA FUENTES [[id number]]   Attending Physician: GRACIELA FUENTES [[id number]]                 This medical record created using voice recognition software.             Bao Goetz MD  12/30/23 0931

## 2023-12-30 NOTE — PAYOR COMM NOTE
"Carol Abarca (53 y.o. Female)     PATIENT INFORMATION  Name:  Carol Abarca  MRN#:     [id number]  :  1970       ADMISSION INFORMATION  CLASS: Inpatient   DOS:  2023      CURRENT ATTENDING PROVIDER INFORMATION  Name/NPI: Edward Manuel MD [[id number]]  Phone:  Phone: [phone]        RENDERING FACILITY  Name:  [facility]   NPI:  [id number]  TID:  [id number]  Address:      [address] [place], [place] [address]  Phone  [phone]      CASE MANAGEMENT CONTACT INFORMATION  Phone:      [phone]  Fax:           [phone]                  Date of Birth   1970    Social Security Number       Address   [address] [address]    Home Phone   [phone]    MRN   [id number]       Yarsanism   None    Marital Status   Single                            Admission Date   23    Admission Type   Emergency    Admitting Provider   Edward Manuel MD    Attending Provider   Edward Manuel MD    Department, Room/Bed   [facility] PROGRESSIVE CARE UNIT, 201/2       Discharge Date       Discharge Disposition       Discharge Destination                                 Attending Provider: Edward Manuel MD    Allergies: Tramadol, Tramadol Hcl, Moxifloxacin, Sulfa Antibiotics    Isolation: None   Infection: None   Code Status: CPR    Ht: 157.5 cm (62\")   Wt: 99.9 kg (220 lb 3.8 oz)    Admission Cmt: None   Principal Problem: Altered mental status [R41.82]                   Active Insurance as of 2023       Primary Coverage       Payor Plan Insurance Group Employer/Plan Group    HUMANA MEDICARE REPLACEMENT HUMANA MEDICARE REPLACEMENT [id number]       Payor Plan Address Payor Plan Phone Number Payor Plan Fax Number Effective Dates    [address] [address] [phone]  2019 - None Entered    [facility] [address]         Subscriber Name Subscriber Birth Date Member ID       CAROL ABARCA 1970 [id number]               Secondary Coverage  "      Payor Plan Insurance Group Employer/Plan Group    [facility] BY NAKIA [facility] BY NAKIA [id number]       Payor Plan Address Payor Plan Phone Number Payor Plan Fax Number Effective Dates    [address] [address]   1/1/2021 - None Entered    [place] [phone]         Subscriber Name Subscriber Birth Date Member ID       TIM COTTON 1970 [id number]                      Heart Failure [facility] Inpatient Care       Indications Met   Last updated by Radha Goins RN on 12/30/2023 1043     Review Status Created By   Primary Completed Radha Goins RN      Criteria Review   Heart Failure [facility] Inpatient Care     Overall Determination: Indications Met     Criteria:  [×] Admission is indicated for  1 or more  of the following :      [×] Altered mental status that is severe or persistent          12/30/2023 10:43 AM              -- 12/30/2023 10:43 AM by Radha Goins, JIGNESH --                                    (X) Altered mental status (ie, different from baseline) that is severe or persistent, as indicated by  1 or more  of the following  (1) (2) (3) (4):                  (X) Confusional state (eg, disorientation, difficulty following commands, deficit in attention) that persists (eg, for more than few hours) despite appropriate treatment (eg, of underlying cause)                  (X) Lethargy (awake or arousable, but with drowsiness; reduced awareness of self and environment) that persists (eg, for more than few hours) despite appropriate treatment (eg, of underlying cause)          12/30/2023 10:43 AM              -- 12/30/2023 10:43 AM by Radha Goins, JIGNESH --                  Normally AAOx4. Today: AAOx2. + Lethargic.     Notes:  -- 12/30/2023 10:43 AM by Radha Goins, RN --      To ED with family for AMS. Unsure of last known well. Family saw pt today & realized she was altered. Normally AAOx4.      On ED MD exam:  AAOx2. Lethargic. ED MD unable to perform full ROS 2/2 Mental status change. + BLE edema.  Generalized weakness w/no focal deficits noted.       PMHx: DM; hx PE; HTN; Mile LVH. HLD; SOCO; seizures. Hx gastric lap band.       ED results: Trop neg. Lactate 2.3; ABGs ok on 2L.       CXR: + Pulmonary edema. Mild cardiomegaly. CT head: Ok.       In ED: IV NS 500ml bolus; Lasix 40mg IV. Narcan (no response).       Admit for AMS, CHF, Metabolic encephalopathy.       Admit: Telemetry. Additional orders pending.                                 History & Physical        Edward Manuel MD at 23 1305           [facility]   HISTORY AND PHYSICAL    Patient Name: Carol Abarca  : 1970  MRN: [id number]  Primary Care Physician:  Sonia Mosquera APRN  Date of admission: 2023    Subjective  Subjective     Chief Complaint: Patient is being admitted because of altered mental status but she is taking many different medications which could be doing that she also feels sick she says she has been febrile weak tired was found to have anemia with a hemoglobin of 10.3 white count is somewhat low blood sugar elevated she did have also elevated lactate and possibility of underlying infection cannot rule out we will therefore put her on IV antibiotics put her in the unit and observe she has seizure disorder hypertension congestive heart failure arthritis and in the past has been admitted by me in the hospital    HPI: Patient being admitted with altered mental status confusion but when I examined him she was already alert oriented    Carol Abarca is a 53 y.o. female has been clear now it could be medication effect she was altered but she does have elevated lactate so we will start antibiotic    Review of Systems   All systems were reviewed and negative except for: Has been reviewed and discussed    Personal History     Past Medical History:   Diagnosis Date    Anemia     Arthritis     Diabetes     Essential hypertension 2021    History of pulmonary embolism     Lumbago     Low back pain    Mild left  ventricular hypertrophy 2021    Mixed hyperlipidemia 2021    Obstructive sleep apnea     Reflux esophagitis     Seasonal allergies        Past Surgical History:   Procedure Laterality Date    APPENDECTOMY  2010     SECTION      1990, 1992, 1998,     COLONOSCOPY      2019    COLONOSCOPY N/A 2022    Procedure: COLONOSCOPY;  Surgeon: Radha James MD;  Location: [facility] ENDOSCOPY;  Service: Gastroenterology;  Laterality: N/A;  COLON POLYP     ENDOSCOPY  2019    ENDOSCOPY N/A 2023    Procedure: ESOPHAGOGASTRODUODENOSCOPY WITH BIPOSIES;  Surgeon: Coy Patrick MD;  Location: [facility] ENDOSCOPY;  Service: Gastroenterology;  Laterality: N/A;  PRIOR LAPBAND, GASTRITIS    HEMORRHOIDECTOMY N/A 2022    Procedure: HEMORRHOIDECTOMY;  Surgeon: Remi Reeder MD;  Location: [facility] MAIN OR;  Service: General;  Laterality: N/A;    HERNIA REPAIR      , 2006, 2007, 2008    HYSTERECTOMY  2004    LAPAROSCOPIC GASTRIC BANDING      OTHER SURGICAL HISTORY      Metal implants       Family History: family history includes Arthritis in her mother; Diabetes in her mother and son; Heart disease in her father and mother. Otherwise pertinent FHx was reviewed and not pertinent to current issue.    Social History:  reports that she has been smoking cigarettes. She started smoking about 25 years ago. She has a 11.50 pack-year smoking history. She has been exposed to tobacco smoke. She has never used smokeless tobacco. She reports that she does not currently use alcohol. She reports that she does not use drugs.    Home Medications:  ALPRAZolam, DULoxetine, HYDROcodone-acetaminophen, QUEtiapine XR, amLODIPine, aspirin, cetirizine, furosemide, gabapentin, levETIRAcetam, metoprolol succinate XL, potassium chloride, pravastatin, and zolpidem      Allergies:  Allergies   Allergen Reactions    Tramadol Anaphylaxis    Tramadol Hcl Anaphylaxis    Moxifloxacin Hives    Sulfa Antibiotics  Hives       Objective  Objective     Vitals:   Temp:  [97.7 °F (36.5 °C)-98.3 °F (36.8 °C)] 97.7 °F (36.5 °C)  Heart Rate:  [79-95] 79  Resp:  [18-24] 24  BP: (132-139)/(85-92) 139/85  Flow (L/min):  [2] 2  Physical Exam    Constitutional: Alert and oriented not in acute distress   Eyes: Pupils equal reacting to light and accommodation   HENT: Normal   Neck: Normal   Respiratory: No rales or rhonchi   Cardiovascular: S1-S2 normal no S3 or S4   Gastrointestinal: Normal   Musculoskeletal: Normal   Neurologic: Localizing signs  Skin: Normal    Result Review   Result Review:  I have personally reviewed the results from the time of this admission to 12/30/2023 13:06 EST and agree with these findings:  [x]  Laboratory  []  Microbiology  []  Radiology  []  EKG/Telemetry   []  Cardiology/Vascular   []  Pathology  []  Old records  []  Other:  Most notable findings include: Weighted lactate levels    Assessment & Plan  Assessment / Plan     Brief Patient Summary:  Carol Abarca is a 53 y.o. female who underlying infection may be pneumonia or UTI    Active Hospital Problems:  Active Hospital Problems    Diagnosis     **Altered mental status     AMS (altered mental status)        Plan:   IV antibiotic    DVT prophylaxis:  No DVT prophylaxis order currently exists.    CODE STATUS:         Admission Status:  I believe this patient meets inpatient status.    Electronically signed by Edward Manuel MD, 12/30/23, 1:06 PM EST.      Part of this note may be an electronic transcription/translation of spoken language to printed text using the Dragon Dictation System.       Electronically signed by Edward Manuel MD at 12/30/23 1308          Emergency Department Notes        Bao Goetz MD at 12/30/23 0715          Time: 7:15 AM EST  Date of encounter:  12/30/2023  Independent Historian/Clinical History and Information was obtained by:   Patient and EMS    History is limited by: Altered Mental Status    Chief Complaint:  Altered mental status      History of Present Illness:  Patient is a 53 y.o. year old female who presents to the emergency department for evaluation of altered mental status.  Patient has a history of diabetes, hypertension who presents with altered mental status.  Per EMS the call was for mental status change by family.  Unknown last well.  Family reported that she was not herself and does not acting herself.  She has no current complaints to me.  Received Narcan prior to arrival. history is somewhat limited as she is altered.    HPI    Patient Care Team  Primary Care Provider: Sonia Mosquera APRN    Past Medical History:     Allergies   Allergen Reactions    Tramadol Anaphylaxis    Tramadol Hcl Anaphylaxis    Moxifloxacin Hives    Sulfa Antibiotics Hives     Past Medical History:   Diagnosis Date    Anemia     Arthritis     Diabetes     Essential hypertension 2021    History of pulmonary embolism     Lumbago     Low back pain    Mild left ventricular hypertrophy 2021    Mixed hyperlipidemia 2021    Obstructive sleep apnea     Reflux esophagitis     Seasonal allergies      Past Surgical History:   Procedure Laterality Date    APPENDECTOMY  2010     SECTION      , , ,     COLONOSCOPY      2019 2018    COLONOSCOPY N/A 2022    Procedure: COLONOSCOPY;  Surgeon: Radha James MD;  Location: [facility] ENDOSCOPY;  Service: Gastroenterology;  Laterality: N/A;  COLON POLYP     ENDOSCOPY  2019    ENDOSCOPY N/A 2023    Procedure: ESOPHAGOGASTRODUODENOSCOPY WITH BIPOSIES;  Surgeon: Coy Patrick MD;  Location: [facility] ENDOSCOPY;  Service: Gastroenterology;  Laterality: N/A;  PRIOR LAPBAND, GASTRITIS    HEMORRHOIDECTOMY N/A 2022    Procedure: HEMORRHOIDECTOMY;  Surgeon: Remi Reeder MD;  Location: [facility] MAIN OR;  Service: General;  Laterality: N/A;    HERNIA REPAIR      2005, 2006, 2007, 2008    HYSTERECTOMY  2004    LAPAROSCOPIC GASTRIC BANDING       OTHER SURGICAL HISTORY      Metal implants     Family History   Problem Relation Age of Onset    Heart disease Mother     Diabetes Mother         Unspecified type    Arthritis Mother     Heart disease Father     Diabetes Son         Unspecified type    Malig Hyperthermia Neg Hx        Home Medications:  Prior to Admission medications    Medication Sig Start Date End Date Taking? Authorizing Provider   ALPRAZolam (XANAX) 1 MG tablet Take 1 tablet by mouth 3 (Three) Times a Day As Needed. for anxiety 7/26/23   Silas Bhatt MD   amLODIPine (NORVASC) 5 MG tablet Take 1 tablet by mouth Daily. 9/21/23   Silas Bhatt MD   aspirin 81 MG EC tablet Take 1 tablet by mouth Daily.    Silas Bhatt MD   cetirizine (zyrTEC) 10 MG tablet Take 1 tablet by mouth Daily.    Silas Bhatt MD   DULoxetine (CYMBALTA) 60 MG capsule Take 1 capsule by mouth Every 12 (Twelve) Hours. 10/26/23   Silas Bhatt MD   furosemide (LASIX) 20 MG tablet Take 1 tablet by mouth Daily. 11/16/23   Marleny Gomez APRN   gabapentin (NEURONTIN) 300 MG capsule Take 1 capsule by mouth 3 (Three) Times a Day.    Silas Bhatt MD   glipizide (GLUCOTROL XL) 5 MG ER tablet Take 2 tablets by mouth Daily.    Silas Bhatt MD   HYDROcodone-acetaminophen (NORCO) 7.5-325 MG per tablet Take 1 tablet by mouth Every 8 (Eight) Hours As Needed for Moderate Pain, Severe Pain or Mild Pain.    Silas Bhatt MD   Insulin Glargine (TOUJEO SOLOSTAR SC) Inject 40 Units under the skin into the appropriate area as directed.    Silas Bhatt MD   Keppra 750 MG tablet 1 tablet Every 12 (Twelve) Hours.    Silas Bhatt MD   lisinopril (PRINIVIL,ZESTRIL) 40 MG tablet Take 1 tablet by mouth Daily.    Silas Bhatt MD   metOLazone (ZAROXOLYN) 2.5 MG tablet Take 1 tablet by mouth Daily. 12/7/23   Staci Dunne APRN   metoprolol succinate XL (TOPROL-XL) 25 MG 24 hr tablet Take 1 tablet by  "mouth Daily. 11/16/23   Marleny Gomez APRN   montelukast (SINGULAIR) 10 MG tablet Take 1 tablet by mouth Every Night.    ProviderSilas MD   potassium chloride 10 MEQ CR tablet Take 1 tablet by mouth Daily. 11/14/23   Marleny Gomez APRN   pravastatin (PRAVACHOL) 40 MG tablet Take 1 tablet by mouth Daily.    ProviderSilas MD   QUEtiapine XR (SEROquel XR) 300 MG 24 hr tablet Take 1 tablet by mouth Every Evening for 30 days.  Patient taking differently: Take 2 tablets by mouth Every Night. 7/21/23 11/14/23  Dung Hall MD   zolpidem (AMBIEN) 10 MG tablet Take 1 tablet by mouth At Night As Needed. 8/23/23   ProviderSilas MD        Social History:   Social History     Tobacco Use    Smoking status: Every Day     Packs/day: 0.50     Years: 23.00     Additional pack years: 0.00     Total pack years: 11.50     Types: Cigarettes     Start date: 1999     Passive exposure: Current    Smokeless tobacco: Never    Tobacco comments:     Smoked 11-20 years. last 7/24/22 1700   Vaping Use    Vaping Use: Never used   Substance Use Topics    Alcohol use: Not Currently     Comment: Occasionally drinks, less than 1 drink per day, has been drinking for less than 1 year    Drug use: Never         Review of Systems:  Review of Systems   Unable to perform ROS: Mental status change        Physical Exam:  /92 (BP Location: Right arm, Patient Position: Lying)   Pulse 89   Temp 98.3 °F (36.8 °C) (Oral)   Resp 18   Ht 157.5 cm (62\")   Wt 99.9 kg (220 lb 3.8 oz)   SpO2 97%   BMI 40.28 kg/m²     Physical Exam  Vitals and nursing note reviewed.   Constitutional:       Appearance: Normal appearance.   HENT:      Head: Normocephalic and atraumatic.   Eyes:      General: No scleral icterus.  Cardiovascular:      Rate and Rhythm: Normal rate and regular rhythm.      Heart sounds: Normal heart sounds.   Pulmonary:      Effort: Pulmonary effort is normal.      Breath sounds: Normal breath sounds. "   Abdominal:      Palpations: Abdomen is soft.      Tenderness: There is no abdominal tenderness.   Musculoskeletal:         General: Normal range of motion.      Cervical back: Normal range of motion.      Right lower leg: Edema present.      Left lower leg: Edema present.   Skin:     Findings: No rash.   Neurological:      Mental Status: She is alert.      Comments: Patiently generally weak throughout but with no focal deficits noted.  She is alert and oriented x 2.  Patient is lethargic        \          Procedures:  Procedures      Medical Decision Making:      Comorbidities that affect care:    Congestive Heart Failure, Diabetes, Hypertension    External Notes reviewed:    Reviewed ER note from 11/21/2023      The following orders were placed and all results were independently analyzed by me:  Orders Placed This Encounter   Procedures    Blood Culture - Blood,    Blood Culture - Blood,    XR Chest 1 View    CT Head Without Contrast    Kamuela Draw    Comprehensive Metabolic Panel    Single High Sensitivity Troponin T    Magnesium    Urinalysis With Microscopic If Indicated (No Culture) - Urine, Clean Catch    CBC Auto Differential    Blood Gas, Arterial -With Co-Ox Panel: Yes    Lactic Acid, Plasma    STAT Lactic Acid, Reflex    BNP    NPO Diet NPO Type: Strict NPO    Undress & Gown    Continuous Pulse Oximetry    Vital Signs    Orthostatic Blood Pressure    IP General Consult (Use specialty-specific consult if known)    Oxygen Therapy- Nasal Cannula; Titrate 1-6 LPM Per SpO2; 90 - 95%    POC Glucose Once    ECG 12 Lead ED Triage Standing Order; Weak / Dizzy / AMS    Insert Peripheral IV    Initiate Observation Status    Fall Precautions    CBC & Differential    Green Top (Gel)    Lavender Top    Gold Top - SST    Light Blue Top       Medications Given in the Emergency Department:  Medications   sodium chloride 0.9 % flush 10 mL (has no administration in time range)   sodium chloride 0.9 % bolus 500 mL (500 mL  Intravenous New Bag 12/30/23 0850)   furosemide (LASIX) injection 40 mg (40 mg Intravenous Given 12/30/23 0943)        ED Course:         Labs:    Lab Results (last 24 hours)       Procedure Component Value Units Date/Time    CBC & Differential [[id number]]  (Abnormal) Collected: 12/30/23 0725    Specimen: Blood Updated: 12/30/23 0735    Narrative:      The following orders were created for panel order CBC & Differential.  Procedure                               Abnormality         Status                     ---------                               -----------         ------                     CBC Auto Differential[[id number]]        Abnormal            Final result                 Please view results for these tests on the individual orders.    Comprehensive Metabolic Panel [[id number]]  (Abnormal) Collected: 12/30/23 0725    Specimen: Blood Updated: 12/30/23 0754     Glucose 143 mg/dL      BUN 10 mg/dL      Creatinine 0.80 mg/dL      Sodium 138 mmol/L      Potassium 3.5 mmol/L      Chloride 101 mmol/L      CO2 25.7 mmol/L      Calcium 8.9 mg/dL      Total Protein 8.7 g/dL      Albumin 4.0 g/dL      ALT (SGPT) 6 U/L      AST (SGOT) 14 U/L      Alkaline Phosphatase 122 U/L      Total Bilirubin 0.2 mg/dL      Globulin 4.7 gm/dL      A/G Ratio 0.9 g/dL      BUN/Creatinine Ratio 12.5     Anion Gap 11.3 mmol/L      eGFR 88.2 mL/min/1.73     Narrative:      GFR Normal >60  Chronic Kidney Disease <60  Kidney Failure <15      Single High Sensitivity Troponin T [[id number]]  (Normal) Collected: 12/30/23 0725    Specimen: Blood Updated: 12/30/23 0754     HS Troponin T 8 ng/L     Narrative:      High Sensitive Troponin T Reference Range:  <14.0 ng/L- Negative Female for AMI  <22.0 ng/L- Negative Male for AMI  >=14 - Abnormal Female indicating possible myocardial injury.  >=22 - Abnormal Male indicating possible myocardial injury.   Clinicians would have to utilize clinical acumen, EKG, Troponin, and serial changes to determine if  it is an Acute Myocardial Infarction or myocardial injury due to an underlying chronic condition.         Magnesium [[id number]]  (Normal) Collected: 12/30/23 0725    Specimen: Blood Updated: 12/30/23 0754     Magnesium 2.0 mg/dL     CBC Auto Differential [[id number]]  (Abnormal) Collected: 12/30/23 0725    Specimen: Blood Updated: 12/30/23 0735     WBC 3.88 10*3/mm3      RBC 4.27 10*6/mm3      Hemoglobin 10.3 g/dL      Hematocrit 34.7 %      MCV 81.3 fL      MCH 24.1 pg      MCHC 29.7 g/dL      RDW 16.9 %      RDW-SD 49.2 fl      MPV 10.4 fL      Platelets 287 10*3/mm3      Neutrophil % 48.0 %      Lymphocyte % 41.2 %      Monocyte % 5.4 %      Eosinophil % 4.9 %      Basophil % 0.5 %      Immature Grans % 0.0 %      Neutrophils, Absolute 1.86 10*3/mm3      Lymphocytes, Absolute 1.60 10*3/mm3      Monocytes, Absolute 0.21 10*3/mm3      Eosinophils, Absolute 0.19 10*3/mm3      Basophils, Absolute 0.02 10*3/mm3      Immature Grans, Absolute 0.00 10*3/mm3      nRBC 0.0 /100 WBC     Blood Culture - Blood, Arm, Left [[id number]] Collected: 12/30/23 0725    Specimen: Blood from Arm, Left Updated: 12/30/23 0731    Blood Culture - Blood, Arm, Right [[id number]] Collected: 12/30/23 0725    Specimen: Blood from Arm, Right Updated: 12/30/23 0732    Lactic Acid, Plasma [[id number]]  (Abnormal) Collected: 12/30/23 0725    Specimen: Blood Updated: 12/30/23 0838     Lactate 2.3 mmol/L     Blood Gas, Arterial -With Co-Ox Panel: Yes [[id number]]  (Abnormal) Collected: 12/30/23 0809    Specimen: Arterial Blood from Arm, Right Updated: 12/30/23 0814     pH, Arterial 7.341 pH units      pCO2, Arterial 45.4 mm Hg      pO2, Arterial 77.1 mm Hg      HCO3, Arterial 24.0 mmol/L      Base Excess, Arterial -1.8 mmol/L      O2 Saturation, Arterial 93.4 %      Hemoglobin, Blood Gas 10.5 g/dL      Carboxyhemoglobin 5.4 %      Methemoglobin 0.10 %      Oxyhemoglobin 88.3 %      FHHB 6.2 %      Site Arterial: right brachial     Modality Cannula      Flow Rate 2 lpm      Lactate, Arterial --             Imaging:    XR Chest 1 View    Result Date: 12/30/2023  PROCEDURE: XR CHEST 1 VW  COMPARISON: [facility], CR, XR CHEST 1 VW, 11/21/2023, 14:13.  INDICATIONS: AMS. Weakness  FINDINGS:  Pulmonary vascular cephalization is noted.  There is mild perihilar and basilar airspace opacity.  Mild cardiomegaly is worse in the interval.  No significant pleural effusion is identified.        1. Mild cardiomegaly, worse in the interval 2. Mild perihilar and basilar airspace opacity favored to represent pulmonary edema.  Pneumonia is also in the differential.       Tejas Hernandez M.D.       Electronically Signed and Approved By: Tejas Hernandez M.D. on 12/30/2023 at 7:50             CT Head Without Contrast    Result Date: 12/30/2023  PROCEDURE: CT HEAD WO CONTRAST  COMPARISON:  [facility], CT, CT HEAD WO CONTRAST, 6/17/2023, 5:00. INDICATIONS: Mental status change, unknown cause  PROTOCOL:   Standard imaging protocol performed    RADIATION:   DLP: 954.2 mGy*cm   MA and/or KV was adjusted to minimize radiation dose.     TECHNIQUE: After obtaining the patient's consent, CT images were obtained without non-ionic intravenous contrast material.  FINDINGS:  No focal brain lesion, mass or intracranial hemorrhage is seen.  No acute infarction is identified.  The ventricles are normal in size and configuration.  No focal calvarial abnormality is seen.  Visualized extracranial soft tissues appear within normal limits.        1. No acute intracranial abnormality     Tejas Hernandez M.D.       Electronically Signed and Approved By: Tejas Hernandez M.D. on 12/30/2023 at 7:46                Differential Diagnosis and Discussion:    Altered Mental Status: Based on the patient's signs and symptoms, differential diagnosis includes but is not limited to meningitis, stroke, sepsis, subarachnoid hemorrhage, intracranial bleeding, encephalitis, and metabolic  encephalopathy.    All labs were reviewed and interpreted by me.  All X-rays impressions were independently interpreted by me.  EKG was interpreted by me.  CT scan radiology impression was interpreted by me.    MDM     Patient is a 53-year-old female who was brought in for decreased responsiveness and altered mental status.  Appears to be is globally weak but no acute findings here.  May have some CHF.  Will give some Lasix here.  CT of the head is unremarkable.  I do not note any focal deficits on exam and she just is more lethargic.  Appears to be likely metabolic encephalopathy.  Did not respond to Narcan.  Will admit to the hospital further workup management.        Patient Care Considerations:          Consultants/Shared Management Plan:    Hospitalist: I have discussed the case with Dr. [facility] who agrees to accept the patient for admission.    Social Determinants of Health:          Disposition and Care Coordination:    Admit:   Through independent evaluation of the patient's history, physical, and imperical data, the patient meets criteria for observation/admission to the hospital.        Final diagnoses:   Altered mental status, unspecified altered mental status type        ED Disposition       ED Disposition   Decision to Admit    Condition   --    Comment   Level of Care: Telemetry [5]   Diagnosis: Altered mental status [780.97.ICD-9-CM]   Admitting Physician: GRACIELA FUENTES [[id number]]   Attending Physician: GRACIELA FUENTES [[id number]]                 This medical record created using voice recognition software.             Bao Goetz MD  12/30/23 0958      Electronically signed by Bao Goetz MD at 12/30/23 0958       Lab Results (last 24 hours)       Procedure Component Value Units Date/Time    STAT Lactic Acid, Reflex [[id number]]  (Abnormal) Collected: 12/30/23 1059    Specimen: Blood Updated: 12/30/23 1146     Lactate 2.6 mmol/L     Urinalysis With Microscopic If Indicated (No Culture)  - Urine, Clean Catch [[id number]]  (Normal) Collected: 12/30/23 1029    Specimen: Urine, Clean Catch Updated: 12/30/23 1040     Color, UA Yellow     Appearance, UA Clear     pH, UA 6.5     Specific Gravity, UA <=1.005     Glucose, UA Negative     Ketones, UA Negative     Bilirubin, UA Negative     Blood, UA Negative     Protein, UA Negative     Leuk Esterase, UA Negative     Nitrite, UA Negative     Urobilinogen, UA 0.2 E.U./dL    Narrative:      Urine microscopic not indicated.    BNP [[id number]]  (Normal) Collected: 12/30/23 0725    Specimen: Blood Updated: 12/30/23 1030     proBNP 50.7 pg/mL     Narrative:      This assay is used as an aid in the diagnosis of individuals suspected of having heart failure. It can be used as an aid in the diagnosis of acute decompensated heart failure (ADHF) in patients presenting with signs and symptoms of ADHF to the emergency department (ED). In addition, NT-proBNP of <300 pg/mL indicates ADHF is not likely.    Age Range Result Interpretation  NT-proBNP Concentration (pg/mL:      <50             Positive            >450                   Gray                 300-450                    Negative             <300    50-75           Positive            >900                  Gray                300-900                  Negative            <300      >75             Positive            >1800                  Gray                300-1800                  Negative            <300    Lactic Acid, Plasma [[id number]]  (Abnormal) Collected: 12/30/23 0725    Specimen: Blood Updated: 12/30/23 0838     Lactate 2.3 mmol/L     Blood Gas, Arterial -With Co-Ox Panel: Yes [[id number]]  (Abnormal) Collected: 12/30/23 0809    Specimen: Arterial Blood from Arm, Right Updated: 12/30/23 0814     pH, Arterial 7.341 pH units      pCO2, Arterial 45.4 mm Hg      pO2, Arterial 77.1 mm Hg      HCO3, Arterial 24.0 mmol/L      Base Excess, Arterial -1.8 mmol/L      O2 Saturation, Arterial 93.4 %      Hemoglobin,  Blood Gas 10.5 g/dL      Carboxyhemoglobin 5.4 %      Methemoglobin 0.10 %      Oxyhemoglobin 88.3 %      FHHB 6.2 %      Site Arterial: right brachial     Modality Cannula     Flow Rate 2 lpm      Lactate, Arterial --    [place] Draw [[id number]] Collected: 12/30/23 0725    Specimen: Blood Updated: 12/30/23 0801    Narrative:      The following orders were created for panel order [place] Draw.  Procedure                               Abnormality         Status                     ---------                               -----------         ------                     Green Top (Gel)[[id number]]                                  Final result               Lavender Top[[id number]]                                     Final result               Gold Top - SST[[id number]]                                   Final result               Light Blue Top[[id number]]                                   Final result                 Please view results for these tests on the individual orders.    Green Top (Gel) [[id number]] Collected: 12/30/23 0725    Specimen: Blood Updated: 12/30/23 0801     Extra Tube Hold for add-ons.     Comment: Auto resulted.       Gold Top - SST [[id number]] Collected: 12/30/23 0725    Specimen: Blood Updated: 12/30/23 0801     Extra Tube Hold for add-ons.     Comment: Auto resulted.       Lavender Top [[id number]] Collected: 12/30/23 0725    Specimen: Blood Updated: 12/30/23 0800     Extra Tube hold for add-on     Comment: Auto resulted       Light Blue Top [[id number]] Collected: 12/30/23 0725    Specimen: Blood Updated: 12/30/23 0800     Extra Tube Hold for add-ons.     Comment: Auto resulted       Comprehensive Metabolic Panel [[id number]]  (Abnormal) Collected: 12/30/23 0725    Specimen: Blood Updated: 12/30/23 0754     Glucose 143 mg/dL      BUN 10 mg/dL      Creatinine 0.80 mg/dL      Sodium 138 mmol/L      Potassium 3.5 mmol/L      Chloride 101 mmol/L      CO2 25.7 mmol/L      Calcium 8.9 mg/dL      Total  Protein 8.7 g/dL      Albumin 4.0 g/dL      ALT (SGPT) 6 U/L      AST (SGOT) 14 U/L      Alkaline Phosphatase 122 U/L      Total Bilirubin 0.2 mg/dL      Globulin 4.7 gm/dL      A/G Ratio 0.9 g/dL      BUN/Creatinine Ratio 12.5     Anion Gap 11.3 mmol/L      eGFR 88.2 mL/min/1.73     Narrative:      GFR Normal >60  Chronic Kidney Disease <60  Kidney Failure <15      Single High Sensitivity Troponin T [[id number]]  (Normal) Collected: 12/30/23 0725    Specimen: Blood Updated: 12/30/23 0754     HS Troponin T 8 ng/L     Narrative:      High Sensitive Troponin T Reference Range:  <14.0 ng/L- Negative Female for AMI  <22.0 ng/L- Negative Male for AMI  >=14 - Abnormal Female indicating possible myocardial injury.  >=22 - Abnormal Male indicating possible myocardial injury.   Clinicians would have to utilize clinical acumen, EKG, Troponin, and serial changes to determine if it is an Acute Myocardial Infarction or myocardial injury due to an underlying chronic condition.         Magnesium [[id number]]  (Normal) Collected: 12/30/23 0725    Specimen: Blood Updated: 12/30/23 0754     Magnesium 2.0 mg/dL     CBC & Differential [[id number]]  (Abnormal) Collected: 12/30/23 0725    Specimen: Blood Updated: 12/30/23 0735    Narrative:      The following orders were created for panel order CBC & Differential.  Procedure                               Abnormality         Status                     ---------                               -----------         ------                     CBC Auto Differential[[id number]]        Abnormal            Final result                 Please view results for these tests on the individual orders.    CBC Auto Differential [[id number]]  (Abnormal) Collected: 12/30/23 0725    Specimen: Blood Updated: 12/30/23 0735     WBC 3.88 10*3/mm3      RBC 4.27 10*6/mm3      Hemoglobin 10.3 g/dL      Hematocrit 34.7 %      MCV 81.3 fL      MCH 24.1 pg      MCHC 29.7 g/dL      RDW 16.9 %      RDW-SD 49.2 fl      MPV  10.4 fL      Platelets 287 10*3/mm3      Neutrophil % 48.0 %      Lymphocyte % 41.2 %      Monocyte % 5.4 %      Eosinophil % 4.9 %      Basophil % 0.5 %      Immature Grans % 0.0 %      Neutrophils, Absolute 1.86 10*3/mm3      Lymphocytes, Absolute 1.60 10*3/mm3      Monocytes, Absolute 0.21 10*3/mm3      Eosinophils, Absolute 0.19 10*3/mm3      Basophils, Absolute 0.02 10*3/mm3      Immature Grans, Absolute 0.00 10*3/mm3      nRBC 0.0 /100 WBC     Blood Culture - Blood, Arm, Right [[id number]] Collected: 12/30/23 0725    Specimen: Blood from Arm, Right Updated: 12/30/23 0732    Blood Culture - Blood, Arm, Left [[id number]] Collected: 12/30/23 0725    Specimen: Blood from Arm, Left Updated: 12/30/23 0731          Imaging Results (Last 24 Hours)       Procedure Component Value Units Date/Time    XR Chest 1 View [[id number]] Collected: 12/30/23 0750     Updated: 12/30/23 0753    Narrative:      PROCEDURE: XR CHEST 1 VW     COMPARISON: [facility], CR, XR CHEST 1 VW, 11/21/2023, 14:13.     INDICATIONS: AMS. Weakness     FINDINGS:   Pulmonary vascular cephalization is noted.  There is mild perihilar and basilar airspace opacity.    Mild cardiomegaly is worse in the interval.  No significant pleural effusion is identified.       Impression:         1. Mild cardiomegaly, worse in the interval  2. Mild perihilar and basilar airspace opacity favored to represent pulmonary edema.  Pneumonia is   also in the differential.                  Tejas Hernandez M.D.         Electronically Signed and Approved By: Tejas Hernandez M.D. on 12/30/2023 at 7:50                     CT Head Without Contrast [[id number]] Collected: 12/30/23 0746     Updated: 12/30/23 0749    Narrative:      PROCEDURE: CT HEAD WO CONTRAST     COMPARISON:  [facility], CT, CT HEAD WO CONTRAST, 6/17/2023, 5:00.  INDICATIONS: Mental status change, unknown cause     PROTOCOL:   Standard imaging protocol performed      RADIATION:   DLP: 954.2  mGy*cm    MA and/or KV was adjusted to minimize radiation dose.          TECHNIQUE: After obtaining the patient's consent, CT images were obtained without non-ionic   intravenous contrast material.      FINDINGS:   No focal brain lesion, mass or intracranial hemorrhage is seen.  No acute infarction is identified.    The ventricles are normal in size and configuration.     No focal calvarial abnormality is seen.  Visualized extracranial soft tissues appear within normal   limits.       Impression:         1. No acute intracranial abnormality            Tejas Hernandez M.D.         Electronically Signed and Approved By: Tejas Hernandez M.D. on 12/30/2023 at 7:46

## 2023-12-31 ENCOUNTER — READMISSION MANAGEMENT (OUTPATIENT)
Dept: CALL CENTER | Facility: HOSPITAL | Age: 53
End: 2023-12-31
Payer: MEDICARE

## 2023-12-31 VITALS
RESPIRATION RATE: 24 BRPM | BODY MASS INDEX: 40.53 KG/M2 | SYSTOLIC BLOOD PRESSURE: 114 MMHG | WEIGHT: 220.24 LBS | TEMPERATURE: 97.9 F | HEIGHT: 62 IN | OXYGEN SATURATION: 96 % | HEART RATE: 79 BPM | DIASTOLIC BLOOD PRESSURE: 80 MMHG

## 2023-12-31 LAB
ALBUMIN SERPL-MCNC: 3.4 G/DL (ref 3.5–5.2)
ALBUMIN/GLOB SERPL: 0.9 G/DL
ALP SERPL-CCNC: 103 U/L (ref 39–117)
ALT SERPL W P-5'-P-CCNC: 5 U/L (ref 1–33)
ANION GAP SERPL CALCULATED.3IONS-SCNC: 10.2 MMOL/L (ref 5–15)
AST SERPL-CCNC: 11 U/L (ref 1–32)
BASOPHILS # BLD AUTO: 0.01 10*3/MM3 (ref 0–0.2)
BASOPHILS NFR BLD AUTO: 0.2 % (ref 0–1.5)
BILIRUB SERPL-MCNC: 0.3 MG/DL (ref 0–1.2)
BUN SERPL-MCNC: 15 MG/DL (ref 6–20)
BUN/CREAT SERPL: 24.2 (ref 7–25)
CALCIUM SPEC-SCNC: 8.2 MG/DL (ref 8.6–10.5)
CHLORIDE SERPL-SCNC: 103 MMOL/L (ref 98–107)
CO2 SERPL-SCNC: 26.8 MMOL/L (ref 22–29)
CREAT SERPL-MCNC: 0.62 MG/DL (ref 0.57–1)
DEPRECATED RDW RBC AUTO: 49.1 FL (ref 37–54)
EGFRCR SERPLBLD CKD-EPI 2021: 106.6 ML/MIN/1.73
EOSINOPHIL # BLD AUTO: 0.18 10*3/MM3 (ref 0–0.4)
EOSINOPHIL NFR BLD AUTO: 3.1 % (ref 0.3–6.2)
ERYTHROCYTE [DISTWIDTH] IN BLOOD BY AUTOMATED COUNT: 17 % (ref 12.3–15.4)
GLOBULIN UR ELPH-MCNC: 3.9 GM/DL
GLUCOSE SERPL-MCNC: 98 MG/DL (ref 65–99)
HCT VFR BLD AUTO: 29.9 % (ref 34–46.6)
HGB BLD-MCNC: 9 G/DL (ref 12–15.9)
IMM GRANULOCYTES # BLD AUTO: 0.01 10*3/MM3 (ref 0–0.05)
IMM GRANULOCYTES NFR BLD AUTO: 0.2 % (ref 0–0.5)
INR PPP: 1.1 (ref 0.86–1.15)
LYMPHOCYTES # BLD AUTO: 1.47 10*3/MM3 (ref 0.7–3.1)
LYMPHOCYTES NFR BLD AUTO: 25.4 % (ref 19.6–45.3)
MCH RBC QN AUTO: 24.3 PG (ref 26.6–33)
MCHC RBC AUTO-ENTMCNC: 30.1 G/DL (ref 31.5–35.7)
MCV RBC AUTO: 80.6 FL (ref 79–97)
MONOCYTES # BLD AUTO: 0.41 10*3/MM3 (ref 0.1–0.9)
MONOCYTES NFR BLD AUTO: 7.1 % (ref 5–12)
NEUTROPHILS NFR BLD AUTO: 3.71 10*3/MM3 (ref 1.7–7)
NEUTROPHILS NFR BLD AUTO: 64 % (ref 42.7–76)
NRBC BLD AUTO-RTO: 0 /100 WBC (ref 0–0.2)
PLATELET # BLD AUTO: 278 10*3/MM3 (ref 140–450)
PMV BLD AUTO: 10.9 FL (ref 6–12)
POTASSIUM SERPL-SCNC: 3.7 MMOL/L (ref 3.5–5.2)
PROT SERPL-MCNC: 7.3 G/DL (ref 6–8.5)
PROTHROMBIN TIME: 14.5 SECONDS (ref 11.8–14.9)
RBC # BLD AUTO: 3.71 10*6/MM3 (ref 3.77–5.28)
SODIUM SERPL-SCNC: 140 MMOL/L (ref 136–145)
WBC NRBC COR # BLD AUTO: 5.79 10*3/MM3 (ref 3.4–10.8)

## 2023-12-31 PROCEDURE — 80053 COMPREHEN METABOLIC PANEL: CPT | Performed by: INTERNAL MEDICINE

## 2023-12-31 PROCEDURE — 85025 COMPLETE CBC W/AUTO DIFF WBC: CPT | Performed by: INTERNAL MEDICINE

## 2023-12-31 PROCEDURE — 85610 PROTHROMBIN TIME: CPT | Performed by: INTERNAL MEDICINE

## 2023-12-31 RX ADMIN — FUROSEMIDE 20 MG: 20 TABLET ORAL at 08:43

## 2023-12-31 RX ADMIN — LEVETIRACETAM 750 MG: 750 TABLET, FILM COATED ORAL at 08:42

## 2023-12-31 RX ADMIN — GABAPENTIN 300 MG: 300 CAPSULE ORAL at 08:43

## 2023-12-31 RX ADMIN — ASPIRIN 81 MG: 81 TABLET, COATED ORAL at 08:42

## 2023-12-31 RX ADMIN — Medication 10 ML: at 08:43

## 2023-12-31 RX ADMIN — AMLODIPINE BESYLATE 5 MG: 5 TABLET ORAL at 08:43

## 2023-12-31 RX ADMIN — METOPROLOL SUCCINATE 25 MG: 25 TABLET, EXTENDED RELEASE ORAL at 08:42

## 2023-12-31 RX ADMIN — ACETAMINOPHEN 650 MG: 325 TABLET ORAL at 05:14

## 2023-12-31 RX ADMIN — DULOXETINE HYDROCHLORIDE 60 MG: 30 CAPSULE, DELAYED RELEASE ORAL at 08:42

## 2023-12-31 RX ADMIN — POTASSIUM CHLORIDE 10 MEQ: 10 CAPSULE, COATED, EXTENDED RELEASE ORAL at 08:42

## 2023-12-31 NOTE — PLAN OF CARE
Goal Outcome Evaluation:           Progress: no change  Outcome Evaluation: No acute changes through shift. Pt alert and oriented X4. VSS. No complaints at this time.

## 2023-12-31 NOTE — DISCHARGE SUMMARY
[facility]         DISCHARGE SUMMARY    Patient Name: Carol Abarca  : 1970  MRN: [id number]    Date of Admission: 2023  Date of Discharge: 2023  Primary Care Physician: Sonia Mosquera APRN    Consults       Date and Time Order Name Status Description    2023  9:37 AM IP General Consult (Use specialty-specific consult if known)              Presenting Problem:   Altered mental status [R41.82]  Altered mental status, unspecified altered mental status type [R41.82]  AMS (altered mental status) [R41.82]    Active and Resolved Hospital Problems:  Active Hospital Problems    Diagnosis POA   • **Altered mental status [R41.82] Yes   • AMS (altered mental status) [R41.82] Yes      Resolved Hospital Problems   No resolved problems to display.         Hospital Course     Hospital Course:  Carol Abarca is a 53 y.o. female patient was admitted to the hospital because of what she describes to be somewhat disoriented but now she is completely alert oriented not in any acute distress all the lab work is stable she does have a low-grade anemia for which outpatient workup has been recommended she has had EGD colonoscopy      DISCHARGE Follow Up Recommendations for labs and diagnostics: Stable doing well no more altered mental status and wants to go home and is therefore being discharged she does have a low-grade anemia which will require workup as an outpatient      Pertinent  and/or Most Recent Results     LAB RESULTS:      Lab 23  0506 23  1726 23  1416 23  1059 23  0725   WBC 5.79  --  3.38*  --  3.88   HEMOGLOBIN 9.0*  --  10.0*  --  10.3*   HEMATOCRIT 29.9*  --  33.5*  --  34.7   PLATELETS 278  --  271  --  287   NEUTROS ABS 3.71  --   --   --  1.86   IMMATURE GRANS (ABS) 0.01  --   --   --  0.00   LYMPHS ABS 1.47  --   --   --  1.60   MONOS ABS 0.41  --   --   --  0.21   EOS ABS 0.18  --   --   --  0.19   MCV 80.6  --  81.1  --  81.3   LACTATE  --  2.0  2.5* 2.6* 2.3*   PROTIME 14.5  --   --   --   --          Lab 12/31/23  0506 12/30/23  1416 12/30/23  0725   SODIUM 140 143 138   POTASSIUM 3.7 3.8 3.5   CHLORIDE 103 104 101   CO2 26.8 25.1 25.7   ANION GAP 10.2 13.9 11.3   BUN 15 10 10   CREATININE 0.62 0.63 0.80   EGFR 106.6 106.2 88.2   GLUCOSE 98 101* 143*   CALCIUM 8.2* 8.4* 8.9   MAGNESIUM  --   --  2.0         Lab 12/31/23  0506 12/30/23  1416 12/30/23  0725   TOTAL PROTEIN 7.3 7.7 8.7*   ALBUMIN 3.4* 3.5 4.0   GLOBULIN 3.9 4.2 4.7   ALT (SGPT) 5 6 6   AST (SGOT) 11 12 14   BILIRUBIN 0.3 0.2 0.2   ALK PHOS 103 109 122*         Lab 12/31/23  0506 12/30/23  0725   PROBNP  --  50.7   HSTROP T  --  8   PROTIME 14.5  --    INR 1.10  --                  Lab 12/30/23  0809   PH, ARTERIAL 7.341*   PCO2, ARTERIAL 45.4*   PO2 ART 77.1*   O2 SATURATION ART 93.4*   HCO3 ART 24.0   BASE EXCESS ART -1.8   CARBOXYHEMOGLOBIN 5.4*     Brief Urine Lab Results  (Last result in the past 365 days)        Color   Clarity   Blood   Leuk Est   Nitrite   Protein   CREAT   Urine HCG        12/30/23 1029 Yellow   Clear   Negative   Negative   Negative   Negative                 Microbiology Results (last 10 days)       Procedure Component Value - Date/Time    Blood Culture - Blood, Arm, Left [[id number]]  (Normal) Collected: 12/30/23 0725    Lab Status: Preliminary result Specimen: Blood from Arm, Left Updated: 12/31/23 0745     Blood Culture No growth at 24 hours    Blood Culture - Blood, Arm, Right [[id number]]  (Normal) Collected: 12/30/23 0725    Lab Status: Preliminary result Specimen: Blood from Arm, Right Updated: 12/31/23 0745     Blood Culture No growth at 24 hours            XR Chest 1 View    Result Date: 12/30/2023  Impression:   1. Mild cardiomegaly, worse in the interval 2. Mild perihilar and basilar airspace opacity favored to represent pulmonary edema.  Pneumonia is also in the differential.       Tejas Hernandez M.D.       Electronically Signed and Approved By: Tejas  HAYLEE Hernandez on 12/30/2023 at 7:50             CT Head Without Contrast    Result Date: 12/30/2023  Impression:   1. No acute intracranial abnormality     Tejas Hernandez M.D.       Electronically Signed and Approved By: Tejas Hernandez M.D. on 12/30/2023 at 7:46                      Results for orders placed during the hospital encounter of 08/27/23    Adult Transthoracic Echo Complete W/ Cont if Necessary Per Protocol (With Agitated Saline)    Interpretation Summary  •  Left ventricular systolic function is normal. Calculated left ventricular EF = 64.7%  •  Left ventricular wall thickness is consistent with concentric hypertrophy.  •  Left ventricular diastolic function was indeterminate.  •  Estimated right ventricular systolic pressure from tricuspid regurgitation is normal (<35 mmHg).  •  There is a small (<1cm) pericardial effusion.  •  No obvious wall motion abnormalities      Labs Pending at Discharge:  Pending Labs       Order Current Status    Blood Culture - Blood, Arm, Left Preliminary result    Blood Culture - Blood, Arm, Right Preliminary result              Discharge Details        Discharge Medications        Changes to Medications        Instructions Start Date   QUEtiapine  MG 24 hr tablet  Commonly known as: SEROquel XR  What changed:   how much to take  when to take this   300 mg, Oral, Every Evening             Continue These Medications        Instructions Start Date   ALPRAZolam 1 MG tablet  Commonly known as: XANAX   1 mg, Oral, 3 Times Daily PRN, for anxiety      amLODIPine 5 MG tablet  Commonly known as: NORVASC   1 tablet, Oral, Daily      aspirin 81 MG EC tablet   81 mg, Oral, Daily      cetirizine 10 MG tablet  Commonly known as: zyrTEC   10 mg, Oral, Daily      DULoxetine 60 MG capsule  Commonly known as: CYMBALTA   60 mg, Oral, Every 12 Hours Scheduled      furosemide 20 MG tablet  Commonly known as: LASIX   20 mg, Oral, Daily      gabapentin 300 MG capsule  Commonly known as:  NEURONTIN   300 mg, Oral, 3 Times Daily      HYDROcodone-acetaminophen 7.5-325 MG per tablet  Commonly known as: NORCO   1 tablet, Oral, Every 8 Hours PRN      Keppra 750 MG tablet  Generic drug: levETIRAcetam   750 mg, Every 12 Hours Scheduled      metoprolol succinate XL 25 MG 24 hr tablet  Commonly known as: TOPROL-XL   25 mg, Oral, Every 24 Hours Scheduled      potassium chloride 10 MEQ CR tablet   10 mEq, Oral, Daily      pravastatin 40 MG tablet  Commonly known as: PRAVACHOL   40 mg, Oral, Daily      zolpidem 10 MG tablet  Commonly known as: AMBIEN   10 mg, Oral, Nightly PRN               Allergies   Allergen Reactions   • Tramadol Anaphylaxis   • Tramadol Hcl Anaphylaxis   • Moxifloxacin Hives   • Sulfa Antibiotics Hives         Discharge Disposition:  Home or Self Care    Diet:  Hospital:  Diet Order   Procedures   • Diet: Regular/House Diet; Texture: Regular Texture (IDDSI 7); Fluid Consistency: Thin (IDDSI 0)         Discharge Activity: As tolerated        CODE STATUS:  Code Status and Medical Interventions:   Ordered at: 12/30/23 1334     Level Of Support Discussed With:    Patient     Code Status (Patient has no pulse and is not breathing):    CPR (Attempt to Resuscitate)     Medical Interventions (Patient has pulse or is breathing):    Full Support         Future Appointments   Date Time Provider Department Center   3/8/2024  8:00 AM KARLA MAYNOR DEXA 1 [facility]   3/8/2024  8:30 AM KARLA MAYNOR MANNY 1 [facility]   3/14/2024  2:00 PM Staci Dunne APRN [facility]   5/23/2024  2:15 PM Remi Leyva MD [facility]           Time spent on Discharge including face to face service: 45 minutes    Electronically signed by Edward Manuel MD, 12/31/23, 11:39 AM EST.    Part of this note may be an electronic transcription/translation of spoken language to printed text using the Dragon Dictation System.

## 2023-12-31 NOTE — OUTREACH NOTE
Prep Survey      Flowsheet Row Responses   [facility] facility patient discharged from? [facility]   Is LACE score < 7 ? No   Eligibility Readm Mgmt   Discharge diagnosis Altered mental status   Does the patient have one of the following disease processes/diagnoses(primary or secondary)? Other   Does the patient have Home health ordered? No   Is there a DME ordered? No   Prep survey completed? Yes            Yasmin BATEMAN - Registered Nurse

## 2024-01-01 ENCOUNTER — TELEPHONE (OUTPATIENT)
Dept: NEUROSURGERY | Facility: OTHER | Age: 54
End: 2024-01-01
Payer: MEDICARE

## 2024-01-01 ENCOUNTER — TELEPHONE (OUTPATIENT)
Dept: CARDIOLOGY | Facility: CLINIC | Age: 54
End: 2024-01-01
Payer: MEDICARE

## 2024-01-01 ENCOUNTER — HOSPITAL ENCOUNTER (EMERGENCY)
Facility: HOSPITAL | Age: 54
End: 2024-02-23
Attending: EMERGENCY MEDICINE
Payer: MEDICARE

## 2024-01-01 VITALS — WEIGHT: 220.9 LBS | OXYGEN SATURATION: 53 % | SYSTOLIC BLOOD PRESSURE: 82 MMHG | DIASTOLIC BLOOD PRESSURE: 15 MMHG

## 2024-01-01 DIAGNOSIS — I46.9 CARDIAC ARREST: Primary | ICD-10-CM

## 2024-01-01 LAB
ARTERIAL PATENCY WRIST A: ABNORMAL
BASE EXCESS BLDA CALC-SCNC: -22.3 MMOL/L (ref -2–2)
BDY SITE: ABNORMAL
CA-I BLDA-SCNC: 1.19 MMOL/L (ref 1.13–1.32)
CHLORIDE BLDA-SCNC: 102 MMOL/L (ref 98–106)
COHGB MFR BLD: 1.8 % (ref 0–1.5)
FHHB: 90.4 % (ref 0–5)
GAS FLOW AIRWAY: 15 LPM
GLUCOSE BLDA-MCNC: 387 MG/DL (ref 65–99)
HCO3 BLDA-SCNC: 12 MMOL/L (ref 22–26)
HGB BLDA-MCNC: 8.6 G/DL (ref 11.7–14.6)
INHALED O2 CONCENTRATION: 100 %
LACTATE BLDA-SCNC: 11.7 MMOL/L (ref 0.5–2)
METHGB BLD QL: 2.6 % (ref 0–1.5)
MODALITY: ABNORMAL
NOTE: ABNORMAL
OXYHGB MFR BLDV: 5.2 % (ref 94–99)
PCO2 BLDA: 84 MM HG (ref 35–45)
PH BLDA: 6.77 PH UNITS (ref 7.35–7.45)
PO2 BLD: ABNORMAL MM[HG]
PO2 BLDA: ABNORMAL MM[HG]
POTASSIUM BLDA-SCNC: 5.02 MMOL/L (ref 3.5–5)
SAO2 % BLDCOA: ABNORMAL %
SODIUM BLDA-SCNC: 139.9 MMOL/L (ref 136–146)

## 2024-01-01 PROCEDURE — 99285 EMERGENCY DEPT VISIT HI MDM: CPT

## 2024-01-01 PROCEDURE — 31500 INSERT EMERGENCY AIRWAY: CPT

## 2024-01-01 PROCEDURE — 94799 UNLISTED PULMONARY SVC/PX: CPT

## 2024-01-01 PROCEDURE — 92950 HEART/LUNG RESUSCITATION CPR: CPT

## 2024-01-01 PROCEDURE — 25010000002 EPINEPHRINE 1 MG/10ML SOLUTION PREFILLED SYRINGE: Performed by: EMERGENCY MEDICINE

## 2024-01-01 PROCEDURE — 82805 BLOOD GASES W/O2 SATURATION: CPT | Performed by: EMERGENCY MEDICINE

## 2024-01-01 PROCEDURE — 25010000002 TENECTEPLASE PER 50 MG: Performed by: EMERGENCY MEDICINE

## 2024-01-01 PROCEDURE — 25010000002 AMIODARONE PER 30 MG: Performed by: EMERGENCY MEDICINE

## 2024-01-01 PROCEDURE — 83050 HGB METHEMOGLOBIN QUAN: CPT | Performed by: EMERGENCY MEDICINE

## 2024-01-01 PROCEDURE — 82375 ASSAY CARBOXYHB QUANT: CPT | Performed by: EMERGENCY MEDICINE

## 2024-01-01 RX ORDER — AMIODARONE HYDROCHLORIDE 50 MG/ML
INJECTION, SOLUTION INTRAVENOUS
Status: COMPLETED | OUTPATIENT
Start: 2024-01-01 | End: 2024-01-01

## 2024-01-01 RX ORDER — CALCIUM CHLORIDE 100 MG/ML
INJECTION INTRAVENOUS; INTRAVENTRICULAR
Status: COMPLETED | OUTPATIENT
Start: 2024-01-01 | End: 2024-01-01

## 2024-01-01 RX ADMIN — EPINEPHRINE 1 MG: 0.1 INJECTION INTRAVENOUS at 16:29

## 2024-01-01 RX ADMIN — EPINEPHRINE 1 MG: 0.1 INJECTION INTRAVENOUS at 16:26

## 2024-01-01 RX ADMIN — CALCIUM CHLORIDE INJECTION 1 G: 100 INJECTION, SOLUTION INTRAVENOUS at 16:31

## 2024-01-01 RX ADMIN — Medication 50 MG: at 16:46

## 2024-01-01 RX ADMIN — EPINEPHRINE 1 MG: 0.1 INJECTION INTRAVENOUS at 16:36

## 2024-01-01 RX ADMIN — EPINEPHRINE 1 MG: 0.1 INJECTION INTRAVENOUS at 16:32

## 2024-01-01 RX ADMIN — AMIODARONE HYDROCHLORIDE 300 MG: 50 INJECTION, SOLUTION INTRAVENOUS at 16:41

## 2024-01-01 RX ADMIN — SODIUM BICARBONATE 50 MEQ: 84 INJECTION INTRAVENOUS at 16:37

## 2024-01-01 RX ADMIN — SODIUM BICARBONATE 50 MEQ: 84 INJECTION INTRAVENOUS at 16:26

## 2024-01-01 RX ADMIN — EPINEPHRINE 1 MG: 0.1 INJECTION INTRAVENOUS at 16:39

## 2024-01-02 LAB
QT INTERVAL: 418 MS
QTC INTERVAL: 498 MS

## 2024-01-04 LAB
BACTERIA SPEC AEROBE CULT: NORMAL
BACTERIA SPEC AEROBE CULT: NORMAL

## 2024-01-05 ENCOUNTER — READMISSION MANAGEMENT (OUTPATIENT)
Dept: CALL CENTER | Facility: HOSPITAL | Age: 54
End: 2024-01-05
Payer: MEDICARE

## 2024-01-05 NOTE — OUTREACH NOTE
Medical Week 1 Survey      Flowsheet Row Responses   [facility] patient discharged from? [facility]   Does the patient have one of the following disease processes/diagnoses(primary or secondary)? Other   Week 1 attempt successful? No   Unsuccessful attempts Attempt 1            Nayeli BOSS - Licensed Nurse

## 2024-01-10 ENCOUNTER — READMISSION MANAGEMENT (OUTPATIENT)
Dept: CALL CENTER | Facility: HOSPITAL | Age: 54
End: 2024-01-10
Payer: MEDICARE

## 2024-01-10 NOTE — OUTREACH NOTE
Medical Week 1 Survey      Flowsheet Row Responses   [facility] patient discharged from? [facility]   Does the patient have one of the following disease processes/diagnoses(primary or secondary)? Other   Week 1 attempt successful? Yes   Call start time 1735   Call end time 1737   Discharge diagnosis Altered mental status   Is the patient taking all medications as directed (includes completed medication regime)? Yes   Medication comments No changes   Does the patient have a primary care provider?  Yes   Does the patient have an appointment with their PCP within 7 days of discharge? Greater than 7 days   Comments Has appt Friday with PCP   Psychosocial issues? No   What is the patient's perception of their health status since discharge? Improving   Additional teach back comments Call was brief and states she is doing better.   Week 1 call completed? Yes   Graduated Yes   Graduated/Revoked comments No questions or needs at this time.   Call end time 1737            Lisa NI - Licensed Nurse

## 2024-01-10 NOTE — PROGRESS NOTES
"Enter Query Response Below      Query Response: Clinically cannot be determined             If applicable, please update the problem list.   Patient: Carol Abarca        : 1970  Account: [id number]           Admit Date: 2023        How to Respond to this query:  Electronically signed by Edward Manuel MD, 01/10/24, 7:02 AM EST.       a. Click New Note     b. Answer query within the yellow box.                c. Update the Problem List, if applicable.    If you have any questions about this query contact me at: [email or web address]     Dr. Manuel,     52 yo female admitted for \"altered mental status\" per H&P.  Risk Factors: PMH includes seizure disorder, anemia, HTN, CHF   Clinical indicators: H&P also includes \"low blood sugar she did have also elevated lactate underlying infection may be pneumonia or UTI.\"  Laboratory values reported as follows: 23 proBNP 50.7, Creatinine 0.80, lactate 2.6, and Hemoglobin 10.3, repeated (23) 9.0.  Treatment: IV Lasix 40 mg x 1 on 23, IV fluids normal saline 500 mLs x 1. changes to medication at discharge included Seroquel 300 mg every evening and continuation of all home medications. Recommendation for out-patient work up for anemia including EGD and colonoscopy.    After study, please clarify the underlying etiology(ies) of altered mental status?    By submitting this query, we are merely seeking further clarification of documentation to accurately reflect all conditions that you are monitoring, evaluating, treating or that extend the hospitalization or utilize additional resources of care. Please utilize your independent clinical judgment when addressing the question(s) above.     This query and your response, once completed, will be entered into the legal medical record.    Sincerely,  Chandni BOURGEOIS RN CCDS  System Director Clinical Documentation Integrity Program     "

## 2024-01-14 ENCOUNTER — HOSPITAL ENCOUNTER (EMERGENCY)
Facility: HOSPITAL | Age: 54
Discharge: HOME | End: 2024-01-15
Attending: EMERGENCY MEDICINE | Admitting: EMERGENCY MEDICINE
Payer: MEDICARE

## 2024-01-14 ENCOUNTER — APPOINTMENT (OUTPATIENT)
Dept: GENERAL RADIOLOGY | Facility: HOSPITAL | Age: 54
End: 2024-01-14
Payer: MEDICARE

## 2024-01-14 DIAGNOSIS — R11.2 NAUSEA AND VOMITING, UNSPECIFIED VOMITING TYPE: Primary | ICD-10-CM

## 2024-01-14 LAB
ALBUMIN SERPL-MCNC: 3.9 G/DL (ref 3.5–5.2)
ALBUMIN/GLOB SERPL: 0.8 G/DL
ALP SERPL-CCNC: 124 U/L (ref 39–117)
ALT SERPL W P-5'-P-CCNC: 5 U/L (ref 1–33)
ANION GAP SERPL CALCULATED.3IONS-SCNC: 22.6 MMOL/L (ref 5–15)
AST SERPL-CCNC: 9 U/L (ref 1–32)
BASOPHILS # BLD AUTO: 0.02 10*3/MM3 (ref 0–0.2)
BASOPHILS NFR BLD AUTO: 0.3 % (ref 0–1.5)
BILIRUB SERPL-MCNC: 0.3 MG/DL (ref 0–1.2)
BUN SERPL-MCNC: 6 MG/DL (ref 6–20)
BUN/CREAT SERPL: 8 (ref 7–25)
CALCIUM SPEC-SCNC: 9.4 MG/DL (ref 8.6–10.5)
CHLORIDE SERPL-SCNC: 95 MMOL/L (ref 98–107)
CO2 SERPL-SCNC: 19.4 MMOL/L (ref 22–29)
CREAT SERPL-MCNC: 0.75 MG/DL (ref 0.57–1)
D-LACTATE SERPL-SCNC: 7.1 MMOL/L (ref 0.5–2)
DEPRECATED RDW RBC AUTO: 47 FL (ref 37–54)
EGFRCR SERPLBLD CKD-EPI 2021: 95.3 ML/MIN/1.73
EOSINOPHIL # BLD AUTO: 0.06 10*3/MM3 (ref 0–0.4)
EOSINOPHIL NFR BLD AUTO: 0.9 % (ref 0.3–6.2)
ERYTHROCYTE [DISTWIDTH] IN BLOOD BY AUTOMATED COUNT: 16.6 % (ref 12.3–15.4)
FLUAV SUBTYP SPEC NAA+PROBE: NOT DETECTED
FLUBV RNA ISLT QL NAA+PROBE: NOT DETECTED
GLOBULIN UR ELPH-MCNC: 5.1 GM/DL
GLUCOSE SERPL-MCNC: 168 MG/DL (ref 65–99)
HCT VFR BLD AUTO: 33.7 % (ref 34–46.6)
HGB BLD-MCNC: 10.2 G/DL (ref 12–15.9)
HOLD SPECIMEN: NORMAL
HOLD SPECIMEN: NORMAL
IMM GRANULOCYTES # BLD AUTO: 0.03 10*3/MM3 (ref 0–0.05)
IMM GRANULOCYTES NFR BLD AUTO: 0.5 % (ref 0–0.5)
LYMPHOCYTES # BLD AUTO: 1.2 10*3/MM3 (ref 0.7–3.1)
LYMPHOCYTES NFR BLD AUTO: 19 % (ref 19.6–45.3)
MCH RBC QN AUTO: 24.1 PG (ref 26.6–33)
MCHC RBC AUTO-ENTMCNC: 30.3 G/DL (ref 31.5–35.7)
MCV RBC AUTO: 79.5 FL (ref 79–97)
MONOCYTES # BLD AUTO: 0.38 10*3/MM3 (ref 0.1–0.9)
MONOCYTES NFR BLD AUTO: 6 % (ref 5–12)
NEUTROPHILS NFR BLD AUTO: 4.63 10*3/MM3 (ref 1.7–7)
NEUTROPHILS NFR BLD AUTO: 73.3 % (ref 42.7–76)
NRBC BLD AUTO-RTO: 0 /100 WBC (ref 0–0.2)
NT-PROBNP SERPL-MCNC: 78.9 PG/ML (ref 0–900)
PLATELET # BLD AUTO: 335 10*3/MM3 (ref 140–450)
PMV BLD AUTO: 10.6 FL (ref 6–12)
POTASSIUM SERPL-SCNC: 3.4 MMOL/L (ref 3.5–5.2)
PROT SERPL-MCNC: 9 G/DL (ref 6–8.5)
RBC # BLD AUTO: 4.24 10*6/MM3 (ref 3.77–5.28)
RSV RNA NPH QL NAA+NON-PROBE: NOT DETECTED
S PYO AG THROAT QL: NEGATIVE
SARS-COV-2 RNA RESP QL NAA+PROBE: NOT DETECTED
SODIUM SERPL-SCNC: 137 MMOL/L (ref 136–145)
TROPONIN T SERPL HS-MCNC: 7 NG/L
WBC NRBC COR # BLD AUTO: 6.32 10*3/MM3 (ref 3.4–10.8)
WHOLE BLOOD HOLD COAG: NORMAL
WHOLE BLOOD HOLD SPECIMEN: NORMAL

## 2024-01-14 PROCEDURE — 87880 STREP A ASSAY W/OPTIC: CPT | Performed by: EMERGENCY MEDICINE

## 2024-01-14 PROCEDURE — 93005 ELECTROCARDIOGRAM TRACING: CPT | Performed by: EMERGENCY MEDICINE

## 2024-01-14 PROCEDURE — 85025 COMPLETE CBC W/AUTO DIFF WBC: CPT | Performed by: EMERGENCY MEDICINE

## 2024-01-14 PROCEDURE — 99284 EMERGENCY DEPT VISIT MOD MDM: CPT

## 2024-01-14 PROCEDURE — 80053 COMPREHEN METABOLIC PANEL: CPT | Performed by: EMERGENCY MEDICINE

## 2024-01-14 PROCEDURE — 87637 SARSCOV2&INF A&B&RSV AMP PRB: CPT | Performed by: EMERGENCY MEDICINE

## 2024-01-14 PROCEDURE — 25010000002 MORPHINE PER 10 MG: Performed by: EMERGENCY MEDICINE

## 2024-01-14 PROCEDURE — 83880 ASSAY OF NATRIURETIC PEPTIDE: CPT | Performed by: EMERGENCY MEDICINE

## 2024-01-14 PROCEDURE — 25810000003 SODIUM CHLORIDE 0.9 % SOLUTION: Performed by: EMERGENCY MEDICINE

## 2024-01-14 PROCEDURE — 36415 COLL VENOUS BLD VENIPUNCTURE: CPT

## 2024-01-14 PROCEDURE — 83605 ASSAY OF LACTIC ACID: CPT | Performed by: EMERGENCY MEDICINE

## 2024-01-14 PROCEDURE — 96374 THER/PROPH/DIAG INJ IV PUSH: CPT

## 2024-01-14 PROCEDURE — 87040 BLOOD CULTURE FOR BACTERIA: CPT | Performed by: EMERGENCY MEDICINE

## 2024-01-14 PROCEDURE — 87081 CULTURE SCREEN ONLY: CPT | Performed by: EMERGENCY MEDICINE

## 2024-01-14 PROCEDURE — 71045 X-RAY EXAM CHEST 1 VIEW: CPT

## 2024-01-14 PROCEDURE — 84484 ASSAY OF TROPONIN QUANT: CPT | Performed by: EMERGENCY MEDICINE

## 2024-01-14 RX ORDER — SODIUM CHLORIDE 0.9 % (FLUSH) 0.9 %
10 SYRINGE (ML) INJECTION AS NEEDED
Status: DISCONTINUED | OUTPATIENT
Start: 2024-01-14 | End: 2024-01-15 | Stop reason: HOSPADM

## 2024-01-14 RX ADMIN — SODIUM CHLORIDE 500 ML: 9 INJECTION, SOLUTION INTRAVENOUS at 21:53

## 2024-01-14 RX ADMIN — MORPHINE SULFATE 4 MG: 4 INJECTION, SOLUTION INTRAMUSCULAR; INTRAVENOUS at 21:53

## 2024-01-15 VITALS
OXYGEN SATURATION: 97 % | HEIGHT: 63 IN | RESPIRATION RATE: 28 BRPM | TEMPERATURE: 98.8 F | HEART RATE: 90 BPM | SYSTOLIC BLOOD PRESSURE: 179 MMHG | BODY MASS INDEX: 36.95 KG/M2 | WEIGHT: 208.56 LBS | DIASTOLIC BLOOD PRESSURE: 120 MMHG

## 2024-01-15 LAB
D-LACTATE SERPL-SCNC: 2.3 MMOL/L (ref 0.5–2)
QT INTERVAL: 471 MS
QTC INTERVAL: 568 MS

## 2024-01-15 RX ORDER — ONDANSETRON 4 MG/1
4 TABLET, ORALLY DISINTEGRATING ORAL EVERY 8 HOURS PRN
Qty: 14 TABLET | Refills: 0 | Status: SHIPPED | OUTPATIENT
Start: 2024-01-15

## 2024-01-15 NOTE — ED NOTES
Pt comes to the ER tonight for shortness of breath and n/v. Pt states that this started 3 to 4 hrs ago. EMS gave 4mg of zofran and 1 nitro in route with no relief. Pt normally 3 liters of oxygen nasal cannula at home.

## 2024-01-15 NOTE — ED PROVIDER NOTES
Time: 11:19 PM EST  Date of encounter:  2024  Independent Historian/Clinical History and Information was obtained by:   Patient    History is limited by: N/A    Chief Complaint: Shortness of air, nausea vomiting      History of Present Illness:  Patient is a 53 y.o. year old female who presents to the emergency department for evaluation of shortness of breath with nausea vomiting.  Patient reports symptoms today.    HPI    Patient Care Team  Primary Care Provider: Sonia Mosquera APRN    Past Medical History:     Allergies   Allergen Reactions    Tramadol Anaphylaxis    Tramadol Hcl Anaphylaxis    Moxifloxacin Hives    Sulfa Antibiotics Hives     Past Medical History:   Diagnosis Date    Anemia     Arthritis     Diabetes     Essential hypertension 2021    History of pulmonary embolism     Lumbago     Low back pain    Mild left ventricular hypertrophy 2021    Mixed hyperlipidemia 2021    Obstructive sleep apnea     Reflux esophagitis     Seasonal allergies      Past Surgical History:   Procedure Laterality Date    APPENDECTOMY  2010     SECTION      , , ,     COLONOSCOPY      2019    COLONOSCOPY N/A 2022    Procedure: COLONOSCOPY;  Surgeon: Radha James MD;  Location: [facility] ENDOSCOPY;  Service: Gastroenterology;  Laterality: N/A;  COLON POLYP     ENDOSCOPY  2019    ENDOSCOPY N/A 2023    Procedure: ESOPHAGOGASTRODUODENOSCOPY WITH BIPOSIES;  Surgeon: Coy Patrick MD;  Location: [facility] ENDOSCOPY;  Service: Gastroenterology;  Laterality: N/A;  PRIOR LAPBAND, GASTRITIS    HEMORRHOIDECTOMY N/A 2022    Procedure: HEMORRHOIDECTOMY;  Surgeon: Remi Reeder MD;  Location: [facility] MAIN OR;  Service: General;  Laterality: N/A;    HERNIA REPAIR      , 2006, 2007, 2008    HYSTERECTOMY  2004    LAPAROSCOPIC GASTRIC BANDING      OTHER SURGICAL HISTORY      Metal implants     Family History   Problem Relation Age of Onset    Heart  disease Mother     Diabetes Mother         Unspecified type    Arthritis Mother     Heart disease Father     Diabetes Son         Unspecified type    Malig Hyperthermia Neg Hx        Home Medications:  Prior to Admission medications    Medication Sig Start Date End Date Taking? Authorizing Provider   ALPRAZolam (XANAX) 1 MG tablet Take 1 tablet by mouth 3 (Three) Times a Day As Needed. for anxiety 7/26/23   Silas Bhatt MD   amLODIPine (NORVASC) 5 MG tablet Take 1 tablet by mouth Daily. 9/21/23   Silas Bhatt MD   aspirin 81 MG EC tablet Take 1 tablet by mouth Daily.    Silas Bhatt MD   cetirizine (zyrTEC) 10 MG tablet Take 1 tablet by mouth Daily.    Silas Bhatt MD   DULoxetine (CYMBALTA) 60 MG capsule Take 1 capsule by mouth Every 12 (Twelve) Hours. 10/26/23   Silas Bhatt MD   furosemide (LASIX) 20 MG tablet Take 1 tablet by mouth Daily. 11/16/23   Marleny Gomez APRN   gabapentin (NEURONTIN) 300 MG capsule Take 1 capsule by mouth 3 (Three) Times a Day.    Silas Bhatt MD   HYDROcodone-acetaminophen (NORCO) 7.5-325 MG per tablet Take 1 tablet by mouth Every 8 (Eight) Hours As Needed for Moderate Pain, Severe Pain or Mild Pain.    Silas Bhatt MD   Keppra 750 MG tablet 1 tablet Every 12 (Twelve) Hours.    Silas Bhatt MD   metoprolol succinate XL (TOPROL-XL) 25 MG 24 hr tablet Take 1 tablet by mouth Daily. 11/16/23   Marleny Gomez APRN   potassium chloride 10 MEQ CR tablet Take 1 tablet by mouth Daily. 11/14/23   Marleny Gomez APRN   pravastatin (PRAVACHOL) 40 MG tablet Take 1 tablet by mouth Daily.    Silas Bhatt MD   QUEtiapine XR (SEROquel XR) 300 MG 24 hr tablet Take 1 tablet by mouth Every Evening for 30 days.  Patient taking differently: Take 2 tablets by mouth Every Night. 7/21/23 12/30/23  Dung Hall MD   zolpidem (AMBIEN) 10 MG tablet Take 1 tablet by mouth At Night As Needed. 8/23/23   Miller  "MD Silas        Social History:   Social History     Tobacco Use    Smoking status: Every Day     Packs/day: 0.50     Years: 23.00     Additional pack years: 0.00     Total pack years: 11.50     Types: Cigarettes     Start date: 1999     Passive exposure: Current    Smokeless tobacco: Never    Tobacco comments:     Smoked 11-20 years. last 7/24/22 1700   Vaping Use    Vaping Use: Never used   Substance Use Topics    Alcohol use: Not Currently     Comment: Occasionally drinks, less than 1 drink per day, has been drinking for less than 1 year    Drug use: Never         Review of Systems:  Review of Systems   Constitutional:  Negative for chills and fever.   HENT:  Negative for congestion, rhinorrhea and sore throat.    Eyes:  Negative for pain and visual disturbance.   Respiratory:  Positive for shortness of breath. Negative for apnea, cough and chest tightness.    Cardiovascular:  Negative for chest pain and palpitations.   Gastrointestinal:  Positive for vomiting. Negative for abdominal pain, diarrhea and nausea.   Genitourinary:  Negative for difficulty urinating and dysuria.   Musculoskeletal:  Negative for joint swelling and myalgias.   Skin:  Negative for color change.   Neurological:  Negative for seizures and headaches.   Psychiatric/Behavioral: Negative.     All other systems reviewed and are negative.       Physical Exam:  BP (!) 179/120   Pulse 90   Temp 98.8 °F (37.1 °C) (Oral)   Resp 28   Ht 160 cm (63\")   Wt 94.6 kg (208 lb 8.9 oz)   SpO2 97%   BMI 36.94 kg/m²     Physical Exam  Vitals and nursing note reviewed.   Constitutional:       General: She is not in acute distress.     Appearance: Normal appearance. She is not toxic-appearing.   HENT:      Head: Normocephalic and atraumatic.      Jaw: There is normal jaw occlusion.   Eyes:      General: Lids are normal.      Extraocular Movements: Extraocular movements intact.      Conjunctiva/sclera: Conjunctivae normal.      Pupils: Pupils are " equal, round, and reactive to light.   Cardiovascular:      Rate and Rhythm: Normal rate and regular rhythm.      Pulses: Normal pulses.      Heart sounds: Normal heart sounds.   Pulmonary:      Effort: Pulmonary effort is normal. No respiratory distress.      Breath sounds: Normal breath sounds. No wheezing or rhonchi.   Abdominal:      General: Abdomen is flat.      Palpations: Abdomen is soft.      Tenderness: There is no abdominal tenderness. There is no guarding or rebound.   Musculoskeletal:         General: Normal range of motion.      Cervical back: Normal range of motion and neck supple.      Right lower leg: Edema present.      Left lower leg: Edema present.   Skin:     General: Skin is warm and dry.   Neurological:      Mental Status: She is alert and oriented to person, place, and time. Mental status is at baseline.   Psychiatric:         Mood and Affect: Mood normal.                  Procedures:  Procedures      Medical Decision Making:      Comorbidities that affect care:    Diabetes, hypertension, hyperlipidemia, seizure disorder    External Notes reviewed:    None      The following orders were placed and all results were independently analyzed by me:  Orders Placed This Encounter   Procedures    Rapid Strep A Screen - Swab, Throat    COVID-19, FLU A/B, RSV PCR 1 HR TAT - Swab, Nasopharynx    Blood Culture - Blood,    Blood Culture - Blood,    Beta Strep Culture, Throat - Swab, Throat    XR Chest 1 View    [place] Draw    Comprehensive Metabolic Panel    BNP    Single High Sensitivity Troponin T    Lactic Acid, Plasma    CBC Auto Differential    STAT Lactic Acid, Reflex    STAT Lactic Acid, Reflex    NPO Diet NPO Type: Strict NPO    Undress & Gown    Continuous Pulse Oximetry    Vital Signs    Oxygen Therapy- Nasal Cannula; Titrate 1-6 LPM Per SpO2; 90 - 95%    ECG 12 Lead ED Triage Standing Order; SOA    Insert Peripheral IV    CBC & Differential    Green Top (Gel)    Lavender Top    Gold Top - SST     Light Blue Top       Medications Given in the Emergency Department:  Medications   sodium chloride 0.9 % flush 10 mL (has no administration in time range)   morphine injection 4 mg (4 mg Intravenous Given 1/14/24 2153)   sodium chloride 0.9 % bolus 500 mL (0 mL Intravenous Stopped 1/14/24 2345)        ED Course:         Labs:    Lab Results (last 24 hours)       Procedure Component Value Units Date/Time    CBC & Differential [[id number]]  (Abnormal) Collected: 01/14/24 2054    Specimen: Blood Updated: 01/14/24 2104    Narrative:      The following orders were created for panel order CBC & Differential.  Procedure                               Abnormality         Status                     ---------                               -----------         ------                     CBC Auto Differential[[id number]]        Abnormal            Final result                 Please view results for these tests on the individual orders.    Comprehensive Metabolic Panel [[id number]]  (Abnormal) Collected: 01/14/24 2054    Specimen: Blood Updated: 01/14/24 2135     Glucose 168 mg/dL      BUN 6 mg/dL      Creatinine 0.75 mg/dL      Sodium 137 mmol/L      Potassium 3.4 mmol/L      Chloride 95 mmol/L      CO2 19.4 mmol/L      Calcium 9.4 mg/dL      Total Protein 9.0 g/dL      Albumin 3.9 g/dL      ALT (SGPT) 5 U/L      AST (SGOT) 9 U/L      Alkaline Phosphatase 124 U/L      Total Bilirubin 0.3 mg/dL      Globulin 5.1 gm/dL      A/G Ratio 0.8 g/dL      BUN/Creatinine Ratio 8.0     Anion Gap 22.6 mmol/L      eGFR 95.3 mL/min/1.73     Narrative:      GFR Normal >60  Chronic Kidney Disease <60  Kidney Failure <15      BNP [[id number]]  (Normal) Collected: 01/14/24 2054    Specimen: Blood Updated: 01/14/24 2121     proBNP 78.9 pg/mL     Narrative:      This assay is used as an aid in the diagnosis of individuals suspected of having heart failure. It can be used as an aid in the diagnosis of acute decompensated heart failure (ADHF) in  patients presenting with signs and symptoms of ADHF to the emergency department (ED). In addition, NT-proBNP of <300 pg/mL indicates ADHF is not likely.    Age Range Result Interpretation  NT-proBNP Concentration (pg/mL:      <50             Positive            >450                   Gray                 300-450                    Negative             <300    50-75           Positive            >900                  Gray                300-900                  Negative            <300      >75             Positive            >1800                  Gray                300-1800                  Negative            <300    Single High Sensitivity Troponin T [[id number]]  (Normal) Collected: 01/14/24 2054    Specimen: Blood Updated: 01/14/24 2121     HS Troponin T 7 ng/L     Narrative:      High Sensitive Troponin T Reference Range:  <14.0 ng/L- Negative Female for AMI  <22.0 ng/L- Negative Male for AMI  >=14 - Abnormal Female indicating possible myocardial injury.  >=22 - Abnormal Male indicating possible myocardial injury.   Clinicians would have to utilize clinical acumen, EKG, Troponin, and serial changes to determine if it is an Acute Myocardial Infarction or myocardial injury due to an underlying chronic condition.         CBC Auto Differential [[id number]]  (Abnormal) Collected: 01/14/24 2054    Specimen: Blood Updated: 01/14/24 2104     WBC 6.32 10*3/mm3      RBC 4.24 10*6/mm3      Hemoglobin 10.2 g/dL      Hematocrit 33.7 %      MCV 79.5 fL      MCH 24.1 pg      MCHC 30.3 g/dL      RDW 16.6 %      RDW-SD 47.0 fl      MPV 10.6 fL      Platelets 335 10*3/mm3      Neutrophil % 73.3 %      Lymphocyte % 19.0 %      Monocyte % 6.0 %      Eosinophil % 0.9 %      Basophil % 0.3 %      Immature Grans % 0.5 %      Neutrophils, Absolute 4.63 10*3/mm3      Lymphocytes, Absolute 1.20 10*3/mm3      Monocytes, Absolute 0.38 10*3/mm3      Eosinophils, Absolute 0.06 10*3/mm3      Basophils, Absolute 0.02 10*3/mm3      Immature  Grans, Absolute 0.03 10*3/mm3      nRBC 0.0 /100 WBC     Blood Culture - Blood, Arm, Right [[id number]] Collected: 01/14/24 2054    Specimen: Blood from Arm, Right Updated: 01/14/24 2059    Blood Culture - Blood, Arm, Left [[id number]] Collected: 01/14/24 2054    Specimen: Blood from Arm, Left Updated: 01/14/24 2059    Lactic Acid, Plasma [[id number]]  (Abnormal) Collected: 01/14/24 2054    Specimen: Blood Updated: 01/14/24 2131     Lactate 7.1 mmol/L     Rapid Strep A Screen - Swab, Throat [[id number]]  (Normal) Collected: 01/14/24 2056    Specimen: Swab from Throat Updated: 01/14/24 2113     Strep A Ag Negative    COVID-19, FLU A/B, RSV PCR 1 HR TAT - Swab, Nasopharynx [[id number]]  (Normal) Collected: 01/14/24 2056    Specimen: Swab from Nasopharynx Updated: 01/14/24 2139     COVID19 Not Detected     Influenza A PCR Not Detected     Influenza B PCR Not Detected     RSV, PCR Not Detected    Narrative:      Fact sheet for providers: https://www.fda.gov/media/824932/download    Fact sheet for patients: https://www.fda.gov/media/812608/download    Test performed by PCR.    Beta Strep Culture, Throat - Swab, Throat [[id number]] Collected: 01/14/24 2056    Specimen: Swab from Throat Updated: 01/14/24 2113    STAT Lactic Acid, Reflex [[id number]]  (Abnormal) Collected: 01/14/24 2345    Specimen: Blood Updated: 01/15/24 0017     Lactate 2.3 mmol/L              Imaging:    XR Chest 1 View    Result Date: 1/14/2024  PROCEDURE: XR CHEST 1 VW  COMPARISON: 12/30/2023.  INDICATIONS: SOA/SOB/shortness of air/shortness of breath.  FINDINGS: A single AP upright portable view of the chest is provided for review.  Bilateral infiltrates are seen.  The findings may represent pulmonary edema with vascular congestion.  Infectious multifocal pneumonia cannot be excluded.  No pneumothorax.  No pneumomediastinum.  External artifacts obscure detail.  Mild-to-moderate cardiomegaly is suspected, as before.  The patient has undergone median  sternotomy and suspected CABG (coronary artery bypass graft) surgery.  There may be a small right pleural effusion.  Minimal if any left pleural effusion is suggested.  The thoracic aorta is atherosclerotic.       Decreased bilateral infiltrates are noted, which may represent interval improvement in pulmonary edema since 12/30/2023.  Interval improvement in infectious multifocal pneumonia is also possible.  Mild-to-moderate cardiomegaly is suspected.      Please note that portions of this note were completed with a voice recognition program.  PARVEZ PIERCE JR, MD       Electronically Signed and Approved By: PARVEZ PIERCE JR, MD on 1/14/2024 at 21:08                 Differential Diagnosis and Discussion:    Dyspnea: Differential diagnosis includes but is not limited to metabolic acidosis, neurological disorders, psychogenic, asthma, pneumothorax, upper airway obstruction, COPD, pneumonia, noncardiogenic pulmonary edema, interstitial lung disease, anemia, congestive heart failure, and pulmonary embolism  Vomiting: Differential diagnosis includes but is not limited to migraine, labyrinthine disorders, psychogenic, metabolic and endocrine causes, peptic ulcer, gastric outlet obstruction, gastritis, gastroenteritis, appendicitis, intestinal obstruction, paralytic ileus, food poisoning, cholecystitis, acute hepatitis, acute pancreatitis, acute febrile illness, and myocardial infarction.    All labs were reviewed and interpreted by me.  All X-rays impressions were independently interpreted by me.    MDM  Number of Diagnoses or Management Options  Nausea and vomiting, unspecified vomiting type  Diagnosis management comments: In summary this is a 53-year-old female who presents to the emergency department for evaluation of shortness of air with nausea and vomiting.  CBC independently reviewed by me and shows no critical abnormalities.  CMP independently reviewed by me and shows no critical abnormalities.  Patient's nausea  is been controlled with Zofran.  Chest x-ray shows improvement from previous studies.  Initial lactic acid was elevated but has improved after treatment.  I believe the lactic acid was elevated due to the persistent nausea vomiting patient experienced earlier today.  Prescription for Zofran given.  Very strict return to ER and follow-up instructions have been provided to the patient.         Amount and/or Complexity of Data Reviewed  Clinical lab tests: reviewed  Tests in the radiology section of CPT®: reviewed  Tests in the medicine section of CPT®: reviewed                 Patient Care Considerations:    CT CHEST: I considered ordering a CT scan of the chest, however no significant respiratory distress      Consultants/Shared Management Plan:    None    Social Determinants of Health:    Patient is independent, reliable, and has access to care.       Disposition and Care Coordination:    Discharged: I considered escalation of care by admitting this patient for observation, however the patient has improved and is suitable and  stable for discharge.    I have explained the patient´s condition, diagnoses and treatment plan based on the information available to me at this time. I have answered questions and addressed any concerns. The patient has a good  understanding of the patient´s diagnosis, condition, and treatment plan as can be expected at this point. The vital signs have been stable. The patient´s condition is stable and appropriate for discharge from the emergency department.      The patient will pursue further outpatient evaluation with the primary care physician or other designated or consulting physician as outlined in the discharge instructions. They are agreeable to this plan of care and follow-up instructions have been explained in detail. The patient has received these instructions in written format and have expressed an understanding of the discharge instructions. The patient is aware that any  significant change in condition or worsening of symptoms should prompt an immediate return to this or the closest emergency department or call to 911.  I have explained discharge medications and the need for follow up with the patient/caretakers. This was also printed in the discharge instructions. Patient was discharged with the following medications and follow up:      Medication List        New Prescriptions      ondansetron ODT 4 MG disintegrating tablet  Commonly known as: ZOFRAN-ODT  Place 1 tablet on the tongue Every 8 (Eight) Hours As Needed for Nausea or Vomiting.            Changed      QUEtiapine  MG 24 hr tablet  Commonly known as: SEROquel XR  Take 1 tablet by mouth Every Evening for 30 days.  What changed:   how much to take  when to take this               Where to Get Your Medications        These medications were sent to [facility]/pharmacy #[id number] - [place], [place] - [address] - [phone]  - [phone] FX  [address] [place] [address]      Hours: 24-hours Phone: [phone]   ondansetron ODT 4 MG disintegrating tablet      Sonia Mosquera, APRN  [address]  [address]  [place] [address]  [phone]    In 1 week         Final diagnoses:   Nausea and vomiting, unspecified vomiting type        ED Disposition       ED Disposition   Discharge    Condition   Stable    Comment   --               This medical record created using voice recognition software.             Ewdardo Braswell MD  01/15/24 0117

## 2024-01-16 LAB — BACTERIA SPEC AEROBE CULT: NORMAL

## 2024-01-19 LAB
BACTERIA SPEC AEROBE CULT: NORMAL
BACTERIA SPEC AEROBE CULT: NORMAL

## 2024-02-20 NOTE — TELEPHONE ENCOUNTER
Received VM from patient    SW patient. Patient states her pcp wanted her to get in sooner than May. When ask why she said for her CHF. Patient states she does have swelling but it has been that way since September. Christeltent states she also has been haing numbness to hands feet and front chest. Patient states been going on since September. Thor made

## 2024-02-20 NOTE — TELEPHONE ENCOUNTER
PATIENT CALLED IN TO SCHEDULE AN APPT- STATED SHE HAS BEEN DEALING WITH SOME NUMBNESS IN HER FEET AND HANDS SINCE JUNE 2023- STATED SHE HAS HAD SOME FALLS BUT DR LARKIN WAS MADE AWARE - NOT DROPPING THINGS RIGHT NOW-     STATED SHE SAW DR LARKIN IN THE ED IN JULY- CONSULT NOTES IN CHART- 07/13/23    PER CONSULT NOTES-   Plan:   I would think at some point as an outpatient and EMG/NCV would be indicated.  I do not feel surgery on the C6-7-disc herniation is likely to help unless she were to develop pain into the left triceps and middle finger distribution.  I suspect she would benefit from physical therapy and potentially occupational therapy.    PATIENT STATED SHE HAS BEEN DOING PHYSICAL THERAPY- SHE STATED THAT THE PT IS NOT IMPROVING OR DECLINING- JUST STAYING THE SAME. - NO PAIN MANAGEMENT.     SHE STATED SHE DOES HAVE A LAP BAND- GOT WHEN SHE WAS 13.-    SENDING MESSAGE TO ADVISE ON SCHEDULING DUE TO TIME PASSED SINCE HOSPITAL CONSULT.     PLEASE ADVISE- THANK YOU.

## 2024-02-23 NOTE — ED PROVIDER NOTES
Time: 5:00 PM EST  Date of encounter:  2/23/2024  Independent Historian/Clinical History and Information was obtained by:   EMS    History is limited by: Acuity of Condition    Chief Complaint: Cardiac arrest      History of Present Illness:  Patient is a 53 y.o. year old female who presents to the emergency department for evaluation of cardiac arrest    EMS states they were called for seizure-like activity.  On arrival to the scene the patient was breathing spontaneously but was not responsive.  Shortly thereafter she became pulseless and apneic.  EMS initiated CPR attempted intubation but ultimately placed an i-gel.  Patient had several episodes of ventricular fibrillation which were defibrillated patient had a brief episode of return of spontaneous circulation prior to arrival in the emergency department.  Patient received 2 doses of epinephrine prior to arrival.    On arrival the patient was found to be in PEA.      HPI    Patient Care Team  Primary Care Provider: Sonia Mosquera APRN    Past Medical History:     Not on File  No past medical history on file.  No past surgical history on file.  No family history on file.    Home Medications:  Prior to Admission medications    Not on File        Social History:          Review of Systems:  Review of Systems   Unable to perform ROS: Patient unresponsive        Physical Exam:  BP (!) 82/15   Pulse 105   Wt 100 kg (220 lb 14.4 oz)   SpO2 (!) 53%     Physical Exam  Constitutional:       Appearance: She is ill-appearing.      Comments: Is unresponsive CPR in progress with supraglottic airway in place     HENT:      Head: Normocephalic and atraumatic.      Nose: Nose normal.      Mouth/Throat:      Mouth: Mucous membranes are dry.   Eyes:      Comments: Dilated pupil bilaterally   Cardiovascular:      Comments: Mechanical chest compressions in progress    No spontaneous pulse  Pulmonary:      Comments: Supraglottic airway in place bag-valve-mask ventilation  Abdominal:       General: There is distension.   Musculoskeletal:         General: No deformity.   Skin:     Comments: Skin is cool and dry   Neurological:      Comments: GCS = 3 T           Procedures:  Intubation    Date/Time: 2/23/2024 5:42 PM    Performed by: Salinas Reyes MD  Authorized by: Salinas Reyes MD    Consent:     Consent obtained:  Emergent situation  Pre-procedure details:     Indications: cardio/pulmonary arrest      Patient status:  Unresponsive    Look externally: large tongue      Mouth opening - incisor distance:  2 finger widths    Hyoid-mental distance: 2 finger widths      Hyoid-thyroid distance: 1 finger width      Mallampati score:  IV    Obstruction: none      Neck mobility: reduced      Pharmacologic strategy: none      Induction agents:  None    Paralytics:  None  Procedure details:     Preoxygenation:  Supraglottic device    CPR in progress: yes      Number of attempts:  1  Successful intubation attempt details:     Intubation method:  Oral    Intubation technique: video assisted      Laryngoscope blade:  Mac 3    Bougie used: no      Grade view: II      Tube size (mm):  8.0    Tube type:  Cuffed    Tube visualized through cords: yes    Placement assessment:     ETT at teeth/gumline (cm):  24    Tube secured with:  ETT wright    Breath sounds:  Equal and absent over the epigastrium    Placement verification: chest rise, colorimetric ETCO2, direct visualization, equal breath sounds, numeric ETCO2 and waveform ETCO2    Post-procedure details:     Procedure completion:  Tolerated        Medical Decision Making:      Comorbidities that affect care:    History of PE, coronary artery disease, Diabetes, Hypertension    External Notes reviewed:    Telephone Encounter: Telephone communication requesting doctor's appointment sooner than May due to congestive heart failure symptoms 2/20/2024      The following orders were placed and all results were independently analyzed by me:  Orders Placed This  Encounter   Procedures    Intubation    ABG with Co-Ox and Electrolytes       Medications Given in the Emergency Department:  Medications   EPINEPHrine (ADRENALIN) injection (1 mg Intravenous Given 2/23/24 1639)   sodium bicarbonate injection 8.4% (50 mEq Intravenous Given 2/23/24 1637)   calcium chloride injection (1 g Intravenous Given 2/23/24 1631)   amiodarone (CORDARONE) injection (300 mg Intravenous Given 2/23/24 1641)   tenecteplase (TNKASE) injection (50 mg Intravenous Given 2/23/24 1646)        ED Course:         Labs:    Lab Results (last 24 hours)       Procedure Component Value Units Date/Time    ABG with Co-Ox and Electrolytes [[id number]]  (Abnormal) Collected: 02/23/24 1634    Specimen: Arterial Blood Updated: 02/23/24 1801     pH, Arterial 6.773 pH units      pCO2, Arterial 84.0 mm Hg      pO2, Arterial --     Comment: Value too low to read        HCO3, Arterial 12.0 mmol/L      Base Excess, Arterial -22.3 mmol/L      O2 Saturation, Arterial --     Comment: Value too low to read        Hemoglobin, Blood Gas 8.6 g/dL      Carboxyhemoglobin 1.8 %      Methemoglobin 2.60 %      Oxyhemoglobin 5.2 %      FHHB 90.4 %      Del's Test N/A     Note --     Site Femoral Artery     Modality Bagging     FIO2 100 %      Flow Rate 15 lpm      Sodium, Arterial 139.9 mmol/L      Potassium, Arterial 5.02 mmol/L      Ionized Calcium, Arterial 1.19 mmol/L      Chloride, Arterial 102 mmol/L      Glucose, Arterial 387 mg/dL      Lactate, Arterial 11.70 mmol/L      PO2/FIO2 --             Imaging:    No Radiology Exams Resulted Within Past 24 Hours      Differential Diagnosis and Discussion:    Cardiac Arrest: Differential diagnosis includes but is not limited to ventricular tachycardia, ventricular fibrillation, asystole, PEA, respiratory arrest due to hypoxia or metabolic abnormalities.    All labs were reviewed and interpreted by me.  EKG was interpreted by me.    GENIA       Critical Care Note: Total Critical Care time  of 35 minutes. Total critical care time documented does not include time spent on separately billed procedures for services of nurses or physician assistants. I personally saw and examined the patient. I have reviewed all diagnostic interpretations and treatment plans as written. I was present for the key portions of any procedures performed and the inclusive time noted in any critical care statement. Critical care time includes patient management by me, time spent at the patients bedside,  time to review lab and imaging results, discussing patient care, documentation in the medical record, and time spent with family or caregiver.    Patient was placed on the cardiac monitor after given epinephrine, sodium bicarb, amiodarone, IV calcium, TNKase.  They were monitored for ventricular ectopy, arrhythmia, tachycardia, hypoxia, and changes in blood pressure.  Patient was rechecked several times throughout their stay.        Patient Care Considerations:          Consultants/Shared Management Plan:    None    Social Determinants of Health:    Patient  prior to arrival in the emergency department.      Disposition and Care Coordination:    : The patient  in the emergency department despite resuscitative efforts. Time of Death:         Final diagnoses:   Cardiac arrest        ED Disposition       ED Disposition       Condition   --    Comment   --               This medical record created using voice recognition software.             Salinas Reyes MD  [phone]

## 2024-03-06 LAB — GLUCOSE BLDC GLUCOMTR-MCNC: 508 MG/DL (ref 70–99)

## 2024-11-11 NOTE — PLAN OF CARE
Goal Outcome Evaluation:   Patient A&O X4. Patient medicated X2 for pain. 3L NC.                      PULMONARY FUNCTION TESTING INTERPRETATION        Spirometry:  Forced vital capacity (FVC) is: normal  Forced expiratory volume in 1 second (FEV1) is: decreased   FEV1/FVC ratio: decreased  Significant bronchodilator response? Yes         Flow Volume Loop:  Decreased expiratory flows consistent with obstructive ventilatory pattern        Lung Volume:  Total lung capacity (TLC) is: normal   Residual volume (RV) is: increased         Diffusing Capacity:  Decreased         Interpretation:  Severe obstructive ventilatory pattern.  There is significant bronchodilator response.  Air trapping is present.   Severe decrease in diffusing capacity maybe seen in conditions such as emphysema, interstitial lung disease, pulmonary vascular disorders, anemia.   Correlate clinically.  The PFT raw data is available in [employer] EMR under CHART REVIEW under the PROCEDURES tab. Please click on the the PFT and there should be an attached hyperlink which shows the attached test when clicked on.       Electronically signed by    MINNIE Interiano DO, MPH  Pulmonary Critical Care Medicine  [facility]           done

## (undated) DEVICE — SOLIDIFIER LIQLOC PLS 1500CC BT

## (undated) DEVICE — SKIN PREP TRAY W/CHG: Brand: MEDLINE INDUSTRIES, INC.

## (undated) DEVICE — Device

## (undated) DEVICE — DRAPE,UNDERBUTTOCKS,PCH,STERILE: Brand: MEDLINE

## (undated) DEVICE — LINER SURG CANSTR SXN S/RIGD 1500CC

## (undated) DEVICE — GLV SURG SENSICARE SLT PF LF 6.5 STRL

## (undated) DEVICE — MINOR-LF: Brand: MEDLINE INDUSTRIES, INC.

## (undated) DEVICE — GOWN,REINFRCE,POLY,SIRUS,BREATH SLV,XXLG: Brand: MEDLINE

## (undated) DEVICE — INTENDED FOR TISSUE SEPARATION, AND OTHER PROCEDURES THAT REQUIRE A SHARP SURGICAL BLADE TO PUNCTURE OR CUT.: Brand: BARD-PARKER ® CARBON RIB-BACK BLADES

## (undated) DEVICE — SOL IRRG H2O PL/BG 1000ML STRL

## (undated) DEVICE — LEGGINGS, PAIR, CLEAR, STERILE: Brand: MEDLINE

## (undated) DEVICE — THE SINGLE USE ETRAP – POLYP TRAP IS USED FOR SUCTION RETRIEVAL OF ENDOSCOPICALLY REMOVED POLYPS.: Brand: ETRAP

## (undated) DEVICE — DEV OPN LIGASURE SM/JAW 28D 16.5MM 18.8CM 1P/U

## (undated) DEVICE — DRSNG PAD ABD 8X10IN STRL

## (undated) DEVICE — BRIEF KNIT SEAMLSS PREM 70IN 3XL PK/2

## (undated) DEVICE — GAUZE,SPONGE,4"X4",16PLY,STRL,LF,10/TRAY: Brand: MEDLINE

## (undated) DEVICE — SINGLE-USE BIOPSY FORCEPS: Brand: RADIAL JAW 4

## (undated) DEVICE — STRAP STIRUP SLP RNG 19X3.5IN DISP

## (undated) DEVICE — Device: Brand: DEFENDO AIR/WATER/SUCTION AND BIOPSY VALVE

## (undated) DEVICE — STERILE POLYISOPRENE POWDER-FREE SURGICAL GLOVES WITH EMOLLIENT COATING: Brand: PROTEXIS

## (undated) DEVICE — GLV SURG SENSICARE SLT PF LF 7.5 STRL

## (undated) DEVICE — SOL IRR NACL 0.9PCT BT 1000ML

## (undated) DEVICE — SUT GUT CHRM 4/0 SH 27IN G121H

## (undated) DEVICE — PENCL E/S SMOKEEVAC W/TELESCP CANN

## (undated) DEVICE — CONN JET HYDRA H20 AUXILIARY DISP

## (undated) DEVICE — SNAR POLYP CAPTIFLEX XS/OVL 11X2.4MM 240CM 1P/U

## (undated) DEVICE — SLV SCD KN/LEN ADJ EXPRSS BLENDED MD 1P/U

## (undated) DEVICE — BLCK/BITE BLOX WO/DENTL/RIM W/STRAP 54F